# Patient Record
Sex: FEMALE | Race: BLACK OR AFRICAN AMERICAN | Employment: UNEMPLOYED | URBAN - METROPOLITAN AREA
[De-identification: names, ages, dates, MRNs, and addresses within clinical notes are randomized per-mention and may not be internally consistent; named-entity substitution may affect disease eponyms.]

---

## 2017-01-14 ENCOUNTER — HOSPITAL ENCOUNTER (OUTPATIENT)
Dept: GENERAL RADIOLOGY | Age: 57
Discharge: HOME | End: 2017-01-14
Payer: COMMERCIAL

## 2017-01-14 DIAGNOSIS — J45.41 MODERATE PERSISTENT ASTHMA WITH EXACERBATION: ICD-10-CM

## 2017-01-14 PROCEDURE — 71020 XR CHEST PA LAT: CPT

## 2017-01-25 ENCOUNTER — HOSPITAL ENCOUNTER (OUTPATIENT)
Dept: ULTRASOUND IMAGING | Age: 57
Discharge: HOME | End: 2017-01-25
Attending: FAMILY MEDICINE
Payer: COMMERCIAL

## 2017-01-25 ENCOUNTER — HOSPITAL ENCOUNTER (OUTPATIENT)
Dept: MAMMOGRAPHY | Age: 57
Discharge: HOME | End: 2017-01-25
Attending: FAMILY MEDICINE
Payer: COMMERCIAL

## 2017-01-25 DIAGNOSIS — N63.0 LUMP OR MASS IN BREAST: ICD-10-CM

## 2017-01-25 PROCEDURE — 77065 DX MAMMO INCL CAD UNI: CPT

## 2017-01-25 PROCEDURE — 76642 ULTRASOUND BREAST LIMITED: CPT

## 2017-02-06 ENCOUNTER — OFFICE VISIT (OUTPATIENT)
Dept: OBGYN CLINIC | Age: 57
End: 2017-02-06

## 2017-02-06 VITALS
WEIGHT: 222 LBS | DIASTOLIC BLOOD PRESSURE: 81 MMHG | HEIGHT: 66 IN | SYSTOLIC BLOOD PRESSURE: 129 MMHG | HEART RATE: 104 BPM | BODY MASS INDEX: 35.68 KG/M2

## 2017-02-06 DIAGNOSIS — N82.8 VAGINAL FISTULA: Primary | ICD-10-CM

## 2017-02-06 DIAGNOSIS — N89.8 VAGINAL LESION: ICD-10-CM

## 2017-02-07 DIAGNOSIS — N89.8 VAGINAL LESION: ICD-10-CM

## 2017-02-07 RX ORDER — VALACYCLOVIR HYDROCHLORIDE 1 G/1
1000 TABLET, FILM COATED ORAL DAILY
Qty: 5 TAB | Refills: 0 | Status: SHIPPED | OUTPATIENT
Start: 2017-02-07 | End: 2017-02-09 | Stop reason: SDUPTHER

## 2017-02-07 NOTE — PATIENT INSTRUCTIONS
Injury to the Rectum and Vagina: Care Instructions  Your Care Instructions  Injury to the rectum and vagina can cause many problems. These problems may include rectal or vaginal bleeding, infection, constipation, pain, or leaking of stool. The injury can be caused by an accident, childbirth, or physical or sexual abuse. Medicines can treat pain. They can also prevent infection. Surgery may be needed to treat severe injuries. Follow-up care is a key part of your treatment and safety. Be sure to make and go to all appointments, and call your doctor if you are having problems. It's also a good idea to know your test results and keep a list of the medicines you take. How can you care for yourself at home? · Be safe with medicines. Read and follow all instructions on the label. ¨ If the doctor gave you a prescription medicine for pain, take it as prescribed. ¨ If you are not taking a prescription pain medicine, ask your doctor if you can take an over-the-counter medicine. · If your doctor suggests, sit in a few inches of water (sitz bath) 3 times a day and after bowel movements. The warm water helps with pain and itching. · If you do not have a safe place to stay, tell your doctor. When should you call for help? Call 911 anytime you think you may need emergency care. For example, call if:  · You passed out (lost consciousness). · You feel that you are in danger. Call your doctor now or seek immediate medical care if:  · You have new or worse rectal or vaginal bleeding. · You are dizzy or lightheaded, or you feel like you may faint. · You have a fever. · You have sudden, severe pain in your belly or pelvis. Watch closely for changes in your health, and be sure to contact your doctor if:  · You need support for domestic violence or sexual abuse. · You do not get better as expected. Where can you learn more? Go to http://faye-jyada.info/.   Enter P687 in the search box to learn more about \"Injury to the Rectum and Vagina: Care Instructions. \"  Current as of: May 27, 2016  Content Version: 11.1  © 6257-5004 [employer], [employer]. Care instructions adapted under license by [employer] (which disclaims liability or warranty for this information). If you have questions about a medical condition or this instruction, always ask your healthcare professional. [address] any warranty or liability for your use of this information.

## 2017-02-07 NOTE — PROGRESS NOTES
SUBJ: Ms. Kadie Bush is a 49 y.o. y/o female, , who presents today for     Chief Complaint   Patient presents with    Vaginal Pain     vaginal cyst       Her LMP was No LMP recorded. Patient is not currently having periods (Reason: Menopause). Kelly Navarro is a new patient to our practice. She c/o a vaginal abscess that has caused her pain for approx 1 week. These abscesses reoccur approx every 3 years and then self-resolve. This particular lesion was located on her labia and was \"enlarged to the size of a golfball then started to drain pus on its own\". She was evaluated by her PCP for this concern on 2/3/17 and treated with a ABX course of ciprofloxacin. Today, she states that the lesion is almost gone and no longer causes her pain or is draining. 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 Per her report, she experienced a 3rd or 4th degree repair following the vaginal delivery of her first child and this eventually caused her fistula. She recalls a time where \"poop came from her vagina\" many years ago but denies this ever occurring again, she has seen 3 different GI specialists; however, none have addressed her concerns so she desires to follow up here. She denies pain, abnormal bleeding or other  symptoms today.      Past Medical History   Diagnosis Date    Asthma     Chest pain, unspecified      Possible Angina, GERD, chest wall pains, recurrent pains, possible atypical angina, happens at rest, abnormal nuc scan in past, discussed risk benefit options of cardiac cath/pci, she wants to think it over, add sl ntg    Coronary atherosclerosis of native coronary artery      Abnormal NUC scan, patient's symptoms are better, will start meds and monitor    Hypercholesterolemia     Obesity, unspecified      Discussed diet    Other and unspecified hyperlipidemia      F/u per pmd on zocor    Pain of right thumb     Trigger thumb of right hand       Past Surgical History   Procedure Laterality Date    Hx  section      Hx cholecystectomy       Family History   Problem Relation Age of Onset    Hypertension Mother     Diabetes Father     Colon Cancer Father     Hypertension Maternal Grandmother     Heart Disease Neg Hx      no family history of heart disease     Social History     Social History    Marital status: LEGALLY      Spouse name: N/A    Number of children: N/A    Years of education: N/A     Social History Main Topics    Smoking status: Former Smoker     Quit date: 2/25/1995    Smokeless tobacco: Never Used    Alcohol use 0.0 oz/week     0 Standard drinks or equivalent per week      Comment: socially    Drug use: No    Sexual activity: Yes     Partners: Male     Birth control/ protection: None     Other Topics Concern    None     Social History Narrative         PE:   Visit Vitals    /81 (BP 1 Location: Right arm, BP Patient Position: Sitting)    Pulse (!) 104    Ht 5' 6\" (1.676 m)    Wt 222 lb (100.7 kg)    BMI 35.83 kg/m2       Pt is a well developed female who is alert and oriented x 3. CVS exam: normal rate, regular rhythm, normal S1, S2, no murmurs, rubs, clicks or gallops. Lungs: clear bilaterally to auscultation, no wheezing, rhonci or rales  Pelvic exam: VULVA: 3 small vulvar lesion <1 cm each located on labia minora, vesicles, tender on palpation, VAGINA: normal appearing vagina with normal color and discharge, no lesions, CERVIX: normal appearing cervix without discharge or lesions, cervical motion tenderness absent, UTERUS: uterus is normal size, shape, consistency and nontender, ADNEXA: normal adnexa in size, nontender and no masses, RECTAL: normal rectal, no masses, normal sphincter tone. Assessment/Plan:       1. Vaginal lesion  Discussed findings on exam appear herpetic, consistent with pain and pustular appearance  Patient denies hx of HSV 2  Explained HSV transmission  - CULTURE, HSV W/ TYPING;  Future --> pending  - valACYclovir (VALTREX) 1 gram tablet; Take 1 Tab by mouth daily for 5 days. Dispense: 5 Tab; Refill: 0    2. Vaginal fistula  Will await US results to learn if fistula present  Discussed GI referral  - [address];  Future    Instructed to visit ED if bloody stools or pain  She verbalizes understanding  RTC in 2 wks to f/u and results    >50% of this visit spent in counseling

## 2017-02-09 DIAGNOSIS — B00.9 HSV-2 (HERPES SIMPLEX VIRUS 2) INFECTION: Primary | ICD-10-CM

## 2017-02-09 LAB — HSV SPEC CULT: ABNORMAL

## 2017-02-09 RX ORDER — VALACYCLOVIR HYDROCHLORIDE 1 G/1
1000 TABLET, FILM COATED ORAL DAILY
Qty: 5 TAB | Refills: 4 | Status: SHIPPED | OUTPATIENT
Start: 2017-02-09 | End: 2017-02-09 | Stop reason: CLARIF

## 2017-02-09 RX ORDER — VALACYCLOVIR HYDROCHLORIDE 1 G/1
1000 TABLET, FILM COATED ORAL DAILY
Qty: 5 TAB | Refills: 4 | Status: SHIPPED | OUTPATIENT
Start: 2017-02-09 | End: 2017-02-14

## 2017-02-09 NOTE — PROGRESS NOTES
Please notify patient of HSV results. She was treated prophylactically at last visit. Will send refills to pharmacy in case of recurrent outbreak.

## 2017-02-16 ENCOUNTER — HOSPITAL ENCOUNTER (OUTPATIENT)
Dept: ULTRASOUND IMAGING | Age: 57
Discharge: HOME | End: 2017-02-16
Attending: ADVANCED PRACTICE MIDWIFE
Payer: COMMERCIAL

## 2017-02-16 DIAGNOSIS — N82.8 VAGINAL FISTULA: ICD-10-CM

## 2017-02-16 PROCEDURE — 76830 TRANSVAGINAL US NON-OB: CPT

## 2017-02-21 ENCOUNTER — TELEPHONE (OUTPATIENT)
Dept: OBGYN CLINIC | Age: 57
End: 2017-02-21

## 2017-02-22 ENCOUNTER — TELEPHONE (OUTPATIENT)
Dept: OBGYN CLINIC | Age: 57
End: 2017-02-22

## 2017-02-27 ENCOUNTER — OFFICE VISIT (OUTPATIENT)
Dept: SURGERY | Age: 57
End: 2017-02-27

## 2017-02-27 VITALS
WEIGHT: 227 LBS | BODY MASS INDEX: 36.48 KG/M2 | SYSTOLIC BLOOD PRESSURE: 142 MMHG | DIASTOLIC BLOOD PRESSURE: 78 MMHG | OXYGEN SATURATION: 93 % | HEART RATE: 86 BPM | HEIGHT: 66 IN | RESPIRATION RATE: 17 BRPM | TEMPERATURE: 98.6 F

## 2017-02-27 DIAGNOSIS — N82.3 RECTOVAGINAL FISTULA: Primary | ICD-10-CM

## 2017-02-27 NOTE — MR AVS SNAPSHOT
Visit Information Date & Time Provider Department Dept. Phone Encounter #  
 2/27/2017  2:15 PM Lane Vázquez MD [address] [place], [place] [phone] [id number] Your Appointments 3/23/2017 11:00 AM  
ESTABLISHED PATIENT with Mike Hussein MD  
[facility]) Appt Note: 6 months; 6 months [address] [facility], [address] [place] [address] Afia Burleson, [address] [place] [address] Upcoming Health Maintenance Date Due Hepatitis C Screening 1960 Pneumococcal 19-64 Medium Risk (1 of 1 - PPSV23) 9/13/1981 DTaP/Tdap/Td series (1 - Tdap) 9/13/1983 PAP AKA CERVICAL CYTOLOGY 9/13/1983 FOBT Q 1 YEAR AGE 50-75 9/13/2012 INFLUENZA AGE 9 TO ADULT 8/1/2016 BREAST CANCER SCRN MAMMOGRAM 1/25/2019 Allergies as of 2/27/2017  Review Complete On: 2/27/2017 By: Lane Vázquez MD  
  
 Severity Noted Reaction Type Reactions Penicillins  02/20/2013    Not Reported This Time Current Immunizations  Never Reviewed No immunizations on file. Not reviewed this visit You Were Diagnosed With   
  
 Codes Comments Rectovaginal fistula    -  Primary ICD-10-CM: N82.3 ICD-9-CM: 619.1 Vitals BP  
  
  
  
  
  
 142/78 (BP 1 Location: Left arm, BP Patient Position: Sitting) Vitals History BMI and BSA Data Body Mass Index Body Surface Area  
 36.64 kg/m 2 2.19 m 2 Preferred Pharmacy Pharmacy Name Phone [id number] [address] [phone] Your Updated Medication List  
  
   
This list is accurate as of: 2/27/17  3:09 PM.  Always use your most recent med list.  
  
  
  
  
 * albuterol 90 mcg/actuation inhaler Commonly known as:  PROVENTIL HFA, VENTOLIN HFA, PROAIR HFA Take  by inhalation. * albuterol 2.5 mg /3 mL (0.083 %) nebulizer solution Commonly known as:  PROVENTIL VENTOLIN  
by Nebulization route once. aspirin 81 mg tablet Take 81 mg by mouth daily. atorvastatin 20 mg tablet Commonly known as:  LIPITOR Take 1 Tab by mouth daily. BIOTIN PO Take 1,000 mcg by mouth. cholecalciferol 1,000 unit Cap Commonly known as:  VITAMIN D3 Take  by mouth. HYDROcodone-acetaminophen 7.5-325 mg per tablet Commonly known as:  Alicia Parisian Take 1-2 Tabs by mouth nightly as needed for Pain. Max Daily Amount: 2 Tabs. metoprolol succinate 25 mg XL tablet Commonly known as:  TOPROL-XL Take 1 Tab by mouth daily. mometasone 220 mcg (60 doses) inhaler Commonly known as:  Anne Lofts Take  by inhalation. montelukast 10 mg tablet Commonly known as:  SINGULAIR Take 10 mg by mouth daily. MULTI VITAMIN PO Take  by mouth. nitroglycerin 0.4 mg SL tablet Commonly known as:  NITROSTAT  
1 Tab by SubLINGual route every five (5) minutes as needed for Chest Pain.  
  
 vitamin c-vitamin e Cap Take  by mouth. zinc 50 mg Tab tablet Take  by mouth daily. * Notice: This list has 2 medication(s) that are the same as other medications prescribed for you. Read the directions carefully, and ask your doctor or other care provider to review them with you. Introducing [facility]! Kyle Bull introduces [employer] patient portal. Now you can access parts of your medical record, email your doctor's office, and request medication refills online. 1. In your internet browser, go to [email or web address] 2. Click on the First Time User? Click Here link in the Sign In box. You will see the New Member Sign Up page. 3. Enter your [employer] Access Code exactly as it appears below. You will not need to use this code after youve completed the sign-up process. If you do not sign up before the expiration date, you must request a new code. · [employer] Access Code: [email or web address] Expires: 4/14/2017 11:55 AM 
 
4. Enter the last four digits of your Social Security Number (xxxx) and Date of Birth (mm/dd/yyyy) as indicated and click Submit. You will be taken to the next sign-up page. 5. Create a FriendsClear ID. This will be your [employer] login ID and cannot be changed, so think of one that is secure and easy to remember. 6. Create a [employer] password. You can change your password at any time. 7. Enter your Password Reset Question and Answer. This can be used at a later time if you forget your password. 8. Enter your e-mail address. You will receive e-mail notification when new information is available in [address] 9. Click Sign Up. You can now view and download portions of your medical record. 10. Click the Download Summary menu link to download a portable copy of your medical information. If you have questions, please visit the Frequently Asked Questions section of the [employer] website. Remember, [employer] is NOT to be used for urgent needs. For medical emergencies, dial 911. Now available from your iPhone and Android! Please provide this summary of care documentation to your next provider. Your primary care clinician is listed as Rhett Johns. If you have any questions after today's visit, please call [phone].

## 2017-02-27 NOTE — PROGRESS NOTES
HPI: Madelin Palomo is a 49 y.o. female presenting with chief complain of rectovaginal fistula. This occurred after the delivery of her first child. She had 3 repairs. It recurred a year later and have a second repair. Most recently she has developed abscesses which have been drained every 2-3 years. This sounds as if they have been drained transvaginally by a gynecologist.  She denies abdominal pain nausea or vomiting or recent weight loss. She has had 3 bowel movements per day denies constipation or diarrhea. Her last colonoscopy was in  and was negative. She denies rectal bleeding. She denies fecal incontinence. She denies feces per vagina. She is a  with 1 spontaneous delivery and 3 C-sections. She has occasionally urinary frequency.     Past Medical History:   Diagnosis Date    Asthma     Chest pain, unspecified     Possible Angina, GERD, chest wall pains, recurrent pains, possible atypical angina, happens at rest, abnormal nuc scan in past, discussed risk benefit options of cardiac cath/pci, she wants to think it over, add sl ntg    Coronary atherosclerosis of native coronary artery     Abnormal NUC scan, patient's symptoms are better, will start meds and monitor    Heart murmur     Hypercholesterolemia     Obesity, unspecified     Discussed diet    Other and unspecified hyperlipidemia     F/u per pmd on zocor    Pain of right thumb     Trigger thumb of right hand        Past Surgical History:   Procedure Laterality Date    HX  SECTION      HX CHOLECYSTECTOMY      HX COLONOSCOPY         Family History   Problem Relation Age of Onset    Hypertension Mother     Diabetes Father     Colon Cancer Father     Cancer Father      prosate    Hypertension Maternal Grandmother     Heart Disease Neg Hx      no family history of heart disease       Social History     Social History    Marital status: LEGALLY      Spouse name: N/A    Number of children: N/A    Years of education: N/A     Social History Main Topics    Smoking status: Former Smoker     Quit date: 2/25/1995    Smokeless tobacco: Never Used    Alcohol use 0.0 oz/week     0 Standard drinks or equivalent per week      Comment: socially    Drug use: No    Sexual activity: Yes     Partners: Male     Birth control/ protection: None     Other Topics Concern    None     Social History Narrative       Review of Systems - Review of Systems   Constitutional: Positive for chills, fever and malaise/fatigue. Negative for diaphoresis and weight loss. HENT: Negative for congestion, ear discharge, ear pain, hearing loss, nosebleeds, sore throat and tinnitus. Eyes: Positive for pain. Negative for blurred vision, double vision, photophobia, discharge and redness. Right eye pain with headache only   Respiratory: Positive for wheezing. Negative for cough, hemoptysis, sputum production, shortness of breath and stridor. Cardiovascular: Negative. Gastrointestinal: Negative. Genitourinary: Negative. Musculoskeletal: Negative. Skin: Positive for itching and rash. excema   Neurological: Positive for headaches. Negative for dizziness, tingling, tremors, sensory change, speech change, focal weakness, seizures, loss of consciousness and weakness. Endo/Heme/Allergies: Negative. Psychiatric/Behavioral: Negative. Outpatient Prescriptions Marked as Taking for the 2/27/17 encounter (Office Visit) with Axel Gill MD   Medication Sig Dispense Refill    MULTIVIT &MINERALS/FERROUS FUM (MULTI VITAMIN PO) Take  by mouth.  vitamin c-vitamin e cap Take  by mouth.  montelukast (SINGULAIR) 10 mg tablet Take 10 mg by mouth daily.  zinc 50 mg Tab Take  by mouth daily.          Allergies   Allergen Reactions    Penicillins Not Reported This Time       Vitals:    02/27/17 1423   BP: 142/78   Pulse: 86   Resp: 17   Temp: 98.6 °F (37 °C)   TempSrc: Oral   SpO2: 93%   Weight: 103 kg (227 lb)   Height: 5' 6\" (1.676 m)   PainSc:   0 - No pain       Physical Exam   Constitutional: She appears well-developed and well-nourished. HENT:   Head: Normocephalic and atraumatic. Eyes: Conjunctivae and EOM are normal.   Abdominal: Soft. She exhibits no distension. There is no tenderness. Musculoskeletal: Normal range of motion. Lymphadenopathy:     She has no cervical adenopathy. Right: No inguinal adenopathy present. Left: No inguinal adenopathy present. Neurological: She exhibits normal muscle tone. Skin: Skin is warm and dry. No rash noted. Psychiatric: She has a normal mood and affect. Her speech is normal.   Rectum: no external hemorrhoids, fissure, secondary fistulous opening  RALF good tone, no mass  Anoscopy: no visible internal opening, mild internal hemorrhoids L lateral. R posterior  Vaginal wall: no clear external opening    Assessment / Plan    Possible rectovaginal fistula with recurrent abscesses  OR for EUA, possible seton  Cardiac clearance given prior cardiac issues    The diagnoses and plan were discussed with the patient. All questions answered. Plan of care agreed to by all concerned.

## 2017-02-27 NOTE — LETTER
2/27/2017 3:11 PM 
 
Patient:  Portia Ralph YOB: 1960 Date of Visit: 2/27/2017 Wilfredo Ordoñez MD 
[address] [place] [address] [address] [address] VIA Facsimile: [phone] Alondra Slaughter, [address] [facility] [place] [address] VIA In Basket Dear Mary Grace Ann, I saw Julio Carroll in the office today for persistent abscesses in the rectovaginal area. She has a history of rectovaginal fistula status post multiple prior repairs, however these were several years ago. More currently she has had issues with recurrent abscesses requiring drainage. On exam she has no clear fistula but we will proceed to the operating room for exam under anesthesia to see if we can define it. Thank you very much for your referral of Ms. Roland Carrion. If you have questions, please do not hesitate to call me. I look forward to following Ms. Jaffe along with you and will keep you updated as to her progress. Sincerely, Ilene Romo MD

## 2017-03-23 ENCOUNTER — OFFICE VISIT (OUTPATIENT)
Dept: CARDIOLOGY CLINIC | Age: 57
End: 2017-03-23

## 2017-03-23 VITALS
SYSTOLIC BLOOD PRESSURE: 115 MMHG | HEIGHT: 66 IN | DIASTOLIC BLOOD PRESSURE: 84 MMHG | BODY MASS INDEX: 36.32 KG/M2 | WEIGHT: 226 LBS | HEART RATE: 92 BPM

## 2017-03-23 DIAGNOSIS — I49.3 PVC (PREMATURE VENTRICULAR CONTRACTION): Primary | ICD-10-CM

## 2017-03-23 DIAGNOSIS — Z01.810 PRE-OPERATIVE CARDIOVASCULAR EXAMINATION: ICD-10-CM

## 2017-03-23 DIAGNOSIS — Q21.12 PFO (PATENT FORAMEN OVALE): ICD-10-CM

## 2017-03-23 DIAGNOSIS — E78.00 HYPERCHOLESTEROLEMIA: ICD-10-CM

## 2017-03-23 NOTE — PROGRESS NOTES
1. Have you been to the ER, urgent care clinic since your last visit? Hospitalized since your last visit?     no    2. Have you seen or consulted any other health care providers outside of the [address] [place] [address] since your last visit? Include any pap smears or colon screening. Yes, pcp    3. Since your last visit, have you had any of the following symptoms? No cardiac symptoms    4. Have you had any blood work, X-rays or cardiac testing? Yes, [facility]iner    5. Where do you normally have your labs drawn?   Located within [facility]    6. Do you need any refills today?    no

## 2017-03-23 NOTE — PROGRESS NOTES
HISTORY OF PRESENT ILLNESS  Amrik Rojas is a 49 y.o. female. Cholesterol Problem   The history is provided by the patient. This is a chronic problem. The problem occurs constantly. Pertinent negatives include no chest pain, no abdominal pain, no headaches and no shortness of breath. Pre-op Exam   The history is provided by the patient. This is a new problem. Pertinent negatives include no chest pain, no abdominal pain, no headaches and no shortness of breath. Shortness of Breath   The history is provided by the patient. This is a recurrent problem. The problem occurs intermittently. The problem has not changed since onset. Associated symptoms include wheezing. Pertinent negatives include no fever, no headaches, no cough, no sputum production, no hemoptysis, no PND, no orthopnea, no chest pain, no vomiting, no abdominal pain, no rash, no leg swelling and no claudication. The problem's precipitants include exercise. Palpitations    The history is provided by the patient. This is a recurrent problem. The problem has not changed since onset. The problem occurs rarely. Pertinent negatives include no fever, no chest pain, no claudication, no orthopnea, no PND, no abdominal pain, no nausea, no vomiting, no headaches, no dizziness, no weakness, no cough, no hemoptysis, no shortness of breath and no sputum production. Review of Systems   Constitutional: Negative for chills and fever. HENT: Negative for nosebleeds. Eyes: Negative for blurred vision and double vision. Respiratory: Positive for wheezing. Negative for cough, hemoptysis, sputum production and shortness of breath. Cardiovascular: Negative for chest pain, palpitations, orthopnea, claudication, leg swelling and PND. Gastrointestinal: Negative for abdominal pain, heartburn, nausea and vomiting. Musculoskeletal: Negative for myalgias. Skin: Negative for rash. Neurological: Negative for dizziness, weakness and headaches. Endo/Heme/Allergies: Does not bruise/bleed easily. Family History   Problem Relation Age of Onset    Hypertension Mother     Diabetes Father     Colon Cancer Father     Cancer Father      prosate    Hypertension Maternal Grandmother     Heart Disease Neg Hx      no family history of heart disease       Past Medical History:   Diagnosis Date    Asthma     Chest pain, unspecified     Possible Angina, GERD, chest wall pains, recurrent pains, possible atypical angina, happens at rest, abnormal nuc scan in past, discussed risk benefit options of cardiac cath/pci, she wants to think it over, add sl ntg    Coronary atherosclerosis of native coronary artery     Abnormal NUC scan, patient's symptoms are better, will start meds and monitor    Heart murmur     Hypercholesterolemia     Obesity, unspecified     Discussed diet    Other and unspecified hyperlipidemia     F/u per pmd on zocor    Pain of right thumb     Trigger thumb of right hand        Past Surgical History:   Procedure Laterality Date    HX  SECTION      HX CHOLECYSTECTOMY      HX COLONOSCOPY         Social History   Substance Use Topics    Smoking status: Former Smoker     Quit date: 1995    Smokeless tobacco: Never Used    Alcohol use 0.0 oz/week     0 Standard drinks or equivalent per week      Comment: socially       Allergies   Allergen Reactions    Penicillins Not Reported This Time       Current Outpatient Prescriptions   Medication Sig    OMEGA-3 FATTY ACIDS/FISH OIL (FISH OIL EXTRA STRENGTH PO) Take  by mouth daily.  umeclidinium-vilanterol (ANORO ELLIPTA) 62.5-25 mcg/actuation inhaler Take 1 Puff by inhalation daily.  atorvastatin (LIPITOR) 20 mg tablet Take 1 Tab by mouth daily.  nitroglycerin (NITROSTAT) 0.4 mg SL tablet 1 Tab by SubLINGual route every five (5) minutes as needed for Chest Pain.  MULTIVIT &MINERALS/FERROUS FUM (MULTI VITAMIN PO) Take  by mouth.     Cholecalciferol, Vitamin D3, 1,000 unit cap Take  by mouth.  albuterol (PROVENTIL HFA, VENTOLIN HFA, PROAIR HFA) 90 mcg/actuation inhaler Take  by inhalation.  montelukast (SINGULAIR) 10 mg tablet Take 10 mg by mouth daily.  aspirin 81 mg tablet Take 81 mg by mouth daily.  zinc 50 mg Tab Take  by mouth daily.  BIOTIN PO Take 1,000 mcg by mouth. No current facility-administered medications for this visit. Visit Vitals    /84    Pulse 92    Ht 5' 6\" (1.676 m)    Wt 102.5 kg (226 lb)    BMI 36.48 kg/m2         Physical Exam   Constitutional: She is oriented to person, place, and time. She appears well-developed and well-nourished. HENT:   Head: Normocephalic and atraumatic. Eyes: Conjunctivae are normal.   Neck: Neck supple. No JVD present. No tracheal deviation present. No thyromegaly present. Cardiovascular: Normal rate. An irregular rhythm present. PMI is not displaced. Exam reveals no gallop, no S3 and no decreased pulses. No murmur heard. Pulmonary/Chest: No respiratory distress. She has no wheezes. She has no rales. She exhibits no tenderness. Abdominal: Soft. There is no tenderness. Musculoskeletal: She exhibits no edema. Neurological: She is alert and oriented to person, place, and time. Skin: Skin is warm. Psychiatric: She has a normal mood and affect. Ms. Kadie Bush has a reminder for a \"due or due soon\" health maintenance. I have asked that she contact her primary care provider for follow-up on this health maintenance. CARDIOLOGY STUDIES 3/1/2012   Myocardial Perfusion Scan Result Abnormal; ANT ischemia   Echocardiogram - Complete Result Normal EF     SUMMARY:1/2015:echo  Left ventricle: Systolic function was normal. Ejection fraction was  estimated in the range of 55 % to 60 %. There were no regional wall motion  abnormalities. Doppler parameters were consistent with abnormal left  ventricular relaxation (grade 1 diastolic dysfunction).     Atrial septum: Positive bubble study with a right to left shunt induced by  the Valsalva maneuver. Interatrial septum is aneurysmal with a jump  rope-like appearance. Mitral valve: There was mild regurgitation. Tricuspid valve: There was mild regurgitation. Pulmonary artery systolic  pressure: 30 mmHg. NUCLEAR IMAGIN2015         Findings:   1. Stress images reveal small reduced Myoview uptake in moderate sized area of anterior wall seen in short axis and vertical long axis views. 2. Resting images have no evidence of redistribution in the anterior wall. 3. Gated images reveal normal wall motion and ejection fraction is calculated a 64%. Conclusion:   1. Normal scan. 2. Evidence of a fixed anterior defect is most likely due to soft tissue attenuation. 3. Normal wall motion and ejection fraction. 4. Low risk scan. I have personally reviewed patients ekg done at other facility. Sr,pvc-2016  I Have personally reviewed recent relevant labs available and discussed with patient  2016  High ldl  Assessment       ICD-10-CM ICD-9-CM    1. PVC (premature ventricular contraction) I49.3 427.69 AMB POC EKG ROUTINE W/ 12 LEADS, INTER & REP    stable  OFF METOPROLOL     2. PFO (patent foramen ovale) Q21.1 745.5     old stable asymptomatic   3. Hypercholesterolemia E78.00 272.0     stable   4.  Pre-operative cardiovascular examination Z01.810 V72.81 AMB POC EKG ROUTINE W/ 12 LEADS, INTER & REP    STABLE  cardiac status  ok for fistula surgery       Medications Discontinued During This Encounter   Medication Reason    HYDROcodone-acetaminophen (NORCO) 7.5-325 mg per tablet Not A Current Medication    albuterol (PROVENTIL VENTOLIN) 2.5 mg /3 mL (0.083 %) nebulizer solution Not A Current Medication    mometasone (ASMANEX TWISTHALER) 220 mcg (60 doses) inhaler Not A Current Medication    vitamin c-vitamin e cap Not A Current Medication    metoprolol succinate (TOPROL-XL) 25 mg XL tablet Not A Current Medication       Orders Placed This Encounter    AMB POC EKG ROUTINE W/ 12 LEADS, INTER & REP     Order Specific Question:   Reason for Exam:     Answer:   surgical clearance       Follow-up Disposition:  Return in about 6 months (around 9/23/2017).

## 2017-03-23 NOTE — LETTER
Otto Karina 1960 
 
3/23/2017 Dear MD Rolando Mathew MD 
 
I had the pleasure of evaluating  Ms. Jaffe in office today. Below are the relevant portions of my assessment and plan of care. ICD-10-CM ICD-9-CM 1. PVC (premature ventricular contraction) I49.3 427.69 AMB POC EKG ROUTINE W/ 12 LEADS, INTER & REP  
 stable OFF METOPROLOL 2. PFO (patent foramen ovale) Q21.1 745.5   
 old stable asymptomatic 3. Hypercholesterolemia E78.00 272.0   
 stable 4. Pre-operative cardiovascular examination Z01.810 V72.81 AMB POC EKG ROUTINE W/ 12 LEADS, INTER & REP  
 STABLE 
cardiac status 
ok for fistula surgery Current Outpatient Prescriptions Medication Sig Dispense Refill  OMEGA-3 FATTY ACIDS/FISH OIL (FISH OIL EXTRA STRENGTH PO) Take  by mouth daily.  umeclidinium-vilanterol (ANORO ELLIPTA) 62.5-25 mcg/actuation inhaler Take 1 Puff by inhalation daily.  atorvastatin (LIPITOR) 20 mg tablet Take 1 Tab by mouth daily. 30 Tab 6  
 nitroglycerin (NITROSTAT) 0.4 mg SL tablet 1 Tab by SubLINGual route every five (5) minutes as needed for Chest Pain. 25 Tab 1  MULTIVIT &MINERALS/FERROUS FUM (MULTI VITAMIN PO) Take  by mouth.  Cholecalciferol, Vitamin D3, 1,000 unit cap Take  by mouth.  albuterol (PROVENTIL HFA, VENTOLIN HFA, PROAIR HFA) 90 mcg/actuation inhaler Take  by inhalation.  montelukast (SINGULAIR) 10 mg tablet Take 10 mg by mouth daily.  aspirin 81 mg tablet Take 81 mg by mouth daily.  zinc 50 mg Tab Take  by mouth daily.  BIOTIN PO Take 1,000 mcg by mouth. Orders Placed This Encounter  AMB POC EKG ROUTINE W/ 12 LEADS, INTER & REP Order Specific Question:   Reason for Exam: Answer:   surgical clearance  OMEGA-3 FATTY ACIDS/FISH OIL (FISH OIL EXTRA STRENGTH PO) Sig: Take  by mouth daily.  umeclidinium-vilanterol (ANORO ELLIPTA) 62.5-25 mcg/actuation inhaler Sig: Take 1 Puff by inhalation daily. If you have questions, please do not hesitate to call me. I look forward to following Ms. Jaffe along with you. Sincerely, Juanita Alvarado MD

## 2017-03-23 NOTE — MR AVS SNAPSHOT
Visit Information Date & Time Provider Department Dept. Phone Encounter #  
 3/23/2017 11:00 AM Rolando Dos Santos MD [facility] [address] [place] [address] [id number] Follow-up Instructions Return in about 6 months (around 9/23/2017). Your Appointments 9/6/2017  9:00 AM  
ESTABLISHED PATIENT with Rolando Dos Santos MD  
[facility]) Appt Note: 6 months [address] [facility], [address], [address] [place] [address] Upcoming Health Maintenance Date Due Hepatitis C Screening 1960 Pneumococcal 19-64 Medium Risk (1 of 1 - PPSV23) 9/13/1981 DTaP/Tdap/Td series (1 - Tdap) 9/13/1983 PAP AKA CERVICAL CYTOLOGY 9/13/1983 FOBT Q 1 YEAR AGE 50-75 9/13/2012 INFLUENZA AGE 9 TO ADULT 8/1/2016 BREAST CANCER SCRN MAMMOGRAM 1/25/2019 Allergies as of 3/23/2017  Review Complete On: 3/23/2017 By: Rolando Dos Santos MD  
  
 Severity Noted Reaction Type Reactions Penicillins  02/20/2013    Not Reported This Time Current Immunizations  Never Reviewed No immunizations on file. Not reviewed this visit You Were Diagnosed With   
  
 Codes Comments PVC (premature ventricular contraction)    -  Primary ICD-10-CM: I49.3 ICD-9-CM: 427.69 stable OFF METOPROLOL 
  
 PFO (patent foramen ovale)     ICD-10-CM: Q21.1 ICD-9-CM: 0.11 old stable asymptomatic Hypercholesterolemia     ICD-10-CM: E78.00 ICD-9-CM: 272.0 stable Pre-operative cardiovascular examination     ICD-10-CM: Z01.810 ICD-9-CM: V72.81 STABLE 
cardiac status 
ok for fistula surgery Vitals BP Pulse Height(growth percentile) Weight(growth percentile) BMI OB Status 115/84 92 5' 6\" (1.676 m) 226 lb (102.5 kg) 36.48 kg/m2 Menopause Smoking Status Former Smoker Vitals History BMI and BSA Data Body Mass Index Body Surface Area 36.48 kg/m 2 2.18 m 2 Preferred Pharmacy Pharmacy Name Phone [facility] [address] [phone] Your Updated Medication List  
  
   
This list is accurate as of: 3/23/17 12:18 PM.  Always use your most recent med list.  
  
  
  
  
 albuterol 90 mcg/actuation inhaler Commonly known as:  PROVENTIL HFA, VENTOLIN HFA, PROAIR HFA Take  by inhalation. ANORO ELLIPTA 62.5-25 mcg/actuation inhaler Generic drug:  umeclidinium-vilanterol Take 1 Puff by inhalation daily. aspirin 81 mg tablet Take 81 mg by mouth daily. atorvastatin 20 mg tablet Commonly known as:  LIPITOR Take 1 Tab by mouth daily. BIOTIN PO Take 1,000 mcg by mouth. cholecalciferol 1,000 unit Cap Commonly known as:  VITAMIN D3 Take  by mouth. FISH OIL EXTRA STRENGTH PO Take  by mouth daily. montelukast 10 mg tablet Commonly known as:  SINGULAIR Take 10 mg by mouth daily. MULTI VITAMIN PO Take  by mouth. nitroglycerin 0.4 mg SL tablet Commonly known as:  NITROSTAT  
1 Tab by SubLINGual route every five (5) minutes as needed for Chest Pain. zinc 50 mg Tab tablet Take  by mouth daily. We Performed the Following AMB POC EKG ROUTINE W/ 12 LEADS, INTER & REP [07199 CPT(R)] Follow-up Instructions Return in about 6 months (around 9/23/2017). Introducing [facility]! Rah Cage introduces [employer] patient portal. Now you can access parts of your medical record, email your doctor's office, and request medication refills online. 1. In your internet browser, go to [email or web address] 2. Click on the First Time User? Click Here link in the Sign In box. You will see the New Member Sign Up page. 3. Enter your [employer] Access Code exactly as it appears below. You will not need to use this code after youve completed the sign-up process.  If you do not sign up before the expiration date, you must request a new code. · [employer] Access Code: [email or web address] Expires: 4/14/2017 12:55 PM 
 
4. Enter the last four digits of your Social Security Number (xxxx) and Date of Birth (mm/dd/yyyy) as indicated and click Submit. You will be taken to the next sign-up page. 5. Create a [employer] ID. This will be your [employer] login ID and cannot be changed, so think of one that is secure and easy to remember. 6. Create a [employer] password. You can change your password at any time. 7. Enter your Password Reset Question and Answer. This can be used at a later time if you forget your password. 8. Enter your e-mail address. You will receive e-mail notification when new information is available in [address] 9. Click Sign Up. You can now view and download portions of your medical record. 10. Click the Download Summary menu link to download a portable copy of your medical information. If you have questions, please visit the Frequently Asked Questions section of the [employer] website. Remember, [employer] is NOT to be used for urgent needs. For medical emergencies, dial 911. Now available from your iPhone and Android! Please provide this summary of care documentation to your next provider. Your primary care clinician is listed as Santi Zheng. If you have any questions after today's visit, please call [phone].

## 2017-04-25 ENCOUNTER — HOSPITAL ENCOUNTER (OUTPATIENT)
Dept: LAB | Age: 57
Discharge: HOME | End: 2017-04-25
Payer: COMMERCIAL

## 2017-04-25 DIAGNOSIS — N82.3 RECTOVAGINAL FISTULA: ICD-10-CM

## 2017-04-25 LAB
ANION GAP BLD CALC-SCNC: 5 MMOL/L (ref 3–18)
BASOPHILS # BLD AUTO: 0 K/UL (ref 0–0.06)
BASOPHILS # BLD: 1 % (ref 0–2)
BUN SERPL-MCNC: 6 MG/DL (ref 7–18)
BUN/CREAT SERPL: 8 (ref 12–20)
CALCIUM SERPL-MCNC: 9.4 MG/DL (ref 8.5–10.1)
CHLORIDE SERPL-SCNC: 107 MMOL/L (ref 100–108)
CO2 SERPL-SCNC: 31 MMOL/L (ref 21–32)
CREAT SERPL-MCNC: 0.72 MG/DL (ref 0.6–1.3)
DIFFERENTIAL METHOD BLD: ABNORMAL
EOSINOPHIL # BLD: 0.3 K/UL (ref 0–0.4)
EOSINOPHIL NFR BLD: 7 % (ref 0–5)
ERYTHROCYTE [DISTWIDTH] IN BLOOD BY AUTOMATED COUNT: 12.7 % (ref 11.6–14.5)
GLUCOSE SERPL-MCNC: 95 MG/DL (ref 74–99)
HCT VFR BLD AUTO: 41.3 % (ref 35–45)
HGB BLD-MCNC: 13.2 G/DL (ref 12–16)
LYMPHOCYTES # BLD AUTO: 52 % (ref 21–52)
LYMPHOCYTES # BLD: 2.3 K/UL (ref 0.9–3.6)
MCH RBC QN AUTO: 29.1 PG (ref 24–34)
MCHC RBC AUTO-ENTMCNC: 32 G/DL (ref 31–37)
MCV RBC AUTO: 91.2 FL (ref 74–97)
MONOCYTES # BLD: 0.2 K/UL (ref 0.05–1.2)
MONOCYTES NFR BLD AUTO: 6 % (ref 3–10)
NEUTS SEG # BLD: 1.5 K/UL (ref 1.8–8)
NEUTS SEG NFR BLD AUTO: 34 % (ref 40–73)
PLATELET # BLD AUTO: 271 K/UL (ref 135–420)
PMV BLD AUTO: 10.5 FL (ref 9.2–11.8)
POTASSIUM SERPL-SCNC: 4 MMOL/L (ref 3.5–5.5)
RBC # BLD AUTO: 4.53 M/UL (ref 4.2–5.3)
SODIUM SERPL-SCNC: 143 MMOL/L (ref 136–145)
WBC # BLD AUTO: 4.4 K/UL (ref 4.6–13.2)

## 2017-04-25 PROCEDURE — 36415 COLL VENOUS BLD VENIPUNCTURE: CPT | Performed by: COLON & RECTAL SURGERY

## 2017-04-25 PROCEDURE — 80048 BASIC METABOLIC PNL TOTAL CA: CPT | Performed by: COLON & RECTAL SURGERY

## 2017-04-25 PROCEDURE — 85025 COMPLETE CBC W/AUTO DIFF WBC: CPT | Performed by: COLON & RECTAL SURGERY

## 2017-05-04 ENCOUNTER — ANESTHESIA EVENT (OUTPATIENT)
Dept: SURGERY | Age: 57
End: 2017-05-04
Payer: COMMERCIAL

## 2017-05-05 ENCOUNTER — HOSPITAL ENCOUNTER (OUTPATIENT)
Age: 57
Setting detail: OUTPATIENT SURGERY
Discharge: HOME | End: 2017-05-05
Attending: COLON & RECTAL SURGERY | Admitting: COLON & RECTAL SURGERY
Payer: COMMERCIAL

## 2017-05-05 ENCOUNTER — ANESTHESIA (OUTPATIENT)
Dept: SURGERY | Age: 57
End: 2017-05-05
Payer: COMMERCIAL

## 2017-05-05 VITALS
HEIGHT: 66 IN | BODY MASS INDEX: 36.24 KG/M2 | DIASTOLIC BLOOD PRESSURE: 73 MMHG | RESPIRATION RATE: 16 BRPM | OXYGEN SATURATION: 99 % | WEIGHT: 225.5 LBS | TEMPERATURE: 97.1 F | SYSTOLIC BLOOD PRESSURE: 104 MMHG | HEART RATE: 73 BPM

## 2017-05-05 LAB — HCG UR QL: NEGATIVE

## 2017-05-05 PROCEDURE — 76010000138 HC OR TIME 0.5 TO 1 HR: Performed by: COLON & RECTAL SURGERY

## 2017-05-05 PROCEDURE — 77030011640 HC PAD GRND REM COVD -A: Performed by: COLON & RECTAL SURGERY

## 2017-05-05 PROCEDURE — 88305 TISSUE EXAM BY PATHOLOGIST: CPT

## 2017-05-05 PROCEDURE — 76210000063 HC OR PH I REC FIRST 0.5 HR: Performed by: COLON & RECTAL SURGERY

## 2017-05-05 PROCEDURE — 74011250636 HC RX REV CODE- 250/636

## 2017-05-05 PROCEDURE — 74011250636 HC RX REV CODE- 250/636: Performed by: NURSE ANESTHETIST, CERTIFIED REGISTERED

## 2017-05-05 PROCEDURE — 81025 URINE PREGNANCY TEST: CPT

## 2017-05-05 PROCEDURE — 74011000250 HC RX REV CODE- 250

## 2017-05-05 PROCEDURE — 74011000250 HC RX REV CODE- 250: Performed by: COLON & RECTAL SURGERY

## 2017-05-05 PROCEDURE — 76210000020 HC REC RM PH II FIRST 0.5 HR: Performed by: COLON & RECTAL SURGERY

## 2017-05-05 PROCEDURE — 76060000032 HC ANESTHESIA 0.5 TO 1 HR: Performed by: COLON & RECTAL SURGERY

## 2017-05-05 PROCEDURE — 74011250637 HC RX REV CODE- 250/637: Performed by: NURSE ANESTHETIST, CERTIFIED REGISTERED

## 2017-05-05 PROCEDURE — 77030018836 HC SOL IRR NACL ICUM -A: Performed by: COLON & RECTAL SURGERY

## 2017-05-05 PROCEDURE — 77030031139 HC SUT VCRL2 J&J -A: Performed by: COLON & RECTAL SURGERY

## 2017-05-05 RX ORDER — BUPIVACAINE HYDROCHLORIDE 2.5 MG/ML
INJECTION, SOLUTION EPIDURAL; INFILTRATION; INTRACAUDAL AS NEEDED
Status: DISCONTINUED | OUTPATIENT
Start: 2017-05-05 | End: 2017-05-05 | Stop reason: HOSPADM

## 2017-05-05 RX ORDER — SODIUM CHLORIDE 0.9 % (FLUSH) 0.9 %
5-10 SYRINGE (ML) INJECTION AS NEEDED
Status: DISCONTINUED | OUTPATIENT
Start: 2017-05-05 | End: 2017-05-05 | Stop reason: HOSPADM

## 2017-05-05 RX ORDER — PROPOFOL 10 MG/ML
INJECTION, EMULSION INTRAVENOUS AS NEEDED
Status: DISCONTINUED | OUTPATIENT
Start: 2017-05-05 | End: 2017-05-05 | Stop reason: HOSPADM

## 2017-05-05 RX ORDER — INSULIN LISPRO 100 [IU]/ML
INJECTION, SOLUTION INTRAVENOUS; SUBCUTANEOUS ONCE
Status: DISCONTINUED | OUTPATIENT
Start: 2017-05-05 | End: 2017-05-05 | Stop reason: HOSPADM

## 2017-05-05 RX ORDER — ONDANSETRON 2 MG/ML
4 INJECTION INTRAMUSCULAR; INTRAVENOUS ONCE
Status: DISCONTINUED | OUTPATIENT
Start: 2017-05-05 | End: 2017-05-05 | Stop reason: HOSPADM

## 2017-05-05 RX ORDER — SODIUM CHLORIDE, SODIUM LACTATE, POTASSIUM CHLORIDE, CALCIUM CHLORIDE 600; 310; 30; 20 MG/100ML; MG/100ML; MG/100ML; MG/100ML
75 INJECTION, SOLUTION INTRAVENOUS CONTINUOUS
Status: DISCONTINUED | OUTPATIENT
Start: 2017-05-05 | End: 2017-05-05 | Stop reason: HOSPADM

## 2017-05-05 RX ORDER — FAMOTIDINE 20 MG/1
20 TABLET, FILM COATED ORAL ONCE
Status: COMPLETED | OUTPATIENT
Start: 2017-05-05 | End: 2017-05-05

## 2017-05-05 RX ORDER — MIDAZOLAM HYDROCHLORIDE 1 MG/ML
INJECTION, SOLUTION INTRAMUSCULAR; INTRAVENOUS AS NEEDED
Status: DISCONTINUED | OUTPATIENT
Start: 2017-05-05 | End: 2017-05-05 | Stop reason: HOSPADM

## 2017-05-05 RX ORDER — LIDOCAINE HYDROCHLORIDE 20 MG/ML
INJECTION, SOLUTION EPIDURAL; INFILTRATION; INTRACAUDAL; PERINEURAL AS NEEDED
Status: DISCONTINUED | OUTPATIENT
Start: 2017-05-05 | End: 2017-05-05 | Stop reason: HOSPADM

## 2017-05-05 RX ORDER — FENTANYL CITRATE 50 UG/ML
INJECTION, SOLUTION INTRAMUSCULAR; INTRAVENOUS AS NEEDED
Status: DISCONTINUED | OUTPATIENT
Start: 2017-05-05 | End: 2017-05-05 | Stop reason: HOSPADM

## 2017-05-05 RX ORDER — LIDOCAINE HYDROCHLORIDE 10 MG/ML
INJECTION, SOLUTION EPIDURAL; INFILTRATION; INTRACAUDAL; PERINEURAL AS NEEDED
Status: DISCONTINUED | OUTPATIENT
Start: 2017-05-05 | End: 2017-05-05 | Stop reason: HOSPADM

## 2017-05-05 RX ORDER — DEXTROSE 50 % IN WATER (D50W) INTRAVENOUS SYRINGE
25-50 AS NEEDED
Status: DISCONTINUED | OUTPATIENT
Start: 2017-05-05 | End: 2017-05-05 | Stop reason: HOSPADM

## 2017-05-05 RX ORDER — SODIUM CHLORIDE 0.9 % (FLUSH) 0.9 %
5-10 SYRINGE (ML) INJECTION EVERY 8 HOURS
Status: DISCONTINUED | OUTPATIENT
Start: 2017-05-05 | End: 2017-05-05 | Stop reason: HOSPADM

## 2017-05-05 RX ORDER — OXYCODONE AND ACETAMINOPHEN 5; 325 MG/1; MG/1
1 TABLET ORAL
Qty: 20 TAB | Refills: 0 | Status: SHIPPED | OUTPATIENT
Start: 2017-05-05 | End: 2017-05-22 | Stop reason: ALTCHOICE

## 2017-05-05 RX ORDER — MAGNESIUM SULFATE 100 %
4 CRYSTALS MISCELLANEOUS AS NEEDED
Status: DISCONTINUED | OUTPATIENT
Start: 2017-05-05 | End: 2017-05-05 | Stop reason: HOSPADM

## 2017-05-05 RX ORDER — FENTANYL CITRATE 50 UG/ML
50 INJECTION, SOLUTION INTRAMUSCULAR; INTRAVENOUS
Status: DISCONTINUED | OUTPATIENT
Start: 2017-05-05 | End: 2017-05-05 | Stop reason: HOSPADM

## 2017-05-05 RX ADMIN — FENTANYL CITRATE 50 MCG: 50 INJECTION, SOLUTION INTRAMUSCULAR; INTRAVENOUS at 07:35

## 2017-05-05 RX ADMIN — SODIUM CHLORIDE, SODIUM LACTATE, POTASSIUM CHLORIDE, AND CALCIUM CHLORIDE 75 ML/HR: 600; 310; 30; 20 INJECTION, SOLUTION INTRAVENOUS at 06:42

## 2017-05-05 RX ADMIN — FENTANYL CITRATE 50 MCG: 50 INJECTION, SOLUTION INTRAMUSCULAR; INTRAVENOUS at 07:49

## 2017-05-05 RX ADMIN — PROPOFOL 50 MG: 10 INJECTION, EMULSION INTRAVENOUS at 07:35

## 2017-05-05 RX ADMIN — LIDOCAINE HYDROCHLORIDE 40 MG: 20 INJECTION, SOLUTION EPIDURAL; INFILTRATION; INTRACAUDAL; PERINEURAL at 07:35

## 2017-05-05 RX ADMIN — PROPOFOL 50 MG: 10 INJECTION, EMULSION INTRAVENOUS at 07:57

## 2017-05-05 RX ADMIN — PROPOFOL 50 MG: 10 INJECTION, EMULSION INTRAVENOUS at 08:17

## 2017-05-05 RX ADMIN — FAMOTIDINE 20 MG: 20 TABLET ORAL at 06:42

## 2017-05-05 RX ADMIN — PROPOFOL 50 MG: 10 INJECTION, EMULSION INTRAVENOUS at 07:49

## 2017-05-05 RX ADMIN — MIDAZOLAM HYDROCHLORIDE 4 MG: 1 INJECTION, SOLUTION INTRAMUSCULAR; INTRAVENOUS at 07:31

## 2017-05-05 RX ADMIN — PROPOFOL 50 MG: 10 INJECTION, EMULSION INTRAVENOUS at 08:10

## 2017-05-05 NOTE — H&P
HPI: Fish Irwin is a 49 y.o. female presenting with chief complain of possible fistula.     Past Medical History:   Diagnosis Date    Asthma     Chest pain, unspecified     Possible Angina, GERD, chest wall pains, recurrent pains, possible atypical angina, happens at rest, abnormal nuc scan in past, discussed risk benefit options of cardiac cath/pci, she wants to think it over, add sl ntg    Coronary atherosclerosis of native coronary artery     Abnormal NUC scan, patient's symptoms are better, will start meds and monitor    Heart murmur     Hypercholesterolemia     Ill-defined condition     Patent Foramen Ovale- asymptomatic    Obesity, unspecified     Discussed diet    Other and unspecified hyperlipidemia     F/u per pmd on zocor    Pain of right thumb     Trigger thumb of right hand        Past Surgical History:   Procedure Laterality Date    HX  SECTION      HX CHOLECYSTECTOMY      HX COLONOSCOPY         Family History   Problem Relation Age of Onset    Hypertension Mother     Diabetes Father     Colon Cancer Father     Cancer Father      prosate    Hypertension Maternal Grandmother     Heart Disease Neg Hx      no family history of heart disease       Social History     Social History    Marital status: LEGALLY      Spouse name: N/A    Number of children: N/A    Years of education: N/A     Social History Main Topics    Smoking status: Former Smoker     Quit date: 1995    Smokeless tobacco: Never Used    Alcohol use 0.0 oz/week     0 Standard drinks or equivalent per week      Comment: socially    Drug use: No    Sexual activity: Yes     Partners: Male     Birth control/ protection: None     Other Topics Concern    None     Social History Narrative       Review of Systems - negative    Outpatient Prescriptions Marked as Taking for the 17 encounter [facility] Encounter)   Medication Sig Dispense Refill    OMEGA-3 FATTY ACIDS/FISH OIL (FISH OIL EXTRA STRENGTH PO) Take  by mouth daily.  umeclidinium-vilanterol (ANORO ELLIPTA) 62.5-25 mcg/actuation inhaler Take 1 Puff by inhalation daily.  MULTIVIT &MINERALS/FERROUS FUM (MULTI VITAMIN PO) Take  by mouth.  Cholecalciferol, Vitamin D3, 1,000 unit cap Take  by mouth.  albuterol (PROVENTIL HFA, VENTOLIN HFA, PROAIR HFA) 90 mcg/actuation inhaler Take  by inhalation.  montelukast (SINGULAIR) 10 mg tablet Take 10 mg by mouth daily.  aspirin 81 mg tablet Take 81 mg by mouth daily.  zinc 50 mg Tab Take  by mouth daily.  BIOTIN PO Take 1,000 mcg by mouth. Allergies   Allergen Reactions    Penicillins Other (comments)       Vitals:    04/24/17 1515 05/05/17 0632   BP:  122/80   Pulse:  93   Resp:  18   Temp:  98.6 °F (37 °C)   SpO2:  99%   Weight: 101.2 kg (223 lb) 102.3 kg (225 lb 8 oz)   Height: 5' 5.5\" (1.664 m) 5' 6\" (1.676 m)       Physical Exam   Constitutional: She appears well-developed and well-nourished. HENT:   Head: Normocephalic and atraumatic. Eyes: Conjunctivae and EOM are normal.   Abdominal: Soft. She exhibits no distension. There is no tenderness. Musculoskeletal: Normal range of motion. Lymphadenopathy:     She has no cervical adenopathy. Right: No inguinal adenopathy present. Left: No inguinal adenopathy present. Neurological: She exhibits normal muscle tone. Skin: Skin is warm and dry. No rash noted. Psychiatric: She has a normal mood and affect. Her speech is normal.       Assessment / Plan    EUA, possible seton    The diagnoses and plan were discussed with the patient. All questions answered. Plan of care agreed to by all concerned.

## 2017-05-05 NOTE — ANESTHESIA PREPROCEDURE EVALUATION
Anesthetic History   No history of anesthetic complications            Review of Systems / Medical History  Patient summary reviewed and pertinent labs reviewed    Pulmonary            Asthma        Neuro/Psych   Within defined limits           Cardiovascular      Valvular problems/murmurs: tricuspid insufficiency and mitral insufficiency      Dysrhythmias : PVC  Pacemaker, CAD and hyperlipidemia    Exercise tolerance: >4 METS  Comments: PFO   GI/Hepatic/Renal                Endo/Other        Obesity     Other Findings   Comments:   Risk Factors for Postoperative nausea/vomiting:       History of postoperative nausea/vomiting? NO       Female? YES       Motion sickness? NO       Intended opioid administration for postoperative analgesia? NO      Smoking Abstinence  Current Smoker? NO  Elective Surgery? YES  Seen preoperatively by anesthesiologist or proxy prior to day of surgery? YES  Pt abstained from smoking 24 hours prior to anesthesia?  YES           Physical Exam    Airway  Mallampati: I  TM Distance: > 6 cm  Neck ROM: normal range of motion   Mouth opening: Normal     Cardiovascular  Regular rate and rhythm,  S1 and S2 normal,  no murmur, click, rub, or gallop             Dental  No notable dental hx       Pulmonary  Breath sounds clear to auscultation               Abdominal  GI exam deferred       Other Findings            Anesthetic Plan    ASA: 3  Anesthesia type: MAC          Induction: Intravenous  Anesthetic plan and risks discussed with: Patient

## 2017-05-05 NOTE — DISCHARGE INSTRUCTIONS
Anal Fistulotomy: What to Expect at [facility] may be worried about having a bowel movement after your surgery. You will likely have some pain and bleeding with bowel movements for the first 1 to 2 weeks. You can make your bowel movements less painful by getting enough fiber and fluids. And you can use stool softeners or laxatives. Sitting in warm water (sitz bath) after bowel movements will also help. You may notice a small amount of pus or blood draining from the opening of your fistula. This is normal in the days after your surgery. You can put a gauze pad over the opening of the fistula to absorb the drainage, if needed. Most people can go back to work and their normal routine 1 to 2 weeks after surgery. It will probably take several weeks to several months for your fistula to completely heal. This depends on the size of your fistula and how much surgery you had. This care sheet gives you a general idea about how long it will take for you to recover. But each person recovers at a different pace. Follow the steps below to get better as quickly as possible. How can you care for yourself at home? Activity  · Rest when you feel tired. Getting enough sleep will help you recover. · Try to walk each day. Start by walking a little more than you did the day before. Bit by bit, increase the amount you walk. Walking boosts blood flow and helps prevent pneumonia and constipation. · You may drive when you are no longer taking pain medicine and can quickly move your foot from the gas pedal to the brake. You must also be able to sit comfortably for a long period of time, even if you do not plan to go far. You might get caught in traffic. · Most people are able to return to work within 1 to 2 weeks after surgery. · Shower or take baths as usual. Pat your anal area dry with a towel when you are done. Diet  · You can eat your normal diet.  If your stomach is upset, try bland, low-fat foods like plain rice, broiled chicken, toast, and yogurt. · Drink plenty of fluids (unless your doctor tells you not to). · Include high-fiber foods, such as fruits, vegetables, beans, and whole grains, in your diet each day. · You may notice that your bowel movements are not regular right after your surgery. This is common. Try to avoid constipation and straining with bowel movements. You may want to take a fiber supplement every day. If you have not had a bowel movement after a couple of days, ask your doctor about taking a mild laxative. Medicines  · Your doctor will tell you if and when you can restart your medicines. He or she will also give you instructions about taking any new medicines. · If you take blood thinners, such as warfarin (Coumadin), clopidogrel (Plavix), or aspirin, be sure to talk to your doctor. He or she will tell you if and when to start taking those medicines again. Make sure that you understand exactly what your doctor wants you to do. · Be safe with medicines. Take pain medicines exactly as directed. ¨ If the doctor gave you a prescription medicine for pain, take it as prescribed. ¨ If you are not taking a prescription pain medicine, take an over-the-counter medicine such as acetaminophen (Tylenol), ibuprofen (Advil, Motrin), or naproxen (Aleve). Read and follow all instructions on the label. ¨ Do not take two or more pain medicines at the same time unless the doctor told you to. Many pain medicines have acetaminophen, which is Tylenol. Too much acetaminophen (Tylenol) can be harmful. · If your doctor prescribed antibiotics, take them as directed. Do not stop taking them just because you feel better. You need to take the full course of antibiotics. · If you think your pain medicine is making you sick to your stomach:  ¨ Take your medicine after meals (unless your doctor has told you not to). ¨ Ask your doctor for a different pain medicine.   Incision care  · You may have gauze and bandages over the opening of your fistula, or you may have a string coming from the fistula. Your doctor will tell you how to take care of these. · After a bowel movement, use a baby wipe or take a shower or sitz bath to gently clean the anal area. Other instructions  · Place a maxi pad or gauze in your underwear to absorb drainage from your fistula while it heals. · Sit in a few inches of warm water (sitz bath) for 15 to 20 minutes. Then pat the area dry. Do this as long as you have pain in your anal area. · Apply ice several times a day for 10 to 20 minutes at a time. Put a thin cloth between your skin and the ice. · Support your feet with a small step stool when you sit on the toilet. This helps flex your hips and places your pelvis in a squatting position. This can make bowel movements easier after surgery. · Try lying on your stomach with a pillow under your hips to decrease swelling. Follow-up care is a key part of your treatment and safety. Be sure to make and go to all appointments, and call your doctor if you are having problems. It's also a good idea to know your test results and keep a list of the medicines you take. When should you call for help? Call 911 anytime you think you may need emergency care. For example, call if:  · You passed out (lost consciousness). · You have sudden chest pain and shortness of breath, or you cough up blood. · You have severe belly pain. Call your doctor now or seek immediate medical care if:  · You have bleeding from your anus that soaks 2 or more large gauze pads. · You have pain that does not get better after you take your pain medicine. · You have signs of infection, such as:  ¨ Increased pain, swelling, warmth, or redness. ¨ Red streaks leading from the wound. ¨ Swollen lymph nodes in your groin. ¨ A fever. · You have stool that leaks from your anus or you cannot control when you have a bowel movement.   Watch closely for any changes in your health, and be sure to contact your doctor if:  · You do not have a bowel movement after taking a laxative. Where can you learn more? Go to http://faye-jayda.info/. Enter Ramiro Koehler in the search box to learn more about \"Anal Fistulotomy: What to Expect at Home. \"  Current as of: August 9, 2016  Content Version: 11.2  © 0141-6170 [employer]. Care instructions adapted under license by [employer] (which disclaims liability or warranty for this information). If you have questions about a medical condition or this instruction, always ask your healthcare professional. [place] [address] any warranty or liability for your use of this information. Narcotic-Analgesic/Acetaminophen (Percocet, Norco, Lorcet HD, Lortab 10/325) - (By mouth)   Why this medicine is used:   Relieves pain. Contact a nurse or doctor right away if you have:  · Extreme weakness, shallow breathing, slow heartbeat  · Severe confusion, lightheadedness, dizziness, fainting  · Yellow skin or eyes, dark urine or pale stools  · Severe constipation, severe stomach pain, nausea, vomiting, loss of appetite  · Sweating or cold, clammy skin     Common side effects:  · Mild constipation, nausea, vomiting  · Sleepiness, tiredness  · Itching, rash  © 2017 [address] [employer] Information is for End User's use only and may not be sold, redistributed or otherwise used for commercial purposes.     DISCHARGE SUMMARY from Nurse    The following personal items are in your possession at time of discharge:    Dental Appliances: None  Visual Aid: Glasses, Contacts     Home Medications: None  Jewelry: None  Clothing: Undergarments, Pants, Shirt, Socks, Footwear  Other Valuables: None   PATIENT INSTRUCTIONS:    After general anesthesia or intravenous sedation, for 24 hours or while taking prescription Narcotics:  · Limit your activities  · Do not drive and operate hazardous machinery  · Do not make important personal or business decisions  · Do  not drink alcoholic beverages  · If you have not urinated within 8 hours after discharge, please contact your surgeon on call. Report the following to your surgeon:  · Excessive pain, swelling, redness or odor of or around the surgical area  · Temperature over 100.5  · Nausea and vomiting lasting longer than 4 hours or if unable to take medications  · Any signs of decreased circulation or nerve impairment to extremity: change in color, persistent  numbness, tingling, coldness or increase pain  · Any questions    *  Please give a list of your current medications to your Primary Care Provider. *  Please update this list whenever your medications are discontinued, doses are      changed, or new medications (including over-the-counter products) are added. *  Please carry medication information at all times in case of emergency situations. These are general instructions for a healthy lifestyle:    No smoking/ No tobacco products/ Avoid exposure to second hand smoke    Surgeon General's Warning:  Quitting smoking now greatly reduces serious risk to your health. Obesity, smoking, and sedentary lifestyle greatly increases your risk for illness    A healthy diet, regular physical exercise & weight monitoring are important for maintaining a healthy lifestyle    You may be retaining fluid if you have a history of heart failure or if you experience any of the following symptoms:  Weight gain of 3 pounds or more overnight or 5 pounds in a week, increased swelling in our hands or feet or shortness of breath while lying flat in bed. Please call your doctor as soon as you notice any of these symptoms; do not wait until your next office visit.     Recognize signs and symptoms of STROKE:    F-face looks uneven    A-arms unable to move or move unevenly    S-speech slurred or non-existent    T-time-call 911 as soon as signs and symptoms begin-DO NOT go       Back to bed or wait to see if you get better-TIME IS BRAIN. Warning Signs of HEART ATTACK     Call 911 if you have these symptoms:   Chest discomfort. Most heart attacks involve discomfort in the center of the chest that lasts more than a few minutes, or that goes away and comes back. It can feel like uncomfortable pressure, squeezing, fullness, or pain.  Discomfort in other areas of the upper body. Symptoms can include pain or discomfort in one or both arms, the back, neck, jaw, or stomach.  Shortness of breath with or without chest discomfort.  Other signs may include breaking out in a cold sweat, nausea, or lightheadedness. Don't wait more than five minutes to call 911 - MINUTES MATTER! Fast action can save your life. Calling 911 is almost always the fastest way to get lifesaving treatment. Emergency Medical Services staff can begin treatment when they arrive -- up to an hour sooner than if someone gets to the hospital by car. The discharge information has been reviewed with the patient and daughters. The patient and daughters verbalized understanding. Discharge medications reviewed with the patient and daughters and appropriate educational materials and side effects teaching were provided.

## 2017-05-05 NOTE — IP AVS SNAPSHOT
[address] Patient: Karyn Whitney MRN: [id number] LTB:[id number] You are allergic to the following Allergen Reactions Penicillins Other (comments) Recent Documentation Height Weight BMI OB Status Smoking Status 1.676 m 102.3 kg 36.4 kg/m2 Menopause Former Smoker Emergency Contacts Name Discharge Info Relation Home Work Mobile Spencer Jaffe DISCHARGE CAREGIVER [3] Spouse [3] [phone]    
 GARCIAMinorSpencer  Spouse [3] [phone] AlannahEmmie  Child [2] [phone] About your hospitalization You were admitted on: May 5, 2017 You last received care in the:  [facility] PHASE 2 RECOVERY You were discharged on: May 5, 2017 Unit phone number:  [phone] Why you were hospitalized Your primary diagnosis was:  Not on File Providers Seen During Your Hospitalizations Provider Role Specialty Primary office phone Li Weaver MD Attending Provider Colon and Rectal Surgery [phone] Your Primary Care Physician (PCP) Primary Care Physician Office Phone Office Fax [employer], [address] [place] [address] [phone] Follow-up Information Follow up With Details Comments Contact Info Abdoulaye Desai MD   [facility] 
[phone] Li Weaver MD Call today Arrange a 3 week follow up 8 [facility] [address] [place] [address] [facility] 
[phone] Current Discharge Medication List  
  
START taking these medications Dose & Instructions Dispensing Information Comments Morning Noon Evening Bedtime  
 oxyCODONE-acetaminophen 5-325 mg per tablet Commonly known as:  PERCOCET Your last dose was: Your next dose is:    
   
   
 Dose:  1 Tab Take 1 Tab by mouth every six (6) hours as needed for Pain. Max Daily Amount: 4 Tabs. Quantity:  20 Tab Refills:  0 CONTINUE these medications which have NOT CHANGED Dose & Instructions Dispensing Information Comments Morning Noon Evening Bedtime  
 albuterol 90 mcg/actuation inhaler Commonly known as:  PROVENTIL HFA, VENTOLIN HFA, PROAIR HFA Your last dose was: Your next dose is: Take  by inhalation. Refills:  0  
     
   
   
   
  
 ANORO ELLIPTA 62.5-25 mcg/actuation inhaler Generic drug:  umeclidinium-vilanterol Your last dose was: Your next dose is:    
   
   
 Dose:  1 Puff Take 1 Puff by inhalation daily. Refills:  0  
     
   
   
   
  
 BIOTIN PO Your last dose was: Your next dose is:    
   
   
 Dose:  1000 mcg Take 1,000 mcg by mouth. Refills:  0  
     
   
   
   
  
 cholecalciferol 1,000 unit Cap Commonly known as:  VITAMIN D3 Your last dose was: Your next dose is: Take  by mouth. Refills:  0  
     
   
   
   
  
 FISH OIL EXTRA STRENGTH PO Your last dose was: Your next dose is: Take  by mouth daily. Refills:  0  
     
   
   
   
  
 montelukast 10 mg tablet Commonly known as:  SINGULAIR Your last dose was: Your next dose is:    
   
   
 Dose:  10 mg Take 10 mg by mouth daily. Refills:  0 MULTI VITAMIN PO Your last dose was: Your next dose is: Take  by mouth. Refills:  0  
     
   
   
   
  
 nitroglycerin 0.4 mg SL tablet Commonly known as:  NITROSTAT Your last dose was: Your next dose is:    
   
   
 Dose:  0.4 mg  
1 Tab by SubLINGual route every five (5) minutes as needed for Chest Pain. Quantity:  25 Tab Refills:  1  
     
   
   
   
  
 zinc 50 mg Tab tablet Your last dose was: Your next dose is: Take  by mouth daily. Refills:  0 STOP taking these medications   
 aspirin 81 mg tablet Where to Get Your Medications Information on where to get these meds will be given to you by the nurse or doctor. ! Ask your nurse or doctor about these medications  
  oxyCODONE-acetaminophen 5-325 mg per tablet Discharge Instructions Anal Fistulotomy: What to Expect at [facility] Your Recovery You may be worried about having a bowel movement after your surgery. You will likely have some pain and bleeding with bowel movements for the first 1 to 2 weeks. You can make your bowel movements less painful by getting enough fiber and fluids. And you can use stool softeners or laxatives. Sitting in warm water (sitz bath) after bowel movements will also help. You may notice a small amount of pus or blood draining from the opening of your fistula. This is normal in the days after your surgery. You can put a gauze pad over the opening of the fistula to absorb the drainage, if needed. Most people can go back to work and their normal routine 1 to 2 weeks after surgery. It will probably take several weeks to several months for your fistula to completely heal. This depends on the size of your fistula and how much surgery you had. This care sheet gives you a general idea about how long it will take for you to recover. But each person recovers at a different pace. Follow the steps below to get better as quickly as possible. How can you care for yourself at home? Activity · Rest when you feel tired. Getting enough sleep will help you recover. · Try to walk each day. Start by walking a little more than you did the day before. Bit by bit, increase the amount you walk. Walking boosts blood flow and helps prevent pneumonia and constipation.  
· You may drive when you are no longer taking pain medicine and can quickly move your foot from the gas pedal to the brake. You must also be able to sit comfortably for a long period of time, even if you do not plan to go far. You might get caught in traffic. · Most people are able to return to work within 1 to 2 weeks after surgery. · Shower or take baths as usual. Pat your anal area dry with a towel when you are done. Diet · You can eat your normal diet. If your stomach is upset, try bland, low-fat foods like plain rice, broiled chicken, toast, and yogurt. · Drink plenty of fluids (unless your doctor tells you not to). · Include high-fiber foods, such as fruits, vegetables, beans, and whole grains, in your diet each day. · You may notice that your bowel movements are not regular right after your surgery. This is common. Try to avoid constipation and straining with bowel movements. You may want to take a fiber supplement every day. If you have not had a bowel movement after a couple of days, ask your doctor about taking a mild laxative. Medicines · Your doctor will tell you if and when you can restart your medicines. He or she will also give you instructions about taking any new medicines. · If you take blood thinners, such as warfarin (Coumadin), clopidogrel (Plavix), or aspirin, be sure to talk to your doctor. He or she will tell you if and when to start taking those medicines again. Make sure that you understand exactly what your doctor wants you to do. · Be safe with medicines. Take pain medicines exactly as directed. ¨ If the doctor gave you a prescription medicine for pain, take it as prescribed. ¨ If you are not taking a prescription pain medicine, take an over-the-counter medicine such as acetaminophen (Tylenol), ibuprofen (Advil, Motrin), or naproxen (Aleve). Read and follow all instructions on the label. ¨ Do not take two or more pain medicines at the same time unless the doctor told you to.  Many pain medicines have acetaminophen, which is Tylenol. Too much acetaminophen (Tylenol) can be harmful. · If your doctor prescribed antibiotics, take them as directed. Do not stop taking them just because you feel better. You need to take the full course of antibiotics. · If you think your pain medicine is making you sick to your stomach: 
¨ Take your medicine after meals (unless your doctor has told you not to). ¨ Ask your doctor for a different pain medicine. Incision care · You may have gauze and bandages over the opening of your fistula, or you may have a string coming from the fistula. Your doctor will tell you how to take care of these. · After a bowel movement, use a baby wipe or take a shower or sitz bath to gently clean the anal area. Other instructions · Place a maxi pad or gauze in your underwear to absorb drainage from your fistula while it heals. · Sit in a few inches of warm water (sitz bath) for 15 to 20 minutes. Then pat the area dry. Do this as long as you have pain in your anal area. · Apply ice several times a day for 10 to 20 minutes at a time. Put a thin cloth between your skin and the ice. · Support your feet with a small step stool when you sit on the toilet. This helps flex your hips and places your pelvis in a squatting position. This can make bowel movements easier after surgery. · Try lying on your stomach with a pillow under your hips to decrease swelling. Follow-up care is a key part of your treatment and safety. Be sure to make and go to all appointments, and call your doctor if you are having problems. It's also a good idea to know your test results and keep a list of the medicines you take. When should you call for help? Call 911 anytime you think you may need emergency care. For example, call if: 
· You passed out (lost consciousness). · You have sudden chest pain and shortness of breath, or you cough up blood. · You have severe belly pain. Call your doctor now or seek immediate medical care if: · You have bleeding from your anus that soaks 2 or more large gauze pads. · You have pain that does not get better after you take your pain medicine. · You have signs of infection, such as: 
¨ Increased pain, swelling, warmth, or redness. ¨ Red streaks leading from the wound. ¨ Swollen lymph nodes in your groin. ¨ A fever. · You have stool that leaks from your anus or you cannot control when you have a bowel movement. Watch closely for any changes in your health, and be sure to contact your doctor if: 
· You do not have a bowel movement after taking a laxative. Where can you learn more? Go to http://faye-jayda.info/. Enter Edwardkatarina Pete in the search box to learn more about \"Anal Fistulotomy: What to Expect at Home. \" Current as of: August 9, 2016 Content Version: 11.2 © 6729-7369 [employer]. Care instructions adapted under license by [employer] (which disclaims liability or warranty for this information). If you have questions about a medical condition or this instruction, always ask your healthcare professional. [place] [address] any warranty or liability for your use of this information. Narcotic-Analgesic/Acetaminophen (Percocet, Norco, Lorcet HD, Lortab 10/325) - (By mouth) Why this medicine is used:  
Relieves pain. Contact a nurse or doctor right away if you have: 
· Extreme weakness, shallow breathing, slow heartbeat · Severe confusion, lightheadedness, dizziness, fainting · Yellow skin or eyes, dark urine or pale stools · Severe constipation, severe stomach pain, nausea, vomiting, loss of appetite · Sweating or cold, clammy skin Common side effects: · Mild constipation, nausea, vomiting · Sleepiness, tiredness · Itching, rash © 2017 [facility] Information is for End User's use only and may not be sold, redistributed or otherwise used for commercial purposes. DISCHARGE SUMMARY from Nurse The following personal items are in your possession at time of discharge: 
 
Dental Appliances: None Visual Aid: Glasses, Contacts Home Medications: None Jewelry: None Clothing: Undergarments, Pants, Shirt, Socks, Footwear Other Valuables: None PATIENT INSTRUCTIONS: 
 
 
F-face looks uneven A-arms unable to move or move unevenly S-speech slurred or non-existent T-time-call 911 as soon as signs and symptoms begin-DO NOT go Back to bed or wait to see if you get better-TIME IS BRAIN. Warning Signs of HEART ATTACK Call 911 if you have these symptoms: 
? Chest discomfort. Most heart attacks involve discomfort in the center of the chest that lasts more than a few minutes, or that goes away and comes back. It can feel like uncomfortable pressure, squeezing, fullness, or pain. ? Discomfort in other areas of the upper body. Symptoms can include pain or discomfort in one or both arms, the back, neck, jaw, or stomach. ? Shortness of breath with or without chest discomfort. ? Other signs may include breaking out in a cold sweat, nausea, or lightheadedness. Don't wait more than five minutes to call [address]! Fast action can save your life. Calling 911 is almost always the fastest way to get lifesaving treatment. Emergency Medical Services staff can begin treatment when they arrive  up to an hour sooner than if someone gets to the hospital by car. The discharge information has been reviewed with the patient and daughters. The patient and daughters verbalized understanding. Discharge medications reviewed with the patient and daughters and appropriate educational materials and side effects teaching were provided. Discharge Orders None Introducing [facility]!    
 Radha Smith introduces [employer] ([employer]) patient portal. Now you can access parts of your medical record, email your doctor's office, and request medication refills online. 1. In your internet browser, go to [email or web address] 2. Click on the First Time User? Click Here link in the Sign In box. You will see the New Member Sign Up page. 3. Enter your [employer] Access Code exactly as it appears below. You will not need to use this code after youve completed the sign-up process. If you do not sign up before the expiration date, you must request a new code. · [employer] Access Code: [email or web address] Expires: 8/2/2017 11:50 AM 
 
4. Enter the last four digits of your Social Security Number (xxxx) and Date of Birth (mm/dd/yyyy) as indicated and click Submit. You will be taken to the next sign-up page. 5. Create a NovaSom ID. This will be your [employer]Som login ID and cannot be changed, so think of one that is secure and easy to remember. 6. Create a [employer] password. You can change your password at any time. 7. Enter your Password Reset Question and Answer. This can be used at a later time if you forget your password. 8. Enter your e-mail address. You will receive e-mail notification when new information is available in [address] 9. Click Sign Up. You can now view and download portions of your medical record. 10. Click the Download Summary menu link to download a portable copy of your medical information. If you have questions, please visit the Frequently Asked Questions section of the [employer] website. Remember, [employer] is NOT to be used for urgent needs. For medical emergencies, dial 911. Now available from your iPhone and Android! General Information Please provide this summary of care documentation to your next provider. Patient Signature:  ____________________________________________________________ Date:  ____________________________________________________________  
  
Elma Marck  Provider Signature: ____________________________________________________________ Date:  ____________________________________________________________

## 2017-05-05 NOTE — ANESTHESIA POSTPROCEDURE EVALUATION
Post-Anesthesia Evaluation and Assessment    Patient: Everardo Sanz MRN: [id number]  SSN: [id number]    YOB: 1960  Age: 49 y.o. Sex: female       Cardiovascular Function/Vital Signs  Visit Vitals    /70    Pulse 87    Temp 36.6 °C (97.9 °F)    Resp 20    Ht 5' 6\" (1.676 m)    Wt 102.3 kg (225 lb 8 oz)    SpO2 98%    BMI 36.4 kg/m2       Patient is status post MAC anesthesia for Procedure(s):  EXAM UNDER ANESTHESIA (EUA)/POSSIBLE SETON/PRONE. Nausea/Vomiting: None    Postoperative hydration reviewed and adequate. Pain:  Pain Scale 1: Numeric (0 - 10) (05/05/17 9020)  Pain Intensity 1: 0 (05/05/17 9494)   Managed    Neurological Status:   Neuro (WDL): Within Defined Limits (05/05/17 0630)   At baseline    Mental Status and Level of Consciousness: Arousable    Pulmonary Status:   O2 Device: Nasal cannula (05/05/17 2654)   Adequate oxygenation and airway patent    Complications related to anesthesia: None    Post-anesthesia assessment completed.  No concerns        Signed By: Ramonita Sparks MD     May 5, 2017

## 2017-05-05 NOTE — PROGRESS NOTES
conducted a pre-op visit with Gwen Whitney, who is a 49 y.o.,female. The  provided the following Interventions:  Initiated a relationship of care and support. Plan:  Chaplains will continue to follow and will provide pastoral care on an as needed/requested basis.  recommends bedside caregivers page  on duty if patient shows signs of acute spiritual or emotional distress.     [address] [facility] Certified [address] [facility]   [phone]

## 2017-05-05 NOTE — OP NOTES
[address]    Name:  Jonatan Barrios  MR#:  [id number]  :  1960  Account #:  [de-identified]  Date of Adm:  2017  Date of Surgery:  2017      PREOPERATIVE DIAGNOSIS: Rectovaginal fistula. POSTOPERATIVE DIAGNOSIS: Subcutaneous posterior vaginal wall  fistula. PROCEDURE: Subcutaneous fistulotomy posterior vaginal wall. SURGEON: Chau Sofia. Micheal Hubbard MD    ANESTHESIA: MAC.    ESTIMATED BLOOD LOSS: 10 mL. SPECIMENS REMOVED: Fistula tract to pathology. INDICATIONS: The patient is a 53-year-old woman who noted some  swelling in the perineal area. She has a history of rectovaginal fistula,  status post multiple repairs. She is brought to the operating room for  exam under anesthesia. I told her that if there was a fistula we would  place a seton. She understood the risks of the procedure including  bleeding, infection, recurrence, and incontinence, and wished to  proceed. DESCRIPTION OF PROCEDURE: The patient was properly identified  in the holding area, brought to the operating room and placed in the  prone jackknife position. Sedation was administered by Anesthesia. The vaginal and perianal areas were prepped and draped in the usual  sterile fashion. Digital rectal exam was performed and was normal.  Anoscopy was performed, there was no clearly visible internal opening. There were no visible external openings of the perianal area. The  vagina was evaluated as well. There was a very small pinhole size  opening in the distal vagina. This was probed and there was some  concern initially that there was a small fistula between this area and  the rectum. This could not be probed easily, it was injected with  peroxide and there was no peroxide formation in the rectum itself, that  tends to denote a fistulous tract. After further throbbing, it became  clear that the opening probed onto the other side of a large fold of  mucosa on the vaginal wall. We performed subcutaneous fistulotomy  here and removed a small portion of the fistula tract to pathology. After  excising the tissue and evaluating beneath this area, there was no  opening that could be probed. The rectum and vagina were  both irrigated. Dry sterile dressings were applied. The patient tolerated the procedure well. All instrument, sponge and  needle counts were correct at the end of the case x2. The patient  awoke from anesthesia, was transported to the PACU in stable  condition. MD YAS Washington / Mariposa Estrada  D:  05/05/2017   08:31  T:  05/05/2017   08:54  Job #:  [id number]

## 2017-05-05 NOTE — PERIOP NOTES
Patient armband removed and given to patient to take home. Patient was informed of the privacy risks if armband lost or stolen. Patient confirmed by two identifiers with discharge instructions prior too being provided to patient and daughters.

## 2017-05-22 ENCOUNTER — OFFICE VISIT (OUTPATIENT)
Dept: SURGERY | Age: 57
End: 2017-05-22

## 2017-05-22 VITALS
TEMPERATURE: 97.7 F | WEIGHT: 226 LBS | BODY MASS INDEX: 36.32 KG/M2 | SYSTOLIC BLOOD PRESSURE: 114 MMHG | HEIGHT: 66 IN | RESPIRATION RATE: 18 BRPM | HEART RATE: 84 BPM | DIASTOLIC BLOOD PRESSURE: 70 MMHG

## 2017-05-22 DIAGNOSIS — N82.8: Primary | ICD-10-CM

## 2017-05-22 RX ORDER — BISMUTH SUBSALICYLATE 262 MG
1 TABLET,CHEWABLE ORAL DAILY
COMMUNITY

## 2017-05-25 ENCOUNTER — DOCUMENTATION ONLY (OUTPATIENT)
Dept: SURGERY | Age: 57
End: 2017-05-25

## 2017-05-25 NOTE — PROGRESS NOTES
Received colonoscopy and pathology results from GI office/Dr. Tabares. Patient had colonoscopy in 2012, findings were a tubular adenoma and she was recalled for 10 years. LM for patient to return our phone call. Report scanned into media.

## 2017-10-26 ENCOUNTER — APPOINTMENT (OUTPATIENT)
Dept: GENERAL RADIOLOGY | Age: 57
End: 2017-10-26
Attending: EMERGENCY MEDICINE
Payer: COMMERCIAL

## 2017-10-26 ENCOUNTER — HOSPITAL ENCOUNTER (EMERGENCY)
Age: 57
Discharge: HOME | End: 2017-10-27
Attending: EMERGENCY MEDICINE
Payer: COMMERCIAL

## 2017-10-26 DIAGNOSIS — R07.9 CHEST PAIN, UNSPECIFIED TYPE: ICD-10-CM

## 2017-10-26 DIAGNOSIS — J45.21 INTERMITTENT ASTHMA WITH ACUTE EXACERBATION, UNSPECIFIED ASTHMA SEVERITY: Primary | ICD-10-CM

## 2017-10-26 LAB
ANION GAP SERPL CALC-SCNC: 4 MMOL/L (ref 3–18)
BASOPHILS # BLD: 0 K/UL (ref 0–0.1)
BASOPHILS NFR BLD: 0 % (ref 0–2)
BNP SERPL-MCNC: 33 PG/ML (ref 0–900)
BUN SERPL-MCNC: 7 MG/DL (ref 7–18)
BUN/CREAT SERPL: 7 (ref 12–20)
CALCIUM SERPL-MCNC: 8.8 MG/DL (ref 8.5–10.1)
CHLORIDE SERPL-SCNC: 109 MMOL/L (ref 100–108)
CK MB CFR SERPL CALC: NORMAL % (ref 0–4)
CK MB SERPL-MCNC: <1 NG/ML (ref 5–25)
CK SERPL-CCNC: 71 U/L (ref 26–192)
CO2 SERPL-SCNC: 29 MMOL/L (ref 21–32)
CREAT SERPL-MCNC: 0.96 MG/DL (ref 0.6–1.3)
DIFFERENTIAL METHOD BLD: ABNORMAL
EOSINOPHIL # BLD: 0.3 K/UL (ref 0–0.4)
EOSINOPHIL NFR BLD: 6 % (ref 0–5)
ERYTHROCYTE [DISTWIDTH] IN BLOOD BY AUTOMATED COUNT: 12.4 % (ref 11.6–14.5)
GLUCOSE SERPL-MCNC: 160 MG/DL (ref 74–99)
HCT VFR BLD AUTO: 40.4 % (ref 35–45)
HGB BLD-MCNC: 13.1 G/DL (ref 12–16)
LYMPHOCYTES # BLD: 2.6 K/UL (ref 0.9–3.6)
LYMPHOCYTES NFR BLD: 50 % (ref 21–52)
MCH RBC QN AUTO: 29.8 PG (ref 24–34)
MCHC RBC AUTO-ENTMCNC: 32.4 G/DL (ref 31–37)
MCV RBC AUTO: 92 FL (ref 74–97)
MONOCYTES # BLD: 0.4 K/UL (ref 0.05–1.2)
MONOCYTES NFR BLD: 8 % (ref 3–10)
NEUTS SEG # BLD: 1.8 K/UL (ref 1.8–8)
NEUTS SEG NFR BLD: 36 % (ref 40–73)
PLATELET # BLD AUTO: 277 K/UL (ref 135–420)
PMV BLD AUTO: 10.4 FL (ref 9.2–11.8)
POTASSIUM SERPL-SCNC: 3.4 MMOL/L (ref 3.5–5.5)
RBC # BLD AUTO: 4.39 M/UL (ref 4.2–5.3)
SODIUM SERPL-SCNC: 142 MMOL/L (ref 136–145)
TROPONIN I SERPL-MCNC: <0.02 NG/ML (ref 0–0.04)
WBC # BLD AUTO: 5.1 K/UL (ref 4.6–13.2)

## 2017-10-26 PROCEDURE — 71010 XR CHEST PORT: CPT

## 2017-10-26 PROCEDURE — 85025 COMPLETE CBC W/AUTO DIFF WBC: CPT | Performed by: EMERGENCY MEDICINE

## 2017-10-26 PROCEDURE — 96374 THER/PROPH/DIAG INJ IV PUSH: CPT

## 2017-10-26 PROCEDURE — 83880 ASSAY OF NATRIURETIC PEPTIDE: CPT | Performed by: EMERGENCY MEDICINE

## 2017-10-26 PROCEDURE — 77030029684 HC NEB SM VOL KT MONA -A

## 2017-10-26 PROCEDURE — 74011250636 HC RX REV CODE- 250/636: Performed by: EMERGENCY MEDICINE

## 2017-10-26 PROCEDURE — 80048 BASIC METABOLIC PNL TOTAL CA: CPT | Performed by: EMERGENCY MEDICINE

## 2017-10-26 PROCEDURE — 94640 AIRWAY INHALATION TREATMENT: CPT

## 2017-10-26 PROCEDURE — 82550 ASSAY OF CK (CPK): CPT | Performed by: EMERGENCY MEDICINE

## 2017-10-26 PROCEDURE — 93005 ELECTROCARDIOGRAM TRACING: CPT

## 2017-10-26 PROCEDURE — 99285 EMERGENCY DEPT VISIT HI MDM: CPT

## 2017-10-26 PROCEDURE — 74011000250 HC RX REV CODE- 250: Performed by: EMERGENCY MEDICINE

## 2017-10-26 RX ORDER — IPRATROPIUM BROMIDE AND ALBUTEROL SULFATE 2.5; .5 MG/3ML; MG/3ML
3 SOLUTION RESPIRATORY (INHALATION) ONCE
Status: COMPLETED | OUTPATIENT
Start: 2017-10-26 | End: 2017-10-26

## 2017-10-26 RX ADMIN — METHYLPREDNISOLONE SODIUM SUCCINATE 125 MG: 125 INJECTION, POWDER, FOR SOLUTION INTRAMUSCULAR; INTRAVENOUS at 22:19

## 2017-10-26 RX ADMIN — IPRATROPIUM BROMIDE AND ALBUTEROL SULFATE 3 ML: .5; 3 SOLUTION RESPIRATORY (INHALATION) at 22:19

## 2017-10-27 VITALS
DIASTOLIC BLOOD PRESSURE: 74 MMHG | WEIGHT: 228 LBS | TEMPERATURE: 97.5 F | OXYGEN SATURATION: 94 % | HEIGHT: 66 IN | BODY MASS INDEX: 36.64 KG/M2 | RESPIRATION RATE: 19 BRPM | HEART RATE: 84 BPM | SYSTOLIC BLOOD PRESSURE: 104 MMHG

## 2017-10-27 LAB
ATRIAL RATE: 74 BPM
ATRIAL RATE: 90 BPM
CALCULATED P AXIS, ECG09: 19 DEGREES
CALCULATED P AXIS, ECG09: 22 DEGREES
CALCULATED R AXIS, ECG10: 18 DEGREES
CALCULATED R AXIS, ECG10: 19 DEGREES
CALCULATED T AXIS, ECG11: 13 DEGREES
CALCULATED T AXIS, ECG11: 9 DEGREES
CK MB CFR SERPL CALC: NORMAL % (ref 0–4)
CK MB SERPL-MCNC: <1 NG/ML (ref 5–25)
CK SERPL-CCNC: 61 U/L (ref 26–192)
DIAGNOSIS, 93000: NORMAL
DIAGNOSIS, 93000: NORMAL
P-R INTERVAL, ECG05: 130 MS
P-R INTERVAL, ECG05: 156 MS
Q-T INTERVAL, ECG07: 382 MS
Q-T INTERVAL, ECG07: 408 MS
QRS DURATION, ECG06: 88 MS
QRS DURATION, ECG06: 88 MS
QTC CALCULATION (BEZET), ECG08: 452 MS
QTC CALCULATION (BEZET), ECG08: 467 MS
TROPONIN I SERPL-MCNC: <0.02 NG/ML (ref 0–0.04)
VENTRICULAR RATE, ECG03: 74 BPM
VENTRICULAR RATE, ECG03: 90 BPM

## 2017-10-27 PROCEDURE — 93005 ELECTROCARDIOGRAM TRACING: CPT

## 2017-10-27 PROCEDURE — 82550 ASSAY OF CK (CPK): CPT | Performed by: EMERGENCY MEDICINE

## 2017-10-27 RX ORDER — PREDNISONE 50 MG/1
50 TABLET ORAL DAILY
Qty: 4 TAB | Refills: 0 | Status: SHIPPED | OUTPATIENT
Start: 2017-10-27 | End: 2017-10-31

## 2017-10-27 NOTE — ED PROVIDER NOTES
HPI Comments: 9:41 PM Lachelle Cortes is a 56 y.o. female with h/o asthma, heart murmur, and obesity who presents to ED brought in by EMS complaining of SOB onset 2 hours ago. The patient states that she was laying down and went to stand up to used the restroom and began expiericing SOB. She states that she taken Albuterol 1 hour before her symptoms occurred that did not help in alleviating her symptoms. The patient states she has not taken any other medications. EMS states they did not give the patient any medication. PCP: Chago Us MD      The history is provided by the patient. Past Medical History:   Diagnosis Date    Asthma     Chest pain, unspecified     Possible Angina, GERD, chest wall pains, recurrent pains, possible atypical angina, happens at rest, abnormal nuc scan in past, discussed risk benefit options of cardiac cath/pci, she wants to think it over, add sl ntg    Coronary atherosclerosis of native coronary artery     Abnormal NUC scan, patient's symptoms are better, will start meds and monitor    Heart murmur     Hypercholesterolemia     Ill-defined condition     Patent Foramen Ovale- asymptomatic    Obesity, unspecified     Discussed diet    Other and unspecified hyperlipidemia     no longer on meds    Pain of right thumb     Trigger thumb of right hand        Past Surgical History:   Procedure Laterality Date    BIOPSY OF VAGINA,SIMPLE N/A 05/05/2017    Dr. Emerson Smith DIAGNOSTIC ANOSCOPY N/A 05/05/2017    Dr. Kaley Camara HX CHOLECYSTECTOMY      HX COLONOSCOPY  2012    HX OTHER SURGICAL      Subcutaneous fistulotomy posterior vaginal wall.          Family History:   Problem Relation Age of Onset    Hypertension Mother     Diabetes Father     Colon Cancer Father     Cancer Father      prosate    Hypertension Maternal Grandmother     Heart Disease Neg Hx      no family history of heart disease       Social History     Social History    Marital status: LEGALLY      Spouse name: N/A    Number of children: N/A    Years of education: N/A     Occupational History    Not on file. Social History Main Topics    Smoking status: Former Smoker     Quit date: 2/25/1995    Smokeless tobacco: Never Used    Alcohol use 0.0 oz/week     0 Standard drinks or equivalent per week      Comment: socially    Drug use: No    Sexual activity: Yes     Partners: Male     Birth control/ protection: None     Other Topics Concern    Not on file     Social History Narrative         ALLERGIES: Penicillins    Review of Systems   Constitutional: Negative. Negative for activity change and appetite change. HENT: Negative for congestion, ear discharge, ear pain, facial swelling, nosebleeds, postnasal drip, sinus pressure, sneezing and tinnitus. Eyes: Negative for pain, discharge, redness and visual disturbance. Respiratory: Positive for shortness of breath. Negative for apnea, cough, choking, chest tightness, wheezing and stridor. Cardiovascular: Negative for chest pain and leg swelling. Gastrointestinal: Negative for abdominal distention, abdominal pain, anal bleeding, blood in stool, constipation, diarrhea, nausea and vomiting. Genitourinary: Negative for decreased urine volume, difficulty urinating, dyspareunia, dysuria, flank pain, frequency, hematuria, pelvic pain, urgency, vaginal bleeding and vaginal discharge. Musculoskeletal: Negative for arthralgias, back pain, gait problem, joint swelling, myalgias, neck pain and neck stiffness. Skin: Negative for color change. Neurological: Negative for dizziness, tremors, seizures, speech difficulty, weakness, numbness and headaches. Hematological: Negative for adenopathy. Does not bruise/bleed easily. Psychiatric/Behavioral: Negative for agitation, dysphoric mood and self-injury. The patient is not nervous/anxious.         Vitals:    10/27/17 0215 10/27/17 0230 10/27/17 0245 10/27/17 0430 BP: 103/72 101/66 109/71 104/74   Pulse: 89 91 87 84   Resp: 22 21 21 19   Temp:       SpO2: 92% 92% 92% 94%   Weight:       Height:                Physical Exam   Constitutional: She is oriented to person, place, and time. She appears well-developed and well-nourished. HENT:   Head: Normocephalic and atraumatic. Right Ear: External ear normal.   Left Ear: External ear normal.   Nose: Nose normal.   Mouth/Throat: Oropharynx is clear and moist.   Eyes: Conjunctivae and EOM are normal. Pupils are equal, round, and reactive to light. Neck: Normal range of motion. Neck supple. Cardiovascular: Regular rhythm and intact distal pulses. Heart sounds okay. Pulmonary/Chest: Effort normal. No respiratory distress. She has wheezes. She has no rales. She exhibits no tenderness. 1+ wheezes   Abdominal: Soft. Bowel sounds are normal. She exhibits no distension and no mass. There is no tenderness. There is no rebound and no guarding. Musculoskeletal: Normal range of motion. She exhibits no edema. Neurological: She is alert and oriented to person, place, and time. Skin: Skin is warm and dry. No rash noted. No erythema. Psychiatric: She has a normal mood and affect. Her behavior is normal. Judgment normal.   Nursing note and vitals reviewed. MDM  Number of Diagnoses or Management Options  Diagnosis management comments: Shortness of breath noted. Known asthmatic DDx CHF, COPD, clot, infection, hemorrhage, other etiologies. Known asthmatic. Will trend labs, treat and follow.  Prior cardiac workup reviewed       Amount and/or Complexity of Data Reviewed  Clinical lab tests: ordered and reviewed  Tests in the radiology section of CPT®: ordered and reviewed    Risk of Complications, Morbidity, and/or Mortality  Presenting problems: moderate      ED Course       Procedures        Vitals:  Patient Vitals for the past 12 hrs:   Temp Pulse Resp BP SpO2   10/27/17 0430 - 84 19 104/74 94 %   10/27/17 0245 - 87 21 109/71 92 %   10/27/17 0230 - 91 21 101/66 92 %   10/27/17 0215 - 89 22 103/72 92 %   10/27/17 0200 - 90 20 111/79 93 %   10/27/17 0130 - 93 18 104/63 94 %   10/27/17 0115 - 96 23 104/77 93 %   10/27/17 0100 - 82 22 104/68 92 %   10/27/17 0045 - 93 14 104/68 95 %   10/27/17 0030 - 93 16 112/78 94 %   10/27/17 0015 - 92 24 112/69 94 %   10/27/17 0000 - 95 16 109/63 95 %   10/26/17 2345 - (!) 109 19 126/74 94 %   10/26/17 2330 - 96 15 118/76 96 %   10/26/17 2315 - (!) 103 18 130/72 96 %   10/26/17 2300 - 98 18 123/72 94 %   10/26/17 2245 - (!) 108 21 131/81 97 %   10/26/17 2230 - 85 25 118/71 96 %   10/26/17 2215 - 78 18 121/80 99 %   10/26/17 2211 97.5 °F (36.4 °C) 85 13 111/70 97 %   10/26/17 2200 - 77 17 111/70 97 %       Medications ordered:   Medications   albuterol-ipratropium (DUO-NEB) 2.5 MG-0.5 MG/3 ML (3 mL Nebulization Given 10/26/17 2219)   methylPREDNISolone (PF) (SOLU-MEDROL) injection 125 mg (125 mg IntraVENous Given 10/26/17 2219)         Lab findings:  Recent Results (from the past 12 hour(s))   NT-PRO BNP    Collection Time: 10/26/17 10:00 PM   Result Value Ref Range    NT pro-BNP 33 0 - 900 PG/ML   CBC WITH AUTOMATED DIFF    Collection Time: 10/26/17 10:00 PM   Result Value Ref Range    WBC 5.1 4.6 - 13.2 K/uL    RBC 4.39 4.20 - 5.30 M/uL    HGB 13.1 12.0 - 16.0 g/dL    HCT 40.4 35.0 - 45.0 %    MCV 92.0 74.0 - 97.0 FL    MCH 29.8 24.0 - 34.0 PG    MCHC 32.4 31.0 - 37.0 g/dL    RDW 12.4 11.6 - 14.5 %    PLATELET 084 583 - 178 K/uL    MPV 10.4 9.2 - 11.8 FL    NEUTROPHILS 36 (L) 40 - 73 %    LYMPHOCYTES 50 21 - 52 %    MONOCYTES 8 3 - 10 %    EOSINOPHILS 6 (H) 0 - 5 %    BASOPHILS 0 0 - 2 %    ABS. NEUTROPHILS 1.8 1.8 - 8.0 K/UL    ABS. LYMPHOCYTES 2.6 0.9 - 3.6 K/UL    ABS. MONOCYTES 0.4 0.05 - 1.2 K/UL    ABS. EOSINOPHILS 0.3 0.0 - 0.4 K/UL    ABS.  BASOPHILS 0.0 0.0 - 0.1 K/UL    DF AUTOMATED     METABOLIC PANEL, BASIC    Collection Time: 10/26/17 10:00 PM   Result Value Ref Range    Sodium 142 136 - 145 mmol/L    Potassium 3.4 (L) 3.5 - 5.5 mmol/L    Chloride 109 (H) 100 - 108 mmol/L    CO2 29 21 - 32 mmol/L    Anion gap 4 3.0 - 18 mmol/L    Glucose 160 (H) 74 - 99 mg/dL    BUN 7 7.0 - 18 MG/DL    Creatinine 0.96 0.6 - 1.3 MG/DL    BUN/Creatinine ratio 7 (L) 12 - 20      GFR est AA >60 >60 ml/min/1.73m2    GFR est non-AA >60 >60 ml/min/1.73m2    Calcium 8.8 8.5 - 10.1 MG/DL   CARDIAC PANEL,(CK, CKMB & TROPONIN)    Collection Time: 10/26/17 10:00 PM   Result Value Ref Range    CK 71 26 - 192 U/L    CK - MB <1.0 <3.6 ng/ml    CK-MB Index  0.0 - 4.0 %     CALCULATION NOT PERFORMED WHEN RESULT IS BELOW LINEAR LIMIT    Troponin-I, Qt. <0.02 0.0 - 0.045 NG/ML   EKG, 12 LEAD, SUBSEQUENT    Collection Time: 10/26/17 10:33 PM   Result Value Ref Range    Ventricular Rate 74 BPM    Atrial Rate 74 BPM    P-R Interval 130 ms    QRS Duration 88 ms    Q-T Interval 408 ms    QTC Calculation (Bezet) 452 ms    Calculated P Axis 22 degrees    Calculated R Axis 19 degrees    Calculated T Axis 13 degrees    Diagnosis       Normal sinus rhythm with sinus arrhythmia  Normal ECG  No previous ECGs available     EKG, 12 LEAD, INITIAL    Collection Time: 10/27/17  1:23 AM   Result Value Ref Range    Ventricular Rate 90 BPM    Atrial Rate 90 BPM    P-R Interval 156 ms    QRS Duration 88 ms    Q-T Interval 382 ms    QTC Calculation (Bezet) 467 ms    Calculated P Axis 19 degrees    Calculated R Axis 18 degrees    Calculated T Axis 9 degrees    Diagnosis       Normal sinus rhythm  Nonspecific T wave abnormality  Prolonged QT  Abnormal ECG  When compared with ECG of 26-OCT-2017 22:33,  No significant change was found     CARDIAC PANEL,(CK, CKMB & TROPONIN)    Collection Time: 10/27/17  1:48 AM   Result Value Ref Range    CK 61 26 - 192 U/L    CK - MB <1.0 <3.6 ng/ml    CK-MB Index  0.0 - 4.0 %     CALCULATION NOT PERFORMED WHEN RESULT IS BELOW LINEAR LIMIT    Troponin-I, Qt. <0.02 0.0 - 0.045 NG/ML       EKG interpretation by ED Physician:  6838 NSR with sinus arrhythmia at a rate if 74bpm. No STEMI.  0123 NSR at a rate of 90bpm. Nonspecific T wave abnormality. Prolonged QT. X-Ray, CT or other radiology findings or impressions:  XR CHEST PORT      Interpretation by Dr. Gabriel Calderón, ED provider:  No changes from previous. Progress notes, Consult notes or additional Procedure notes:   4:36 AM Patient will be discharged with an Aspirin daily with no strenuous activity. Call PCP today for a follow up and possible outpatient stress test.     Reevaluation of patient:   I have reassessed the patient. Patient is feeling improved,no chest pain or pressure, no nausea,diaphoresis or palpitations,no wheezing . Patient was discharged in good condition, for outpatient further workup While it is impossible to completely exclude the possibility of underlying serious disease or worsening of condition, I feel the relative likelihood is extremely low. I discussed this uncertainty with the patient, who understood ED evaluation and treatment and felt comfortable with the outpatient treatment plan. All questions regarding care, test results, and follow up were answered. The patient is stable and appropriate to discharge. They understand that they should return to the emergency department for any new or worsening symptoms. I stressed the importance of follow up for repeat assessment and possibly further evaluation/treatment. Disposition:  Diagnosis:   1. Intermittent asthma with acute exacerbation, unspecified asthma severity    2. Chest pain, unspecified type        Disposition: Discharge.     Follow-up Information     Follow up With Details Comments Louise Bro MD Call today  [phone] [facility]  [address]      [facility] EM[facility]GENCY DEPT  As needed, If symptoms worsen [address], [place] [address]  [phone]           Discharge Medication List as of 10/27/2017  4:36 AM      CONTINUE these medications which have NOT CHANGED    Details   multivitamin (ONE A DAY) tablet Take 1 Tab by mouth daily. , Historical Med      OMEGA-3 FATTY ACIDS/FISH OIL (FISH OIL EXTRA STRENGTH PO) Take  by mouth daily. , Historical Med      umeclidinium-vilanterol (ANORO ELLIPTA) 62.5-25 mcg/actuation inhaler Take 1 Puff by inhalation daily. , Historical Med      nitroglycerin (NITROSTAT) 0.4 mg SL tablet 1 Tab by SubLINGual route every five (5) minutes as needed for Chest Pain., Normal, Disp-25 Tab, R-1      MULTIVIT &MINERALS/FERROUS FUM (MULTI VITAMIN PO) Take  by mouth., Historical Med      Cholecalciferol, Vitamin D3, 1,000 unit cap Take  by mouth., Historical Med      albuterol (PROVENTIL HFA, VENTOLIN HFA, PROAIR HFA) 90 mcg/actuation inhaler Take  by inhalation. , Historical Med      montelukast (SINGULAIR) 10 mg tablet Take 10 mg by mouth daily. , Historical Med      zinc 50 mg Tab Take  by mouth daily. , Historical Med      BIOTIN PO Take 1,000 mcg by mouth., Historical Med               Scribe Attestation      [employer] acting as a scribe for and in the presence of Yoanna Galo MD      October 26, 2017 at 10:05 PM       Provider Attestation:      I personally performed the services described in the documentation, reviewed the documentation, as recorded by the scribe in my presence, and it accurately and completely records my words and actions.  October 26, 2017 at 10:05 PM - Yoanna Galo MD

## 2017-10-27 NOTE — ED TRIAGE NOTES
Per EMS-Pt. C/o dizziness with position change which started at noon today; pt. Also c/o SOB used rescue inhaler once with no relief. Pt. Able to speak in complete sentences w/o difficulty.

## 2017-10-27 NOTE — DISCHARGE INSTRUCTIONS
Asthma Attack: Care Instructions  Your Care Instructions    During an asthma attack, the airways swell and narrow. This makes it hard to breathe. Severe asthma attacks can be life-threatening, but you can help prevent them by keeping your asthma under control and treating symptoms before they get bad. Symptoms include being short of breath, having chest tightness, coughing, and wheezing. Noting and treating these symptoms can also help you avoid future trips to the emergency room. The doctor has checked you carefully, but problems can develop later. If you notice any problems or new symptoms, get medical treatment right away. Follow-up care is a key part of your treatment and safety. Be sure to make and go to all appointments, and call your doctor if you are having problems. It's also a good idea to know your test results and keep a list of the medicines you take. How can you care for yourself at home? · Follow your asthma action plan to prevent and treat attacks. If you don't have an asthma action plan, work with your doctor to create one. · Take your asthma medicines exactly as prescribed. Talk to your doctor right away if you have any questions about how to take them. ¨ Use your quick-relief medicine when you have symptoms of an attack. Quick-relief medicine is usually an albuterol inhaler. Some people need to use quick-relief medicine before they exercise. ¨ Take your controller medicine every day, not just when you have symptoms. Controller medicine is usually an inhaled corticosteroid. The goal is to prevent problems before they occur. Don't use your controller medicine to treat an attack that has already started. It doesn't work fast enough to help. ¨ If your doctor prescribed corticosteroid pills to use during an attack, take them exactly as prescribed. It may take hours for the pills to work, but they may make the episode shorter and help you breathe better.   ¨ Keep your quick-relief medicine with you at all times. · Talk to your doctor before using other medicines. Some medicines, such as aspirin, can cause asthma attacks in some people. · If you have a peak flow meter, use it to check how well you are breathing. This can help you predict when an asthma attack is going to occur. Then you can take medicine to prevent the asthma attack or make it less severe. · Do not smoke or allow others to smoke around you. Avoid smoky places. Smoking makes asthma worse. If you need help quitting, talk to your doctor about stop-smoking programs and medicines. These can increase your chances of quitting for good. · Learn what triggers an asthma attack for you, and avoid the triggers when you can. Common triggers include colds, smoke, air pollution, dust, pollen, mold, pets, cockroaches, stress, and cold air. · Avoid colds and the flu. Get a pneumococcal vaccine shot. If you have had one before, ask your doctor if you need a second dose. Get a flu vaccine every fall. If you must be around people with colds or the flu, wash your hands often. When should you call for help? Call 911 anytime you think you may need emergency care. For example, call if:  ? · You have severe trouble breathing. ?Call your doctor now or seek immediate medical care if:  ? · Your symptoms do not get better after you have followed your asthma action plan. ? · You have new or worse trouble breathing. ? · Your coughing and wheezing get worse. ? · You cough up dark brown or bloody mucus (sputum). ? · You have a new or higher fever. ? Watch closely for changes in your health, and be sure to contact your doctor if:  ? · You need to use quick-relief medicine on more than 2 days a week (unless it is just for exercise). ? · You cough more deeply or more often, especially if you notice more mucus or a change in the color of your mucus. ? · You are not getting better as expected. Where can you learn more?   Go to http://faye-jayda.info/. Enter L920 in the search box to learn more about \"Asthma Attack: Care Instructions. \"  Current as of: May 12, 2017  Content Version: 11.4  © 9718-0351 [employer]. Care instructions adapted under license by [employer] (which disclaims liability or warranty for this information). If you have questions about a medical condition or this instruction, always ask your healthcare professional. [address] any warranty or liability for your use of this information. Chest Pain: Care Instructions  Your Care Instructions    There are many things that can cause chest pain. Some are not serious and will get better on their own in a few days. But some kinds of chest pain need more testing and treatment. Your doctor may have recommended a follow-up visit in the next 8 to 12 hours. If you are not getting better, you may need more tests or treatment. Even though your doctor has released you, you still need to watch for any problems. The doctor carefully checked you, but sometimes problems can develop later. If you have new symptoms or if your symptoms do not get better, get medical care right away. If you have worse or different chest pain or pressure that lasts more than 5 minutes or you passed out (lost consciousness), call 911 or seek other emergency help right away. A medical visit is only one step in your treatment. Even if you feel better, you still need to do what your doctor recommends, such as going to all suggested follow-up appointments and taking medicines exactly as directed. This will help you recover and help prevent future problems. How can you care for yourself at home? · Rest until you feel better. · Take your medicine exactly as prescribed. Call your doctor if you think you are having a problem with your medicine. · Do not drive after taking a prescription pain medicine.   When should you call for help?  Call 911 if:  ? · You passed out (lost consciousness). ? · You have severe difficulty breathing. ? · You have symptoms of a heart attack. These may include:  ¨ Chest pain or pressure, or a strange feeling in your chest.  ¨ Sweating. ¨ Shortness of breath. ¨ Nausea or vomiting. ¨ Pain, pressure, or a strange feeling in your back, neck, jaw, or upper belly or in one or both shoulders or arms. ¨ Lightheadedness or sudden weakness. ¨ A fast or irregular heartbeat. After you call 911, the  may tell you to chew 1 adult-strength or 2 to 4 low-dose aspirin. Wait for an ambulance. Do not try to drive yourself. ?Call your doctor today if:  ? · You have any trouble breathing. ? · Your chest pain gets worse. ? · You are dizzy or lightheaded, or you feel like you may faint. ? · You are not getting better as expected. ? · You are having new or different chest pain. Where can you learn more? Go to http://faye-jayda.info/. Enter A120 in the search box to learn more about \"Chest Pain: Care Instructions. \"  Current as of: March 20, 2017  Content Version: 11.4  © 2036-6951 [employer]. Care instructions adapted under license by [employer] (which disclaims liability or warranty for this information). If you have questions about a medical condition or this instruction, always ask your healthcare professional. [place] [address] any warranty or liability for your use of this information.

## 2017-11-02 ENCOUNTER — HOSPITAL ENCOUNTER (OUTPATIENT)
Dept: ULTRASOUND IMAGING | Age: 57
Discharge: HOME | End: 2017-11-02
Attending: FAMILY MEDICINE
Payer: COMMERCIAL

## 2017-11-02 ENCOUNTER — HOSPITAL ENCOUNTER (OUTPATIENT)
Dept: MAMMOGRAPHY | Age: 57
Discharge: HOME | End: 2017-11-02
Attending: FAMILY MEDICINE
Payer: COMMERCIAL

## 2017-11-02 DIAGNOSIS — N63.20 BREAST MASS, LEFT: ICD-10-CM

## 2017-11-02 PROCEDURE — 76642 ULTRASOUND BREAST LIMITED: CPT

## 2017-11-02 PROCEDURE — 77066 DX MAMMO INCL CAD BI: CPT

## 2017-11-06 ENCOUNTER — APPOINTMENT (OUTPATIENT)
Dept: GENERAL RADIOLOGY | Age: 57
End: 2017-11-06
Attending: EMERGENCY MEDICINE
Payer: COMMERCIAL

## 2017-11-06 ENCOUNTER — HOSPITAL ENCOUNTER (EMERGENCY)
Age: 57
Discharge: HOME | End: 2017-11-06
Attending: EMERGENCY MEDICINE | Admitting: EMERGENCY MEDICINE
Payer: COMMERCIAL

## 2017-11-06 VITALS
WEIGHT: 225 LBS | SYSTOLIC BLOOD PRESSURE: 98 MMHG | OXYGEN SATURATION: 95 % | HEART RATE: 97 BPM | DIASTOLIC BLOOD PRESSURE: 68 MMHG | BODY MASS INDEX: 36.16 KG/M2 | HEIGHT: 66 IN | RESPIRATION RATE: 16 BRPM | TEMPERATURE: 97.8 F

## 2017-11-06 DIAGNOSIS — R07.9 ACUTE CHEST PAIN: Primary | ICD-10-CM

## 2017-11-06 DIAGNOSIS — J98.11 ATELECTASIS: ICD-10-CM

## 2017-11-06 LAB
ALBUMIN SERPL-MCNC: 4 G/DL (ref 3.4–5)
ALBUMIN/GLOB SERPL: 1 {RATIO} (ref 0.8–1.7)
ALP SERPL-CCNC: 65 U/L (ref 45–117)
ALT SERPL-CCNC: 24 U/L (ref 13–56)
ANION GAP SERPL CALC-SCNC: 6 MMOL/L (ref 3–18)
AST SERPL-CCNC: 13 U/L (ref 15–37)
BASOPHILS # BLD: 0 K/UL (ref 0–0.06)
BASOPHILS NFR BLD: 0 % (ref 0–2)
BILIRUB DIRECT SERPL-MCNC: 0.2 MG/DL (ref 0–0.2)
BILIRUB SERPL-MCNC: 1.1 MG/DL (ref 0.2–1)
BUN SERPL-MCNC: 9 MG/DL (ref 7–18)
BUN/CREAT SERPL: 9 (ref 12–20)
CALCIUM SERPL-MCNC: 9.6 MG/DL (ref 8.5–10.1)
CHLORIDE SERPL-SCNC: 106 MMOL/L (ref 100–108)
CK MB CFR SERPL CALC: NORMAL % (ref 0–4)
CK MB CFR SERPL CALC: NORMAL % (ref 0–4)
CK MB SERPL-MCNC: <1 NG/ML (ref 5–25)
CK MB SERPL-MCNC: <1 NG/ML (ref 5–25)
CK SERPL-CCNC: 74 U/L (ref 26–192)
CK SERPL-CCNC: 80 U/L (ref 26–192)
CO2 SERPL-SCNC: 28 MMOL/L (ref 21–32)
CREAT SERPL-MCNC: 0.97 MG/DL (ref 0.6–1.3)
D DIMER PPP FEU-MCNC: 0.3 UG/ML(FEU)
DIFFERENTIAL METHOD BLD: NORMAL
EOSINOPHIL # BLD: 0.1 K/UL (ref 0–0.4)
EOSINOPHIL NFR BLD: 2 % (ref 0–5)
ERYTHROCYTE [DISTWIDTH] IN BLOOD BY AUTOMATED COUNT: 12.4 % (ref 11.6–14.5)
GLOBULIN SER CALC-MCNC: 4.1 G/DL (ref 2–4)
GLUCOSE SERPL-MCNC: 117 MG/DL (ref 74–99)
HCT VFR BLD AUTO: 42.4 % (ref 35–45)
HGB BLD-MCNC: 14.1 G/DL (ref 12–16)
LIPASE SERPL-CCNC: 188 U/L (ref 73–393)
LYMPHOCYTES # BLD: 1.7 K/UL (ref 0.9–3.6)
LYMPHOCYTES NFR BLD: 24 % (ref 21–52)
MAGNESIUM SERPL-MCNC: 2 MG/DL (ref 1.6–2.6)
MCH RBC QN AUTO: 30.6 PG (ref 24–34)
MCHC RBC AUTO-ENTMCNC: 33.3 G/DL (ref 31–37)
MCV RBC AUTO: 92 FL (ref 74–97)
MONOCYTES # BLD: 0.5 K/UL (ref 0.05–1.2)
MONOCYTES NFR BLD: 7 % (ref 3–10)
NEUTS SEG # BLD: 4.8 K/UL (ref 1.8–8)
NEUTS SEG NFR BLD: 67 % (ref 40–73)
PLATELET # BLD AUTO: 266 K/UL (ref 135–420)
PMV BLD AUTO: 10.3 FL (ref 9.2–11.8)
POTASSIUM SERPL-SCNC: 3.6 MMOL/L (ref 3.5–5.5)
PROT SERPL-MCNC: 8.1 G/DL (ref 6.4–8.2)
RBC # BLD AUTO: 4.61 M/UL (ref 4.2–5.3)
SODIUM SERPL-SCNC: 140 MMOL/L (ref 136–145)
TROPONIN I SERPL-MCNC: <0.02 NG/ML (ref 0–0.04)
TROPONIN I SERPL-MCNC: <0.02 NG/ML (ref 0–0.04)
WBC # BLD AUTO: 7.1 K/UL (ref 4.6–13.2)

## 2017-11-06 PROCEDURE — 83690 ASSAY OF LIPASE: CPT | Performed by: EMERGENCY MEDICINE

## 2017-11-06 PROCEDURE — 74011000250 HC RX REV CODE- 250: Performed by: EMERGENCY MEDICINE

## 2017-11-06 PROCEDURE — 83735 ASSAY OF MAGNESIUM: CPT | Performed by: EMERGENCY MEDICINE

## 2017-11-06 PROCEDURE — 80048 BASIC METABOLIC PNL TOTAL CA: CPT | Performed by: EMERGENCY MEDICINE

## 2017-11-06 PROCEDURE — 85379 FIBRIN DEGRADATION QUANT: CPT | Performed by: EMERGENCY MEDICINE

## 2017-11-06 PROCEDURE — 99284 EMERGENCY DEPT VISIT MOD MDM: CPT

## 2017-11-06 PROCEDURE — 80076 HEPATIC FUNCTION PANEL: CPT | Performed by: EMERGENCY MEDICINE

## 2017-11-06 PROCEDURE — 71020 XR CHEST PA LAT: CPT

## 2017-11-06 PROCEDURE — 93005 ELECTROCARDIOGRAM TRACING: CPT

## 2017-11-06 PROCEDURE — 85025 COMPLETE CBC W/AUTO DIFF WBC: CPT | Performed by: EMERGENCY MEDICINE

## 2017-11-06 PROCEDURE — 94640 AIRWAY INHALATION TREATMENT: CPT

## 2017-11-06 PROCEDURE — 82550 ASSAY OF CK (CPK): CPT | Performed by: EMERGENCY MEDICINE

## 2017-11-06 PROCEDURE — 77030029684 HC NEB SM VOL KT MONA -A

## 2017-11-06 RX ORDER — IPRATROPIUM BROMIDE AND ALBUTEROL SULFATE 2.5; .5 MG/3ML; MG/3ML
3 SOLUTION RESPIRATORY (INHALATION)
Status: COMPLETED | OUTPATIENT
Start: 2017-11-06 | End: 2017-11-06

## 2017-11-06 RX ORDER — PREDNISONE 10 MG/1
TABLET ORAL
Qty: 1 PACKAGE | Refills: 0 | Status: SHIPPED | OUTPATIENT
Start: 2017-11-06 | End: 2017-11-13

## 2017-11-06 RX ADMIN — IPRATROPIUM BROMIDE AND ALBUTEROL SULFATE 3 ML: .5; 3 SOLUTION RESPIRATORY (INHALATION) at 18:47

## 2017-11-06 NOTE — ED PROVIDER NOTES
HPI Comments: 5:01 PM        Rodri Tim is a 56 y.o. Female with PMHx of asthma, heart murmur and hypercholesterolemia and past surgical history of caesarian and cholecystectomy and no family history of heart disease presents to the ED with a chief complaint of chest tightness for the past 3 hours. Pt states she felt chest tightness and heaviness while shopping at [facility] today. Pt also states she sat in a chair and then in the car, but it did not alleviate her tightness. Pt reports that she had lunch around 12:30-1 PM today and had chest tightness after that and it lasted for about 2 hours before easing off on itself. Pt's daughter present bedside states that the pt had difficulty breathing along with her chest tightness. Pt reports that the onset of her chest tightness is random and has no correlation with moving around or performing any strenuous activity. Pt states she had these episodes of chest tightness for the past 1 month but they have been more frequent to an extent of 3 episodes in the last 1 week. Patient denies any fever, chills, cough, abdominal pain, N/V/D, headache, dysuria, diaphoresis, syncope or any other symptoms or complaints. The history is provided by the patient. No  was used.         Past Medical History:   Diagnosis Date    Asthma     Chest pain, unspecified     Possible Angina, GERD, chest wall pains, recurrent pains, possible atypical angina, happens at rest, abnormal nuc scan in past, discussed risk benefit options of cardiac cath/pci, she wants to think it over, add sl ntg    Coronary atherosclerosis of native coronary artery     Abnormal NUC scan, patient's symptoms are better, will start meds and monitor    Heart murmur     Hypercholesterolemia     Ill-defined condition     Patent Foramen Ovale- asymptomatic    Obesity, unspecified     Discussed diet    Other and unspecified hyperlipidemia     no longer on meds    Pain of right thumb     Trigger thumb of right hand        Past Surgical History:   Procedure Laterality Date    BIOPSY OF VAGINA,SIMPLE N/A 05/05/2017    Dr. Mike Fairchild N/A 05/05/2017    Dr. Chase Mast HX COLONOSCOPY  2012    HX OTHER SURGICAL      Subcutaneous fistulotomy posterior vaginal wall. Family History:   Problem Relation Age of Onset    Hypertension Mother     Diabetes Father     Colon Cancer Father     Cancer Father      prosate    Hypertension Maternal Grandmother     Heart Disease Neg Hx      no family history of heart disease       Social History     Social History    Marital status: LEGALLY      Spouse name: N/A    Number of children: N/A    Years of education: N/A     Occupational History    Not on file. Social History Main Topics    Smoking status: Former Smoker     Quit date: 2/25/1995    Smokeless tobacco: Never Used    Alcohol use 0.0 oz/week     0 Standard drinks or equivalent per week      Comment: socially    Drug use: No    Sexual activity: Yes     Partners: Male     Birth control/ protection: None     Other Topics Concern    Not on file     Social History Narrative         ALLERGIES: Penicillins    Review of Systems   Constitutional: Negative for activity change, fatigue and fever. HENT: Negative for congestion and rhinorrhea. Eyes: Negative for visual disturbance. Respiratory: Positive for chest tightness and shortness of breath. Cardiovascular: Negative for chest pain and palpitations. Gastrointestinal: Negative for abdominal pain, diarrhea, nausea and vomiting. Genitourinary: Negative for dysuria and hematuria. Musculoskeletal: Negative for back pain. Skin: Negative for rash. Neurological: Negative for dizziness, weakness and light-headedness.        Vitals:    11/06/17 1605 11/06/17 1739   BP: (!) 130/92 115/75   Pulse:  97   Resp: (!) 80 16   Temp: 97.8 °F (36.6 °C)    SpO2: 97% 97% Weight: 102.1 kg (225 lb)    Height: 5' 6\" (1.676 m)             Physical Exam   Constitutional: She is oriented to person, place, and time. She appears well-developed and well-nourished. No distress. HENT:   Head: Normocephalic and atraumatic. Right Ear: External ear normal.   Left Ear: External ear normal.   Nose: Nose normal.   Mouth/Throat: Oropharynx is clear and moist.   Eyes: Conjunctivae and EOM are normal. Pupils are equal, round, and reactive to light. No scleral icterus. Neck: Normal range of motion. Neck supple. No JVD present. No tracheal deviation present. No thyromegaly present. Cardiovascular: Normal rate, regular rhythm, normal heart sounds and intact distal pulses. Exam reveals no gallop and no friction rub. No murmur heard. Pulmonary/Chest: Effort normal and breath sounds normal. She exhibits no tenderness. Abdominal: Soft. Bowel sounds are normal. She exhibits no distension. There is no tenderness. There is no rebound and no guarding. Musculoskeletal: Normal range of motion. She exhibits no edema or tenderness. Lymphadenopathy:     She has no cervical adenopathy. Neurological: She is alert and oriented to person, place, and time. No cranial nerve deficit. Coordination normal.   No sensory loss, Gait normal, Motor 5/5   Skin: Skin is warm and dry. Psychiatric: She has a normal mood and affect. Her behavior is normal. Judgment and thought content normal.   Nursing note and vitals reviewed. MDM  Number of Diagnoses or Management Options  Diagnosis management comments: Pt is a 51yo female with a hx of asthma, PFO, recurrent chest pressure, hypercholesterolemia presents to the ED after having chest pressure that lasted 2 hours. The pain subsided on its own. Pt did use her inhaler without relief. Pt denies nausea, vomiting, or diaphoresis. Pt per CC has hx of abnormal stress. Will follow cardiac labs, d-dimer as patient recently travelled, CXR then reevaluate.  Alvaro Richmond DO Fran 5:25 PM      ED Course       Procedures           Vitals:  Patient Vitals for the past 12 hrs:   Temp Pulse Resp BP SpO2   11/06/17 1739 - 97 16 115/75 97 %   11/06/17 1605 97.8 °F (36.6 °C) - (!) 80 (!) 130/92 97 %       Medications Ordered:  Medications   albuterol-ipratropium (DUO-NEB) 2.5 MG-0.5 MG/3 ML (3 mL Nebulization Given 11/6/17 1654)       Lab Findings:  Recent Results (from the past 12 hour(s))   EKG, 12 LEAD, INITIAL    Collection Time: 11/06/17  3:50 PM   Result Value Ref Range    Ventricular Rate 91 BPM    Atrial Rate 91 BPM    P-R Interval 136 ms    QRS Duration 82 ms    Q-T Interval 356 ms    QTC Calculation (Bezet) 437 ms    Calculated P Axis 30 degrees    Calculated R Axis 19 degrees    Calculated T Axis -4 degrees    Diagnosis       Normal sinus rhythm  Nonspecific T wave abnormality  Abnormal ECG  When compared with ECG of 27-OCT-2017 01:23,  No significant change was found     CBC WITH AUTOMATED DIFF    Collection Time: 11/06/17  5:19 PM   Result Value Ref Range    WBC 7.1 4.6 - 13.2 K/uL    RBC 4.61 4.20 - 5.30 M/uL    HGB 14.1 12.0 - 16.0 g/dL    HCT 42.4 35.0 - 45.0 %    MCV 92.0 74.0 - 97.0 FL    MCH 30.6 24.0 - 34.0 PG    MCHC 33.3 31.0 - 37.0 g/dL    RDW 12.4 11.6 - 14.5 %    PLATELET 785 636 - 903 K/uL    MPV 10.3 9.2 - 11.8 FL    NEUTROPHILS 67 40 - 73 %    LYMPHOCYTES 24 21 - 52 %    MONOCYTES 7 3 - 10 %    EOSINOPHILS 2 0 - 5 %    BASOPHILS 0 0 - 2 %    ABS. NEUTROPHILS 4.8 1.8 - 8.0 K/UL    ABS. LYMPHOCYTES 1.7 0.9 - 3.6 K/UL    ABS. MONOCYTES 0.5 0.05 - 1.2 K/UL    ABS. EOSINOPHILS 0.1 0.0 - 0.4 K/UL    ABS.  BASOPHILS 0.0 0.0 - 0.06 K/UL    DF AUTOMATED     METABOLIC PANEL, BASIC    Collection Time: 11/06/17  5:19 PM   Result Value Ref Range    Sodium 140 136 - 145 mmol/L    Potassium 3.6 3.5 - 5.5 mmol/L    Chloride 106 100 - 108 mmol/L    CO2 28 21 - 32 mmol/L    Anion gap 6 3.0 - 18 mmol/L    Glucose 117 (H) 74 - 99 mg/dL    BUN 9 7.0 - 18 MG/DL    Creatinine 0.97 0.6 - 1.3 MG/DL    BUN/Creatinine ratio 9 (L) 12 - 20      GFR est AA >60 >60 ml/min/1.73m2    GFR est non-AA 60 (L) >60 ml/min/1.73m2    Calcium 9.6 8.5 - 10.1 MG/DL   CARDIAC PANEL,(CK, CKMB & TROPONIN)    Collection Time: 17  5:19 PM   Result Value Ref Range    CK 80 26 - 192 U/L    CK - MB <1.0 <3.6 ng/ml    CK-MB Index  0.0 - 4.0 %     CALCULATION NOT PERFORMED WHEN RESULT IS BELOW LINEAR LIMIT    Troponin-I, Qt. <0.02 0.0 - 0.045 NG/ML   LIPASE    Collection Time: 17  5:19 PM   Result Value Ref Range    Lipase 188 73 - 393 U/L   HEPATIC FUNCTION PANEL    Collection Time: 17  5:19 PM   Result Value Ref Range    Protein, total 8.1 6.4 - 8.2 g/dL    Albumin 4.0 3.4 - 5.0 g/dL    Globulin 4.1 (H) 2.0 - 4.0 g/dL    A-G Ratio 1.0 0.8 - 1.7      Bilirubin, total 1.1 (H) 0.2 - 1.0 MG/DL    Bilirubin, direct 0.2 0.0 - 0.2 MG/DL    Alk. phosphatase 65 45 - 117 U/L    AST (SGOT) 13 (L) 15 - 37 U/L    ALT (SGPT) 24 13 - 56 U/L   MAGNESIUM    Collection Time: 17  5:19 PM   Result Value Ref Range    Magnesium 2.0 1.6 - 2.6 mg/dL   D DIMER    Collection Time: 17  5:19 PM   Result Value Ref Range    D DIMER 0.30 <0.46 ug/ml(FEU)   CARDIAC PANEL,(CK, CKMB & TROPONIN)    Collection Time: 17  7:21 PM   Result Value Ref Range    CK 74 26 - 192 U/L    CK - MB <1.0 <3.6 ng/ml    CK-MB Index  0.0 - 4.0 %     CALCULATION NOT PERFORMED WHEN RESULT IS BELOW LINEAR LIMIT    Troponin-I, Qt. <0.02 0.0 - 0.045 NG/ML       EKG Interpretation by ED physician:  Rhythm: NSR  Rate: 91 bpm  Interpretation: LVH, no STEMI   EKG interpret by Luis Martinez MD 3:50 PM          X-ray, CT or radiology findings or impressions:  XR CHEST PA LAT   Impression:  1. Streaky densities in the left lung base are potentially atelectasis. Progress notes, consult notes, or additional procedure notes:  Pt had stress 2015:  NUCLEAR IMAGIN2015         Findings:   1.  Stress images reveal small reduced Myoview uptake in moderate sized area of anterior wall seen in short axis and vertical long axis views. 2. Resting images have no evidence of redistribution in the anterior wall. 3. Gated images reveal normal wall motion and ejection fraction is calculated a 64%. 6:06 PM Consult: I discussed care with Dr. Tyler Membreno (Cardiology). It was a standard discussion including patient history, chief complaint, available diagnostic results, and predicted treatment course. Dr. Snehal Fairbanks states that if the 2 troponins are negative, pt can see Dr. Tyler Membreno in her office to do a stress test.     Reevaluation of the patient:   7:48 PM  Pt's repeat trop in negative. I discussed the case with her cardiology associates and they will see her in office to repeat stress test in 24-48 hours. Pt understands the plan and will return if symptoms worsen. Diagnosis:   1. Acute chest pain        Disposition: Discharge     Follow-up Information     None           Patient's Medications   Start Taking    No medications on file   Continue Taking    ALBUTEROL (PROVENTIL HFA, VENTOLIN HFA, PROAIR HFA) 90 MCG/ACTUATION INHALER    Take  by inhalation. BIOTIN PO    Take 1,000 mcg by mouth. CHOLECALCIFEROL, VITAMIN D3, 1,000 UNIT CAP    Take  by mouth. MONTELUKAST (SINGULAIR) 10 MG TABLET    Take 10 mg by mouth daily. MULTIVIT &MINERALS/FERROUS FUM (MULTI VITAMIN PO)    Take  by mouth. MULTIVITAMIN (ONE A DAY) TABLET    Take 1 Tab by mouth daily. NITROGLYCERIN (NITROSTAT) 0.4 MG SL TABLET    1 Tab by SubLINGual route every five (5) minutes as needed for Chest Pain. OMEGA-3 FATTY ACIDS/FISH OIL (FISH OIL EXTRA STRENGTH PO)    Take  by mouth daily. UMECLIDINIUM-VILANTEROL (ANORO ELLIPTA) 62.5-25 MCG/ACTUATION INHALER    Take 1 Puff by inhalation daily. ZINC 50 MG TAB    Take  by mouth daily.    These Medications have changed    No medications on file   Stop Taking    No medications on file       Scribe Attestation     Tony Maurer acting as a scribe for and in the presence of Dolores Baldwin MD      November 06, 2017 at 4:42 PM       Provider Attestation:      I personally performed the services described in the documentation, reviewed the documentation, as recorded by the scribe in my presence, and it accurately and completely records my words and actions.  November 06, 2017 at 4:42 PM - Dolores Baldwin MD

## 2017-11-06 NOTE — ED NOTES
I performed a brief evaluation, including history and physical, of the patient here in triage and I have determined that pt will need further treatment and evaluation from the main side ER physician. I have placed initial orders to help in expediting patients care. November 06, 2017 at 4:02 PM - Juancarlos Santiago DO        There were no vitals taken for this visit.

## 2017-11-07 NOTE — DISCHARGE INSTRUCTIONS
Chest Pain: Care Instructions  Your Care Instructions    There are many things that can cause chest pain. Some are not serious and will get better on their own in a few days. But some kinds of chest pain need more testing and treatment. Your doctor may have recommended a follow-up visit in the next 8 to 12 hours. If you are not getting better, you may need more tests or treatment. Even though your doctor has released you, you still need to watch for any problems. The doctor carefully checked you, but sometimes problems can develop later. If you have new symptoms or if your symptoms do not get better, get medical care right away. If you have worse or different chest pain or pressure that lasts more than 5 minutes or you passed out (lost consciousness), call 911 or seek other emergency help right away. A medical visit is only one step in your treatment. Even if you feel better, you still need to do what your doctor recommends, such as going to all suggested follow-up appointments and taking medicines exactly as directed. This will help you recover and help prevent future problems. How can you care for yourself at home? · Rest until you feel better. · Take your medicine exactly as prescribed. Call your doctor if you think you are having a problem with your medicine. · Do not drive after taking a prescription pain medicine. When should you call for help? Call 911 if:  ? · You passed out (lost consciousness). ? · You have severe difficulty breathing. ? · You have symptoms of a heart attack. These may include:  ¨ Chest pain or pressure, or a strange feeling in your chest.  ¨ Sweating. ¨ Shortness of breath. ¨ Nausea or vomiting. ¨ Pain, pressure, or a strange feeling in your back, neck, jaw, or upper belly or in one or both shoulders or arms. ¨ Lightheadedness or sudden weakness. ¨ A fast or irregular heartbeat.   After you call 911, the  may tell you to chew 1 adult-strength or 2 to 4 low-dose aspirin. Wait for an ambulance. Do not try to drive yourself. ?Call your doctor today if:  ? · You have any trouble breathing. ? · Your chest pain gets worse. ? · You are dizzy or lightheaded, or you feel like you may faint. ? · You are not getting better as expected. ? · You are having new or different chest pain. Where can you learn more? Go to http://faye-jayda.info/. Enter A120 in the search box to learn more about \"Chest Pain: Care Instructions. \"  Current as of: March 20, 2017  Content Version: 11.4  © 3761-0386 [employer]. Care instructions adapted under license by [employer] (which disclaims liability or warranty for this information). If you have questions about a medical condition or this instruction, always ask your healthcare professional. [place] [address] any warranty or liability for your use of this information. Musculoskeletal Chest Pain: Care Instructions  Your Care Instructions    Chest pain is not always a sign that something is wrong with your heart or that you have another serious problem. The doctor thinks your chest pain is caused by strained muscles or ligaments, inflamed chest cartilage, or another problem in your chest, rather than by your heart. You may need more tests to find the cause of your chest pain. Follow-up care is a key part of your treatment and safety. Be sure to make and go to all appointments, and call your doctor if you are having problems. It's also a good idea to know your test results and keep a list of the medicines you take. How can you care for yourself at home? · Take pain medicines exactly as directed. ¨ If the doctor gave you a prescription medicine for pain, take it as prescribed. ¨ If you are not taking a prescription pain medicine, ask your doctor if you can take an over-the-counter medicine. · Rest and protect the sore area.   · Stop, change, or take a break from any activity that may be causing your pain or soreness. · Put ice or a cold pack on the sore area for 10 to 20 minutes at a time. Try to do this every 1 to 2 hours for the next 3 days (when you are awake) or until the swelling goes down. Put a thin cloth between the ice and your skin. · After 2 or 3 days, apply a heating pad set on low or a warm cloth to the area that hurts. Some doctors suggest that you go back and forth between hot and cold. · Do not wrap or tape your ribs for support. This may cause you to take smaller breaths, which could increase your risk of lung problems. · Mentholated creams such as Bengay or Icy Hot may soothe sore muscles. Follow the instructions on the package. · Follow your doctor's instructions for exercising. · Gentle stretching and massage may help you get better faster. Stretch slowly to the point just before pain begins, and hold the stretch for at least 15 to 30 seconds. Do this 3 or 4 times a day. Stretch just after you have applied heat. · As your pain gets better, slowly return to your normal activities. Any increased pain may be a sign that you need to rest a while longer. When should you call for help? Call 911 anytime you think you may need emergency care. For example, call if:  ? · You have chest pain or pressure. This may occur with:  ¨ Sweating. ¨ Shortness of breath. ¨ Nausea or vomiting. ¨ Pain that spreads from the chest to the neck, jaw, or one or both shoulders or arms. ¨ Dizziness or lightheadedness. ¨ A fast or uneven pulse. After calling 911, chew 1 adult-strength aspirin. Wait for an ambulance. Do not try to drive yourself. ? · You have sudden chest pain and shortness of breath, or you cough up blood. ?Call your doctor now or seek immediate medical care if:  ? · You have any trouble breathing. ? · Your chest pain gets worse. ? · Your chest pain occurs consistently with exercise and is relieved by rest.   ? Watch closely for changes in your health, and be sure to contact your doctor if:  ? · Your chest pain does not get better after 1 week. Where can you learn more? Go to http://faye-jayda.info/. Enter V293 in the search box to learn more about \"Musculoskeletal Chest Pain: Care Instructions. \"  Current as of: March 20, 2017  Content Version: 11.4  © 5453-0033 [employer]. Care instructions adapted under license by [employer] (which disclaims liability or warranty for this information). If you have questions about a medical condition or this instruction, always ask your healthcare professional. [place] [address] any warranty or liability for your use of this information.

## 2017-11-08 LAB
ATRIAL RATE: 91 BPM
CALCULATED P AXIS, ECG09: 30 DEGREES
CALCULATED R AXIS, ECG10: 19 DEGREES
CALCULATED T AXIS, ECG11: -4 DEGREES
DIAGNOSIS, 93000: NORMAL
P-R INTERVAL, ECG05: 136 MS
Q-T INTERVAL, ECG07: 356 MS
QRS DURATION, ECG06: 82 MS
QTC CALCULATION (BEZET), ECG08: 437 MS
VENTRICULAR RATE, ECG03: 91 BPM

## 2017-11-09 DIAGNOSIS — J45.909 UNCOMPLICATED ASTHMA, UNSPECIFIED ASTHMA SEVERITY, UNSPECIFIED WHETHER PERSISTENT: Primary | ICD-10-CM

## 2017-11-09 NOTE — PROGRESS NOTES
Verbal Order with read back per Deidre Ramos MD  For PFT smart panel. AMB POC PFT complete w/ bronchodilator  AMB POC PFT complete w/o bronchodilator    Dr. Raf Medrano MD will co-sign the orders.

## 2017-11-13 ENCOUNTER — OFFICE VISIT (OUTPATIENT)
Dept: CARDIOLOGY CLINIC | Age: 57
End: 2017-11-13

## 2017-11-13 VITALS
HEIGHT: 66 IN | HEART RATE: 98 BPM | DIASTOLIC BLOOD PRESSURE: 75 MMHG | WEIGHT: 223 LBS | SYSTOLIC BLOOD PRESSURE: 105 MMHG | BODY MASS INDEX: 35.84 KG/M2

## 2017-11-13 DIAGNOSIS — I49.3 PVC (PREMATURE VENTRICULAR CONTRACTION): ICD-10-CM

## 2017-11-13 DIAGNOSIS — E78.5 HYPERLIPIDEMIA, UNSPECIFIED HYPERLIPIDEMIA TYPE: ICD-10-CM

## 2017-11-13 DIAGNOSIS — R07.9 CHEST PAIN, UNSPECIFIED TYPE: Primary | ICD-10-CM

## 2017-11-13 DIAGNOSIS — R55 NEAR SYNCOPE: ICD-10-CM

## 2017-11-13 DIAGNOSIS — Q21.12 PFO (PATENT FORAMEN OVALE): ICD-10-CM

## 2017-11-13 RX ORDER — LORATADINE 10 MG/1
10 TABLET ORAL
COMMUNITY

## 2017-11-13 RX ORDER — ZINC GLUCONATE 10 MG
250 LOZENGE ORAL
COMMUNITY
End: 2018-11-02

## 2017-11-13 RX ORDER — ASPIRIN 81 MG/1
81 TABLET ORAL DAILY
COMMUNITY
End: 2018-06-30

## 2017-11-13 RX ORDER — LANOLIN ALCOHOL/MO/W.PET/CERES
1000 CREAM (GRAM) TOPICAL DAILY
COMMUNITY

## 2017-11-13 NOTE — PROGRESS NOTES
1. Have you been to the ER, urgent care clinic since your last visit? Hospitalized since your last visit? [facility] November 2017 CP     2. Have you seen or consulted any other health care providers outside of the [address] [place] [address] since your last visit? Include any pap smears or colon screening.  Yes

## 2017-11-13 NOTE — PROGRESS NOTES
HISTORY OF PRESENT ILLNESS  Anne Santiago is a 56 y.o. female. HPI Comments: Recent er visit with cp-chart reviewed    Hospital Follow Up   The history is provided by the patient. Associated symptoms include chest pain. Pertinent negatives include no abdominal pain, no headaches and no shortness of breath. Cholesterol Problem   The history is provided by the patient. This is a chronic problem. The problem occurs constantly. Associated symptoms include chest pain. Pertinent negatives include no abdominal pain, no headaches and no shortness of breath. Chest Pain (Angina)    The history is provided by the patient. This is a new problem. The problem has been resolved. The problem occurs every several days. The pain is associated with exertion and rest. The pain is present in the substernal region. The pain is moderate. The quality of the pain is described as pressure-like. The pain does not radiate. Associated symptoms include dizziness. Pertinent negatives include no abdominal pain, no claudication, no cough, no fever, no headaches, no hemoptysis, no nausea, no orthopnea, no palpitations, no PND, no shortness of breath, no sputum production, no vomiting and no weakness. Pre-op Exam   The history is provided by the patient. This is a new problem. Associated symptoms include chest pain. Pertinent negatives include no abdominal pain, no headaches and no shortness of breath. Shortness of Breath   The history is provided by the patient. This is a recurrent problem. The problem occurs intermittently. The problem has not changed since onset. Associated symptoms include wheezing and chest pain. Pertinent negatives include no fever, no headaches, no cough, no sputum production, no hemoptysis, no PND, no orthopnea, no vomiting, no abdominal pain, no rash, no leg swelling and no claudication. The problem's precipitants include exercise. Palpitations    The history is provided by the patient.  This is a recurrent problem. The problem has not changed since onset. The problem occurs rarely. Associated symptoms include chest pain and dizziness. Pertinent negatives include no fever, no claudication, no orthopnea, no PND, no abdominal pain, no nausea, no vomiting, no headaches, no weakness, no cough, no hemoptysis, no shortness of breath and no sputum production. Review of Systems   Constitutional: Negative for chills and fever. HENT: Negative for nosebleeds. Eyes: Negative for blurred vision and double vision. Respiratory: Positive for wheezing. Negative for cough, hemoptysis, sputum production and shortness of breath. Cardiovascular: Positive for chest pain. Negative for palpitations, orthopnea, claudication, leg swelling and PND. Gastrointestinal: Negative for abdominal pain, heartburn, nausea and vomiting. Musculoskeletal: Negative for myalgias. Skin: Negative for rash. Neurological: Positive for dizziness. Negative for weakness and headaches. Endo/Heme/Allergies: Does not bruise/bleed easily.      Family History   Problem Relation Age of Onset    Hypertension Mother     Diabetes Father     Colon Cancer Father     Cancer Father      prosate    Hypertension Maternal Grandmother     Heart Disease Neg Hx      no family history of heart disease       Past Medical History:   Diagnosis Date    Asthma     Chest pain, unspecified     Possible Angina, GERD, chest wall pains, recurrent pains, possible atypical angina, happens at rest, abnormal nuc scan in past, discussed risk benefit options of cardiac cath/pci, she wants to think it over, add sl ntg    Coronary atherosclerosis of native coronary artery     Abnormal NUC scan, patient's symptoms are better, will start meds and monitor    Heart murmur     Hypercholesterolemia     Ill-defined condition     Patent Foramen Ovale- asymptomatic    Obesity, unspecified     Discussed diet    Other and unspecified hyperlipidemia     no longer on meds    Pain of right thumb     Trigger thumb of right hand        Past Surgical History:   Procedure Laterality Date    BIOPSY OF VAGINA,SIMPLE N/A 05/05/2017    Dr. Calin Sloan N/A 05/05/2017    Dr. Chloe Murcia HX CHOLECYSTECTOMY      HX COLONOSCOPY  2012    HX OTHER SURGICAL      Subcutaneous fistulotomy posterior vaginal wall. Social History   Substance Use Topics    Smoking status: Former Smoker     Types: Cigarettes     Quit date: 2/25/1995    Smokeless tobacco: Never Used    Alcohol use 0.0 oz/week     0 Standard drinks or equivalent per week      Comment: socially       Allergies   Allergen Reactions    Penicillins Other (comments)     Takes skin of body        Current Outpatient Prescriptions   Medication Sig    mometasone (ASMANEX TWISTHALER) 220 mcg (120 doses) aepb inhaler Take 1 Puff by inhalation daily.  loratadine (CLARITIN) 10 mg tablet Take 10 mg by mouth.  magnesium 250 mg tab Take 250 mg by mouth.  aspirin delayed-release 81 mg tablet Take 81 mg by mouth daily.  cyanocobalamin (VITAMIN B-12) 1,000 mcg tablet Take 1,000 mcg by mouth daily.  OTHER chromax plus take one cap every day    multivitamin (ONE A DAY) tablet Take 1 Tab by mouth daily.  OMEGA-3 FATTY ACIDS/FISH OIL (FISH OIL EXTRA STRENGTH PO) Take  by mouth daily.  nitroglycerin (NITROSTAT) 0.4 mg SL tablet 1 Tab by SubLINGual route every five (5) minutes as needed for Chest Pain.  Cholecalciferol, Vitamin D3, 1,000 unit cap Take  by mouth.  albuterol (PROVENTIL HFA, VENTOLIN HFA, PROAIR HFA) 90 mcg/actuation inhaler Take  by inhalation.  zinc 50 mg Tab Take  by mouth daily. No current facility-administered medications for this visit. Visit Vitals    /75    Pulse 98    Ht 5' 6\" (1.676 m)    Wt 101.2 kg (223 lb)    BMI 35.99 kg/m2         Physical Exam   Constitutional: She is oriented to person, place, and time.  She appears well-developed and well-nourished. HENT:   Head: Normocephalic and atraumatic. Eyes: Conjunctivae are normal.   Neck: Neck supple. No JVD present. No tracheal deviation present. No thyromegaly present. Cardiovascular: Normal rate. An irregular rhythm present. PMI is not displaced. Exam reveals no gallop, no S3 and no decreased pulses. No murmur heard. Pulmonary/Chest: No respiratory distress. She has no wheezes. She has no rales. She exhibits no tenderness. Abdominal: Soft. There is no tenderness. Musculoskeletal: She exhibits no edema. Neurological: She is alert and oriented to person, place, and time. Skin: Skin is warm. Psychiatric: She has a normal mood and affect. Ms. Demond Rodriguez has a reminder for a \"due or due soon\" health maintenance. I have asked that she contact her primary care provider for follow-up on this health maintenance. CARDIOLOGY STUDIES 3/1/2012   Myocardial Perfusion Scan Result Abnormal; ANT ischemia   Echocardiogram - Complete Result Normal EF   Some recent data might be hidden     SUMMARY:2015:echo  Left ventricle: Systolic function was normal. Ejection fraction was  estimated in the range of 55 % to 60 %. There were no regional wall motion  abnormalities. Doppler parameters were consistent with abnormal left  ventricular relaxation (grade 1 diastolic dysfunction). Atrial septum: Positive bubble study with a right to left shunt induced by  the Valsalva maneuver. Interatrial septum is aneurysmal with a jump  rope-like appearance. Mitral valve: There was mild regurgitation. Tricuspid valve: There was mild regurgitation. Pulmonary artery systolic  pressure: 30 mmHg. NUCLEAR IMAGIN2015         Findings:   1. Stress images reveal small reduced Myoview uptake in moderate sized area of anterior wall seen in short axis and vertical long axis views. 2. Resting images have no evidence of redistribution in the anterior wall.    3. Gated images reveal normal wall motion and ejection fraction is calculated a 64%. Conclusion:   1. Normal scan. 2. Evidence of a fixed anterior defect is most likely due to soft tissue attenuation. 3. Normal wall motion and ejection fraction. 4. Low risk scan. I have personally reviewed patients ekg done at other facility. Sr,pvc-1/2016  I Have personally reviewed recent relevant labs available and discussed with patient  8/2016  High ldl  I have personally reviewed patient's records available from hospital and other providers and incorporated findings in patient care. 11/2017  Er  I have personally reviewed patients ekg done at other facility. Assessment       ICD-10-CM ICD-9-CM    1. Chest pain, unspecified type R07.9 786.50 SCHEDULE NUCLEAR STUDY    ? angina  abn nuc once recent er visit   2. Hyperlipidemia, unspecified hyperlipidemia type E78.5 272.4     stable   3. PVC (premature ventricular contraction) I49.3 427.69 SCHEDULE NUCLEAR STUDY      2D ECHO COMPLETE ADULT (TTE)      ECG,PT DEMAND EVENT,PRESYMPT MEMORY LOOP    stable   4. PFO (patent foramen ovale) Q21.1 745. 5 2D ECHO COMPLETE ADULT (TTE)    old   5.  Near syncope R55 780.2 ECG,PT DEMAND EVENT,PRESYMPT MEMORY LOOP    2 episode on exertion and at rest       Medications Discontinued During This Encounter   Medication Reason    BIOTIN PO Not A Current Medication    montelukast (SINGULAIR) 10 mg tablet Not A Current Medication    MULTIVIT &MINERALS/FERROUS FUM (MULTI VITAMIN PO) Not A Current Medication    umeclidinium-vilanterol (ANORO ELLIPTA) 62.5-25 mcg/actuation inhaler Not A Current Medication    predniSONE (STERAPRED DS) 10 mg dose pack Not A Current Medication       Orders Placed This Encounter    ECG,PT DEMAND EVENT,PRESYMPT MEMORY LOOP    SCHEDULE NUCLEAR STUDY     Exercise stress test     Standing Status:   Future     Standing Expiration Date:   11/13/2018    2D ECHO COMPLETE ADULT (TTE)     Standing Status:   Future     Standing Expiration Date: 5/12/2018     Order Specific Question:   Reason for Exam:     Answer:   see diagnosis       Follow-up Disposition:  Return for F/u after tests.

## 2017-11-16 ENCOUNTER — HOSPITAL ENCOUNTER (OUTPATIENT)
Dept: VASCULAR SURGERY | Age: 57
Discharge: HOME | End: 2017-11-16
Attending: PSYCHIATRY & NEUROLOGY
Payer: COMMERCIAL

## 2017-11-16 ENCOUNTER — APPOINTMENT (OUTPATIENT)
Age: 57
End: 2017-11-16
Attending: PSYCHIATRY & NEUROLOGY
Payer: COMMERCIAL

## 2017-11-16 DIAGNOSIS — R55 SYNCOPE AND COLLAPSE: ICD-10-CM

## 2017-11-16 PROCEDURE — 93880 EXTRACRANIAL BILAT STUDY: CPT

## 2017-11-16 NOTE — PROCEDURES
[facility]  *** FINAL REPORT ***    Name: Dior Randle  MRN: [id number]    Outpatient  : 13 Sep 1960  HIS Order #: [id number]  [address] [facility] Visit #: [id number]  Date: 2017    TYPE OF TEST: Cerebrovascular Duplex    REASON FOR TEST    Right Carotid:-             Proximal               Mid                 Distal  cm/s  Systolic  Diastolic  Systolic  Diastolic  Systolic  Diastolic  CCA:     96.5      25.4       90.5      25.4       91.9      29.6  Bulb:  ECA:     96.4      23.5  ICA:     70.9      24.1       57.0      30.4       57.0      27.1  ICA/CCA:  0.8       0.8    ICA Stenosis: Normal    Right Vertebral:-  Finding: Antegrade  Sys:       53.4  Hina:       23.7    Right Subclavian: Normal    Left Carotid:-            Proximal                Mid                 Distal  cm/s  Systolic  Diastolic  Systolic  Diastolic  Systolic  Diastolic  CCA:    090.7      46.7       95.1      37.0       93.5      28.9  Bulb:  ECA:     67.7      28.9  ICA:     69.9      21.5       57.8      33.6       59.4      26.3  ICA/CCA:  0.7       0.5    ICA Stenosis: Normal    Left Vertebral:-  Finding: Antegrade  Sys:       57.7  Hina:       29.2    Left Subclavian: Normal    INTERPRETATION/FINDINGS  Duplex images were obtained using 2-D gray scale, color flow, and  spectral Doppler analysis. 1. No evidence of significant arterial occlusive disease in the  internal carotid arteries. 2. No significant stenosis in the external carotid arteries  bilaterally. 3. Antegrade flow in both vertebral arteries. 4. Normal flow in both subclavian arteries. ADDITIONAL COMMENTS    I have personally reviewed the data relevant to the interpretation of  this  study.     TECHNOLOGIST: Loren Gallardo RVT  Signed: 2017 03:46 PM    PHYSICIAN: Esperanza Ramos MD  Signed: 2017 04:23 PM

## 2017-11-20 ENCOUNTER — CLINICAL SUPPORT (OUTPATIENT)
Dept: PULMONOLOGY | Age: 57
End: 2017-11-20

## 2017-11-20 VITALS — HEIGHT: 66 IN | WEIGHT: 225 LBS | BODY MASS INDEX: 36.16 KG/M2

## 2017-11-20 DIAGNOSIS — J45.909 UNCOMPLICATED ASTHMA, UNSPECIFIED ASTHMA SEVERITY, UNSPECIFIED WHETHER PERSISTENT: ICD-10-CM

## 2017-11-22 ENCOUNTER — CLINICAL SUPPORT (OUTPATIENT)
Dept: CARDIOLOGY CLINIC | Age: 57
End: 2017-11-22

## 2017-11-22 DIAGNOSIS — R07.9 CHEST PAIN, UNSPECIFIED TYPE: Primary | ICD-10-CM

## 2017-11-22 DIAGNOSIS — E78.5 HYPERLIPIDEMIA, UNSPECIFIED HYPERLIPIDEMIA TYPE: ICD-10-CM

## 2017-11-22 DIAGNOSIS — Q21.12 PFO (PATENT FORAMEN OVALE): ICD-10-CM

## 2017-11-22 DIAGNOSIS — I49.3 PVC (PREMATURE VENTRICULAR CONTRACTION): ICD-10-CM

## 2017-11-22 NOTE — PROGRESS NOTES
[facility]  [address], [address] [place] [address], [place], [address] [place] [address]  [phone] [place]  [phone] [place]       Name: Davy Christiansen         MRN#: [id number]        YOB: 1960   Gender: female Ht:5'6\" Wt:225 lbs       . Date of Rest/Stress Images: 11/22/2017   Referring Physician: Konrad Sicard, MD  Ordering Physician: Tawanda Jesus. Yo Wilson MD, [facility]  Technologist: Hammad Dean. YOMAIRA Bernard., C.N.M.T  Diagnosis:No diagnosis found. Rest/Stress Myoview SPECT Myocardial Perfusion Imaging with  Lexiscan Stress and gated SPECT Imaging      PROCEDURE:      Myocardial perfusion imaging was performed at rest approximately 30 mins following the intravenous injection,(Right hand ) of 12.2 mCi of Tc99m Myoview for evaluation of myocardial function and perfusion at rest.    Baseline Data:    Baseline EKG reveals sinus rhythm, within normal limits. Baseline heart rate is 56. Baseline blood pressure is 109/72. Procedure: The patient was injected with 0.4 mg IV Lexiscan. The patient had no significant symptoms. Heart rate increased from baseline to a heart rate of 130. Blood pressure increased to 123/84. Electrocardiogram showed no significant ST depression. The patient had transient T-wave inversion in V3-V6 in the inferior lead during the procedure. Diagnosis:   1. Nondiagnostic EKG portion of Lexiscan stress test.    2. Nuclear imaging report to follow. Pharmacological:  Patient was injected with . 4 mg/mL with Lexiscan intravenously over a period 10 to 20 sec. After pharmacologic stress, the patient was injected intravenously with 37.5 mCi of Tc99m Myoview. Gating post stress tomographic imaging performed approximately 45 minutes post tracer injection. The data was reconstructed in the short, horizontal long and vertical long axis views and tomographic slices were generated.      NUCLEAR IMAGING:    Findings:   1. Stress images reveal normal Myoview distrubution in all the LV segments in short axis, vertical and horizontal long axis views. 2. Resting images have a normal uptake. 3. Gated images reveal normal wall motion and the ejection fraction is calculated to be 54%. Conclusion:   1. Normal perfusion scan. 2. Normal wall motion and ejection fraction. 3. No evidence of significant fixed or reversible defect suggesting ischemia or myocardial infarction noted from this nuclear study. 4. Low risk scan. Thank you for the referral.    E-signed and Interpreting Physician:    Cisco Valverde.  Eva Miranda MD, [facility] [place]     Date of interpretation: 11/22/2017  Date of final report: 11/22/2017

## 2017-11-27 ENCOUNTER — OFFICE VISIT (OUTPATIENT)
Dept: CARDIOLOGY CLINIC | Age: 57
End: 2017-11-27

## 2017-11-27 VITALS
HEART RATE: 93 BPM | DIASTOLIC BLOOD PRESSURE: 77 MMHG | BODY MASS INDEX: 36 KG/M2 | WEIGHT: 224 LBS | SYSTOLIC BLOOD PRESSURE: 128 MMHG | HEIGHT: 66 IN

## 2017-11-27 DIAGNOSIS — I49.3 PVC (PREMATURE VENTRICULAR CONTRACTION): ICD-10-CM

## 2017-11-27 DIAGNOSIS — I34.0 NON-RHEUMATIC MITRAL REGURGITATION: ICD-10-CM

## 2017-11-27 DIAGNOSIS — R55 SYNCOPE, UNSPECIFIED SYNCOPE TYPE: ICD-10-CM

## 2017-11-27 DIAGNOSIS — R07.9 CHEST PAIN, UNSPECIFIED TYPE: Primary | ICD-10-CM

## 2017-11-27 RX ORDER — NITROGLYCERIN 0.4 MG/1
0.4 TABLET SUBLINGUAL
Qty: 25 TAB | Refills: 1 | Status: SHIPPED | OUTPATIENT
Start: 2017-11-27 | End: 2017-11-27 | Stop reason: SDUPTHER

## 2017-11-27 RX ORDER — NITROGLYCERIN 0.4 MG/1
0.4 TABLET SUBLINGUAL
Qty: 25 TAB | Refills: 1 | Status: SHIPPED | OUTPATIENT
Start: 2017-11-27 | End: 2018-11-02 | Stop reason: SDUPTHER

## 2017-11-27 NOTE — PROGRESS NOTES
1. Have you been to the ER, urgent care clinic since your last visit? Hospitalized since your last visit? No    2. Have you seen or consulted any other health care providers outside of the [address] [place] [address] since your last visit? Include any pap smears or colon screening. No       ECHO SUMMARY    SUMMARY:  Left ventricle: Systolic function was normal. Ejection fraction was  estimated to be 55 %. There were no regional wall motion abnormalities. Wall thickness was mildly to moderately increased. Doppler parameters were  consistent with abnormal left ventricular relaxation (grade 1 diastolic  dysfunction). Atrial septum: Interatrial septum is aneurysmal with a jump rope-like  appearance. There was a positive bubble study on previous echo indicating  a PFO. Mitral valve: There was mild regurgitation. Tricuspid valve: There was mild regurgitation. COMPARISONS:  There has been no significant change.  Comparison was made with the

## 2017-11-27 NOTE — PROGRESS NOTES
HISTORY OF PRESENT ILLNESS  Jacinto Stephens is a 56 y.o. female. HPI Comments: Recent er visit with cp-chart reviewed  Patient is here for follow up of diagnostic tests. Results will be discussed. Cholesterol Problem   The history is provided by the patient. This is a chronic problem. The problem occurs constantly. Associated symptoms include chest pain. Pertinent negatives include no abdominal pain, no headaches and no shortness of breath. Chest Pain (Angina)    The history is provided by the patient. This is a new problem. The problem has been resolved. The problem occurs every several days. The pain is associated with exertion and rest. The pain is present in the substernal region. The pain is moderate. The quality of the pain is described as pressure-like. The pain does not radiate. Associated symptoms include dizziness. Pertinent negatives include no abdominal pain, no claudication, no cough, no fever, no headaches, no hemoptysis, no nausea, no orthopnea, no palpitations, no PND, no shortness of breath, no sputum production, no vomiting and no weakness. Shortness of Breath   The history is provided by the patient. This is a recurrent problem. The problem occurs intermittently. The problem has not changed since onset. Associated symptoms include wheezing and chest pain. Pertinent negatives include no fever, no headaches, no cough, no sputum production, no hemoptysis, no PND, no orthopnea, no vomiting, no abdominal pain, no rash, no leg swelling and no claudication. The problem's precipitants include exercise. Palpitations    The history is provided by the patient. This is a recurrent problem. The problem has not changed since onset. The problem occurs rarely. Associated symptoms include chest pain and dizziness.  Pertinent negatives include no fever, no claudication, no orthopnea, no PND, no abdominal pain, no nausea, no vomiting, no headaches, no weakness, no cough, no hemoptysis, no shortness of breath and no sputum production. Review of Systems   Constitutional: Negative for chills and fever. HENT: Negative for nosebleeds. Eyes: Negative for blurred vision and double vision. Respiratory: Positive for wheezing. Negative for cough, hemoptysis, sputum production and shortness of breath. Cardiovascular: Positive for chest pain. Negative for palpitations, orthopnea, claudication, leg swelling and PND. Gastrointestinal: Negative for abdominal pain, heartburn, nausea and vomiting. Musculoskeletal: Negative for myalgias. Skin: Negative for rash. Neurological: Positive for dizziness. Negative for weakness and headaches. Endo/Heme/Allergies: Does not bruise/bleed easily.      Family History   Problem Relation Age of Onset    Hypertension Mother     Diabetes Father     Colon Cancer Father     Cancer Father      prosate    Hypertension Maternal Grandmother     Heart Disease Neg Hx      no family history of heart disease       Past Medical History:   Diagnosis Date    Asthma     Chest pain, unspecified     Possible Angina, GERD, chest wall pains, recurrent pains, possible atypical angina, happens at rest, abnormal nuc scan in past, discussed risk benefit options of cardiac cath/pci, she wants to think it over, add sl ntg    Coronary atherosclerosis of native coronary artery     Abnormal NUC scan, patient's symptoms are better, will start meds and monitor    Heart murmur     Hypercholesterolemia     Ill-defined condition     Patent Foramen Ovale- asymptomatic    Obesity, unspecified     Discussed diet    Other and unspecified hyperlipidemia     no longer on meds    Pain of right thumb     Trigger thumb of right hand        Past Surgical History:   Procedure Laterality Date    BIOPSY OF VAGINA,SIMPLE N/A 05/05/2017    Dr. Spring Rangel DIAGNOSTIC ANOSCOPY N/A 05/05/2017    Dr. Alcon Ernandez HX CHOLECYSTECTOMY      HX COLONOSCOPY  2012    HX OTHER SURGICAL Subcutaneous fistulotomy posterior vaginal wall. Social History   Substance Use Topics    Smoking status: Former Smoker     Types: Cigarettes     Quit date: 2/25/1995    Smokeless tobacco: Never Used    Alcohol use 0.0 oz/week     0 Standard drinks or equivalent per week      Comment: socially       Allergies   Allergen Reactions    Penicillins Other (comments)     Takes skin of body        Current Outpatient Prescriptions   Medication Sig    nitroglycerin (NITROSTAT) 0.4 mg SL tablet 1 Tab by SubLINGual route every five (5) minutes as needed for Chest Pain.  mometasone (ASMANEX TWISTHALER) 220 mcg (120 doses) aepb inhaler Take 1 Puff by inhalation daily.  loratadine (CLARITIN) 10 mg tablet Take 10 mg by mouth.  magnesium 250 mg tab Take 250 mg by mouth.  aspirin delayed-release 81 mg tablet Take 81 mg by mouth daily.  cyanocobalamin (VITAMIN B-12) 1,000 mcg tablet Take 1,000 mcg by mouth daily.  OTHER chromax plus take one cap every day    multivitamin (ONE A DAY) tablet Take 1 Tab by mouth daily.  OMEGA-3 FATTY ACIDS/FISH OIL (FISH OIL EXTRA STRENGTH PO) Take  by mouth daily.  Cholecalciferol, Vitamin D3, 1,000 unit cap Take  by mouth.  albuterol (PROVENTIL HFA, VENTOLIN HFA, PROAIR HFA) 90 mcg/actuation inhaler Take  by inhalation.  zinc 50 mg Tab Take  by mouth daily. No current facility-administered medications for this visit. Visit Vitals    /77    Pulse 93    Ht 5' 6\" (1.676 m)    Wt 101.6 kg (224 lb)    BMI 36.15 kg/m2         Physical Exam   Constitutional: She is oriented to person, place, and time. She appears well-developed and well-nourished. HENT:   Head: Normocephalic and atraumatic. Eyes: Conjunctivae are normal.   Neck: Neck supple. No JVD present. No tracheal deviation present. No thyromegaly present. Cardiovascular: Normal rate. An irregular rhythm present. PMI is not displaced.   Exam reveals no gallop, no S3 and no decreased pulses. No murmur heard. Pulmonary/Chest: No respiratory distress. She has no wheezes. She has no rales. She exhibits no tenderness. Abdominal: Soft. There is no tenderness. Musculoskeletal: She exhibits no edema. Neurological: She is alert and oriented to person, place, and time. Skin: Skin is warm. Psychiatric: She has a normal mood and affect. Ms. Alejandra Gomes has a reminder for a \"due or due soon\" health maintenance. I have asked that she contact her primary care provider for follow-up on this health maintenance. CARDIOLOGY STUDIES 3/1/2012   Myocardial Perfusion Scan Result Abnormal; ANT ischemia   Echocardiogram - Complete Result Normal EF   Some recent data might be hidden     SUMMARY:2015:echo  Left ventricle: Systolic function was normal. Ejection fraction was  estimated in the range of 55 % to 60 %. There were no regional wall motion  abnormalities. Doppler parameters were consistent with abnormal left  ventricular relaxation (grade 1 diastolic dysfunction). Atrial septum: Positive bubble study with a right to left shunt induced by  the Valsalva maneuver. Interatrial septum is aneurysmal with a jump  rope-like appearance. Mitral valve: There was mild regurgitation. Tricuspid valve: There was mild regurgitation. Pulmonary artery systolic  pressure: 30 mmHg. NUCLEAR IMAGIN2015         Findings:   1. Stress images reveal small reduced Myoview uptake in moderate sized area of anterior wall seen in short axis and vertical long axis views. 2. Resting images have no evidence of redistribution in the anterior wall. 3. Gated images reveal normal wall motion and ejection fraction is calculated a 64%. Conclusion:   1. Normal scan. 2. Evidence of a fixed anterior defect is most likely due to soft tissue attenuation. 3. Normal wall motion and ejection fraction. 4. Low risk scan. I have personally reviewed patients ekg done at other facility.   gracia Bradley-2016  I Have personally reviewed recent relevant labs available and discussed with patient  2016  High ldl  I have personally reviewed patient's records available from hospital and other providers and incorporated findings in patient care. 2017  Er  I have personally reviewed patients ekg done at other facility. SUMMARY:echo-2017  Left ventricle: Systolic function was normal. Ejection fraction was  estimated to be 55 %. There were no regional wall motion abnormalities. Wall thickness was mildly to moderately increased. Doppler parameters were  consistent with abnormal left ventricular relaxation (grade 1 diastolic  dysfunction). Atrial septum: Interatrial septum is aneurysmal with a jump rope-like  appearance. There was a positive bubble study on previous echo indicating  a PFO. Mitral valve: There was mild regurgitation. Tricuspid valve: There was mild regurgitation. NUCLEAR IMAGIN2017     Findings:   1. Stress images reveal normal Myoview distrubution in all the LV segments in short axis, vertical and horizontal long axis views. 2. Resting images have a normal uptake. 3. Gated images reveal normal wall motion and the ejection fraction is calculated to be 54%. Conclusion:   1. Normal perfusion scan. 2. Normal wall motion and ejection fraction. 3. No evidence of significant fixed or reversible defect suggesting ischemia or myocardial infarction noted from this nuclear study. 4. Low risk scan. 2017-event monitor pending  Assessment       ICD-10-CM ICD-9-CM    1. Chest pain, unspecified type R07.9 786.50 nitroglycerin (NITROSTAT) 0.4 mg SL tablet    better  negative stres stest  medical managment   2. PVC (premature ventricular contraction) I49.3 427.69    3. Non-rheumatic mitral regurgitation I34.0 424.0     mild  monitor   4.  Syncope, unspecified syncope type R55 780.2     no recurrence  event monitor pending         Medications Discontinued During This Encounter   Medication Reason    nitroglycerin (NITROSTAT) 0.4 mg SL tablet Reorder       Orders Placed This Encounter    nitroglycerin (NITROSTAT) 0.4 mg SL tablet     Si Tab by SubLINGual route every five (5) minutes as needed for Chest Pain. Dispense:  25 Tab     Refill:  1       Follow-up Disposition:  Return in about 3 months (around 2018).

## 2017-11-27 NOTE — MR AVS SNAPSHOT
Visit Information Date & Time Provider Department Dept. Phone Encounter #  
 11/27/2017  8:45 AM Berta Dumont MD [facility] [address] [place] [address] [id number] Follow-up Instructions Return in about 3 months (around 2/27/2018). Your Appointments 11/29/2017  9:30 AM  
New Patient with Kye Dick MD  
[address] ([facility]) Appt Note: Dr. Swetha Kidd for asthma. [facility] 11/20/17 @ 10:00; .  
 [address] [place] [address], [address] Tamie [place]on [address]  
[phone]  
  
   
 [address] [place] [address], [address] [facility],[address] [place] [address] Upcoming Health Maintenance Date Due Hepatitis C Screening 1960 Pneumococcal 19-64 Medium Risk (1 of 1 - PPSV23) 9/13/1981 DTaP/Tdap/Td series (1 - Tdap) 9/13/1983 PAP AKA CERVICAL CYTOLOGY 9/13/1983 FOBT Q 1 YEAR AGE 50-75 9/13/2012 Influenza Age 5 to Adult 8/1/2017 BREAST CANCER SCRN MAMMOGRAM 11/2/2019 Allergies as of 11/27/2017  Review Complete On: 11/27/2017 By: Berta Dumont MD  
  
 Severity Noted Reaction Type Reactions Penicillins  02/20/2013    Other (comments) Takes skin of body Current Immunizations  Never Reviewed No immunizations on file. Not reviewed this visit You Were Diagnosed With   
  
 Codes Comments Chest pain, unspecified type    -  Primary ICD-10-CM: R07.9 ICD-9-CM: 786.50 better 
negative stres stest 
medical managment PVC (premature ventricular contraction)     ICD-10-CM: I49.3 ICD-9-CM: 427.69 Non-rheumatic mitral regurgitation     ICD-10-CM: I34.0 ICD-9-CM: 424.0 mild 
monitor Syncope, unspecified syncope type     ICD-10-CM: R55 
ICD-9-CM: 780.2 no recurrence 
event monitor pending Vitals BP Pulse Height(growth percentile) Weight(growth percentile) BMI OB Status 128/77 93 5' 6\" (1.676 m) 224 lb (101.6 kg) 36.15 kg/m2 Menopause Smoking Status Former Smoker Vitals History BMI and BSA Data Body Mass Index Body Surface Area  
 36.15 kg/m 2 2.18 m 2 Preferred Pharmacy Pharmacy Name Phone [facility] [address] [phone] Your Updated Medication List  
  
   
This list is accurate as of: 17  9:15 AM.  Always use your most recent med list.  
  
  
  
  
 albuterol 90 mcg/actuation inhaler Commonly known as:  PROVENTIL HFA, VENTOLIN HFA, PROAIR HFA Take  by inhalation. ASMANEX TWISTHALER 220 mcg (120 doses) Aepb inhaler Generic drug:  mometasone Take 1 Puff by inhalation daily. aspirin delayed-release 81 mg tablet Take 81 mg by mouth daily. cholecalciferol 1,000 unit Cap Commonly known as:  VITAMIN D3 Take  by mouth. FISH OIL EXTRA STRENGTH PO Take  by mouth daily. loratadine 10 mg tablet Commonly known as:  Giuliano Shirley Take 10 mg by mouth.  
  
 magnesium 250 mg Tab Take 250 mg by mouth.  
  
 multivitamin tablet Commonly known as:  ONE A DAY Take 1 Tab by mouth daily. nitroglycerin 0.4 mg SL tablet Commonly known as:  NITROSTAT  
1 Tab by SubLINGual route every five (5) minutes as needed for Chest Pain. OTHER  
chromax plus take one cap every day VITAMIN B-12 1,000 mcg tablet Generic drug:  cyanocobalamin Take 1,000 mcg by mouth daily. zinc 50 mg Tab tablet Take  by mouth daily. Prescriptions Sent to Pharmacy Refills  
 nitroglycerin (NITROSTAT) 0.4 mg SL tablet 1 Si Tab by SubLINGual route every five (5) minutes as needed for Chest Pain. Class: Normal  
 Pharmacy: [facility] [address]  #: [phone] Route: SubLINGual  
  
Follow-up Instructions Return in about 3 months (around 2018). Introducing [facility]!    
 [employer] introduces [employer] patient portal. Now you can access parts of your medical record, email your doctor's office, and request medication refills online. 1. In your internet browser, go to [email or web address]. [email or web address] 2. Click on the First Time User? Click Here link in the Sign In box. You will see the New Member Sign Up page. 3. Enter your [employer] Access Code exactly as it appears below. You will not need to use this code after youve completed the sign-up process. If you do not sign up before the expiration date, you must request a new code. · [employer] Access Code: [email or web address] Expires: 1/17/2018  2:27 PM 
 
4. Enter the last four digits of your Social Security Number (xxxx) and Date of Birth (mm/dd/yyyy) as indicated and click Submit. You will be taken to the next sign-up page. 5. Create a [employer] ID. This will be your [employer] login ID and cannot be changed, so think of one that is secure and easy to remember. 6. Create a [employer] password. You can change your password at any time. 7. Enter your Password Reset Question and Answer. This can be used at a later time if you forget your password. 8. Enter your e-mail address. You will receive e-mail notification when new information is available in [address] 9. Click Sign Up. You can now view and download portions of your medical record. 10. Click the Download Summary menu link to download a portable copy of your medical information. If you have questions, please visit the Frequently Asked Questions section of the [employer] website. Remember, [employer] is NOT to be used for urgent needs. For medical emergencies, dial 911. Now available from your iPhone and Android! Please provide this summary of care documentation to your next provider. Your primary care clinician is listed as Tyra Parrish. If you have any questions after today's visit, please call [phone].

## 2017-11-29 ENCOUNTER — OFFICE VISIT (OUTPATIENT)
Dept: PULMONOLOGY | Age: 57
End: 2017-11-29

## 2017-11-29 VITALS
HEART RATE: 92 BPM | SYSTOLIC BLOOD PRESSURE: 120 MMHG | WEIGHT: 224 LBS | HEIGHT: 66 IN | BODY MASS INDEX: 36 KG/M2 | OXYGEN SATURATION: 96 % | RESPIRATION RATE: 14 BRPM | TEMPERATURE: 98 F | DIASTOLIC BLOOD PRESSURE: 84 MMHG

## 2017-11-29 DIAGNOSIS — E66.9 CLASS 2 OBESITY WITHOUT SERIOUS COMORBIDITY WITH BODY MASS INDEX (BMI) OF 36.0 TO 36.9 IN ADULT, UNSPECIFIED OBESITY TYPE: ICD-10-CM

## 2017-11-29 DIAGNOSIS — J45.40 MODERATE PERSISTENT ASTHMA WITHOUT COMPLICATION: Primary | ICD-10-CM

## 2017-11-29 DIAGNOSIS — J30.1 CHRONIC SEASONAL ALLERGIC RHINITIS DUE TO POLLEN: ICD-10-CM

## 2017-11-29 RX ORDER — ALBUTEROL SULFATE 90 UG/1
2 AEROSOL, METERED RESPIRATORY (INHALATION)
Qty: 1 INHALER | Refills: 6 | Status: SHIPPED | OUTPATIENT
Start: 2017-11-29 | End: 2019-03-26 | Stop reason: SDUPTHER

## 2017-11-29 RX ORDER — ALBUTEROL SULFATE 0.83 MG/ML
2.5 SOLUTION RESPIRATORY (INHALATION) ONCE
Qty: 24 EACH | Refills: 3 | Status: SHIPPED | OUTPATIENT
Start: 2017-11-29 | End: 2017-11-29

## 2017-11-29 RX ORDER — FLUTICASONE PROPIONATE 50 MCG
SPRAY, SUSPENSION (ML) NASAL
Qty: 1 BOTTLE | Refills: 6 | Status: SHIPPED | OUTPATIENT
Start: 2017-11-29 | End: 2018-01-30 | Stop reason: SDUPTHER

## 2017-11-29 NOTE — COMMUNICATION BODY
[facility]  Pulmonary, Critical Care, and Sleep Medicine      Pulmonary Office Initial Consultation    Name: Milad Madera     : 1960     Date: 2017        Subjective:   Patient has been referred for evaluation of: Asthma    Patient is a 56 y.o. female who hs been diagnosed with asthma and seasonal allergies for several years and treated with different medications but still has breakthrough episodes of exacerbation needing ER visits and interventions. She has been on Singulair, Asmanex, ventolin- MDI and also has a nebulizer. She needs a new machine and supplies. Usually has symptoms of wheezing, cough, increased SOB. Denies orthopnea, PND. Has been noted by her children to be a snorer. She has seasonal increase in nasal stuffiness, postnasal drainage and sneezing- Fall and winter. She is a non smoker. 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 No exposure to industrial dust, inhalational agents. Has recent episodes x2 of syncope- seen in Er and following with Cardiology and neurology. .    C/o intermittent chest pain,   No fever, chills, night sweats       Past Medical History:   Diagnosis Date    Asthma     Chest pain, unspecified     Possible Angina, GERD, chest wall pains, recurrent pains, possible atypical angina, happens at rest, abnormal nuc scan in past, discussed risk benefit options of cardiac cath/pci, she wants to think it over, add sl ntg    Coronary atherosclerosis of native coronary artery     Abnormal NUC scan, patient's symptoms are better, will start meds and monitor    Heart murmur     Hypercholesterolemia     Ill-defined condition     Patent Foramen Ovale- asymptomatic    Obesity, unspecified     Discussed diet    Other and unspecified hyperlipidemia     no longer on meds    Pain of right thumb     Trigger thumb of right hand        Past Surgical History: Procedure Laterality Date    BIOPSY OF VAGINA,SIMPLE N/A 05/05/2017    Dr. Fady Maldonado N/A 05/05/2017    Dr. Brown Canal HX CHOLECYSTECTOMY      HX COLONOSCOPY  2012    HX OTHER SURGICAL      Subcutaneous fistulotomy posterior vaginal wall. Social History     Social History    Marital status: LEGALLY      Spouse name: N/A    Number of children: N/A    Years of education: N/A     Social History Main Topics    Smoking status: Former Smoker     Packs/day: 0.20     Years: 5.00     Types: Cigarettes     Quit date: 2/25/1995    Smokeless tobacco: Never Used    Alcohol use 0.0 oz/week     0 Standard drinks or equivalent per week      Comment: socially    Drug use: No    Sexual activity: Yes     Partners: Male     Birth control/ protection: None     Other Topics Concern    None     Social History Narrative       Family History   Problem Relation Age of Onset    Hypertension Mother     Diabetes Father     Colon Cancer Father     Cancer Father      prosate    Hypertension Maternal Grandmother     Heart Disease Neg Hx      no family history of heart disease       Allergies   Allergen Reactions    Penicillins Other (comments)     Takes skin of body        . Current Outpatient Prescriptions   Medication Sig Dispense Refill    nitroglycerin (NITROSTAT) 0.4 mg SL tablet 1 Tab by SubLINGual route every five (5) minutes as needed for Chest Pain. 25 Tab 1    mometasone (ASMANEX TWISTHALER) 220 mcg (120 doses) aepb inhaler Take 1 Puff by inhalation daily.  loratadine (CLARITIN) 10 mg tablet Take 10 mg by mouth.  magnesium 250 mg tab Take 250 mg by mouth.  aspirin delayed-release 81 mg tablet Take 81 mg by mouth daily.  cyanocobalamin (VITAMIN B-12) 1,000 mcg tablet Take 1,000 mcg by mouth daily.  OTHER chromax plus take one cap every day      multivitamin (ONE A DAY) tablet Take 1 Tab by mouth daily.       OMEGA-3 FATTY ACIDS/FISH OIL (FISH OIL EXTRA STRENGTH PO) Take  by mouth daily.  Cholecalciferol, Vitamin D3, 1,000 unit cap Take  by mouth.  albuterol (PROVENTIL HFA, VENTOLIN HFA, PROAIR HFA) 90 mcg/actuation inhaler Take  by inhalation.  zinc 50 mg Tab Take  by mouth daily.            Review of Systems:  HEENT: No epistaxis, no nasal drainage, no difficulty in swallowing, no redness in eyes  Respiratory: as above  Cardiovascular: no chest pain, no palpitations, no chronic leg edema, no syncope  Gastrointestinal: no abd pain, no vomiting, no diarrhea, no bleeding symptoms  Genitourinary: No urinary symptoms or hematuria  Integument/breast: No ulcers or rashes  Musculoskeletal:Neg  Neurological: No focal weakness, no seizures, no headaches  Behvioral/Psych: No anxiety, no depression  Constitutional: No fever, no chills, no weight loss, no night sweats     Objective:     Visit Vitals    /84 (BP 1 Location: Left arm, BP Patient Position: Sitting)    Pulse 92    Temp 98 °F (36.7 °C) (Oral)    Resp 14    Ht 5' 6\" (1.676 m)    Wt 101.6 kg (224 lb)    SpO2 96%    BMI 36.15 kg/m2        Physical Exam:   General: comfortable, no acute distress  HEENT: pupils reactive, sclera anicteric, EOM intact  Neck: No adenopathy or thyroid swelling, no lymphadenopathy or JVD, supple  CVS: S1S2 no murmurs  RS: Mod AE bilaterally, no tactile fremitus or egophony, no accessory muscle use  Abd: soft, non tender, no hepatosplenomegaly  Neuro: non focal, awake, alert  Extrm: no leg edema, clubbing or cyanosis  Skin: no rash    Data review:   Pertinent labs: CBC, BMP, LFT's      PFT:  Pulmonary Function Test    11/21/17  Patient effort:   Good  Meets ATS criteria for interpretation  Flows:  Maximal Mid Expiratory Flow rate is reduced to 58 % predicted  Forced Expiratory Volume in one second is normal  FEV 1% is reduced  FEF50/FIF50 is reduced  Flow Volume Loop:  Reduced Terminal Flows in the Flow Volume Loop  Bronchodilator:  Significant improvement with bronchodilator in FEF 50/FIF50  Impression:  Mild obstructive defect, Predominately small airways    Date FVC FEV1  FEV1/FVC [id number] TLC RV RV/TLC VC DLCO   11/20/2017    58%                                              Echo:  11/22/2017    Left ventricle: Systolic function was normal. Ejection fraction was  estimated to be 55 %. There were no regional wall motion abnormalities. Wall thickness was mildly to moderately increased. Doppler parameters were  consistent with abnormal left ventricular relaxation (grade 1 diastolic  dysfunction). Atrial septum: Interatrial septum is aneurysmal with a jump rope-like  appearance. There was a positive bubble study on previous echo indicating  a PFO. Mitral valve: There was mild regurgitation. Tricuspid valve: There was mild regurgitation. COMPARISONS:  There has been no significant change. Comparison was made with the  previous study of 14-Jan-2015. Imaging:  I have personally reviewed the patients radiographs and have reviewed the reports:    CXR Results     11/16/2017    Streaky densities in the left lung base are likely atelectasis. No  definite focal consolidation, pleural effusion, pulmonary edema, or  pneumothorax. Heart is top normal in size. Thoracic aorta is mildly tortuous. There are no acute osseous abnormalities. Surgical clips in the right upper  quadrant suggest prior cholecystectomy. Impression:  1. Streaky densities in the left lung base are potentially atelectasis. 1/14/2017    There is no pneumothorax, pneumonia or pleural effusions. Heart and  mediastinal structures are unremarkable. Visualized bony thorax and soft tissues  are within normal limits.      IMPRESSION:  1. No acute cardiopulmonary process. CT Results  (Last 48 hours)    None        .      Patient Active Problem List   Diagnosis Code    Coronary atherosclerosis of native coronary artery I25.10    Chest pain R07.9    Hyperlipidemia E78.5    Obesity E66.9    Asthma J45.909    Hypercholesterolemia E78.00    PVC (premature ventricular contraction) I49.3    PFO (patent foramen ovale) Q21.1    Non-rheumatic mitral regurgitation I34.0    Syncope R55     IMPRESSION:   · Asthma- moderate intermittent. Sub optimal control related to triggers as well as some compliance issues ( not using controller as ordered). Suspect postnasal drip- seasonal allergies and GERD as triggers. Spirometry with reduced MMEFR and FEF50/FIF50 with bronchodilator response. Flow volume loop suggests upper airway redundacy  · PFO with non rheumatic MR  · Obesity- BMI 36.15      RECOMMENDATIONS:   · Optimize treatment for asthma- discussed need for trigger control, maintenance therapy and rescue treatment with peak flows monitoring and action plan  · Discussed green, yellow, red zone concept- provided peak flow meter with written action plan instructions  · Discussed spirometry results and rationale for treatment  · Will check IgE ( eosinophilia noted on CBC)   · Add Flonase to Singulair for residual Postnasal drip control  · GERD control- written instructions provided. Can consider trial with short term PPI  · Healthy weight and activity - exercise discussed. · Needs influenza vaccinations and Pneumovax- wishes to come back at later date  · Will consider sleep evalaution  · Continue follow up with Cardiology for PFO  · Will follow for asthma management     Health maintenance screens deferred to Primary care provider.      Raj Blandon MD

## 2017-11-29 NOTE — PATIENT INSTRUCTIONS

## 2017-11-29 NOTE — MR AVS SNAPSHOT
Visit Information Date & Time Provider Department Dept. Phone Encounter #  
 11/29/2017  9:30 AM Gwen Carlos MD [facility] [id number] Follow-up Instructions Return in about 2 months (around 1/29/2018). Your Appointments 2/19/2018  8:30 AM  
ESTABLISHED PATIENT with Demetrius Casiano MD  
[facility]) Appt Note: 3 months [address] [facility], [address] [place] [address]  
[facility], [address] [place] [address] Upcoming Health Maintenance Date Due Hepatitis C Screening 1960 Pneumococcal 19-64 Medium Risk (1 of 1 - PPSV23) 9/13/1981 DTaP/Tdap/Td series (1 - Tdap) 9/13/1983 PAP AKA CERVICAL CYTOLOGY 9/13/1983 FOBT Q 1 YEAR AGE 50-75 9/13/2012 Influenza Age 5 to Adult 8/1/2017 BREAST CANCER SCRN MAMMOGRAM 11/2/2019 Allergies as of 11/29/2017  Review Complete On: 11/29/2017 By: Gwen Carlos MD  
  
 Severity Noted Reaction Type Reactions Penicillins  02/20/2013    Other (comments) Takes skin of body Current Immunizations  Reviewed on 11/29/2017 No immunizations on file. Reviewed by Gwen Carlos MD on 11/29/2017 at  9:35 AM  
You Were Diagnosed With   
  
 Codes Comments Asthma due to environmental allergies    -  Primary ICD-10-CM: J45.909 ICD-9-CM: 493.90 Vitals BP Pulse Temp Resp Height(growth percentile) Weight(growth percentile) 120/84 (BP 1 Location: Left arm, BP Patient Position: Sitting) 92 98 °F (36.7 °C) (Oral) 14 5' 6\" (1.676 m) 224 lb (101.6 kg) SpO2 BMI OB Status Smoking Status 96% 36.15 kg/m2 Menopause Former Smoker Vitals History BMI and BSA Data Body Mass Index Body Surface Area  
 36.15 kg/m 2 2.18 m 2 Preferred Pharmacy Pharmacy Name Phone [facility], [address] [place] [address] [phone] Your Updated Medication List  
  
   
This list is accurate as of: 11/29/17 10:17 AM.  Always use your most recent med list.  
  
  
  
  
 * albuterol 90 mcg/actuation inhaler Commonly known as:  PROVENTIL HFA, VENTOLIN HFA, PROAIR HFA Take 2 Puffs by inhalation every six (6) hours as needed for Wheezing. * albuterol 2.5 mg /3 mL (0.083 %) nebulizer solution Commonly known as:  PROVENTIL VENTOLIN  
3 mL by Nebulization route once for 1 dose. ASMANEX TWISTHALER 220 mcg (120 doses) Aepb inhaler Generic drug:  mometasone Take 1 Puff by inhalation daily. aspirin delayed-release 81 mg tablet Take 81 mg by mouth daily. cholecalciferol 1,000 unit Cap Commonly known as:  VITAMIN D3 Take  by mouth. FISH OIL EXTRA STRENGTH PO Take  by mouth daily. fluticasone 50 mcg/actuation nasal spray Commonly known as:  FLONASE  
2 squirts each nostril at bedtime  
  
 loratadine 10 mg tablet Commonly known as:  Michaell Organ Take 10 mg by mouth.  
  
 magnesium 250 mg Tab Take 250 mg by mouth.  
  
 multivitamin tablet Commonly known as:  ONE A DAY Take 1 Tab by mouth daily. nitroglycerin 0.4 mg SL tablet Commonly known as:  NITROSTAT  
1 Tab by SubLINGual route every five (5) minutes as needed for Chest Pain. OTHER  
chromax plus take one cap every day VITAMIN B-12 1,000 mcg tablet Generic drug:  cyanocobalamin Take 1,000 mcg by mouth daily. zinc 50 mg Tab tablet Take  by mouth daily. * Notice: This list has 2 medication(s) that are the same as other medications prescribed for you. Read the directions carefully, and ask your doctor or other care provider to review them with you. Prescriptions Sent to Pharmacy Refills  
 albuterol (PROVENTIL HFA, VENTOLIN HFA, PROAIR HFA) 90 mcg/actuation inhaler 6 Sig: Take 2 Puffs by inhalation every six (6) hours as needed for Wheezing.   
 Class: Normal  
 Pharmacy: [address] Ph #: [phone] Route: Inhalation  
 fluticasone (FLONASE) 50 mcg/actuation nasal spray 6 Si squirts each nostril at bedtime Class: Normal  
 Pharmacy: [facility] [id number] [place] [address] Ph #: [phone]  
 albuterol (PROVENTIL VENTOLIN) 2.5 mg /3 mL (0.083 %) nebulizer solution 3 Sig: 3 mL by Nebulization route once for 1 dose. Class: Normal  
 Pharmacy: [address] Ph #: [phone] Route: Nebulization We Performed the Following AMB SUPPLY ORDER [[id number] Custom] Comments:  
 Nebulizer with supplies Follow-up Instructions Return in about 2 months (around 2018). To-Do List   
 2017 Lab:  IMMUNOGLOBULIN E, QT Patient Instructions Gastroesophageal Reflux Disease (GERD): Care Instructions Your Care Instructions Gastroesophageal reflux disease (GERD) is the backward flow of stomach acid into the esophagus. The esophagus is the tube that leads from your throat to your stomach. A one-way valve prevents the stomach acid from moving up into this tube. When you have GERD, this valve does not close tightly enough. If you have mild GERD symptoms including heartburn, you may be able to control the problem with antacids or over-the-counter medicine. Changing your diet, losing weight, and making other lifestyle changes can also help reduce symptoms. Follow-up care is a key part of your treatment and safety. Be sure to make and go to all appointments, and call your doctor if you are having problems. It's also a good idea to know your test results and keep a list of the medicines you take. How can you care for yourself at home? · Take your medicines exactly as prescribed. Call your doctor if you think you are having a problem with your medicine. · Your doctor may recommend over-the-counter medicine. For mild or occasional indigestion, antacids, such as Tums, Gaviscon, Mylanta, or Maalox, may help. Your doctor also may recommend over-the-counter acid reducers, such as Pepcid AC, Tagamet HB, Zantac 75, or Prilosec. Read and follow all instructions on the label. If you use these medicines often, talk with your doctor. · Change your eating habits. ¨ It's best to eat several small meals instead of two or three large meals. ¨ After you eat, wait 2 to 3 hours before you lie down. ¨ Chocolate, mint, and alcohol can make GERD worse. ¨ Spicy foods, foods that have a lot of acid (like tomatoes and oranges), and coffee can make GERD symptoms worse in some people. If your symptoms are worse after you eat a certain food, you may want to stop eating that food to see if your symptoms get better. · Do not smoke or chew tobacco. Smoking can make GERD worse. If you need help quitting, talk to your doctor about stop-smoking programs and medicines. These can increase your chances of quitting for good. · If you have GERD symptoms at night, raise the head of your bed 6 to 8 inches by putting the frame on blocks or placing a foam wedge under the head of your mattress. (Adding extra pillows does not work.) · Do not wear tight clothing around your middle. · Lose weight if you need to. Losing just 5 to 10 pounds can help. When should you call for help? Call your doctor now or seek immediate medical care if: 
? · You have new or different belly pain. ? · Your stools are black and tarlike or have streaks of blood. ? Watch closely for changes in your health, and be sure to contact your doctor if: 
? · Your symptoms have not improved after 2 days. ? · Food seems to catch in your throat or chest.  
Where can you learn more? Go to http://faye-jayda.info/.  
Enter H196 in the search box to learn more about \"Gastroesophageal Reflux Disease (GERD): Care Instructions. \" Current as of: May 12, 2017 Content Version: 11.4 © 4721-8595 [employer]. Care instructions adapted under license by [employer] (which disclaims liability or warranty for this information). If you have questions about a medical condition or this instruction, always ask your healthcare professional. [address] any warranty or liability for your use of this information. Introducing [facility]! [place] introduces [employer] patient portal. Now you can access parts of your medical record, email your doctor's office, and request medication refills online. 1. In your internet browser, go to [email or web address] 2. Click on the First Time User? Click Here link in the Sign In box. You will see the New Member Sign Up page. 3. Enter your [employer] Access Code exactly as it appears below. You will not need to use this code after youve completed the sign-up process. If you do not sign up before the expiration date, you must request a new code. · [employer] Access Code: [email or web address] Expires: 1/17/2018  2:27 PM 
 
4. Enter the last four digits of your Social Security Number (xxxx) and Date of Birth (mm/dd/yyyy) as indicated and click Submit. You will be taken to the next sign-up page. 5. Create a [employer] ID. This will be your [employer] login ID and cannot be changed, so think of one that is secure and easy to remember. 6. Create a [employer] password. You can change your password at any time. 7. Enter your Password Reset Question and Answer. This can be used at a later time if you forget your password. 8. Enter your e-mail address. You will receive e-mail notification when new information is available in [address] 9. Click Sign Up. You can now view and download portions of your medical record. 10. Click the Download Summary menu link to download a portable copy of your medical information. If you have questions, please visit the Frequently Asked Questions section of the [employer] website. Remember, [employer] is NOT to be used for urgent needs. For medical emergencies, dial 911. Now available from your iPhone and Android! Please provide this summary of care documentation to your next provider. Your primary care clinician is listed as Luis Laws. If you have any questions after today's visit, please call [phone].

## 2017-11-29 NOTE — PROGRESS NOTES
[facility]  Pulmonary, Critical Care, and Sleep Medicine      Pulmonary Office Initial Consultation    Name: Jacinto Stephens     : 1960     Date: 2017        Subjective:   Patient has been referred for evaluation of: Asthma    Patient is a 56 y.o. female who hs been diagnosed with asthma and seasonal allergies for several years and treated with different medications but still has breakthrough episodes of exacerbation needing ER visits and interventions. She has been on Singulair, Asmanex, ventolin- MDI and also has a nebulizer. She needs a new machine and supplies. Usually has symptoms of wheezing, cough, increased SOB. Denies orthopnea, PND. Has been noted by her children to be a snorer. She has seasonal increase in nasal stuffiness, postnasal drainage and sneezing- Fall and winter. She is a non smoker. 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 No exposure to industrial dust, inhalational agents. Has recent episodes x2 of syncope- seen in Er and following with Cardiology and neurology. .    C/o intermittent chest pain,   No fever, chills, night sweats       Past Medical History:   Diagnosis Date    Asthma     Chest pain, unspecified     Possible Angina, GERD, chest wall pains, recurrent pains, possible atypical angina, happens at rest, abnormal nuc scan in past, discussed risk benefit options of cardiac cath/pci, she wants to think it over, add sl ntg    Coronary atherosclerosis of native coronary artery     Abnormal NUC scan, patient's symptoms are better, will start meds and monitor    Heart murmur     Hypercholesterolemia     Ill-defined condition     Patent Foramen Ovale- asymptomatic    Obesity, unspecified     Discussed diet    Other and unspecified hyperlipidemia     no longer on meds    Pain of right thumb     Trigger thumb of right hand        Past Surgical History: Procedure Laterality Date    BIOPSY OF VAGINA,SIMPLE N/A 05/05/2017    Dr. Kevin Tate N/A 05/05/2017    Dr. Fayrene Dancer HX CHOLECYSTECTOMY      HX COLONOSCOPY  2012    HX OTHER SURGICAL      Subcutaneous fistulotomy posterior vaginal wall. Social History     Social History    Marital status: LEGALLY      Spouse name: N/A    Number of children: N/A    Years of education: N/A     Social History Main Topics    Smoking status: Former Smoker     Packs/day: 0.20     Years: 5.00     Types: Cigarettes     Quit date: 2/25/1995    Smokeless tobacco: Never Used    Alcohol use 0.0 oz/week     0 Standard drinks or equivalent per week      Comment: socially    Drug use: No    Sexual activity: Yes     Partners: Male     Birth control/ protection: None     Other Topics Concern    None     Social History Narrative       Family History   Problem Relation Age of Onset    Hypertension Mother     Diabetes Father     Colon Cancer Father     Cancer Father      prosate    Hypertension Maternal Grandmother     Heart Disease Neg Hx      no family history of heart disease       Allergies   Allergen Reactions    Penicillins Other (comments)     Takes skin of body        . Current Outpatient Prescriptions   Medication Sig Dispense Refill    nitroglycerin (NITROSTAT) 0.4 mg SL tablet 1 Tab by SubLINGual route every five (5) minutes as needed for Chest Pain. 25 Tab 1    mometasone (ASMANEX TWISTHALER) 220 mcg (120 doses) aepb inhaler Take 1 Puff by inhalation daily.  loratadine (CLARITIN) 10 mg tablet Take 10 mg by mouth.  magnesium 250 mg tab Take 250 mg by mouth.  aspirin delayed-release 81 mg tablet Take 81 mg by mouth daily.  cyanocobalamin (VITAMIN B-12) 1,000 mcg tablet Take 1,000 mcg by mouth daily.  OTHER chromax plus take one cap every day      multivitamin (ONE A DAY) tablet Take 1 Tab by mouth daily.       OMEGA-3 FATTY ACIDS/FISH OIL (FISH OIL EXTRA STRENGTH PO) Take  by mouth daily.  Cholecalciferol, Vitamin D3, 1,000 unit cap Take  by mouth.  albuterol (PROVENTIL HFA, VENTOLIN HFA, PROAIR HFA) 90 mcg/actuation inhaler Take  by inhalation.  zinc 50 mg Tab Take  by mouth daily.            Review of Systems:  HEENT: No epistaxis, no nasal drainage, no difficulty in swallowing, no redness in eyes  Respiratory: as above  Cardiovascular: no chest pain, no palpitations, no chronic leg edema, no syncope  Gastrointestinal: no abd pain, no vomiting, no diarrhea, no bleeding symptoms  Genitourinary: No urinary symptoms or hematuria  Integument/breast: No ulcers or rashes  Musculoskeletal:Neg  Neurological: No focal weakness, no seizures, no headaches  Behvioral/Psych: No anxiety, no depression  Constitutional: No fever, no chills, no weight loss, no night sweats     Objective:     Visit Vitals    /84 (BP 1 Location: Left arm, BP Patient Position: Sitting)    Pulse 92    Temp 98 °F (36.7 °C) (Oral)    Resp 14    Ht 5' 6\" (1.676 m)    Wt 101.6 kg (224 lb)    SpO2 96%    BMI 36.15 kg/m2        Physical Exam:   General: comfortable, no acute distress  HEENT: pupils reactive, sclera anicteric, EOM intact  Neck: No adenopathy or thyroid swelling, no lymphadenopathy or JVD, supple  CVS: S1S2 no murmurs  RS: Mod AE bilaterally, no tactile fremitus or egophony, no accessory muscle use  Abd: soft, non tender, no hepatosplenomegaly  Neuro: non focal, awake, alert  Extrm: no leg edema, clubbing or cyanosis  Skin: no rash    Data review:   Pertinent labs: CBC, BMP, LFT's      PFT:  Pulmonary Function Test    11/21/17  Patient effort:   Good  Meets ATS criteria for interpretation  Flows:  Maximal Mid Expiratory Flow rate is reduced to 58 % predicted  Forced Expiratory Volume in one second is normal  FEV 1% is reduced  FEF50/FIF50 is reduced  Flow Volume Loop:  Reduced Terminal Flows in the Flow Volume Loop  Bronchodilator:  Significant improvement with bronchodilator in FEF 50/FIF50  Impression:  Mild obstructive defect, Predominately small airways    Date FVC FEV1  FEV1/FVC DBD14-22 TLC RV RV/TLC VC DLCO   11/20/2017    58%                                              Echo:  11/22/2017    Left ventricle: Systolic function was normal. Ejection fraction was  estimated to be 55 %. There were no regional wall motion abnormalities. Wall thickness was mildly to moderately increased. Doppler parameters were  consistent with abnormal left ventricular relaxation (grade 1 diastolic  dysfunction). Atrial septum: Interatrial septum is aneurysmal with a jump rope-like  appearance. There was a positive bubble study on previous echo indicating  a PFO. Mitral valve: There was mild regurgitation. Tricuspid valve: There was mild regurgitation. COMPARISONS:  There has been no significant change. Comparison was made with the  previous study of 14-Jan-2015. Imaging:  I have personally reviewed the patients radiographs and have reviewed the reports:    CXR Results     11/16/2017    Streaky densities in the left lung base are likely atelectasis. No  definite focal consolidation, pleural effusion, pulmonary edema, or  pneumothorax. Heart is top normal in size. Thoracic aorta is mildly tortuous. There are no acute osseous abnormalities. Surgical clips in the right upper  quadrant suggest prior cholecystectomy. Impression:  1. Streaky densities in the left lung base are potentially atelectasis. 1/14/2017    There is no pneumothorax, pneumonia or pleural effusions. Heart and  mediastinal structures are unremarkable. Visualized bony thorax and soft tissues  are within normal limits.      IMPRESSION:  1. No acute cardiopulmonary process. CT Results  (Last 48 hours)    None        .      Patient Active Problem List   Diagnosis Code    Coronary atherosclerosis of native coronary artery I25.10    Chest pain R07.9    Hyperlipidemia E78.5    Obesity E66.9    Asthma J45.909    Hypercholesterolemia E78.00    PVC (premature ventricular contraction) I49.3    PFO (patent foramen ovale) Q21.1    Non-rheumatic mitral regurgitation I34.0    Syncope R55     IMPRESSION:   · Asthma- moderate intermittent. Sub optimal control related to triggers as well as some compliance issues ( not using controller as ordered). Suspect postnasal drip- seasonal allergies and GERD as triggers. Spirometry with reduced MMEFR and FEF50/FIF50 with bronchodilator response. Flow volume loop suggests upper airway redundacy  · PFO with non rheumatic MR  · Obesity- BMI 36.15      RECOMMENDATIONS:   · Optimize treatment for asthma- discussed need for trigger control, maintenance therapy and rescue treatment with peak flows monitoring and action plan  · Discussed green, yellow, red zone concept- provided peak flow meter with written action plan instructions  · Discussed spirometry results and rationale for treatment  · Will check IgE ( eosinophilia noted on CBC)   · Add Flonase to Singulair for residual Postnasal drip control  · GERD control- written instructions provided. Can consider trial with short term PPI  · Healthy weight and activity - exercise discussed. · Needs influenza vaccinations and Pneumovax- wishes to come back at later date  · Will consider sleep evalaution  · Continue follow up with Cardiology for PFO  · Will follow for asthma management     Health maintenance screens deferred to Primary care provider.      Anitra Santiago MD

## 2017-11-29 NOTE — LETTER
2017 12:29 PM 
 
Patient:  Dotty Jeff YOB: 1960 Date of Visit: 2017 Dear Mary Moreno MD 
[place] [address] [address] [place] [address] VIA Facsimile: [phone] 
 : Thank you for referring Ms. Afua Lemus to me for evaluation/treatment. Below are the relevant portions of my assessment and plan of care. [facility] Pulmonary, Critical Care, and Sleep Medicine Pulmonary Office Initial Consultation Name: Dotty Jeff : 1960 Date: 2017 Subjective:  
Patient has been referred for evaluation of: Asthma Patient is a 56 y.o. female who hs been diagnosed with asthma and seasonal allergies for several years and treated with different medications but still has breakthrough episodes of exacerbation needing ER visits and interventions. She has been on Singulair, Asmanex, ventolin- MDI and also has a nebulizer. She needs a new machine and supplies. Usually has symptoms of wheezing, cough, increased SOB. Denies orthopnea, PND. Has been noted by her children to be a snorer. She has seasonal increase in nasal stuffiness, postnasal drainage and sneezing- Fall and winter. She is a non smoker. 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 Has recent episodes x2 of syncope- seen in Er and following with Cardiology and neurology. Belem Castro C/o intermittent chest pain, No fever, chills, night sweats Past Medical History:  
Diagnosis Date  Asthma  Chest pain, unspecified Possible Angina, GERD, chest wall pains, recurrent pains, possible atypical angina, happens at rest, abnormal nuc scan in past, discussed risk benefit options of cardiac cath/pci, she wants to think it over, add sl ntg  Coronary atherosclerosis of native coronary artery Abnormal NUC scan, patient's symptoms are better, will start meds and monitor  Heart murmur  Hypercholesterolemia  Ill-defined condition Patent Foramen Ovale- asymptomatic  Obesity, unspecified Discussed diet  Other and unspecified hyperlipidemia   
 no longer on meds  Pain of right thumb  Trigger thumb of right hand Past Surgical History:  
Procedure Laterality Date  BIOPSY OF VAGINA,SIMPLE N/A 2017 Dr. Franco Leone  DIAGNOSTIC ANOSCOPY N/A 2017 Dr. Franco Leone  HX  SECTION    
 HX CHOLECYSTECTOMY  HX COLONOSCOPY    HX OTHER SURGICAL Subcutaneous fistulotomy posterior vaginal wall. Social History Social History  Marital status: LEGALLY  Spouse name: N/A  
 Number of children: N/A  
 Years of education: N/A Social History Main Topics  Smoking status: Former Smoker Packs/day: 0.20 Years: 5.00 Types: Cigarettes Quit date: 1995  Smokeless tobacco: Never Used  Alcohol use 0.0 oz/week  
  0 Standard drinks or equivalent per week Comment: socially  Drug use: No  
 Sexual activity: Yes  
  Partners: Male Birth control/ protection: None Other Topics Concern  None Social History Narrative Family History Problem Relation Age of Onset  Hypertension Mother  Diabetes Father  Colon Cancer Father  Cancer Father   
  prosate  Hypertension Maternal Grandmother  Heart Disease Neg Hx   
  no family history of heart disease Allergies Allergen Reactions  Penicillins Other (comments) Takes skin of body Carmen Kelley Current Outpatient Prescriptions Medication Sig Dispense Refill  nitroglycerin (NITROSTAT) 0.4 mg SL tablet 1 Tab by SubLINGual route every five (5) minutes as needed for Chest Pain. 25 Tab 1  
 mometasone (ASMANEX TWISTHALER) 220 mcg (120 doses) aepb inhaler Take 1 Puff by inhalation daily.  loratadine (CLARITIN) 10 mg tablet Take 10 mg by mouth.  magnesium 250 mg tab Take 250 mg by mouth.  aspirin delayed-release 81 mg tablet Take 81 mg by mouth daily.  cyanocobalamin (VITAMIN B-12) 1,000 mcg tablet Take 1,000 mcg by mouth daily.  OTHER chromax plus take one cap every day  multivitamin (ONE A DAY) tablet Take 1 Tab by mouth daily.  OMEGA-3 FATTY ACIDS/FISH OIL (FISH OIL EXTRA STRENGTH PO) Take  by mouth daily.  Cholecalciferol, Vitamin D3, 1,000 unit cap Take  by mouth.  albuterol (PROVENTIL HFA, VENTOLIN HFA, PROAIR HFA) 90 mcg/actuation inhaler Take  by inhalation.  zinc 50 mg Tab Take  by mouth daily. Review of Systems: 
HEENT: No epistaxis, no nasal drainage, no difficulty in swallowing, no redness in eyes Respiratory: as above Cardiovascular: no chest pain, no palpitations, no chronic leg edema, no syncope Gastrointestinal: no abd pain, no vomiting, no diarrhea, no bleeding symptoms Genitourinary: No urinary symptoms or hematuria Integument/breast: No ulcers or rashes Musculoskeletal:Neg 
Neurological: No focal weakness, no seizures, no headaches Behvioral/Psych: No anxiety, no depression Constitutional: No fever, no chills, no weight loss, no night sweats Objective:  
 
Visit Vitals  /84 (BP 1 Location: Left arm, BP Patient Position: Sitting)  Pulse 92  Temp 98 °F (36.7 °C) (Oral)  Resp 14  
 Ht 5' 6\" (1.676 m)  Wt 101.6 kg (224 lb)  SpO2 96%  BMI 36.15 kg/m2 Physical Exam:  
General: comfortable, no acute distress HEENT: pupils reactive, sclera anicteric, EOM intact Neck: No adenopathy or thyroid swelling, no lymphadenopathy or JVD, supple CVS: S1S2 no murmurs RS: Mod AE bilaterally, no tactile fremitus or egophony, no accessory muscle use Abd: soft, non tender, no hepatosplenomegaly Neuro: non focal, awake, alert Extrm: no leg edema, clubbing or cyanosis Skin: no rash Data review:  
Pertinent labs: CBC, BMP, LFT's PFT: 
Pulmonary Function Test 
 
11/21/17 Patient effort:  
Good Meets ATS criteria for interpretation Flows: 
Maximal Mid Expiratory Flow rate is reduced to 58 % predicted Forced Expiratory Volume in one second is normal 
FEV 1% is reduced FEF50/FIF50 is reduced Flow Volume Loop: 
Reduced Terminal Flows in the Flow Volume Loop Bronchodilator: 
Significant improvement with bronchodilator in FEF 50/FIF50 Impression: 
Mild obstructive defect, Predominately small airways Date FVC FEV1  FEV1/FVC XEM04-25 TLC RV RV/TLC VC DLCO  
11/20/2017    58% Echo: 
11/22/2017 Left ventricle: Systolic function was normal. Ejection fraction was 
estimated to be 55 %. There were no regional wall motion abnormalities. Wall thickness was mildly to moderately increased. Doppler parameters were 
consistent with abnormal left ventricular relaxation (grade 1 diastolic 
dysfunction). Atrial septum: Interatrial septum is aneurysmal with a jump rope-like 
appearance. There was a positive bubble study on previous echo indicating 
a PFO. Mitral valve: There was mild regurgitation. Tricuspid valve: There was mild regurgitation. COMPARISONS: 
There has been no significant change. Comparison was made with the 
previous study of 14-Jan-2015. Imaging: 
I have personally reviewed the patients radiographs and have reviewed the reports: CXR Results 11/16/2017 Streaky densities in the left lung base are likely atelectasis. No 
definite focal consolidation, pleural effusion, pulmonary edema, or 
pneumothorax. Heart is top normal in size. Thoracic aorta is mildly tortuous. There are no acute osseous abnormalities. Surgical clips in the right upper 
quadrant suggest prior cholecystectomy. Impression: 1. Streaky densities in the left lung base are potentially atelectasis. 1/14/2017 There is no pneumothorax, pneumonia or pleural effusions. Heart and 
mediastinal structures are unremarkable. Visualized bony thorax and soft tissues 
are within normal limits. 
  
 IMPRESSION: 
1. No acute cardiopulmonary process. CT Results  (Last 48 hours) None [facility] Patient Active Problem List  
Diagnosis Code  Coronary atherosclerosis of native coronary artery I25.10  Chest pain R07.9  Hyperlipidemia E78.5  Obesity E66.9  Asthma J45.909  Hypercholesterolemia E78.00  
 PVC (premature ventricular contraction) I49.3  PFO (patent foramen ovale) Q21.1  Non-rheumatic mitral regurgitation I34.0  Syncope R55 IMPRESSION:  
· Asthma- moderate intermittent. Sub optimal control related to triggers as well as some compliance issues ( not using controller as ordered). Suspect postnasal drip- seasonal allergies and GERD as triggers. Spirometry with reduced MMEFR and FEF50/FIF50 with bronchodilator response. Flow volume loop suggests upper airway redundacy · PFO with non rheumatic MR 
· Obesity- BMI 36.15  
  
RECOMMENDATIONS:  
· Optimize treatment for asthma- discussed need for trigger control, maintenance therapy and rescue treatment with peak flows monitoring and action plan · Discussed green, yellow, red zone concept- provided peak flow meter with written action plan instructions · Discussed spirometry results and rationale for treatment · Will check IgE ( eosinophilia noted on CBC) · Add Flonase to Singulair for residual Postnasal drip control · GERD control- written instructions provided. Can consider trial with short term PPI · Healthy weight and activity - exercise discussed. · Needs influenza vaccinations and Pneumovax- wishes to come back at later date · Will consider sleep evalaution · Continue follow up with Cardiology for PFO · Will follow for asthma management Health maintenance screens deferred to Primary care provider.  
  
Claudy Perez MD 
 
 If you have questions, please do not hesitate to call me. I look forward to following Ms. Jaffe along with you.  
 
 
 
Sincerely, 
 
 
Chi Guerrero MD

## 2017-12-01 ENCOUNTER — TELEPHONE (OUTPATIENT)
Dept: PULMONOLOGY | Age: 57
End: 2017-12-01

## 2017-12-01 RX ORDER — ALBUTEROL SULFATE 0.83 MG/ML
2.5 SOLUTION RESPIRATORY (INHALATION)
Qty: 24 EACH | Refills: 3 | Status: SHIPPED | OUTPATIENT
Start: 2017-12-01 | End: 2019-03-16 | Stop reason: SDUPTHER

## 2018-01-19 ENCOUNTER — HOSPITAL ENCOUNTER (OUTPATIENT)
Dept: LAB | Age: 58
Discharge: HOME | End: 2018-01-19

## 2018-01-19 PROCEDURE — 99001 SPECIMEN HANDLING PT-LAB: CPT | Performed by: INTERNAL MEDICINE

## 2018-01-25 LAB — IGE SERPL-ACNC: 119 IU/ML (ref 0–100)

## 2018-01-30 ENCOUNTER — OFFICE VISIT (OUTPATIENT)
Dept: PULMONOLOGY | Age: 58
End: 2018-01-30

## 2018-01-30 VITALS
HEIGHT: 66 IN | TEMPERATURE: 98.2 F | HEART RATE: 75 BPM | SYSTOLIC BLOOD PRESSURE: 100 MMHG | BODY MASS INDEX: 35.52 KG/M2 | OXYGEN SATURATION: 98 % | WEIGHT: 221 LBS | RESPIRATION RATE: 16 BRPM | DIASTOLIC BLOOD PRESSURE: 64 MMHG

## 2018-01-30 DIAGNOSIS — J45.40 MODERATE PERSISTENT ASTHMA WITHOUT COMPLICATION: Primary | ICD-10-CM

## 2018-01-30 RX ORDER — FLUTICASONE PROPIONATE 50 MCG
SPRAY, SUSPENSION (ML) NASAL
Qty: 1 BOTTLE | Refills: 6 | Status: SHIPPED | OUTPATIENT
Start: 2018-01-30 | End: 2018-11-02

## 2018-01-30 NOTE — MR AVS SNAPSHOT
[address] [facility], [address] [address] 
[phone] Patient: Xenia Rosales MRN: [id number] BMX:[id number] Visit Information Date & Time Provider Department Dept. Phone Encounter #  
 1/30/2018  9:15 AM MD Abena Pinto [facility] [id number] Follow-up Instructions Return in about 4 months (around 5/30/2018). Your Appointments 2/19/2018  8:30 AM  
ESTABLISHED PATIENT with Anali Steward MD  
[facility]) Appt Note: 3 months [address] [facility], [address] [facility] [address] 0679 Afia Burleson, [address] [place] [address] Upcoming Health Maintenance Date Due Hepatitis C Screening 1960 Pneumococcal 19-64 Medium Risk (1 of 1 - PPSV23) 9/13/1981 DTaP/Tdap/Td series (1 - Tdap) 9/13/1983 PAP AKA CERVICAL CYTOLOGY 9/13/1983 FOBT Q 1 YEAR AGE 50-75 9/13/2012 Influenza Age 5 to Adult 8/1/2017 BREAST CANCER SCRN MAMMOGRAM 11/2/2019 Allergies as of 1/30/2018  Review Complete On: 1/30/2018 By: Chantelle Ernandez MD  
  
 Severity Noted Reaction Type Reactions Penicillins  02/20/2013    Other (comments) Takes skin of body Current Immunizations  Reviewed on 11/29/2017 No immunizations on file. Not reviewed this visit You Were Diagnosed With   
  
 Codes Comments Moderate persistent asthma without complication    -  Primary ICD-10-CM: J45.40 ICD-9-CM: 493.90 Vitals BP Pulse Temp Resp Height(growth percentile) Weight(growth percentile) 100/64 (BP 1 Location: Left arm, BP Patient Position: Sitting) 75 98.2 °F (36.8 °C) (Oral) 16 5' 6\" (1.676 m) 221 lb (100.2 kg) SpO2 BMI OB Status Smoking Status 98% 35.67 kg/m2 Menopause Former Smoker BMI and BSA Data  Body Mass Index Body Surface Area  
 35.67 kg/m 2 2.16 m 2  
  
  
 Preferred Pharmacy Pharmacy Name Phone [facility] [place], [place]  [phone] Your Updated Medication List  
  
   
This list is accurate as of: 1/30/18 10:23 AM.  Always use your most recent med list.  
  
  
  
  
 * albuterol 90 mcg/actuation inhaler Commonly known as:  PROVENTIL HFA, VENTOLIN HFA, PROAIR HFA Take 2 Puffs by inhalation every six (6) hours as needed for Wheezing. * albuterol 2.5 mg /3 mL (0.083 %) nebulizer solution Commonly known as:  PROVENTIL VENTOLIN  
3 mL by Nebulization route every six (6) hours as needed for Wheezing. ASMANEX TWISTHALER 220 mcg (120 doses) Aepb inhaler Generic drug:  mometasone Take 1 Puff by inhalation daily. aspirin delayed-release 81 mg tablet Take 81 mg by mouth daily. cholecalciferol 1,000 unit Cap Commonly known as:  VITAMIN D3 Take  by mouth. FISH OIL EXTRA STRENGTH PO Take  by mouth daily. fluticasone 50 mcg/actuation nasal spray Commonly known as:  FLONASE  
2 squirts each nostril at bedtime  
  
 loratadine 10 mg tablet Commonly known as:  Thermon Marker Take 10 mg by mouth.  
  
 magnesium 250 mg Tab Take 250 mg by mouth.  
  
 multivitamin tablet Commonly known as:  ONE A DAY Take 1 Tab by mouth daily. nitroglycerin 0.4 mg SL tablet Commonly known as:  NITROSTAT  
1 Tab by SubLINGual route every five (5) minutes as needed for Chest Pain. OTHER  
chromax plus take one cap every day VITAMIN B-12 1,000 mcg tablet Generic drug:  cyanocobalamin Take 1,000 mcg by mouth daily. zinc 50 mg Tab tablet Take  by mouth daily. * Notice: This list has 2 medication(s) that are the same as other medications prescribed for you. Read the directions carefully, and ask your doctor or other care provider to review them with you. Prescriptions Sent to Pharmacy Refills fluticasone (FLONASE) 50 mcg/actuation nasal spray 6 Si squirts each nostril at bedtime Class: Normal  
 Pharmacy: [facility], [address] [facility] #: [phone] We Performed the Following AMB SUPPLY ORDER [[id number] Custom] Comments:  
 Nebulizer for home use with needed supplies. Follow-up Instructions Return in about 4 months (around 2018). Introducing [facility]! Margoth Stephenson introduces [employer] patient portal. Now you can access parts of your medical record, email your doctor's office, and request medication refills online. 1. In your internet browser, go to [email or web address]. [email or web address] 2. Click on the First Time User? Click Here link in the Sign In box. You will see the New Member Sign Up page. 3. Enter your [employer] Access Code exactly as it appears below. You will not need to use this code after youve completed the sign-up process. If you do not sign up before the expiration date, you must request a new code. · [employer] Access Code: [email or web address] Expires: 2018 11:00 PM 
 
4. Enter the last four digits of your Social Security Number (xxxx) and Date of Birth (mm/dd/yyyy) as indicated and click Submit. You will be taken to the next sign-up page. 5. Create a [employer] ID. This will be your [employer] login ID and cannot be changed, so think of one that is secure and easy to remember. 6. Create a [employer] password. You can change your password at any time. 7. Enter your Password Reset Question and Answer. This can be used at a later time if you forget your password. 8. Enter your e-mail address. You will receive e-mail notification when new information is available in [address] 9. Click Sign Up. You can now view and download portions of your medical record. 10. Click the Download Summary menu link to download a portable copy of your medical information. If you have questions, please visit the Frequently Asked Questions section of the [employer] website. Remember, [employer] is NOT to be used for urgent needs. For medical emergencies, dial 911. Now available from your iPhone and Android! Please provide this summary of care documentation to your next provider. Your primary care clinician is listed as Shanae Kennedy. If you have any questions after today's visit, please call [phone].

## 2018-01-30 NOTE — PROGRESS NOTES
[facility]  Pulmonary, Critical Care, and Sleep Medicine      Pulmonary Office visit    Name: Jackie Dorsey     : 1960     Date: 2018        Subjective:   Patient has been referred for evaluation of: Asthma    18   No new complaints. Using prescribed treatment and completed blood work. Denies any increase in cough, wheezing, SOB  Denies any ER/acute visits  Some nasal stuffiness and postnasal drip    HPI:  Patient is a 56 y.o. female who hs been diagnosed with asthma and seasonal allergies for several years and treated with different medications but still has breakthrough episodes of exacerbation needing ER visits and interventions. She has been on Singulair, Asmanex, ventolin- MDI and also has a nebulizer. She needs a new machine and supplies. Usually has symptoms of wheezing, cough, increased SOB. Denies orthopnea, PND. Has been noted by her children to be a snorer. She has seasonal increase in nasal stuffiness, postnasal drainage and sneezing- Fall and winter. She is a non smoker. 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 No exposure to industrial dust, inhalational agents. Has recent episodes x2 of syncope- seen in Er and following with Cardiology and neurology. .    C/o intermittent chest pain,   No fever, chills, night sweats       Past Medical History:   Diagnosis Date    Asthma     Chest pain, unspecified     Possible Angina, GERD, chest wall pains, recurrent pains, possible atypical angina, happens at rest, abnormal nuc scan in past, discussed risk benefit options of cardiac cath/pci, she wants to think it over, add sl ntg    Coronary atherosclerosis of native coronary artery     Abnormal NUC scan, patient's symptoms are better, will start meds and monitor    Heart murmur     Hypercholesterolemia     Ill-defined condition     Patent Foramen Ovale- asymptomatic    Obesity, unspecified     Discussed diet    Other and unspecified hyperlipidemia     no longer on meds    Pain of right thumb     Trigger thumb of right hand        Past Surgical History:   Procedure Laterality Date    HX  SECTION      HX CHOLECYSTECTOMY      HX COLONOSCOPY      HX OTHER SURGICAL      Subcutaneous fistulotomy posterior vaginal wall.  [place] ANOSCOPY DX W/COLLJ SPEC BR/WA SPX WHEN PRFRMD N/A 2017    Dr. Rebecca Ferrara N/A 2017    Dr. Paco Chandler     Allergies   Allergen Reactions    Penicillins Other (comments)     Takes skin of body      Current Outpatient Prescriptions   Medication Sig Dispense Refill    albuterol (PROVENTIL HFA, VENTOLIN HFA, PROAIR HFA) 90 mcg/actuation inhaler Take 2 Puffs by inhalation every six (6) hours as needed for Wheezing. 1 Inhaler 6    nitroglycerin (NITROSTAT) 0.4 mg SL tablet 1 Tab by SubLINGual route every five (5) minutes as needed for Chest Pain. 25 Tab 1    mometasone (ASMANEX TWISTHALER) 220 mcg (120 doses) aepb inhaler Take 1 Puff by inhalation daily.  loratadine (CLARITIN) 10 mg tablet Take 10 mg by mouth.  magnesium 250 mg tab Take 250 mg by mouth.  aspirin delayed-release 81 mg tablet Take 81 mg by mouth daily.  cyanocobalamin (VITAMIN B-12) 1,000 mcg tablet Take 1,000 mcg by mouth daily.  OTHER chromax plus take one cap every day      multivitamin (ONE A DAY) tablet Take 1 Tab by mouth daily.  Cholecalciferol, Vitamin D3, 1,000 unit cap Take  by mouth.  zinc 50 mg Tab Take  by mouth daily.  albuterol (PROVENTIL VENTOLIN) 2.5 mg /3 mL (0.083 %) nebulizer solution 3 mL by Nebulization route every six (6) hours as needed for Wheezing. 24 Each 3    fluticasone (FLONASE) 50 mcg/actuation nasal spray 2 squirts each nostril at bedtime 1 Bottle 6    OMEGA-3 FATTY ACIDS/FISH OIL (FISH OIL EXTRA STRENGTH PO) Take  by mouth daily.            Review of Systems:  HEENT: No epistaxis, no nasal drainage, no difficulty in swallowing, no redness in eyes  Respiratory: as above  Cardiovascular: no chest pain, no palpitations, no chronic leg edema, no syncope  Gastrointestinal: no abd pain, no vomiting, no diarrhea, no bleeding symptoms  Genitourinary: No urinary symptoms or hematuria  Integument/breast: No ulcers or rashes  Musculoskeletal:Neg  Neurological: No focal weakness, no seizures, no headaches  Behvioral/Psych: No anxiety, no depression  Constitutional: No fever, no chills, no weight loss, no night sweats     Objective:     Visit Vitals    /64 (BP 1 Location: Left arm, BP Patient Position: Sitting)    Pulse 75    Temp 98.2 °F (36.8 °C) (Oral)    Resp 16    Ht 5' 6\" (1.676 m)    Wt 100.2 kg (221 lb)    SpO2 98%    BMI 35.67 kg/m2        Physical Exam:   General: comfortable, no acute distress  HEENT: pupils reactive, sclera anicteric, EOM intact  Neck: No adenopathy or thyroid swelling, no lymphadenopathy or JVD, supple  CVS: S1S2 no murmurs  RS: Mod AE bilaterally, no tactile fremitus or egophony, no accessory muscle use  Abd: soft, non tender, no hepatosplenomegaly  Neuro: non focal, awake, alert  Extrm: no leg edema, clubbing or cyanosis  Skin: no rash    Data review:   Pertinent labs: CBC, BMP, LFT's      PFT:  Pulmonary Function Test    11/21/17  Patient effort:   Good  Meets ATS criteria for interpretation  Flows:  Maximal Mid Expiratory Flow rate is reduced to 58 % predicted  Forced Expiratory Volume in one second is normal  FEV 1% is reduced  FEF50/FIF50 is reduced  Flow Volume Loop:  Reduced Terminal Flows in the Flow Volume Loop  Bronchodilator:  Significant improvement with bronchodilator in FEF 50/FIF50  Impression:  Mild obstructive defect, Predominately small airways    Date FVC FEV1  FEV1/FVC AID82-46 TLC RV RV/TLC VC DLCO   11/20/2017    58%                                              Echo:  11/22/2017    Left ventricle: Systolic function was normal. Ejection fraction was  estimated to be 55 %. There were no regional wall motion abnormalities. Wall thickness was mildly to moderately increased. Doppler parameters were  consistent with abnormal left ventricular relaxation (grade 1 diastolic  dysfunction). Atrial septum: Interatrial septum is aneurysmal with a jump rope-like  appearance. There was a positive bubble study on previous echo indicating  a PFO. Mitral valve: There was mild regurgitation. Tricuspid valve: There was mild regurgitation. COMPARISONS:  There has been no significant change. Comparison was made with the  previous study of 14-Jan-2015. Imaging:  I have personally reviewed the patients radiographs and have reviewed the reports:    CXR Results     11/16/2017    Streaky densities in the left lung base are likely atelectasis. No  definite focal consolidation, pleural effusion, pulmonary edema, or  pneumothorax. Heart is top normal in size. Thoracic aorta is mildly tortuous. There are no acute osseous abnormalities. Surgical clips in the right upper  quadrant suggest prior cholecystectomy. Impression:  1. Streaky densities in the left lung base are potentially atelectasis. 1/14/2017    There is no pneumothorax, pneumonia or pleural effusions. Heart and  mediastinal structures are unremarkable. Visualized bony thorax and soft tissues  are within normal limits.      IMPRESSION:  1. No acute cardiopulmonary process. CT Results  (Last 48 hours)    None        . Patient Active Problem List   Diagnosis Code    Coronary atherosclerosis of native coronary artery I25.10    Chest pain R07.9    Hyperlipidemia E78.5    Obesity E66.9    Asthma J45.909    Hypercholesterolemia E78.00    PVC (premature ventricular contraction) I49.3    PFO (patent foramen ovale) Q21.1    Non-rheumatic mitral regurgitation I34.0    Syncope R55     IMPRESSION:   · Asthma- moderate intermittent.  Sub optimal control related to triggers as well as some compliance issues ( not using controller as ordered). Suspect postnasal drip- seasonal allergies and GERD as triggers. Spirometry with reduced MMEFR and FEF50/FIF50 with bronchodilator response. Flow volume loop suggests upper airway redundacy  · PFO with non rheumatic MR  · Obesity- BMI 36.15      RECOMMENDATIONS:   · Optimize treatment for asthma- discussed need for trigger control, maintenance therapy and rescue treatment with peak flows monitoring and action plan. · Discussed green, yellow, red zone concept- provided peak flow meter with written action plan instructions  · Discussed spirometry results and rationale for treatment  · Discussed IGE results (119)  · Continue  Flonase , Asmanex,Singulair   · GERD control- written instructions provided. Can consider trial with short term PPI  · Healthy weight and activity - exercise discussed. · Needs influenza vaccinations and Pneumovax- wishes to come back at later date  · Will consider sleep evalaution  · Continue follow up with Cardiology for PFO  · Will follow for asthma management     Health maintenance screens deferred to Primary care provider.      Narcisa Woods MD

## 2018-01-30 NOTE — PROGRESS NOTES
1. Have you been to the ER, urgent care clinic since your last visit? Hospitalized since your last visit? No    2. Have you seen or consulted any other health care providers outside of the [address] [place] [address] since your last visit? Include any pap smears or colon screening.  No

## 2018-05-15 ENCOUNTER — HOSPITAL ENCOUNTER (OUTPATIENT)
Dept: ULTRASOUND IMAGING | Age: 58
Discharge: HOME | End: 2018-05-15
Attending: FAMILY MEDICINE
Payer: COMMERCIAL

## 2018-05-15 DIAGNOSIS — R22.32 MASS OF ARM, LEFT: ICD-10-CM

## 2018-05-15 DIAGNOSIS — R74.8 ELEVATED LIVER ENZYMES: ICD-10-CM

## 2018-05-15 PROCEDURE — 76705 ECHO EXAM OF ABDOMEN: CPT

## 2018-05-15 PROCEDURE — 76882 US LMTD JT/FCL EVL NVASC XTR: CPT

## 2018-06-12 ENCOUNTER — HOSPITAL ENCOUNTER (OUTPATIENT)
Dept: GENERAL RADIOLOGY | Age: 58
Discharge: HOME | End: 2018-06-12
Payer: COMMERCIAL

## 2018-06-12 DIAGNOSIS — M54.2 NECK PAIN: ICD-10-CM

## 2018-06-12 DIAGNOSIS — M54.9 BACK PAIN: ICD-10-CM

## 2018-06-12 PROCEDURE — 72114 X-RAY EXAM L-S SPINE BENDING: CPT

## 2018-06-12 PROCEDURE — 72050 X-RAY EXAM NECK SPINE 4/5VWS: CPT

## 2018-06-25 ENCOUNTER — HOSPITAL ENCOUNTER (OUTPATIENT)
Dept: PHYSICAL THERAPY | Age: 58
Discharge: HOME | End: 2018-06-25
Payer: COMMERCIAL

## 2018-06-25 PROCEDURE — 97110 THERAPEUTIC EXERCISES: CPT

## 2018-06-25 PROCEDURE — 97161 PT EVAL LOW COMPLEX 20 MIN: CPT

## 2018-06-25 PROCEDURE — 97140 MANUAL THERAPY 1/> REGIONS: CPT

## 2018-06-25 NOTE — PROGRESS NOTES
PT DAILY TREATMENT NOTE - [facility]     Patient Name: Daniel King  Date:2018  : 1960  [x]  Patient  Verified  Payor: Amarjit Hernadez / Plan: [place] [employer] PPO / Product Type: PPO /    In time:1015  Out time:1103  Total Treatment Time (min): 48    Visit #: 1 of 10-12    Treatment Area: Low back pain [M54.5]    SUBJECTIVE  Pain Level (0-10 scale): 8/10  Any medication changes, allergies to medications, adverse drug reactions, diagnosis change, or new procedure performed?: [x] No    [] Yes (see summary sheet for update)  Subjective functional status/changes:   [] No changes reported  See eval    OBJECTIVE    Modality rationale: decrease pain to improve the patients ability to ease with ADL's   Min Type Additional Details    [] Estim:  []Unatt       []IFC  []Premod                        []Other:  []w/ice   []w/heat  Position:  Location:    [] Estim: []Att    []TENS instruct  []NMES                    []Other:  []w/US   []w/ice   []w/heat  Position:  Location:    []  Traction: [] Cervical       []Lumbar                       [] Prone          []Supine                       []Intermittent   []Continuous Lbs:  [] before manual  [] after manual    []  Ultrasound: []Continuous   [] Pulsed                           []1MHz   []3MHz W/cm2:  Location:    []  Iontophoresis with dexamethasone         Location: [] Take home patch   [] In clinic   10 []  Ice     [x]  heat  []  Ice massage  []  Laser   []  Anodyne Position: seated  Location:LS and CS    []  Laser with stim  []  Other:  Position:  Location:    []  Vasopneumatic Device Pressure:       [] lo [] med [] hi   Temperature: [] lo [] med [] hi   [] Skin assessment post-treatment:  []intact []redness- no adverse reaction    []redness - adverse reaction:     20 min []Eval                  []Re-Eval       10 min Therapeutic Exercise:  [] See flow sheet :   Rationale: increase ROM and increase strength to improve the patients ability to ease with ADL's    8 min Manual Therapy:  Leg pull, lumbar traction with belt trial.    Rationale: decrease pain, increase ROM, increase tissue extensibility and decrease trigger points to ease with ADL's        With   [] TE   [] TA   [] neuro   [] other: Patient Education: [x] Review HEP    [] Progressed/Changed HEP based on:   [] positioning   [] body mechanics   [] transfers   [] heat/ice application    [] other:      Other Objective/Functional Measures: see eval     Pain Level (0-10 scale) post treatment: 6/10    ASSESSMENT/Changes in Function: see eval    Patient will continue to benefit from skilled PT services to modify and progress therapeutic interventions, address functional mobility deficits, address ROM deficits, address strength deficits, analyze and address soft tissue restrictions, analyze and cue movement patterns, analyze and modify body mechanics/ergonomics and assess and modify postural abnormalities to attain remaining goals. [x]  See Plan of Care  []  See progress note/recertification  []  See Discharge Summary         Progress towards goals / Updated goals:  See poc    PLAN  [x]  Upgrade activities as tolerated     [x]  Continue plan of care  []  Update interventions per flow sheet       []  Discharge due to:_  []  Other:_      Frederic Rollins, PT 6/25/2018  10:16 AM    No future appointments.
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
## 2018-06-27 ENCOUNTER — HOSPITAL ENCOUNTER (INPATIENT)
Age: 58
LOS: 3 days | Discharge: HOME | DRG: 378 | End: 2018-06-30
Attending: EMERGENCY MEDICINE | Admitting: INTERNAL MEDICINE
Payer: COMMERCIAL

## 2018-06-27 DIAGNOSIS — R51.9 HEADACHE DISORDER: Primary | ICD-10-CM

## 2018-06-27 PROBLEM — K92.2 LOWER GI BLEED: Status: ACTIVE | Noted: 2018-06-27

## 2018-06-27 PROBLEM — N82.3 RECTOVAGINAL FISTULA: Chronic | Status: ACTIVE | Noted: 2018-06-27

## 2018-06-27 PROBLEM — D64.9 SYMPTOMATIC ANEMIA: Status: ACTIVE | Noted: 2018-06-27

## 2018-06-27 LAB
ABO + RH BLD: NORMAL
ALBUMIN SERPL-MCNC: 3.5 G/DL (ref 3.4–5)
ALBUMIN/GLOB SERPL: 0.9 {RATIO} (ref 0.8–1.7)
ALP SERPL-CCNC: 62 U/L (ref 45–117)
ALT SERPL-CCNC: 21 U/L (ref 13–56)
ANION GAP SERPL CALC-SCNC: 5 MMOL/L (ref 3–18)
APTT PPP: 27.1 SEC (ref 23–36.4)
AST SERPL-CCNC: 13 U/L (ref 15–37)
BASOPHILS # BLD: 0 K/UL (ref 0–0.1)
BASOPHILS NFR BLD: 0 % (ref 0–2)
BILIRUB SERPL-MCNC: 0.7 MG/DL (ref 0.2–1)
BLOOD GROUP ANTIBODIES SERPL: NORMAL
BUN SERPL-MCNC: 12 MG/DL (ref 7–18)
BUN/CREAT SERPL: 13 (ref 12–20)
CALCIUM SERPL-MCNC: 8.8 MG/DL (ref 8.5–10.1)
CHLORIDE SERPL-SCNC: 109 MMOL/L (ref 100–108)
CO2 SERPL-SCNC: 29 MMOL/L (ref 21–32)
CREAT SERPL-MCNC: 0.92 MG/DL (ref 0.6–1.3)
DIFFERENTIAL METHOD BLD: NORMAL
EOSINOPHIL # BLD: 0.2 K/UL (ref 0–0.4)
EOSINOPHIL NFR BLD: 3 % (ref 0–5)
ERYTHROCYTE [DISTWIDTH] IN BLOOD BY AUTOMATED COUNT: 12.7 % (ref 11.6–14.5)
GLOBULIN SER CALC-MCNC: 3.8 G/DL (ref 2–4)
GLUCOSE SERPL-MCNC: 106 MG/DL (ref 74–99)
HCT VFR BLD AUTO: 39.7 % (ref 35–45)
HGB BLD-MCNC: 13.1 G/DL (ref 12–16)
INR PPP: 1 (ref 0.8–1.2)
LYMPHOCYTES # BLD: 2.8 K/UL (ref 0.9–3.6)
LYMPHOCYTES NFR BLD: 48 % (ref 21–52)
MCH RBC QN AUTO: 29.4 PG (ref 24–34)
MCHC RBC AUTO-ENTMCNC: 33 G/DL (ref 31–37)
MCV RBC AUTO: 89.2 FL (ref 74–97)
MONOCYTES # BLD: 0.5 K/UL (ref 0.05–1.2)
MONOCYTES NFR BLD: 8 % (ref 3–10)
NEUTS SEG # BLD: 2.4 K/UL (ref 1.8–8)
NEUTS SEG NFR BLD: 41 % (ref 40–73)
PLATELET # BLD AUTO: 281 K/UL (ref 135–420)
PMV BLD AUTO: 10 FL (ref 9.2–11.8)
POTASSIUM SERPL-SCNC: 3.8 MMOL/L (ref 3.5–5.5)
PROT SERPL-MCNC: 7.3 G/DL (ref 6.4–8.2)
PROTHROMBIN TIME: 12.7 SEC (ref 11.5–15.2)
RBC # BLD AUTO: 4.45 M/UL (ref 4.2–5.3)
SODIUM SERPL-SCNC: 143 MMOL/L (ref 136–145)
SPECIMEN EXP DATE BLD: NORMAL
WBC # BLD AUTO: 5.9 K/UL (ref 4.6–13.2)

## 2018-06-27 PROCEDURE — 80053 COMPREHEN METABOLIC PANEL: CPT | Performed by: PHYSICIAN ASSISTANT

## 2018-06-27 PROCEDURE — 96375 TX/PRO/DX INJ NEW DRUG ADDON: CPT

## 2018-06-27 PROCEDURE — 86900 BLOOD TYPING SEROLOGIC ABO: CPT | Performed by: PHYSICIAN ASSISTANT

## 2018-06-27 PROCEDURE — 85025 COMPLETE CBC W/AUTO DIFF WBC: CPT | Performed by: PHYSICIAN ASSISTANT

## 2018-06-27 PROCEDURE — 96361 HYDRATE IV INFUSION ADD-ON: CPT

## 2018-06-27 PROCEDURE — C9113 INJ PANTOPRAZOLE SODIUM, VIA: HCPCS | Performed by: PHYSICIAN ASSISTANT

## 2018-06-27 PROCEDURE — 99284 EMERGENCY DEPT VISIT MOD MDM: CPT

## 2018-06-27 PROCEDURE — 74011250636 HC RX REV CODE- 250/636: Performed by: PHYSICIAN ASSISTANT

## 2018-06-27 PROCEDURE — C9113 INJ PANTOPRAZOLE SODIUM, VIA: HCPCS | Performed by: INTERNAL MEDICINE

## 2018-06-27 PROCEDURE — 96374 THER/PROPH/DIAG INJ IV PUSH: CPT

## 2018-06-27 PROCEDURE — 93005 ELECTROCARDIOGRAM TRACING: CPT

## 2018-06-27 PROCEDURE — 65270000029 HC RM PRIVATE

## 2018-06-27 PROCEDURE — 85730 THROMBOPLASTIN TIME PARTIAL: CPT | Performed by: PHYSICIAN ASSISTANT

## 2018-06-27 PROCEDURE — 94761 N-INVAS EAR/PLS OXIMETRY MLT: CPT

## 2018-06-27 PROCEDURE — 85610 PROTHROMBIN TIME: CPT | Performed by: PHYSICIAN ASSISTANT

## 2018-06-27 PROCEDURE — 65660000000 HC RM CCU STEPDOWN

## 2018-06-27 PROCEDURE — 74011250636 HC RX REV CODE- 250/636: Performed by: INTERNAL MEDICINE

## 2018-06-27 RX ORDER — PANTOPRAZOLE SODIUM 40 MG/10ML
40 INJECTION, POWDER, LYOPHILIZED, FOR SOLUTION INTRAVENOUS DAILY
Status: DISCONTINUED | OUTPATIENT
Start: 2018-06-28 | End: 2018-06-27

## 2018-06-27 RX ORDER — PANTOPRAZOLE SODIUM 40 MG/10ML
40 INJECTION, POWDER, LYOPHILIZED, FOR SOLUTION INTRAVENOUS
Status: COMPLETED | OUTPATIENT
Start: 2018-06-27 | End: 2018-06-27

## 2018-06-27 RX ORDER — NALOXONE HYDROCHLORIDE 0.4 MG/ML
0.4 INJECTION, SOLUTION INTRAMUSCULAR; INTRAVENOUS; SUBCUTANEOUS AS NEEDED
Status: DISCONTINUED | OUTPATIENT
Start: 2018-06-27 | End: 2018-06-30 | Stop reason: HOSPADM

## 2018-06-27 RX ORDER — PANTOPRAZOLE SODIUM 40 MG/10ML
80 INJECTION, POWDER, LYOPHILIZED, FOR SOLUTION INTRAVENOUS DAILY
Status: DISCONTINUED | OUTPATIENT
Start: 2018-06-28 | End: 2018-06-27

## 2018-06-27 RX ORDER — OXYCODONE AND ACETAMINOPHEN 5; 325 MG/1; MG/1
1 TABLET ORAL
Status: DISCONTINUED | OUTPATIENT
Start: 2018-06-27 | End: 2018-06-30 | Stop reason: HOSPADM

## 2018-06-27 RX ORDER — PANTOPRAZOLE SODIUM 40 MG/10ML
40 INJECTION, POWDER, LYOPHILIZED, FOR SOLUTION INTRAVENOUS EVERY 12 HOURS
Status: DISCONTINUED | OUTPATIENT
Start: 2018-06-27 | End: 2018-06-29

## 2018-06-27 RX ORDER — ONDANSETRON 2 MG/ML
4 INJECTION INTRAMUSCULAR; INTRAVENOUS
Status: DISCONTINUED | OUTPATIENT
Start: 2018-06-27 | End: 2018-06-30 | Stop reason: HOSPADM

## 2018-06-27 RX ORDER — SODIUM CHLORIDE 9 MG/ML
100 INJECTION, SOLUTION INTRAVENOUS CONTINUOUS
Status: DISCONTINUED | OUTPATIENT
Start: 2018-06-27 | End: 2018-06-30 | Stop reason: HOSPADM

## 2018-06-27 RX ORDER — ONDANSETRON 2 MG/ML
4 INJECTION INTRAMUSCULAR; INTRAVENOUS
Status: COMPLETED | OUTPATIENT
Start: 2018-06-27 | End: 2018-06-27

## 2018-06-27 RX ORDER — ALBUTEROL SULFATE 0.83 MG/ML
2.5 SOLUTION RESPIRATORY (INHALATION)
Status: DISCONTINUED | OUTPATIENT
Start: 2018-06-27 | End: 2018-06-30 | Stop reason: HOSPADM

## 2018-06-27 RX ORDER — ACETAMINOPHEN 325 MG/1
650 TABLET ORAL
Status: DISCONTINUED | OUTPATIENT
Start: 2018-06-27 | End: 2018-06-30 | Stop reason: HOSPADM

## 2018-06-27 RX ORDER — DOCUSATE SODIUM 100 MG/1
100 CAPSULE, LIQUID FILLED ORAL
Status: DISCONTINUED | OUTPATIENT
Start: 2018-06-27 | End: 2018-06-30 | Stop reason: HOSPADM

## 2018-06-27 RX ADMIN — ONDANSETRON 4 MG: 2 INJECTION, SOLUTION INTRAMUSCULAR; INTRAVENOUS at 20:14

## 2018-06-27 RX ADMIN — SODIUM CHLORIDE 100 ML/HR: 900 INJECTION, SOLUTION INTRAVENOUS at 23:06

## 2018-06-27 RX ADMIN — PANTOPRAZOLE SODIUM 40 MG: 40 INJECTION, POWDER, FOR SOLUTION INTRAVENOUS at 23:06

## 2018-06-27 RX ADMIN — PANTOPRAZOLE SODIUM 40 MG: 40 INJECTION, POWDER, FOR SOLUTION INTRAVENOUS at 20:14

## 2018-06-27 RX ADMIN — SODIUM CHLORIDE 1000 ML: 900 INJECTION, SOLUTION INTRAVENOUS at 18:58

## 2018-06-27 NOTE — ED PROVIDER NOTES
EMERGENCY DEPARTMENT HISTORY AND PHYSICAL EXAM    7:56 PM      Date: 6/27/2018  Patient Name: Jaren Mo    History of Presenting Illness     Chief Complaint   Patient presents with    Rectal Bleeding         History Provided By: Patient    Additional History (Context): Jaren Mo is a 56 y.o. female with hyperlipidemia and asthma who presents with 1 hour of acute onset rectal bleeding with mild aching pain with bowel movements. Pt reports rectal bleeding that started about an hour ago and has had 5 episodes while in the ED with mild pain with the bowel movements but denies abdominal pain. Pt takes a daily Asprin. Pt had a scope about 7 years ago. No other concerns or symptoms at this time. PCP: Kayla Raymundo MD    Chief Complaint: rectal bleeding  Duration: 1 Hours  Timing:  Acute  Location: rectal  Quality: Aching  Severity: Mild  Modifying Factors: none  Associated Symptoms: pain with bowel movements      Current Facility-Administered Medications   Medication Dose Route Frequency Provider Last Rate Last Dose    sodium chloride 0.9 % bolus infusion 1,000 mL  1,000 mL IntraVENous ONCE Perry, PA 1,000 mL/hr at 06/27/18 1858 1,000 mL at 06/27/18 1858    pantoprazole (PROTONIX) injection 40 mg  40 mg IntraVENous NOW Perry, PA        ondansetron (ZOFRAN) injection 4 mg  4 mg IntraVENous NOW Perry, PA         Current Outpatient Prescriptions   Medication Sig Dispense Refill    fluticasone (FLONASE) 50 mcg/actuation nasal spray 2 squirts each nostril at bedtime 1 Bottle 6    albuterol (PROVENTIL VENTOLIN) 2.5 mg /3 mL (0.083 %) nebulizer solution 3 mL by Nebulization route every six (6) hours as needed for Wheezing. 24 Each 3    albuterol (PROVENTIL HFA, VENTOLIN HFA, PROAIR HFA) 90 mcg/actuation inhaler Take 2 Puffs by inhalation every six (6) hours as needed for Wheezing.  1 Inhaler 6    nitroglycerin (NITROSTAT) 0.4 mg SL tablet 1 Tab by SubLINGual route every five (5) minutes as needed for Chest Pain. 25 Tab 1    mometasone (ASMANEX TWISTHALER) 220 mcg (120 doses) aepb inhaler Take 1 Puff by inhalation daily.  loratadine (CLARITIN) 10 mg tablet Take 10 mg by mouth.  magnesium 250 mg tab Take 250 mg by mouth.  aspirin delayed-release 81 mg tablet Take 81 mg by mouth daily.  cyanocobalamin (VITAMIN B-12) 1,000 mcg tablet Take 1,000 mcg by mouth daily.  OTHER chromax plus take one cap every day      multivitamin (ONE A DAY) tablet Take 1 Tab by mouth daily.  OMEGA-3 FATTY ACIDS/FISH OIL (FISH OIL EXTRA STRENGTH PO) Take  by mouth daily.  Cholecalciferol, Vitamin D3, 1,000 unit cap Take  by mouth.  zinc 50 mg Tab Take  by mouth daily. Past History     Past Medical History:  Past Medical History:   Diagnosis Date    Asthma     Chest pain, unspecified     Possible Angina, GERD, chest wall pains, recurrent pains, possible atypical angina, happens at rest, abnormal nuc scan in past, discussed risk benefit options of cardiac cath/pci, she wants to think it over, add sl ntg    Coronary atherosclerosis of native coronary artery     Abnormal NUC scan, patient's symptoms are better, will start meds and monitor    Heart murmur     Hypercholesterolemia     Ill-defined condition     Patent Foramen Ovale- asymptomatic    Obesity, unspecified     Discussed diet    Other and unspecified hyperlipidemia     no longer on meds    Pain of right thumb     Trigger thumb of right hand        Past Surgical History:  Past Surgical History:   Procedure Laterality Date    HX  SECTION      HX CHOLECYSTECTOMY      HX COLONOSCOPY      HX OTHER SURGICAL      Subcutaneous fistulotomy posterior vaginal wall.     [place] ANOSCOPY DX W/COLLJ SPEC BR/WA SPX WHEN PRFRMD N/A 2017    Dr. Nigel Fleming N/A 2017    Dr. Vanna Napier       Family History:  Family History   Problem Relation Age of Onset    Hypertension Mother     Diabetes Father     Colon Cancer Father     Cancer Father      prosate    Hypertension Maternal Grandmother     Heart Disease Neg Hx      no family history of heart disease       Social History:  Social History   Substance Use Topics    Smoking status: Former Smoker     Packs/day: 0.20     Years: 5.00     Types: Cigarettes     Quit date: 2/25/1995    Smokeless tobacco: Never Used    Alcohol use 0.0 oz/week     0 Standard drinks or equivalent per week      Comment: socially       Allergies: Allergies   Allergen Reactions    Penicillins Other (comments)     Takes skin of body          Review of Systems     Review of Systems   Cardiovascular: Negative for chest pain. Gastrointestinal: Positive for anal bleeding and blood in stool. Negative for abdominal pain. All other systems reviewed and are negative. Physical Exam     Visit Vitals    /84 (BP 1 Location: Left arm, BP Patient Position: Sitting)    Pulse 88    Temp 97.1 °F (36.2 °C)    Resp 16    Ht 5' 5\" (1.651 m)    Wt 91.2 kg (201 lb)    SpO2 95%    BMI 33.45 kg/m2       Physical Exam   Constitutional: She is oriented to person, place, and time. She appears well-developed. HENT:   Head: Normocephalic and atraumatic. Eyes: EOM are normal. Pupils are equal, round, and reactive to light. Neck: Normal range of motion. Neck supple. Cardiovascular: Normal rate, regular rhythm and normal heart sounds. Exam reveals no friction rub. No murmur heard. Pulmonary/Chest: Effort normal and breath sounds normal. No respiratory distress. She has no wheezes. Abdominal: Soft. She exhibits no distension. There is no tenderness. There is no rebound and no guarding. Musculoskeletal: Normal range of motion. Neurological: She is alert and oriented to person, place, and time. Skin: Skin is warm and dry. Psychiatric: She has a normal mood and affect.  Her behavior is normal. Thought content normal. Diagnostic Study Results     Blood stool x 5;   Blood work [employer]. Will admit obs. Medical Decision Making     1. LGIB: painless no imaging. ; recent steriod use. [facility]/ dr Joceline Garcia; will admit. [facility]/ Dr Heather Perez will consult     Diagnosis     No diagnosis found. _______________________________    Attestations:  Scribe [address] [place] [address] acting as a scribe for and in the presence of Ana Wong MD      June 27, 2018 at 7:56 PM       Provider Attestation:      I personally performed the services described in the documentation, reviewed the documentation, as recorded by the scribe in my presence, and it accurately and completely records my words and actions.  June 27, 2018 at 7:56 PM - Ana Wong MD    _______________________________

## 2018-06-27 NOTE — IP AVS SNAPSHOT
Natalie Calderon 
 
 
 [address] [facility] [address] [place] [address] Patient: Monica Whitney MRN: [id number] RMR:[id number] About your hospitalization You were admitted on:  June 27, 2018 You last received care in the:  [facility] [address] [facility] [place] You were discharged on:  June 30, 2018 Why you were hospitalized Your primary diagnosis was:  Symptomatic Anemia Your diagnoses also included:  Lower Gi Bleed, Rectovaginal Fistula Follow-up Information Follow up With Details Comments Contact Info Debra Hassan MD On 7/3/2018 @8:45AM [place] [address] [address] [place] [address] 
[phone] Karen Rinaldi MD In 2 weeks  [address] [place] [id number] Your Scheduled Appointments Monday July 02, 2018 10:30 AM EDT  
PT GENERAL F/U with Kirby Dutton, PTA  
[facility] PT [address] ) [place] [facility] [address]  
[phone] Thursday July 05, 2018 12:00 PM EDT  
PT GENERAL F/U with Eloisa Dixon, PT  
[facility] PT [address] ) [place] [facility] [address]  
[phone] Monday July 09, 2018 10:30 AM EDT  
PT GENERAL F/U with Kirby Dutton, PTA  
[facility] PT [address] [place] [facility] [address]  
[phone] Wednesday July 11, 2018 10:00 AM EDT  
PT GENERAL F/U with Kirby Dutton, PTA  
[facility] PT [address] [place] [facility] [address]  
[phone] Monday July 16, 2018  9:30 AM EDT  
PT GENERAL F/U with Kirby Dutton, PTA  
[facility] [address] ) [address] [place] [address]  
[phone]  Wednesday July 18, 2018  9:30 AM EDT  
PT GENERAL F/U with Eloisa Dixon, PT  
 [facility] PT [address] ) [place] [address]  
[phone] Monday July 23, 2018 10:00 AM EDT  
PT GENERAL F/U with Clari Lessen, PTA  
[facility] PT [address] ) [place] [address]  
[phone] Wednesday July 25, 2018 10:00 AM EDT  
PT GENERAL F/U with Clari Lessen, PTA  
[facility] PT [address] ) [place] [address]  
[phone] Monday July 30, 2018 10:30 AM EDT  
PT GENERAL F/U with Clari Lessen, PTA  
[facility] PT [address] ) [place] [facility] [address]  
[phone] Wednesday August 01, 2018 10:30 AM EDT  
PT GENERAL F/U with Clari Lessen, PTA  
[facility] PT [address] ) [place] [facility] [address]  
[phone] Discharge Orders None A check xi indicates which time of day the medication should be taken. My Medications START taking these medications Instructions Each Dose to Equal  
 Morning Noon Evening Bedtime  
 oxyCODONE-acetaminophen 5-325 mg per tablet Commonly known as:  PERCOCET Your last dose was: Your next dose is: Take 1 Tab by mouth every six (6) hours as needed. Max Daily Amount: 4 Tabs. 1 Tab  
    
   
   
   
  
 pantoprazole 40 mg tablet Commonly known as:  PROTONIX Your last dose was: Your next dose is: Take 1 Tab by mouth Daily (before breakfast). 40 mg  
    
   
   
   
  
 psyllium husk-aspartame 3.4 gram Pwpk packet Commonly known as:  METAMUCIL FIBER Your last dose was: Your next dose is: Take 1 Packet by mouth daily (with dinner). 1 Packet CONTINUE taking these medications Instructions Each Dose to Equal  
 Morning Noon Evening Bedtime * albuterol 90 mcg/actuation inhaler Commonly known as:  PROVENTIL HFA, VENTOLIN HFA, PROAIR HFA Your last dose was: Your next dose is: Take 2 Puffs by inhalation every six (6) hours as needed for Wheezing. 2 Puff * albuterol 2.5 mg /3 mL (0.083 %) nebulizer solution Commonly known as:  PROVENTIL VENTOLIN Your last dose was: Your next dose is:    
   
   
 3 mL by Nebulization route every six (6) hours as needed for Wheezing. 2.5 mg  
    
   
   
   
  
 ASMANEX TWISTHALER 220 mcg (120 doses) Aepb inhaler Generic drug:  mometasone Your last dose was: Your next dose is: Take 1 Puff by inhalation daily. 1 Puff  
    
   
   
   
  
 cholecalciferol 1,000 unit Cap Commonly known as:  VITAMIN D3 Your last dose was: Your next dose is: Take  by mouth. FISH OIL EXTRA STRENGTH PO Your last dose was: Your next dose is: Take  by mouth daily. fluticasone 50 mcg/actuation nasal spray Commonly known as:  Caffie Efrainler Your last dose was: Your next dose is:    
   
   
 2 squirts each nostril at bedtime  
     
   
   
   
  
 loratadine 10 mg tablet Commonly known as:  Genaro Guaman Your last dose was: Your next dose is: Take 10 mg by mouth. 10 mg  
    
   
   
   
  
 magnesium 250 mg Tab Your last dose was: Your next dose is: Take 250 mg by mouth. 250 mg  
    
   
   
   
  
 multivitamin tablet Commonly known as:  ONE A DAY Your last dose was: Your next dose is: Take 1 Tab by mouth daily. 1 Tab  
    
   
   
   
  
 nitroglycerin 0.4 mg SL tablet Commonly known as:  NITROSTAT Your last dose was: Your next dose is: 1 Tab by SubLINGual route every five (5) minutes as needed for Chest Pain. 0.4 mg  
    
   
   
   
  
 OTHER Your last dose was: Your next dose is:    
   
   
 chromax plus take one cap every day VITAMIN B-12 1,000 mcg tablet Generic drug:  cyanocobalamin Your last dose was: Your next dose is: Take 1,000 mcg by mouth daily. 1000 mcg  
    
   
   
   
  
 zinc 50 mg Tab tablet Your last dose was: Your next dose is: Take  by mouth daily. * Notice: This list has 2 medication(s) that are the same as other medications prescribed for you. Read the directions carefully, and ask your doctor or other care provider to review them with you. STOP taking these medications   
 aspirin delayed-release 81 mg tablet Where to Get Your Medications Information on where to get these meds will be given to you by the nurse or doctor. ! Ask your nurse or doctor about these medications  
  oxyCODONE-acetaminophen 5-325 mg per tablet  
 pantoprazole 40 mg tablet  
 psyllium husk-aspartame 3.4 gram Pwpk packet Opioid Education Prescription Opioids: What You Need to Know: 
 
Prescription opioids can be used to help relieve moderate-to-severe pain and are often prescribed following a surgery or injury, or for certain health conditions. These medications can be an important part of treatment but also come with serious risks. Opioids are strong pain medicines. Examples include hydrocodone, oxycodone, fentanyl, and morphine. Heroin is an example of an illegal opioid. It is important to work with your health care provider to make sure you are getting the safest, most effective care. WHAT ARE THE RISKS AND SIDE EFFECTS OF OPIOID USE?  
Prescription opioids carry serious risks of addiction and overdose, especially with prolonged use. An opioid overdose, often marked by slow breathing, can cause sudden death. The use of prescription opioids can have a number of side effects as well, even when taken as directed. · Tolerance-meaning you might need to take more of a medication for the same pain relief · Physical dependence-meaning you have symptoms of withdrawal when the medication is stopped. Withdrawal symptoms can include nausea, sweating, chills, diarrhea, stomach cramps, and muscle aches. Withdrawal can last up to several weeks, depending on which drug you took and how long you took it. · Increased sensitivity to pain · Constipation · Nausea, vomiting, and dry mouth · Sleepiness and dizziness · Confusion · Depression · Low levels of testosterone that can result in lower sex drive, energy, and strength · Itching and sweating RISKS ARE GREATER WITH:      
· History of drug misuse, substance use disorder, or overdose · Mental health conditions (such as depression or anxiety) · Sleep apnea · Older age (72 years or older) · Pregnancy Avoid alcohol while taking prescription opioids. Also, unless specifically advised by your health care provider, medications to avoid include: · Benzodiazepines (such as Xanax or Valium) · Muscle relaxants (such as Soma or Flexeril) · Hypnotics (such as Ambien or Lunesta) · Other prescription opioids KNOW YOUR OPTIONS Talk to your health care provider about ways to manage your pain that don't involve prescription opioids. Some of these options may actually work better and have fewer risks and side effects. Options may include: 
· Pain relievers such as acetaminophen, ibuprofen, and naproxen · Some medications that are also used for depression or seizures · Physical therapy and exercise · Counseling to help patients learn how to cope better with triggers of pain and stress. · Application of heat or cold compress · Massage therapy · Relaxation techniques Be Informed Make sure you know the name of your medication, how much and how often to take it, and its potential risks & side effects. IF YOU ARE PRESCRIBED OPIOIDS FOR PAIN: 
· Never take opioids in greater amounts or more often than prescribed. Remember the goal is not to be pain-free but to manage your pain at a tolerable level. · Follow up with your primary care provider to: · Work together to create a plan on how to manage your pain. · Talk about ways to help manage your pain that don't involve prescription opioids. · Talk about any and all concerns and side effects. · Help prevent misuse and abuse. · Never sell or share prescription opioids · Help prevent misuse and abuse. · Store prescription opioids in a secure place and out of reach of others (this may include visitors, children, friends, and family). · Safely dispose of unused/unwanted prescription opioids: Find your community drug take-back program or your pharmacy mail-back program, or flush them down the toilet, following guidance from the Food and Drug Administration (www.fda.gov/Drugs/ResourcesForYou). · Visit www.cdc.gov/drugoverdose to learn about the risks of opioid abuse and overdose. · If you believe you may be struggling with addiction, tell your health care provider and ask for guidance or call [address] [place], [place] [address]Instant Information at 1-308-043-XLZC. Discharge Instructions Upper GI Endoscopy: What to Expect at [facility] Your Recovery After you have an endoscopy, you will stay at the hospital or clinic for 1 to 2 hours. This will allow the medicine to wear off. You will be able to go home after your doctor or nurse checks to make sure you are not having any problems.  
You may have to stay overnight if you had treatment during the test. You may have a sore throat for a day or two after the test. 
 This care sheet gives you a general idea about what to expect after the test. 
How can you care for yourself at home? Activity · Rest as much as you need to after you go home. · You should be able to go back to your usual activities the day after the test. 
Diet · Follow your doctor's directions for eating after the test. 
· Drink plenty of fluids (unless your doctor has told you not to). Medications · If you have a sore throat the day after the test, use an over-the-counter spray to numb your throat. Follow-up care is a key part of your treatment and safety. Be sure to make and go to all appointments, and call your doctor if you are having problems. It's also a good idea to know your test results and keep a list of the medicines you take. When should you call for help? Call 911 anytime you think you may need emergency care. For example, call if: 
? · You passed out (loses consciousness). ? · You have trouble breathing. ? · You pass maroon or bloody stools. ?Call your doctor now or seek immediate medical care if: 
? · You have pain that does not get better after your take pain medicine. ? · You have new or worse belly pain. ? · You have blood in your stools. ? · You are sick to your stomach and cannot keep fluids down. ? · You have a fever. ? · You cannot pass stools or gas. ? Watch closely for changes in your health, and be sure to contact your doctor if: 
? · Your throat still hurts after a day or two. ? · You do not get better as expected. Where can you learn more? Go to http://faye-jayda.info/. Enter [phone] in the search box to learn more about \"Upper GI Endoscopy: What to Expect at Home. \" Current as of: May 12, 2017 Content Version: 11.4 © 0776-3752 Healthwise, testbirds. Care instructions adapted under license by [employer] (which disclaims liability or warranty for this information).  If you have questions about a medical condition or this instruction, always ask your healthcare professional. [place] [address] any warranty or liability for your use of this information. Xamarin Activation Thank you for requesting access to Xamarin. Please follow the instructions below to securely access and download your online medical record. Xamarin allows you to send messages to your doctor, view your test results, renew your prescriptions, schedule appointments, and more. How Do I Sign Up? 1. In your internet browser, go to [email or web address] 
2. Click on the First Time User? Click Here link in the Sign In box. You will be redirect to the New Member Sign Up page. 3. Enter your Xamarin Access Code exactly as it appears below. You will not need to use this code after youve completed the sign-up process. If you do not sign up before the expiration date, you must request a new code. Xamarin Access Code: [email or web address] Expires: 2018  2:22 PM (This is the date your Xamarin access code will ) 4. Enter the last four digits of your Social Security Number (xxxx) and Date of Birth (mm/dd/yyyy) as indicated and click Submit. You will be taken to the next sign-up page. 5. Create a Xamarin ID. This will be your Xamarin login ID and cannot be changed, so think of one that is secure and easy to remember. 6. Create a Xamarin password. You can change your password at any time. 7. Enter your Password Reset Question and Answer. This can be used at a later time if you forget your password. 8. Enter your e-mail address. You will receive e-mail notification when new information is available in [address] 9. Click Sign Up. You can now view and download portions of your medical record. 10. Click the Download Summary menu link to download a portable copy of your medical information. Additional Information If you have questions, please visit the Frequently Asked Questions section of the SMS Assist website at [email or web address] Remember, MyChart is NOT to be used for urgent needs. For medical emergencies, dial 911. Patient armband removed and shredded DISCHARGE SUMMARY from Nurse PATIENT INSTRUCTIONS: 
 
 
F-face looks uneven A-arms unable to move or move unevenly S-speech slurred or non-existent T-time-call 911 as soon as signs and symptoms begin-DO NOT go Back to bed or wait to see if you get better-TIME IS BRAIN. Warning Signs of HEART ATTACK Call 911 if you have these symptoms: 
? Chest discomfort. Most heart attacks involve discomfort in the center of the chest that lasts more than a few minutes, or that goes away and comes back. It can feel like uncomfortable pressure, squeezing, fullness, or pain. ? Discomfort in other areas of the upper body. Symptoms can include pain or discomfort in one or both arms, the back, neck, jaw, or stomach. ? Shortness of breath with or without chest discomfort. ? Other signs may include breaking out in a cold sweat, nausea, or lightheadedness. Don't wait more than five minutes to call [address]! Fast action can save your life. Calling 911 is almost always the fastest way to get lifesaving treatment. Emergency Medical Services staff can begin treatment when they arrive  up to an hour sooner than if someone gets to the hospital by car. The discharge information has been reviewed with the patient. The patient verbalized understanding. Discharge medications reviewed with the patient and appropriate educational materials and side effects teaching were provided.  
___________________________________________________________________________ ________________________________________________________ Colonoscopy: What to Expect at [facility] Your Recovery After you have a colonoscopy, you will stay at the clinic for 1 to 2 hours until the medicines wear off. Then you can go home. But you will need to arrange for a ride. Your doctor will tell you when you can eat and do your other usual activities. Your doctor will talk to you about when you will need your next colonoscopy. Your doctor can help you decide how often you need to be checked. This will depend on the results of your test and your risk for colorectal cancer. After the test, you may be bloated or have gas pains. You may need to pass gas. If a biopsy was done or a polyp was removed, you may have streaks of blood in your stool (feces) for a few days. This care sheet gives you a general idea about how long it will take for you to recover. But each person recovers at a different pace. Follow the steps below to get better as quickly as possible. How can you care for yourself at home? Activity ? · Rest when you feel tired. ? · You can do your normal activities when it feels okay to do so. Diet ? · Follow your doctor's directions for eating. ? · Unless your doctor has told you not to, drink plenty of fluids. This helps to replace the fluids that were lost during the colon prep. ? · Do not drink alcohol. Medicines ? · Your doctor will tell you if and when you can restart your medicines. He or she will also give you instructions about taking any new medicines. ? · If you take blood thinners, such as warfarin (Coumadin), clopidogrel (Plavix), or aspirin, be sure to talk to your doctor. He or she will tell you if and when to start taking those medicines again. Make sure that you understand exactly what your doctor wants you to do.   
? · If polyps were removed or a biopsy was done during the test, your doctor may tell you not to take aspirin or other anti-inflammatory medicines for a few days. These include ibuprofen (Advil, Motrin) and naproxen (Aleve). Other instructions ? · For your safety, do not drive or operate machinery until the medicine wears off and you can think clearly. Your doctor may tell you not to drive or operate machinery until the day after your test.  
? · Do not sign legal documents or make major decisions until the medicine wears off and you can think clearly. The anesthesia can make it hard for you to fully understand what you are agreeing to. Follow-up care is a key part of your treatment and safety. Be sure to make and go to all appointments, and call your doctor if you are having problems. It's also a good idea to know your test results and keep a list of the medicines you take. When should you call for help? Call 911 anytime you think you may need emergency care. For example, call if: 
? · You passed out (lost consciousness). ? · You pass maroon or bloody stools. ? · You have trouble breathing. ?Call your doctor now or seek immediate medical care if: 
? · You have pain that does not get better after you take pain medicine. ? · You are sick to your stomach or cannot drink fluids. ? · You have new or worse belly pain. ? · You have blood in your stools. ? · You have a fever. ? · You cannot pass stools or gas. ? Watch closely for changes in your health, and be sure to contact your doctor if you have any problems. Where can you learn more? Go to http://faye-jayda.info/. Enter E264 in the search box to learn more about \"Colonoscopy: What to Expect at Home. \" Current as of: May 12, 2017 Content Version: 11.4 © 0474-7343 [employer]. Care instructions adapted under license by [employer] (which disclaims liability or warranty for this information).  If you have questions about a medical condition or this instruction, always ask your healthcare professional. Gerardo Rios, [facility] disclaims any warranty or liability for your use of this information. Gastrointestinal Bleeding: Care Instructions Your Care Instructions The digestive or gastrointestinal tract goes from the mouth to the anus. It is often called the GI tract. Bleeding can happen anywhere in the GI tract. It may be caused by an ulcer, an infection, or cancer. It may also be caused by medicines such as aspirin or ibuprofen. Light bleeding may not cause any symptoms at first. But if you continue to bleed for a while, you may feel very weak or tired. Sudden, heavy bleeding means you need to see a doctor right away. This kind of bleeding can be very dangerous. But it can usually be cured or controlled. The doctor may do some tests to find the cause of your bleeding. Follow-up care is a key part of your treatment and safety. Be sure to make and go to all appointments, and call your doctor if you are having problems. It's also a good idea to know your test results and keep a list of the medicines you take. How can you care for yourself at home? · Be safe with medicines. Take your medicines exactly as prescribed. Call your doctor if you think you are having a problem with your medicine. You will get more details on the specific medicines your doctor prescribes. · Do not take aspirin or other anti-inflammatory medicines, such as naproxen (Aleve) or ibuprofen (Advil, Motrin), without talking to your doctor first. Ask your doctor if it is okay to use acetaminophen (Tylenol). · Do not drink alcohol. · The bleeding may make you lose iron. So it's important to eat foods that have a lot of iron. These include red meat, shellfish, poultry, and eggs. They also include beans, raisins, whole-grain breads, and leafy green vegetables. If you want help planning meals, you can make an appointment with a dietitian. When should you call for help? Call 911 anytime you think you may need emergency care.  For example, call if: 
? · You have sudden, severe belly pain. ? · You vomit blood or what looks like coffee grounds. ? · You passed out (lost consciousness). ? · Your stools are maroon or very bloody. ?Call your doctor now or seek immediate medical care if: 
? · You are dizzy or lightheaded, or you feel like you may faint. ? · Your stools are black and look like tar, or they have streaks of blood. ? · You have belly pain. ? · You vomit or have nausea. ? · You have trouble swallowing, or it hurts when you swallow. ? Watch closely for changes in your health, and be sure to contact your doctor if: 
? · You do not get better as expected. Where can you learn more? Go to http://faye-jayda.info/. Enter L177 in the search box to learn more about \"Gastrointestinal Bleeding: Care Instructions. \" Current as of: March 20, 2017 Content Version: 11.4 © 6729-0690 [employer]. Care instructions adapted under license by [employer] (which disclaims liability or warranty for this information). If you have questions about a medical condition or this instruction, always ask your healthcare professional. [place] [address] any warranty or liability for your use of this information. Follow up with you PCP, and GI specialist. Follow up with your Cardiologist to determine need to restart Aspirin. Anemia: Care Instructions Your Care Instructions Anemia is a low level of red blood cells, which carry oxygen throughout your body. Many things can cause anemia. Lack of iron is one of the most common causes. Your body needs iron to make hemoglobin, a substance in red blood cells that carries oxygen from the lungs to your body's cells. Without enough iron, the body produces fewer and smaller red blood cells. As a result, your body's cells do not get enough oxygen, and you feel tired and weak. And you may have trouble concentrating. Bleeding is the most common cause of a lack of iron. You may have heavy menstrual bleeding or bleeding caused by conditions such as ulcers, hemorrhoids, or cancer. Regular use of aspirin or other anti-inflammatory medicines (such as ibuprofen) also can cause bleeding in some people. A lack of iron in your diet also can cause anemia, especially at times when the body needs more iron, such as during pregnancy, infancy, and the teen years. Your doctor may have prescribed iron pills. It may take several months of treatment for your iron levels to return to normal. Your doctor also may suggest that you eat foods that are rich in iron, such as meat and beans. There are many other causes of anemia. It is not always due to a lack of iron. Finding the specific cause of your anemia will help your doctor find the right treatment for you. Follow-up care is a key part of your treatment and safety. Be sure to make and go to all appointments, and call your doctor if you are having problems. It's also a good idea to know your test results and keep a list of the medicines you take. How can you care for yourself at home? · Take your medicines exactly as prescribed. Call your doctor if you think you are having a problem with your medicine. · If your doctor recommends iron pills, take them as directed: ¨ Try to take the pills on an empty stomach about 1 hour before or 2 hours after meals. But you may need to take iron with food to avoid an upset stomach. ¨ Do not take antacids or drink milk or caffeine drinks (such as coffee, tea, or cola) at the same time or within 2 hours of the time that you take your iron. They can make it hard for your body to absorb the iron. ¨ Vitamin C (from food or supplements) helps your body absorb iron. Try taking iron pills with a glass of orange juice or some other food that is high in vitamin C, such as citrus fruits.  
¨ Iron pills may cause stomach problems, such as heartburn, nausea, diarrhea, constipation, and cramps. Be sure to drink plenty of fluids, and include fruits, vegetables, and fiber in your diet each day. Iron pills often make your bowel movements dark or green. ¨ If you forget to take an iron pill, do not take a double dose of iron the next time you take a pill. ¨ Keep iron pills out of the reach of small children. An overdose of iron can be very dangerous. · Follow your doctor's advice about eating iron-rich foods. These include red meat, shellfish, poultry, eggs, beans, raisins, whole-grain bread, and leafy green vegetables. · Steam vegetables to help them keep their iron content. When should you call for help? Call 911 anytime you think you may need emergency care. For example, call if: 
? · You have symptoms of a heart attack. These may include: ¨ Chest pain or pressure, or a strange feeling in the chest. 
¨ Sweating. ¨ Shortness of breath. ¨ Nausea or vomiting. ¨ Pain, pressure, or a strange feeling in the back, neck, jaw, or upper belly or in one or both shoulders or arms. ¨ Lightheadedness or sudden weakness. ¨ A fast or irregular heartbeat. After you call 911, the  may tell you to chew 1 adult-strength or 2 to 4 low-dose aspirin. Wait for an ambulance. Do not try to drive yourself. ? · You passed out (lost consciousness). ?Call your doctor now or seek immediate medical care if: 
? · You have new or increased shortness of breath. ? · You are dizzy or lightheaded, or you feel like you may faint. ? · Your fatigue and weakness continue or get worse. ? · You have any abnormal bleeding, such as: 
¨ Nosebleeds. ¨ Vaginal bleeding that is different (heavier, more frequent, at a different time of the month) than what you are used to. ¨ Bloody or black stools, or rectal bleeding. ¨ Bloody or pink urine. ? Watch closely for changes in your health, and be sure to contact your doctor if: 
? · You do not get better as expected. Where can you learn more? Go to http://faye-jayda.info/. Enter R301 in the search box to learn more about \"Anemia: Care Instructions. \" Current as of: October 13, 2016 Content Version: 11.4 © 1117-9610 Catapult. Care instructions adapted under license by [employer] (which disclaims liability or warranty for this information). If you have questions about a medical condition or this instruction, always ask your healthcare professional. [address] any warranty or liability for your use of this information. [employer] Announcement We are excited to announce that we are making your provider's discharge notes available to you in [employer]. You will see these notes when they are completed and signed by the physician that discharged you from your recent hospital stay. If you have any questions or concerns about any information you see in [employer], please call the Health Information Department where you were seen or reach out to your Primary Care Provider for more information about your plan of care. Introducing [facility]! [employer] introduces [employer] patient portal. Now you can access parts of your medical record, email your doctor's office, and request medication refills online. 1. In your internet browser, go to [email or web address] 2. Click on the First Time User? Click Here link in the Sign In box. You will see the New Member Sign Up page. 3. Enter your [employer] Access Code exactly as it appears below. You will not need to use this code after youve completed the sign-up process. If you do not sign up before the expiration date, you must request a new code. · [employer] Access Code: [email or web address] Expires: 7/25/2018  2:22 PM 
 
4. Enter the last four digits of your Social Security Number (xxxx) and Date of Birth (mm/dd/yyyy) as indicated and click Submit.  You will be taken to the next sign-up page. 5. Create a WeVideot ID. This will be your HotGrinds login ID and cannot be changed, so think of one that is secure and easy to remember. 6. Create a WeVideot password. You can change your password at any time. 7. Enter your Password Reset Question and Answer. This can be used at a later time if you forget your password. 8. Enter your e-mail address. You will receive e-mail notification when new information is available in [address] 9. Click Sign Up. You can now view and download portions of your medical record. 10. Click the Download Summary menu link to download a portable copy of your medical information. If you have questions, please visit the Frequently Asked Questions section of the HotGrinds website. Remember, HotGrinds is NOT to be used for urgent needs. For medical emergencies, dial 911. Now available from your iPhone and Android! Introducing Ld Hdz As a [facility] patient, I wanted to make you aware of our electronic visit tool called Ld Hdz. [facility] 24/7 allows you to connect within minutes with a medical provider 24 hours a day, seven days a week via a mobile device or tablet or logging into a secure website from your computer. You can access Ld Hdz from anywhere in the [place]. A virtual visit might be right for you when you have a simple condition and feel like you just dont want to get out of bed, or cant get away from work for an appointment, when your regular [facility] provider is not available (evenings, weekends or holidays), or when youre out of town and need minor care. Electronic visits cost only $49 and if the [facility] 24/7 provider determines a prescription is needed to treat your condition, one can be electronically transmitted to a nearby pharmacy*. Please take a moment to enroll today if you have not already done so.   The enrollment process is free and takes just a few minutes. To enroll, please download the Urgent.ly 24/7 beni to your tablet or phone, or visit [email or web address] to enroll on your computer. And, as an [address] [place] [address] patient with a [employer] account, the results of your visits will be scanned into your electronic medical record and your primary care provider will be able to view the scanned results. We urge you to continue to see your regular Urgent.ly provider for your ongoing medical care. And while your primary care provider may not be the one available when you seek a [facility] virtual visit, the peace of mind you get from getting a real diagnosis real time can be priceless. For more information on [facility], view our Frequently Asked Questions (FAQs) at [email or web address]. Sincerely, 
 
Valentino Milks, MD 
Chief Medical Officer Samia Joanna Frantz *:  certain medications cannot be prescribed via [facility] Unresulted Labs-Please follow up with your PCP about these lab tests Order Current Status US TRANSVAGINAL In process Providers Seen During Your Hospitalization Provider Specialty Primary office phone Joselyn Webber MD Emergency Medicine [phone] Jessica Edmond MD Emergency Medicine [phone] Chilo Torres MD Hospitalist [phone] Iglesia Fuentes MD Internal Medicine [phone] Your Primary Care Physician (PCP) Primary Care Physician Office Phone Office Fax Conrad Avendano [phone] You are allergic to the following Allergen Reactions Penicillins Other (comments) Takes skin of body Recent Documentation Height Weight Breastfeeding? BMI OB Status Smoking Status 1.651 m 91.2 kg No 33.45 kg/m2 Menopause Former Smoker Emergency Contacts Name Discharge Info Relation Home Work Mobile Spencer Jaffe DISCHARGE CAREGIVER [3] Spouse [3] [phone] Emmie Jaffe DISCHARGE CAREGIVER [3] Child [2] [phone] Patient Belongings The following personal items are in your possession at time of discharge: 
  Dental Appliances: None  Visual Aid: Contacts      Home Medications: None   Jewelry: Earrings, With patient  Clothing: At bedside, With patient    Other Valuables: Cell Phone, Contact Lenses, Purse, Wallet, With patient  Personal Items Sent to Safe: none Discharge Instructions Attachments/References MEFS - PANTOPRAZOLE (PROTONIX) - (BY MOUTH) (ENGLISH) MEFS - OXYCODONE/ACETAMINOPHEN (PERCOCET, ROXICET) - (BY MOUTH) (ENGLISH) MEFS - LAXATIVE, BULK-FORMING (FIBER, BENEFIBER, CITRUCEL, EQUALACTIN) - (BY MOUTH) (ENGLISH) DIVERTICULOSIS (ENGLISH) ESOPHAGITIS (ENGLISH) Patient Handouts Pantoprazole (Protonix) - (By mouth) Why this medicine is used:  
Treats gastroesophageal reflux disease (GERD), a damaged esophagus, and high levels of stomach acid. Contact a nurse or doctor right away if you have: · Blistering, peeling, red skin rash · Swelling, muscle pain, stiffness, cramps, or twitching · Joint pain, rash on your cheeks or arms that gets worse in the sun · Dark-colored urine, change in how much or how often you urinate · Seizures, dizziness, uneven heartbeat · Severe diarrhea, stomach cramps, fever, weight gain Common side effects: · Mild diarrhea, stomach pain · Headache, tiredness © 2017 [address] [facility] Information is for End User's use only and may not be sold, redistributed or otherwise used for commercial purposes. Oxycodone/Acetaminophen (Percocet, Roxicet) - (By mouth) Why this medicine is used:  
Treats pain. This medicine contains a narcotic pain reliever. Contact a nurse or doctor right away if you have: 
· Extreme weakness, shallow breathing, slow heartbeat · Sweating or cold, clammy skin · Skin blisters, rash, or peeling Common side effects: 
· Constipation · Nausea, vomiting · Tiredness © 2017 [address] [employer]  Information is for End User's use only and may not be sold, redistributed or otherwise used for commercial purposes. Laxative, Bulk-forming (Fiber, Benefiber, Citrucel, Equalactin) - (By mouth) Why this medicine is used:  
Treats constipation by helping you have a bowel movement. Contact a nurse or doctor right away if you have: · Trouble breathing or swallowing · Constipation or abdominal pain · Skin rash, itching Common side effects: 
· Loose stools © 2017 [address] [employer]  Information is for End User's use only and may not be sold, redistributed or otherwise used for commercial purposes. Diverticulosis: Care Instructions Your Care Instructions In diverticulosis, pouches called diverticula form in the wall of the large intestine (colon). The pouches do not cause any pain or other symptoms. Most people who have diverticulosis do not know they have it. But the pouches sometimes bleed, and if they become infected, they can cause pain and other symptoms. When this happens, it is called diverticulitis. Diverticula form when pressure pushes the wall of the colon outward at certain weak points. A diet that is too low in fiber can cause diverticula. Follow-up care is a key part of your treatment and safety. Be sure to make and go to all appointments, and call your doctor if you are having problems. It's also a good idea to know your test results and keep a list of the medicines you take. How can you care for yourself at home? · Include fruits, leafy green vegetables, beans, and whole grains in your diet each day. These foods are high in fiber. · Take a fiber supplement, such as Citrucel or Metamucil, every day if needed. Read and follow all instructions on the label. · Drink plenty of fluids, enough so that your urine is light yellow or clear like water. If you have kidney, heart, or liver disease and have to limit fluids, talk with your doctor before you increase the amount of fluids you drink. · Get at least 30 minutes of exercise on most days of the week. Walking is a good choice. You also may want to do other activities, such as running, swimming, cycling, or playing tennis or team sports. · Cut out foods that cause gas, pain, or other symptoms. When should you call for help? Call your doctor now or seek immediate medical care if: 
? · You have belly pain. ? · You pass maroon or very bloody stools. ? · You have a fever. ? · You have nausea and vomiting. ? · You have unusual changes in your bowel movements or abdominal swelling. ? · You have burning pain when you urinate. ? · You have abnormal vaginal discharge. ? · You have shoulder pain. ? · You have cramping pain that does not get better when you have a bowel movement or pass gas. ? · You pass gas or stool from your urethra while urinating. ? Watch closely for changes in your health, and be sure to contact your doctor if you have any problems. Where can you learn more? Go to http://faye-jayda.info/. Enter L227 in the search box to learn more about \"Diverticulosis: Care Instructions. \" Current as of: May 12, 2017 Content Version: 11.4 © 0163-8779 Promuc. Care instructions adapted under license by [employer] (which disclaims liability or warranty for this information). If you have questions about a medical condition or this instruction, always ask your healthcare professional. [place] [address] any warranty or liability for your use of this information. Esophagitis: Care Instructions Your Care Instructions Esophagitis (say \"ih-sof-uh-JY-tus\") is irritation of the esophagus, the tube that carries food from your throat to your stomach. Acid reflux is the most common cause of this condition. When you have reflux, stomach acid and juices flow upward. This can cause pain or a burning feeling in your chest. You may have a sore throat. It may be hard to swallow. Other causes of this condition include some medicines and supplements. Allergies or an infection can also cause it. Your doctor will ask about your symptoms and past health. He or she might do tests to find the cause of your symptoms. Treatment depends on what is causing the problem. Treatment might include changing your diet or taking medicine to relieve your symptoms. It might also include changing a medicine that is causing your symptoms. If you have reflux, medicine that reduces the stomach acid helps your body heal. It might take 1 to 3 weeks to heal. 
Follow-up care is a key part of your treatment and safety. Be sure to make and go to all appointments, and call your doctor if you are having problems. It's also a good idea to know your test results and keep a list of the medicines you take. How can you care for yourself at home? · If you have acid reflux, your doctor may recommend that you: 
¨ Eat several small meals instead of two or three large meals. After you eat, wait 2 to 3 hours before you lie down. ¨ Avoid chocolate, mint, alcohol, and spicy foods. ¨ Don't smoke or use smokeless tobacco. Smoking can make this condition worse. If you need help quitting, talk to your doctor about stop-smoking programs and medicines. These can increase your chances of quitting for good. ¨ Raise the head of your bed 6 to 8 inches if you have symptoms at night. ¨ Lose weight if you are overweight. ¨ Take an over-the-counter antacid, such as Maalox, Mylanta, or Tums. Be careful when you take over-the-counter antacid medicines. Many of these medicines have aspirin in them.  Read the label to make sure that you are not taking more than the recommended dose. Too much aspirin can be harmful. ¨ Take stronger acid reducers. Examples are famotidine (such as Pepcid), omeprazole (such as Prilosec), and ranitidine (such as Zantac). · If your condition is caused by infection, allergy, or other problems, use the medicine or treatments that your doctor recommends. · Be safe with medicines. Take your medicines exactly as prescribed. Call your doctor if you think you are having a problem with your medicine. When should you call for help? Call your doctor now or seek immediate medical care if: 
? · You have new or worse belly pain. ? · You are vomiting. ? Watch closely for changes in your health, and be sure to contact your doctor if: 
? · You have new or worse symptoms of reflux. ? · You have trouble or pain swallowing. ? · You are losing weight. ? · You do not get better as expected. Where can you learn more? Go to http://faye-jayda.info/. Enter H095 in the search box to learn more about \"Esophagitis: Care Instructions. \" Current as of: May 12, 2017 Content Version: 11.4 © 0339-2276 Asetek. Care instructions adapted under license by [employer] (which disclaims liability or warranty for this information). If you have questions about a medical condition or this instruction, always ask your healthcare professional. [address] any warranty or liability for your use of this information. Please provide this summary of care documentation to your next provider. Signatures-by signing, you are acknowledging that this After Visit Summary has been reviewed with you and you have received a copy. Patient Signature:  ____________________________________________________________ Date:  ____________________________________________________________  
  
[facility]  Provider Signature: ____________________________________________________________ Date:  ____________________________________________________________

## 2018-06-27 NOTE — ED TRIAGE NOTES
PtSid Elliott Hang in by medics, per medic pt. C/o sudden onset of abdominal pain followed by dizziness with ambulating to BR. Pt. Stated she felt better then had a second episode of rectal bleeding with dark blood and clots. On arrival, pt. A/o x 4 In NAD; denies any abdominal pain at this time. Assisted on/off bedpan; straw colored urine observed. No blood or clots present.

## 2018-06-27 NOTE — ED NOTES
Pt. Had 2 large bloody stool; c/o dizziness. CCL-Rosalba made aware. Pt. Transferred to bed 14 on main ED side. Transferred to stretcher w/o difficulty. Report given to Jonathan Medrano RN.

## 2018-06-27 NOTE — IP AVS SNAPSHOT
[address] Patient: Meg Whitney MRN: [id number]:[id number] A check xi indicates which time of day the medication should be taken. My Medications START taking these medications Instructions Each Dose to Equal  
 Morning Noon Evening Bedtime  
 oxyCODONE-acetaminophen 5-325 mg per tablet Commonly known as:  PERCOCET Your last dose was: Your next dose is: Take 1 Tab by mouth every six (6) hours as needed. Max Daily Amount: 4 Tabs. 1 Tab  
    
   
   
   
  
 pantoprazole 40 mg tablet Commonly known as:  PROTONIX Your last dose was: Your next dose is: Take 1 Tab by mouth Daily (before breakfast). 40 mg  
    
   
   
   
  
 psyllium husk-aspartame 3.4 gram Pwpk packet Commonly known as:  METAMUCIL FIBER Your last dose was: Your next dose is: Take 1 Packet by mouth daily (with dinner). 1 Packet CONTINUE taking these medications Instructions Each Dose to Equal  
 Morning Noon Evening Bedtime * albuterol 90 mcg/actuation inhaler Commonly known as:  PROVENTIL HFA, VENTOLIN HFA, PROAIR HFA Your last dose was: Your next dose is: Take 2 Puffs by inhalation every six (6) hours as needed for Wheezing. 2 Puff * albuterol 2.5 mg /3 mL (0.083 %) nebulizer solution Commonly known as:  PROVENTIL VENTOLIN Your last dose was: Your next dose is:    
   
   
 3 mL by Nebulization route every six (6) hours as needed for Wheezing. 2.5 mg  
    
   
   
   
  
 ASMANEX TWISTHALER 220 mcg (120 doses) Aepb inhaler Generic drug:  mometasone Your last dose was: Your next dose is: Take 1 Puff by inhalation daily. 1 Puff  
    
   
   
   
  
 cholecalciferol 1,000 unit Cap Commonly known as:  VITAMIN D3 Your last dose was: Your next dose is: Take  by mouth. FISH OIL EXTRA STRENGTH PO Your last dose was: Your next dose is: Take  by mouth daily. fluticasone 50 mcg/actuation nasal spray Commonly known as:  Collins Ch Your last dose was: Your next dose is:    
   
   
 2 squirts each nostril at bedtime  
     
   
   
   
  
 loratadine 10 mg tablet Commonly known as:  Kaylah Meyers Your last dose was: Your next dose is: Take 10 mg by mouth. 10 mg  
    
   
   
   
  
 magnesium 250 mg Tab Your last dose was: Your next dose is: Take 250 mg by mouth. 250 mg  
    
   
   
   
  
 multivitamin tablet Commonly known as:  ONE A DAY Your last dose was: Your next dose is: Take 1 Tab by mouth daily. 1 Tab  
    
   
   
   
  
 nitroglycerin 0.4 mg SL tablet Commonly known as:  NITROSTAT Your last dose was: Your next dose is:    
   
   
 1 Tab by SubLINGual route every five (5) minutes as needed for Chest Pain. 0.4 mg  
    
   
   
   
  
 OTHER Your last dose was: Your next dose is:    
   
   
 chromax plus take one cap every day VITAMIN B-12 1,000 mcg tablet Generic drug:  cyanocobalamin Your last dose was: Your next dose is: Take 1,000 mcg by mouth daily. 1000 mcg  
    
   
   
   
  
 zinc 50 mg Tab tablet Your last dose was: Your next dose is: Take  by mouth daily. * Notice: This list has 2 medication(s) that are the same as other medications prescribed for you. Read the directions carefully, and ask your doctor or other care provider to review them with you. STOP taking these medications   
 aspirin delayed-release 81 mg tablet Where to Get Your Medications Information on where to get these meds will be given to you by the nurse or doctor. ! Ask your nurse or doctor about these medications  
  oxyCODONE-acetaminophen 5-325 mg per tablet  
 pantoprazole 40 mg tablet  
 psyllium husk-aspartame 3.4 gram Pwpk packet

## 2018-06-28 ENCOUNTER — APPOINTMENT (OUTPATIENT)
Dept: ULTRASOUND IMAGING | Age: 58
DRG: 378 | End: 2018-06-28
Attending: INTERNAL MEDICINE
Payer: COMMERCIAL

## 2018-06-28 LAB
ANION GAP SERPL CALC-SCNC: 5 MMOL/L (ref 3–18)
ATRIAL RATE: 93 BPM
BASOPHILS # BLD: 0 K/UL (ref 0–0.06)
BASOPHILS NFR BLD: 0 % (ref 0–2)
BUN SERPL-MCNC: 10 MG/DL (ref 7–18)
BUN/CREAT SERPL: 14 (ref 12–20)
CALCIUM SERPL-MCNC: 8 MG/DL (ref 8.5–10.1)
CALCULATED P AXIS, ECG09: 27 DEGREES
CALCULATED R AXIS, ECG10: 33 DEGREES
CALCULATED T AXIS, ECG11: 32 DEGREES
CHLORIDE SERPL-SCNC: 114 MMOL/L (ref 100–108)
CO2 SERPL-SCNC: 28 MMOL/L (ref 21–32)
CREAT SERPL-MCNC: 0.73 MG/DL (ref 0.6–1.3)
DIAGNOSIS, 93000: NORMAL
DIFFERENTIAL METHOD BLD: ABNORMAL
EOSINOPHIL # BLD: 0.1 K/UL (ref 0–0.4)
EOSINOPHIL NFR BLD: 1 % (ref 0–5)
ERYTHROCYTE [DISTWIDTH] IN BLOOD BY AUTOMATED COUNT: 12.8 % (ref 11.6–14.5)
GLUCOSE SERPL-MCNC: 106 MG/DL (ref 74–99)
HCT VFR BLD AUTO: 26.4 % (ref 35–45)
HCT VFR BLD AUTO: 34.3 % (ref 35–45)
HCT VFR BLD AUTO: 34.8 % (ref 35–45)
HCT VFR BLD AUTO: 36.6 % (ref 35–45)
HGB BLD-MCNC: 11 G/DL (ref 12–16)
HGB BLD-MCNC: 11.4 G/DL (ref 12–16)
HGB BLD-MCNC: 11.6 G/DL (ref 12–16)
HGB BLD-MCNC: 8.5 G/DL (ref 12–16)
LYMPHOCYTES # BLD: 2.6 K/UL (ref 0.9–3.6)
LYMPHOCYTES NFR BLD: 44 % (ref 21–52)
MAGNESIUM SERPL-MCNC: 2 MG/DL (ref 1.6–2.6)
MCH RBC QN AUTO: 29.4 PG (ref 24–34)
MCHC RBC AUTO-ENTMCNC: 32.1 G/DL (ref 31–37)
MCV RBC AUTO: 91.7 FL (ref 74–97)
MONOCYTES # BLD: 0.4 K/UL (ref 0.05–1.2)
MONOCYTES NFR BLD: 8 % (ref 3–10)
NEUTS SEG # BLD: 2.8 K/UL (ref 1.8–8)
NEUTS SEG NFR BLD: 47 % (ref 40–73)
P-R INTERVAL, ECG05: 140 MS
PHOSPHATE SERPL-MCNC: 3.7 MG/DL (ref 2.5–4.9)
PLATELET # BLD AUTO: 248 K/UL (ref 135–420)
PMV BLD AUTO: 9.8 FL (ref 9.2–11.8)
POTASSIUM SERPL-SCNC: 4.3 MMOL/L (ref 3.5–5.5)
Q-T INTERVAL, ECG07: 374 MS
QRS DURATION, ECG06: 84 MS
QTC CALCULATION (BEZET), ECG08: 465 MS
RBC # BLD AUTO: 3.74 M/UL (ref 4.2–5.3)
SODIUM SERPL-SCNC: 147 MMOL/L (ref 136–145)
VENTRICULAR RATE, ECG03: 93 BPM
WBC # BLD AUTO: 5.9 K/UL (ref 4.6–13.2)

## 2018-06-28 PROCEDURE — 74011250636 HC RX REV CODE- 250/636: Performed by: INTERNAL MEDICINE

## 2018-06-28 PROCEDURE — 85018 HEMOGLOBIN: CPT | Performed by: INTERNAL MEDICINE

## 2018-06-28 PROCEDURE — 85025 COMPLETE CBC W/AUTO DIFF WBC: CPT | Performed by: INTERNAL MEDICINE

## 2018-06-28 PROCEDURE — 74011250637 HC RX REV CODE- 250/637: Performed by: INTERNAL MEDICINE

## 2018-06-28 PROCEDURE — 83735 ASSAY OF MAGNESIUM: CPT | Performed by: INTERNAL MEDICINE

## 2018-06-28 PROCEDURE — 74011000250 HC RX REV CODE- 250: Performed by: NURSE PRACTITIONER

## 2018-06-28 PROCEDURE — 65270000029 HC RM PRIVATE

## 2018-06-28 PROCEDURE — 36415 COLL VENOUS BLD VENIPUNCTURE: CPT | Performed by: INTERNAL MEDICINE

## 2018-06-28 PROCEDURE — 76830 TRANSVAGINAL US NON-OB: CPT

## 2018-06-28 PROCEDURE — 74011250637 HC RX REV CODE- 250/637: Performed by: NURSE PRACTITIONER

## 2018-06-28 PROCEDURE — 84100 ASSAY OF PHOSPHORUS: CPT | Performed by: INTERNAL MEDICINE

## 2018-06-28 PROCEDURE — C9113 INJ PANTOPRAZOLE SODIUM, VIA: HCPCS | Performed by: INTERNAL MEDICINE

## 2018-06-28 PROCEDURE — 80048 BASIC METABOLIC PNL TOTAL CA: CPT | Performed by: INTERNAL MEDICINE

## 2018-06-28 RX ORDER — BISACODYL 5 MG
10 TABLET, DELAYED RELEASE (ENTERIC COATED) ORAL ONCE
Status: COMPLETED | OUTPATIENT
Start: 2018-06-28 | End: 2018-06-28

## 2018-06-28 RX ORDER — ONDANSETRON 4 MG/1
4 TABLET, FILM COATED ORAL ONCE
Status: COMPLETED | OUTPATIENT
Start: 2018-06-28 | End: 2018-06-28

## 2018-06-28 RX ADMIN — ONDANSETRON HYDROCHLORIDE 4 MG: 4 TABLET, FILM COATED ORAL at 19:30

## 2018-06-28 RX ADMIN — SODIUM CHLORIDE 100 ML/HR: 900 INJECTION, SOLUTION INTRAVENOUS at 20:16

## 2018-06-28 RX ADMIN — PANTOPRAZOLE SODIUM 40 MG: 40 INJECTION, POWDER, FOR SOLUTION INTRAVENOUS at 09:20

## 2018-06-28 RX ADMIN — ACETAMINOPHEN 650 MG: 325 TABLET ORAL at 15:29

## 2018-06-28 RX ADMIN — BISACODYL 10 MG: 5 TABLET, DELAYED RELEASE ORAL at 19:30

## 2018-06-28 RX ADMIN — PANTOPRAZOLE SODIUM 40 MG: 40 INJECTION, POWDER, FOR SOLUTION INTRAVENOUS at 20:16

## 2018-06-28 RX ADMIN — POLYETHYLENE GLYCOL 3350, SODIUM SULFATE ANHYDROUS, SODIUM BICARBONATE, SODIUM CHLORIDE, POTASSIUM CHLORIDE 2000 ML: 236; 22.74; 6.74; 5.86; 2.97 POWDER, FOR SOLUTION ORAL at 19:31

## 2018-06-28 NOTE — PROGRESS NOTES
I have assumed care of MsSid Alannah She was assessed after bedside report. She is currently sleeping.
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
## 2018-06-28 NOTE — ED NOTES
TRANSFER - OUT REPORT:    Verbal report given to [facility], RN (name) on Barbra Villagran  being transferred to [facility] (unit) for routine progression of care       Report consisted of patients Situation, Background, Assessment and   Recommendations(SBAR). Information from the following report(s) SBAR, Kardex, ED Summary, Intake/Output, MAR and Recent Results was reviewed with the receiving nurse. Lines:   Peripheral IV 06/27/18 Left Wrist (Active)   Site Assessment Clean, dry, & intact 6/27/2018  8:48 PM   Phlebitis Assessment 0 6/27/2018  8:48 PM   Infiltration Assessment 0 6/27/2018  8:48 PM   Dressing Status Clean, dry, & intact 6/27/2018  8:48 PM   Hub Color/Line Status Pink 6/27/2018  8:48 PM        Opportunity for questions and clarification was provided.       Patient transported with:   Monitor  Registered Nurse

## 2018-06-28 NOTE — PROGRESS NOTES
[facility]  Progress Note    Patient: Jacinto Lipoma Age: 56 y.o. : 1960 MR#: [id number] SSN: [id number]  Date: 2018     Subjective:     Pt feels better today, no BRBPR with 2 loose stools today. Denies any chest pain, SOB, N/V, or abd pain. Passing flatus. Assessment/Plan:   1. Lower GI bleed with + guaiac test: GI consulted, plan for EGD and Colonoscopy tomorrow. NPO per GI. Monitor H/H. Continue IV PPI. 2. Symptomatic anemia 2ry to Acute GI Bleed: monitor H/H. Transfuse if Hgb <7   3. H/o Rectovaginal fistula s/p Subcutaneous fistulotomy posterior vaginal wall on 17. Follow results TV US. asymptomatic per pt. 4. Asthma- moderate intermittent: no exacerbation, cont BD prn. Goals of care: Full code  Disposition:    [x] Case management referral; follow procedures tomorrow. Possible discharge afterwards.      Case discussed with:  [x]Patient  [x]Family  []Nursing  []Case Management  DVT Prophylaxis:  []Lovenox  []Hep SQ  [x]SCDs  []Coumadin   []On Heparin gtt    Objective:   VS:   Visit Vitals    /74 (BP 1 Location: Left arm, BP Patient Position: At rest)    Pulse 66    Temp 97.1 °F (36.2 °C)    Resp 18    Ht 5' 5\" (1.651 m)    Wt 91.2 kg (201 lb)    SpO2 99%    Breastfeeding No    BMI 33.45 kg/m2      Tmax/24hrs: Temp (24hrs), Av.3 °F (36.3 °C), Min:97 °F (36.1 °C), Max:98 °F (36.7 °C)  No intake or output data in the 24 hours ending 18 1149    General:  Awake, alert, NAD  Cardiovascular:  RRR  Pulmonary:  CTA  GI:  NT, normal BS  Extremities:  No edema or cyanosis  Additional:      Labs:    Recent Results (from the past 24 hour(s))   CBC WITH AUTOMATED DIFF    Collection Time: 18  5:50 PM   Result Value Ref Range    WBC 5.9 4.6 - 13.2 K/uL    RBC 4.45 4.20 - 5.30 M/uL    HGB 13.1 12.0 - 16.0 g/dL    HCT 39.7 35.0 - 45.0 %    MCV 89.2 74.0 - 97.0 FL    MCH 29.4 24.0 - 34.0 PG    MCHC 33.0 31.0 - 37.0 g/dL    RDW 12.7 11.6 - 14.5 %    PLATELET 159 841 - 045 K/uL    MPV 10.0 9.2 - 11.8 FL    NEUTROPHILS 41 40 - 73 %    LYMPHOCYTES 48 21 - 52 %    MONOCYTES 8 3 - 10 %    EOSINOPHILS 3 0 - 5 %    BASOPHILS 0 0 - 2 %    ABS. NEUTROPHILS 2.4 1.8 - 8.0 K/UL    ABS. LYMPHOCYTES 2.8 0.9 - 3.6 K/UL    ABS. MONOCYTES 0.5 0.05 - 1.2 K/UL    ABS. EOSINOPHILS 0.2 0.0 - 0.4 K/UL    ABS. BASOPHILS 0.0 0.0 - 0.1 K/UL    DF AUTOMATED     METABOLIC PANEL, COMPREHENSIVE    Collection Time: 06/27/18  5:50 PM   Result Value Ref Range    Sodium 143 136 - 145 mmol/L    Potassium 3.8 3.5 - 5.5 mmol/L    Chloride 109 (H) 100 - 108 mmol/L    CO2 29 21 - 32 mmol/L    Anion gap 5 3.0 - 18 mmol/L    Glucose 106 (H) 74 - 99 mg/dL    BUN 12 7.0 - 18 MG/DL    Creatinine 0.92 0.6 - 1.3 MG/DL    BUN/Creatinine ratio 13 12 - 20      GFR est AA >60 >60 ml/min/1.73m2    GFR est non-AA >60 >60 ml/min/1.73m2    Calcium 8.8 8.5 - 10.1 MG/DL    Bilirubin, total 0.7 0.2 - 1.0 MG/DL    ALT (SGPT) 21 13 - 56 U/L    AST (SGOT) 13 (L) 15 - 37 U/L    Alk.  phosphatase 62 45 - 117 U/L    Protein, total 7.3 6.4 - 8.2 g/dL    Albumin 3.5 3.4 - 5.0 g/dL    Globulin 3.8 2.0 - 4.0 g/dL    A-G Ratio 0.9 0.8 - 1.7     PROTHROMBIN TIME + INR    Collection Time: 06/27/18  5:50 PM   Result Value Ref Range    Prothrombin time 12.7 11.5 - 15.2 sec    INR 1.0 0.8 - 1.2     PTT    Collection Time: 06/27/18  5:50 PM   Result Value Ref Range    aPTT 27.1 23.0 - 36.4 SEC   TYPE & SCREEN    Collection Time: 06/27/18  6:00 PM   Result Value Ref Range    Crossmatch Expiration 06/30/2018     ABO/Rh(D) O POSITIVE     Antibody screen NEG    EKG, 12 LEAD, INITIAL    Collection Time: 06/27/18  7:11 PM   Result Value Ref Range    Ventricular Rate 93 BPM    Atrial Rate 93 BPM    P-R Interval 140 ms    QRS Duration 84 ms    Q-T Interval 374 ms    QTC Calculation (Bezet) 465 ms    Calculated P Axis 27 degrees    Calculated R Axis 33 degrees    Calculated T Axis 32 degrees    Diagnosis       Normal sinus rhythm  Normal ECG  When compared with ECG of 06-NOV-2017 15:50,  No significant change was found  Confirmed by Bryant Foot ([id number]) on 6/28/2018 5:62:12 AM     METABOLIC PANEL, BASIC    Collection Time: 06/28/18  3:25 AM   Result Value Ref Range    Sodium 147 (H) 136 - 145 mmol/L    Potassium 4.3 3.5 - 5.5 mmol/L    Chloride 114 (H) 100 - 108 mmol/L    CO2 28 21 - 32 mmol/L    Anion gap 5 3.0 - 18 mmol/L    Glucose 106 (H) 74 - 99 mg/dL    BUN 10 7.0 - 18 MG/DL    Creatinine 0.73 0.6 - 1.3 MG/DL    BUN/Creatinine ratio 14 12 - 20      GFR est AA >60 >60 ml/min/1.73m2    GFR est non-AA >60 >60 ml/min/1.73m2    Calcium 8.0 (L) 8.5 - 10.1 MG/DL   MAGNESIUM    Collection Time: 06/28/18  3:25 AM   Result Value Ref Range    Magnesium 2.0 1.6 - 2.6 mg/dL   PHOSPHORUS    Collection Time: 06/28/18  3:25 AM   Result Value Ref Range    Phosphorus 3.7 2.5 - 4.9 MG/DL   CBC WITH AUTOMATED DIFF    Collection Time: 06/28/18  3:25 AM   Result Value Ref Range    WBC 5.9 4.6 - 13.2 K/uL    RBC 3.74 (L) 4.20 - 5.30 M/uL    HGB 11.0 (L) 12.0 - 16.0 g/dL    HCT 34.3 (L) 35.0 - 45.0 %    MCV 91.7 74.0 - 97.0 FL    MCH 29.4 24.0 - 34.0 PG    MCHC 32.1 31.0 - 37.0 g/dL    RDW 12.8 11.6 - 14.5 %    PLATELET 901 929 - 194 K/uL    MPV 9.8 9.2 - 11.8 FL    NEUTROPHILS 47 40 - 73 %    LYMPHOCYTES 44 21 - 52 %    MONOCYTES 8 3 - 10 %    EOSINOPHILS 1 0 - 5 %    BASOPHILS 0 0 - 2 %    ABS. NEUTROPHILS 2.8 1.8 - 8.0 K/UL    ABS. LYMPHOCYTES 2.6 0.9 - 3.6 K/UL    ABS. MONOCYTES 0.4 0.05 - 1.2 K/UL    ABS. EOSINOPHILS 0.1 0.0 - 0.4 K/UL    ABS.  BASOPHILS 0.0 0.0 - 0.06 K/UL    DF AUTOMATED     HGB & HCT    Collection Time: 06/28/18 10:42 AM   Result Value Ref Range    HGB 11.6 (L) 12.0 - 16.0 g/dL    HCT 36.6 35.0 - 45.0 %       Signed By: Paul Baeza PA-C     June 28, 2018 11:49 AM

## 2018-06-28 NOTE — CDMP QUERY
H&P: Symptomatic Anemia 2ry to GI Bleed  Please clarify:    =>Symptomatic Anemia 2ry to Acute GI Bleed  =>Symptomatic Anemia 2ry to Chronic GI Bleed  =>Other Explanation of clinical findings  =>Unable to Determine (no explanation of clinical findings)      If you DECLINE this query or would like to communicate with [employer], please utilize the \"[employer] message box\" at the TOP of the Progress Note on the right.       Thank you,  Shane Park RN/CCDS  [phone]
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
## 2018-06-28 NOTE — NURSE NAVIGATOR
Reason for Admission:   Rectal bleed                   RRAT Score:          2           Plan for utilizing home health:      Unlikely however freedom of choice given in early preparation for discharge                    Likelihood of Readmission:  low                         Transition of Care Plan:        Home    Demographics reviewed and correct on file, PCP is Dr. Mitali Patricio as charted.

## 2018-06-28 NOTE — PROGRESS NOTES
Ms. Alejandra Gomes and her daughter were in good spirits when I stopped by, and I offered them support. She told me about her upcoming procedure and her strong kemi in God. I prayed for them and invited them to call me back at any time.      [address], M.Div, [facility] Resident   Pager: [phone]  Phone: [phone]

## 2018-06-28 NOTE — PROGRESS NOTES
Bedside verbal report given to Peter Oh RN (ongoing nurse) by Rosey Christianson RN (offgoing nurse). Reposrt included SBAR, Kardex, MAR, I/O, and Lab results. Patient resting in bed with visitor at bedside. Patient has no apparent distress.

## 2018-06-28 NOTE — ED NOTES
PT had medium amount of blood in bedpan with several clots. PT remains alert and oriented with family members at bedside.

## 2018-06-28 NOTE — CDMP QUERY
PMH: Asthma  Home meds: Albuterol nebulizer/inhaler; Mometasone    Please clarify re: Asthma    Acuity ( Acute, Exacerbated, Status Asthmaticus, Chronic)    Severity (mild, moderate , severe)    Intermittent or Persistent  =>Other Explanation of clinical findings  =>Unable to Determine (no explanation of clinical findings)    If you DECLINE this query or would like to communicate with [employer], please utilize the \"[employer] message box\" at the TOP of the Progress Note on the right.       Thank you,  Esteban Reynolds RN/CCDS  [phone]

## 2018-06-29 ENCOUNTER — ANESTHESIA EVENT (OUTPATIENT)
Dept: ENDOSCOPY | Age: 58
DRG: 378 | End: 2018-06-29
Payer: COMMERCIAL

## 2018-06-29 ENCOUNTER — ANESTHESIA (OUTPATIENT)
Dept: ENDOSCOPY | Age: 58
DRG: 378 | End: 2018-06-29
Payer: COMMERCIAL

## 2018-06-29 LAB
ANION GAP SERPL CALC-SCNC: 5 MMOL/L (ref 3–18)
BASOPHILS # BLD: 0 K/UL (ref 0–0.1)
BASOPHILS NFR BLD: 0 % (ref 0–2)
BUN SERPL-MCNC: 8 MG/DL (ref 7–18)
BUN/CREAT SERPL: 11 (ref 12–20)
CALCIUM SERPL-MCNC: 8.3 MG/DL (ref 8.5–10.1)
CHLORIDE SERPL-SCNC: 114 MMOL/L (ref 100–108)
CO2 SERPL-SCNC: 27 MMOL/L (ref 21–32)
CREAT SERPL-MCNC: 0.72 MG/DL (ref 0.6–1.3)
DIFFERENTIAL METHOD BLD: ABNORMAL
EOSINOPHIL # BLD: 0.2 K/UL (ref 0–0.4)
EOSINOPHIL NFR BLD: 3 % (ref 0–5)
ERYTHROCYTE [DISTWIDTH] IN BLOOD BY AUTOMATED COUNT: 12.7 % (ref 11.6–14.5)
GLUCOSE SERPL-MCNC: 96 MG/DL (ref 74–99)
HCT VFR BLD AUTO: 32.7 % (ref 35–45)
HCT VFR BLD AUTO: 33 % (ref 35–45)
HGB BLD-MCNC: 10.5 G/DL (ref 12–16)
HGB BLD-MCNC: 10.6 G/DL (ref 12–16)
LYMPHOCYTES # BLD: 2.8 K/UL (ref 0.9–3.6)
LYMPHOCYTES NFR BLD: 45 % (ref 21–52)
MCH RBC QN AUTO: 29 PG (ref 24–34)
MCHC RBC AUTO-ENTMCNC: 32.4 G/DL (ref 31–37)
MCV RBC AUTO: 89.6 FL (ref 74–97)
MONOCYTES # BLD: 0.4 K/UL (ref 0.05–1.2)
MONOCYTES NFR BLD: 7 % (ref 3–10)
NEUTS SEG # BLD: 2.8 K/UL (ref 1.8–8)
NEUTS SEG NFR BLD: 45 % (ref 40–73)
PLATELET # BLD AUTO: 250 K/UL (ref 135–420)
PMV BLD AUTO: 9.9 FL (ref 9.2–11.8)
POTASSIUM SERPL-SCNC: 3.7 MMOL/L (ref 3.5–5.5)
RBC # BLD AUTO: 3.65 M/UL (ref 4.2–5.3)
SODIUM SERPL-SCNC: 146 MMOL/L (ref 136–145)
WBC # BLD AUTO: 6.1 K/UL (ref 4.6–13.2)

## 2018-06-29 PROCEDURE — 85025 COMPLETE CBC W/AUTO DIFF WBC: CPT | Performed by: INTERNAL MEDICINE

## 2018-06-29 PROCEDURE — 74011000250 HC RX REV CODE- 250: Performed by: ANESTHESIOLOGY

## 2018-06-29 PROCEDURE — 76040000019: Performed by: INTERNAL MEDICINE

## 2018-06-29 PROCEDURE — 36415 COLL VENOUS BLD VENIPUNCTURE: CPT | Performed by: INTERNAL MEDICINE

## 2018-06-29 PROCEDURE — 77030009426 HC FCPS BIOP ENDOSC BSC -B: Performed by: INTERNAL MEDICINE

## 2018-06-29 PROCEDURE — 77030010936 HC CLP LIG BSC -C: Performed by: INTERNAL MEDICINE

## 2018-06-29 PROCEDURE — 74011250637 HC RX REV CODE- 250/637: Performed by: INTERNAL MEDICINE

## 2018-06-29 PROCEDURE — 74011000250 HC RX REV CODE- 250

## 2018-06-29 PROCEDURE — 77030004927 HC CATH ELECHEMSTAS BSC -C: Performed by: INTERNAL MEDICINE

## 2018-06-29 PROCEDURE — 76060000031 HC ANESTHESIA FIRST 0.5 HR: Performed by: INTERNAL MEDICINE

## 2018-06-29 PROCEDURE — 74011250636 HC RX REV CODE- 250/636

## 2018-06-29 PROCEDURE — 80048 BASIC METABOLIC PNL TOTAL CA: CPT | Performed by: INTERNAL MEDICINE

## 2018-06-29 PROCEDURE — 88305 TISSUE EXAM BY PATHOLOGIST: CPT | Performed by: INTERNAL MEDICINE

## 2018-06-29 PROCEDURE — 0W3P8ZZ CONTROL BLEEDING IN GASTROINTESTINAL TRACT, VIA NATURAL OR ARTIFICIAL OPENING ENDOSCOPIC: ICD-10-PCS | Performed by: INTERNAL MEDICINE

## 2018-06-29 PROCEDURE — 74011250636 HC RX REV CODE- 250/636: Performed by: INTERNAL MEDICINE

## 2018-06-29 PROCEDURE — 0DB38ZX EXCISION OF LOWER ESOPHAGUS, VIA NATURAL OR ARTIFICIAL OPENING ENDOSCOPIC, DIAGNOSTIC: ICD-10-PCS | Performed by: INTERNAL MEDICINE

## 2018-06-29 PROCEDURE — 77030008565 HC TBNG SUC IRR ERBE -B: Performed by: INTERNAL MEDICINE

## 2018-06-29 PROCEDURE — 74011250636 HC RX REV CODE- 250/636: Performed by: ANESTHESIOLOGY

## 2018-06-29 PROCEDURE — 85018 HEMOGLOBIN: CPT | Performed by: INTERNAL MEDICINE

## 2018-06-29 PROCEDURE — 77030018846 HC SOL IRR STRL H20 ICUM -A: Performed by: INTERNAL MEDICINE

## 2018-06-29 PROCEDURE — C9113 INJ PANTOPRAZOLE SODIUM, VIA: HCPCS | Performed by: INTERNAL MEDICINE

## 2018-06-29 PROCEDURE — 65270000029 HC RM PRIVATE

## 2018-06-29 PROCEDURE — 74011000250 HC RX REV CODE- 250: Performed by: NURSE PRACTITIONER

## 2018-06-29 RX ORDER — PROPOFOL 10 MG/ML
INJECTION, EMULSION INTRAVENOUS AS NEEDED
Status: DISCONTINUED | OUTPATIENT
Start: 2018-06-29 | End: 2018-06-29 | Stop reason: HOSPADM

## 2018-06-29 RX ORDER — PANTOPRAZOLE SODIUM 40 MG/1
40 TABLET, DELAYED RELEASE ORAL
Status: DISCONTINUED | OUTPATIENT
Start: 2018-06-30 | End: 2018-06-30 | Stop reason: HOSPADM

## 2018-06-29 RX ORDER — PROPOFOL 10 MG/ML
INJECTION, EMULSION INTRAVENOUS
Status: DISCONTINUED | OUTPATIENT
Start: 2018-06-29 | End: 2018-06-29 | Stop reason: HOSPADM

## 2018-06-29 RX ORDER — LIDOCAINE HYDROCHLORIDE 20 MG/ML
INJECTION, SOLUTION EPIDURAL; INFILTRATION; INTRACAUDAL; PERINEURAL AS NEEDED
Status: DISCONTINUED | OUTPATIENT
Start: 2018-06-29 | End: 2018-06-29 | Stop reason: HOSPADM

## 2018-06-29 RX ORDER — SODIUM CHLORIDE 0.9 % (FLUSH) 0.9 %
5-10 SYRINGE (ML) INJECTION AS NEEDED
Status: DISCONTINUED | OUTPATIENT
Start: 2018-06-29 | End: 2018-06-29 | Stop reason: HOSPADM

## 2018-06-29 RX ORDER — DEXTROSE 50 % IN WATER (D50W) INTRAVENOUS SYRINGE
25-50 AS NEEDED
Status: CANCELLED | OUTPATIENT
Start: 2018-06-29

## 2018-06-29 RX ORDER — MAGNESIUM SULFATE 100 %
4 CRYSTALS MISCELLANEOUS AS NEEDED
Status: CANCELLED | OUTPATIENT
Start: 2018-06-29

## 2018-06-29 RX ORDER — SODIUM CHLORIDE 0.9 % (FLUSH) 0.9 %
5-10 SYRINGE (ML) INJECTION EVERY 8 HOURS
Status: DISCONTINUED | OUTPATIENT
Start: 2018-06-29 | End: 2018-06-29 | Stop reason: HOSPADM

## 2018-06-29 RX ORDER — ONDANSETRON 2 MG/ML
4 INJECTION INTRAMUSCULAR; INTRAVENOUS ONCE
Status: CANCELLED | OUTPATIENT
Start: 2018-06-29 | End: 2018-06-29

## 2018-06-29 RX ORDER — INSULIN LISPRO 100 [IU]/ML
INJECTION, SOLUTION INTRAVENOUS; SUBCUTANEOUS ONCE
Status: DISCONTINUED | OUTPATIENT
Start: 2018-06-29 | End: 2018-06-29 | Stop reason: HOSPADM

## 2018-06-29 RX ORDER — SODIUM CHLORIDE, SODIUM LACTATE, POTASSIUM CHLORIDE, CALCIUM CHLORIDE 600; 310; 30; 20 MG/100ML; MG/100ML; MG/100ML; MG/100ML
75 INJECTION, SOLUTION INTRAVENOUS CONTINUOUS
Status: DISCONTINUED | OUTPATIENT
Start: 2018-06-29 | End: 2018-06-29 | Stop reason: HOSPADM

## 2018-06-29 RX ORDER — SODIUM CHLORIDE 0.9 % (FLUSH) 0.9 %
5-10 SYRINGE (ML) INJECTION AS NEEDED
Status: CANCELLED | OUTPATIENT
Start: 2018-06-29

## 2018-06-29 RX ORDER — INSULIN LISPRO 100 [IU]/ML
INJECTION, SOLUTION INTRAVENOUS; SUBCUTANEOUS ONCE
Status: CANCELLED | OUTPATIENT
Start: 2018-06-29 | End: 2018-06-30

## 2018-06-29 RX ADMIN — PROPOFOL 150 MG: 10 INJECTION, EMULSION INTRAVENOUS at 13:47

## 2018-06-29 RX ADMIN — FAMOTIDINE 20 MG: 10 INJECTION, SOLUTION INTRAVENOUS at 13:38

## 2018-06-29 RX ADMIN — OXYCODONE HYDROCHLORIDE AND ACETAMINOPHEN 1 TABLET: 5; 325 TABLET ORAL at 17:43

## 2018-06-29 RX ADMIN — POLYETHYLENE GLYCOL 3350, SODIUM SULFATE ANHYDROUS, SODIUM BICARBONATE, SODIUM CHLORIDE, POTASSIUM CHLORIDE 2000 ML: 236; 22.74; 6.74; 5.86; 2.97 POWDER, FOR SOLUTION ORAL at 08:51

## 2018-06-29 RX ADMIN — PANTOPRAZOLE SODIUM 40 MG: 40 INJECTION, POWDER, FOR SOLUTION INTRAVENOUS at 08:51

## 2018-06-29 RX ADMIN — PROPOFOL 120 MCG/KG/MIN: 10 INJECTION, EMULSION INTRAVENOUS at 13:47

## 2018-06-29 RX ADMIN — LIDOCAINE HYDROCHLORIDE 100 MG: 20 INJECTION, SOLUTION EPIDURAL; INFILTRATION; INTRACAUDAL; PERINEURAL at 13:47

## 2018-06-29 RX ADMIN — PSYLLIUM HUSK 1 PACKET: 3.4 POWDER ORAL at 17:37

## 2018-06-29 RX ADMIN — SODIUM CHLORIDE, SODIUM LACTATE, POTASSIUM CHLORIDE, AND CALCIUM CHLORIDE: 600; 310; 30; 20 INJECTION, SOLUTION INTRAVENOUS at 13:43

## 2018-06-29 NOTE — PROCEDURES
[place]  [facility], [address]    Endoscopic Gastroduodenoscopy and Colonoscopy Procedure Note    Impression:    EGD: -Grade B esophagitis ? [place]   Colonoscopy: extensive left sided diverticulosis one with clot and visable vessel that was cauterized and clipped x 3     Recommendations:  EGD: -PPI daily for 8 weeks - elevate the head of the bed 3 inches - check bxs   Colonoscopy: -metamucil 1 tablespoon daily -ok to advance diet -dc tomorrow is stable    Call us for questions or concerns available as needed , repeat colo in 5 years due to mild prep limits    Indications:   EGD: gi bleeding  Colonoscopy: gi bleeding    Anesthesia/Sedation: mac     Pre-Procedure Physical:    Current Facility-Administered Medications   Medication Dose Route Frequency    lactated Ringers infusion  75 mL/hr IntraVENous CONTINUOUS    sodium chloride (NS) flush 5-10 mL  5-10 mL IntraVENous Q8H    sodium chloride (NS) flush 5-10 mL  5-10 mL IntraVENous PRN    insulin lispro (HUMALOG) injection   SubCUTAneous ONCE    albuterol (PROVENTIL VENTOLIN) nebulizer solution 2.5 mg  2.5 mg Nebulization Q6H PRN    acetaminophen (TYLENOL) tablet 650 mg  650 mg Oral Q6H PRN    oxyCODONE-acetaminophen (PERCOCET) 5-325 mg per tablet 1 Tab  1 Tab Oral Q6H PRN    naloxone (NARCAN) injection 0.4 mg  0.4 mg IntraVENous PRN    ondansetron (ZOFRAN) injection 4 mg  4 mg IntraVENous Q6H PRN    docusate sodium (COLACE) capsule 100 mg  100 mg Oral BID PRN    0.9% sodium chloride infusion  100 mL/hr IntraVENous CONTINUOUS    pantoprazole (PROTONIX) injection 40 mg  40 mg IntraVENous Q12H     Facility-Administered Medications Ordered in Other Encounters   Medication Dose Route Frequency    lidocaine (PF) (XYLOCAINE) 20 mg/mL (2 %) injection   IntraVENous PRN    propofol (DIPRIVAN) 10 mg/mL injection   IntraVENous PRN    propofol (DIPRAVAN) INFUSION   IntraVENous CONTINUOUS Penicillins    Patient Vitals for the past 8 hrs:   BP Temp Pulse Resp SpO2   06/29/18 1340 116/73 - 84 11 100 %   06/29/18 1142 113/77 97.3 °F (36.3 °C) 63 18 99 %   06/29/18 0833 101/69 97.5 °F (36.4 °C) 76 20 98 %       Exam:    Airway: clear   Heart: normal S1and S2    Lungs: clear bilateral  Abdomen: soft, nontender, bowel sounds present and normal in all quads   Mental Status: awake, alert and oriented to person, place and time          Procedure Details    Informed consent was obtained for the procedure, including conscious sedation. Risks of pancreatitis, infection, perforation, hemorrhage, adverse drug reaction and aspiration were discussed. The patient was placed in the left lateral decubitus position. Based on the pre-procedure assessment, including review of the patient's medical history, medications, allergies, and review of systems, She had been deemed to be an appropriate candidate for conscious sedation; She was therefore sedated with the medications listed below. She was monitored continuously with ECG tracing, pulse oximetry, blood pressure monitoring, and direct observation. The gastroscope was inserted into the mouth and advanced under direct vision to the third portion of the duodenum. A careful inspection was made as the gastroscope was withdrawn, including a retroflexed view of the proximal stomach; findings and interventions are described below. Appropriate photodocumentation was obtained. The patient was placed in the left lateral decubitus position. A rectal examination was performed. The colonoscope was inserted into the rectum and advanced under direct vision to the the cecum. The quality of the colonic preparation was fair. A careful inspection was made as the colonoscope was withdrawn, including a retroflexed view of the rectum; findings and interventions are described below. Appropriate photodocumentation was obtained.     Findings:   EGD: Grade b esophagiits of very distal esophagus wih a ? [place]s all bxed   Colonoscopy: Extensive left colon diverticula [facility] had a clot and a visable vessel that was cuaterized wiht bicap and clipped x 3 no active bleeding     Therapies:    EGD: none  Colonoscopy: bicap cuatery with 7fr probe and Resolution clip x 3     Specimens: distal esophagus   Estimated blood loss: none            Complications:   None; patient tolerated the procedure well. Attending Attestation:  I performed the procedure.      Gianni Echevarria MD

## 2018-06-29 NOTE — PROGRESS NOTES
[facility] Hospitalist Group  Progress Note    Patient: Julienne Crocker Age: 56 y.o. : 1960 MR#: [id number] SSN: [id number]  Date: 2018     Subjective:     Pt reports BRBPR today at 4am with GI prep. States the stool was watery and looked liked dry blood. Denies any chest pain, SOB, N/V, or abd pain. Assessment/Plan:   1. Lower GI bleed with + guaiac test: GI consulted, s/p EGD and Colonoscopy today. Advance diet per GI. Monitor H/H. Continue IV PPI. 2. Symptomatic anemia 2ry to Acute GI Bleed: monitor H/H. Transfuse if Hgb <7   3. H/o Rectovaginal fistula s/p Subcutaneous fistulotomy posterior vaginal wall on 17. Follow results TV US. asymptomatic per pt. 4. Asthma- moderate intermittent: no exacerbation, cont BD prn.   5. Grade B esophagitis: -PPI daily for 8 weeks per GI. 6. Extensive left sided diverticulosis one with clot and visable vessel: s/p cauterization and clipx 3.: monitor today per GI. Goals of care: Full code  Disposition:    [x] Case management referral; Discharge tomorrow if stable per GI.      Case discussed with:  [x]Patient  [x]Family  []Nursing  []Case Management  DVT Prophylaxis:  []Lovenox  []Hep SQ  [x]SCDs  []Coumadin   []On Heparin gtt    Objective:   VS:   Visit Vitals    /77 (BP 1 Location: Left arm, BP Patient Position: At rest)    Pulse 63    Temp 97.3 °F (36.3 °C)    Resp 18    Ht 5' 5\" (1.651 m)    Wt 91.2 kg (201 lb)    SpO2 99%    Breastfeeding No    BMI 33.45 kg/m2      Tmax/24hrs: Temp (24hrs), Av.2 °F (36.2 °C), Min:96.7 °F (35.9 °C), Max:97.5 °F (36.4 °C)  No intake or output data in the 24 hours ending 18 1321    General:  Awake, alert, NAD  Cardiovascular:  RRR  Pulmonary:  CTA  GI:  NT, normal BS  Extremities:  No edema or cyanosis      Labs:    Recent Results (from the past 24 hour(s))   HGB & HCT    Collection Time: 18  3:40 PM   Result Value Ref Range    HGB 8.5 (L) 12.0 - 16.0 g/dL HCT 26.4 (L) 35.0 - 45.0 %   HGB & HCT    Collection Time: 06/28/18  9:50 PM   Result Value Ref Range    HGB 11.4 (L) 12.0 - 16.0 g/dL    HCT 34.8 (L) 35.0 - 34.6 %   METABOLIC PANEL, BASIC    Collection Time: 06/29/18  3:00 AM   Result Value Ref Range    Sodium 146 (H) 136 - 145 mmol/L    Potassium 3.7 3.5 - 5.5 mmol/L    Chloride 114 (H) 100 - 108 mmol/L    CO2 27 21 - 32 mmol/L    Anion gap 5 3.0 - 18 mmol/L    Glucose 96 74 - 99 mg/dL    BUN 8 7.0 - 18 MG/DL    Creatinine 0.72 0.6 - 1.3 MG/DL    BUN/Creatinine ratio 11 (L) 12 - 20      GFR est AA >60 >60 ml/min/1.73m2    GFR est non-AA >60 >60 ml/min/1.73m2    Calcium 8.3 (L) 8.5 - 10.1 MG/DL   CBC WITH AUTOMATED DIFF    Collection Time: 06/29/18  3:00 AM   Result Value Ref Range    WBC 6.1 4.6 - 13.2 K/uL    RBC 3.65 (L) 4.20 - 5.30 M/uL    HGB 10.6 (L) 12.0 - 16.0 g/dL    HCT 32.7 (L) 35.0 - 45.0 %    MCV 89.6 74.0 - 97.0 FL    MCH 29.0 24.0 - 34.0 PG    MCHC 32.4 31.0 - 37.0 g/dL    RDW 12.7 11.6 - 14.5 %    PLATELET 233 260 - 367 K/uL    MPV 9.9 9.2 - 11.8 FL    NEUTROPHILS 45 40 - 73 %    LYMPHOCYTES 45 21 - 52 %    MONOCYTES 7 3 - 10 %    EOSINOPHILS 3 0 - 5 %    BASOPHILS 0 0 - 2 %    ABS. NEUTROPHILS 2.8 1.8 - 8.0 K/UL    ABS. LYMPHOCYTES 2.8 0.9 - 3.6 K/UL    ABS. MONOCYTES 0.4 0.05 - 1.2 K/UL    ABS. EOSINOPHILS 0.2 0.0 - 0.4 K/UL    ABS.  BASOPHILS 0.0 0.0 - 0.1 K/UL    DF AUTOMATED         Signed By: Manny Jackson PA-C     June 29, 2018 1:21 PM

## 2018-06-29 NOTE — ANESTHESIA PREPROCEDURE EVALUATION
Anesthetic History   No history of anesthetic complications            Review of Systems / Medical History  Patient summary reviewed and pertinent labs reviewed    Pulmonary  Within defined limits          Asthma        Neuro/Psych   Within defined limits           Cardiovascular            Dysrhythmias   CAD         GI/Hepatic/Renal  Within defined limits              Endo/Other  Within defined limits      Obesity     Other Findings   Comments: Documentation of current medication  Current medications obtained, documented and obtained? YES      Risk Factors for Postoperative nausea/vomiting:       History of postoperative nausea/vomiting? NO       Female? YES       Motion sickness? NO       Intended opioid administration for postoperative analgesia? YES      Smoking Abstinence:  Current Smoker? NO  Elective Surgery? YES  Seen preoperatively by anesthesiologist or proxy prior to day of surgery? YES  Pt abstained from smoking 24 hours prior to anesthesia?  YES    Preventive care/screening for High Blood Pressure:  Aged 18 years and older: YES  Screened for high blood pressure: YES  Patients with high blood pressure referred to primary care provider   for BP management: YES                     Physical Exam    Airway  Mallampati: II  TM Distance: 4 - 6 cm  Neck ROM: normal range of motion   Mouth opening: Normal     Cardiovascular    Rhythm: regular  Rate: normal         Dental  No notable dental hx       Pulmonary  Breath sounds clear to auscultation               Abdominal  GI exam deferred       Other Findings            Anesthetic Plan    ASA: 3  Anesthesia type: MAC          Induction: Intravenous  Anesthetic plan and risks discussed with: Patient

## 2018-06-29 NOTE — PROGRESS NOTES
Bedside and Verbal shift change report given to Yonis Chanel RN (oncoming nurse) by Say Jamil RN (offgoing nurse). Report included the following information SBAR, Procedure Summary, MAR, Recent Results and Cardiac Rhythm NSR.

## 2018-06-29 NOTE — PERIOP NOTES
TRANSFER - OUT REPORT:    Verbal report given to Yuly RN(name) on Lucas Whitney  being transferred to [address] [place] [address](unit) for routine post - op       Report consisted of patients Situation, Background, Assessment and   Recommendations(SBAR). Information from the following report(s) SBAR, Procedure Summary, Intake/Output and MAR was reviewed with the receiving nurse. Lines:   Peripheral IV 06/27/18 Left Wrist (Active)   Site Assessment Clean, dry, & intact 6/29/2018 10:30 AM   Phlebitis Assessment 0 6/29/2018 10:30 AM   Infiltration Assessment 0 6/29/2018 10:30 AM   Dressing Status Clean, dry, & intact 6/29/2018 10:30 AM   Dressing Type Transparent 6/29/2018 10:30 AM   Hub Color/Line Status Pink 6/29/2018 10:30 AM   Action Taken Open ports on tubing capped 6/29/2018 10:30 AM   Alcohol Cap Used Yes 6/29/2018 10:30 AM        Opportunity for questions and clarification was provided.       Patient transported with:   [employer]

## 2018-06-29 NOTE — PROGRESS NOTES
Pt c/o feeling lightheaded. This RN checked /79 HR 80 bpm. Pt has not had food since 06/27 d/t admitting dx. Did offer offer pt apple juice per clear liquid diet; pt is already receiving continuous IVF.

## 2018-06-29 NOTE — PROGRESS NOTES
Problem: Falls - Risk of  Goal: *Absence of Falls  Document Denis Fall Risk and appropriate interventions in the flowsheet.    Outcome: Progressing Towards Goal  Fall Risk Interventions:  Mobility Interventions: Assess mobility with egress test         Medication Interventions: Patient to call before getting OOB

## 2018-06-30 VITALS
HEART RATE: 87 BPM | OXYGEN SATURATION: 100 % | HEIGHT: 65 IN | DIASTOLIC BLOOD PRESSURE: 74 MMHG | BODY MASS INDEX: 33.49 KG/M2 | WEIGHT: 201 LBS | SYSTOLIC BLOOD PRESSURE: 123 MMHG | RESPIRATION RATE: 18 BRPM | TEMPERATURE: 97.4 F

## 2018-06-30 LAB
BASOPHILS # BLD: 0 K/UL (ref 0–0.1)
BASOPHILS NFR BLD: 0 % (ref 0–2)
DIFFERENTIAL METHOD BLD: ABNORMAL
EOSINOPHIL # BLD: 0.2 K/UL (ref 0–0.4)
EOSINOPHIL NFR BLD: 3 % (ref 0–5)
ERYTHROCYTE [DISTWIDTH] IN BLOOD BY AUTOMATED COUNT: 12.6 % (ref 11.6–14.5)
HCT VFR BLD AUTO: 30.3 % (ref 35–45)
HGB BLD-MCNC: 9.9 G/DL (ref 12–16)
LYMPHOCYTES # BLD: 2.4 K/UL (ref 0.9–3.6)
LYMPHOCYTES NFR BLD: 43 % (ref 21–52)
MCH RBC QN AUTO: 28.9 PG (ref 24–34)
MCHC RBC AUTO-ENTMCNC: 32.7 G/DL (ref 31–37)
MCV RBC AUTO: 88.6 FL (ref 74–97)
MONOCYTES # BLD: 0.5 K/UL (ref 0.05–1.2)
MONOCYTES NFR BLD: 8 % (ref 3–10)
NEUTS SEG # BLD: 2.6 K/UL (ref 1.8–8)
NEUTS SEG NFR BLD: 46 % (ref 40–73)
PLATELET # BLD AUTO: 243 K/UL (ref 135–420)
PMV BLD AUTO: 9.7 FL (ref 9.2–11.8)
RBC # BLD AUTO: 3.42 M/UL (ref 4.2–5.3)
WBC # BLD AUTO: 5.6 K/UL (ref 4.6–13.2)

## 2018-06-30 PROCEDURE — 85025 COMPLETE CBC W/AUTO DIFF WBC: CPT | Performed by: PHYSICIAN ASSISTANT

## 2018-06-30 PROCEDURE — 36415 COLL VENOUS BLD VENIPUNCTURE: CPT | Performed by: PHYSICIAN ASSISTANT

## 2018-06-30 PROCEDURE — 74011250636 HC RX REV CODE- 250/636: Performed by: INTERNAL MEDICINE

## 2018-06-30 PROCEDURE — 74011250637 HC RX REV CODE- 250/637: Performed by: PHYSICIAN ASSISTANT

## 2018-06-30 RX ORDER — PANTOPRAZOLE SODIUM 40 MG/1
40 TABLET, DELAYED RELEASE ORAL
Qty: 30 TAB | Refills: 0 | Status: SHIPPED | OUTPATIENT
Start: 2018-06-30 | End: 2018-11-02

## 2018-06-30 RX ORDER — OXYCODONE AND ACETAMINOPHEN 5; 325 MG/1; MG/1
1 TABLET ORAL
Qty: 12 TAB | Refills: 0 | Status: SHIPPED | OUTPATIENT
Start: 2018-06-30 | End: 2022-03-08 | Stop reason: ALTCHOICE

## 2018-06-30 RX ADMIN — PANTOPRAZOLE SODIUM 40 MG: 40 TABLET, DELAYED RELEASE ORAL at 10:01

## 2018-06-30 RX ADMIN — SODIUM CHLORIDE 100 ML/HR: 900 INJECTION, SOLUTION INTRAVENOUS at 03:37

## 2018-06-30 NOTE — ROUTINE PROCESS
Bedside and Verbal shift change report given to Mohit Viramontes RN (oncoming nurse) by Sonya Torres RN (offgoing nurse). Report included the following information SBAR, Kardex, MAR and Recent Results. SITUATION:  Code Status: Full Code  Reason for Admission: Lower GI bleed  DX  Hospital day: 3  Problem List:       Hospital Problems  Date Reviewed: 6/29/2018          Codes Class Noted POA    Lower GI bleed ICD-10-CM: K92.2  ICD-9-CM: 578.9  6/27/2018 Unknown        Rectovaginal fistula (Chronic) ICD-10-CM: N82.3  ICD-9-CM: 619.1  6/27/2018 No        * (Principal)Symptomatic anemia ICD-10-CM: D64.9  ICD-9-CM: 285.9  6/27/2018 Unknown              BACKGROUND:   Past Medical History:   Past Medical History:   Diagnosis Date    Asthma     Chest pain, unspecified     Possible Angina, GERD, chest wall pains, recurrent pains, possible atypical angina, happens at rest, abnormal nuc scan in past, discussed risk benefit options of cardiac cath/pci, she wants to think it over, add sl ntg    Coronary atherosclerosis of native coronary artery     Abnormal NUC scan, patient's symptoms are better, will start meds and monitor    Heart murmur     Hypercholesterolemia     Ill-defined condition     Patent Foramen Ovale- asymptomatic    Obesity, unspecified     Discussed diet    Other and unspecified hyperlipidemia     no longer on meds    Pain of right thumb     Trigger thumb of right hand       Patient taking anticoagulants no    Patient has a defibrillator: no    History of shots NO for example, flu, pneumonia, tetanus   Isolation History NO for example, MRSA, CDiff    ASSESSMENT:  Changes in Assessment Throughout Shift: None  Significant Changes in 24 hours (for example, RR/code, fall)  Patient has Central Line: no Reasons if yes:   Patient has Johnston Cath: no Reasons if yes:     Mobility Issues  PT  IV Patency  OR Checklist  Pending Tests    Last Vitals:  Vitals w/ MEWS Score (last day)     Date/Time MEWS Score Pulse Resp Temp BP Level of Consciousness SpO2    06/30/18 0341 1 79 17 97 °F (36.1 °C) 119/83 Alert 96 %    06/29/18 2356 1 87 16 97.9 °F (36.6 °C) 108/71 Alert 97 %    06/29/18 2023 2 84 16 97.8 °F (36.6 °C) 100/69 Alert 98 %    06/29/18 1551 1 72 15 98.3 °F (36.8 °C) 110/74 Alert 97 %    06/29/18 1434 -- 84 14 -- 109/62 -- 99 %    06/29/18 1424 -- 83 15 -- 102/59 -- 99 %    06/29/18 1417 -- 86 16 97.7 °F (36.5 °C) 94/55 Alert 99 %    06/29/18 1340 -- 84 11 -- 116/73 Alert 100 %    06/29/18 1142 1 63 18 97.3 °F (36.3 °C) 113/77 Alert 99 %    06/29/18 0833 1 76 20 97.5 °F (36.4 °C) 101/69 Alert 98 %    06/29/18 0344 1 72 20 97.3 °F (36.3 °C) 111/78 Alert 96 %    06/29/18 0000 1 73 18 97.2 °F (36.2 °C) 110/71 Alert 99 %            PAIN    Pain Assessment    Pain Intensity 1: 2 (06/30/18 0400)              Patient Stated Pain Goal: 0  Intervention effective: yes  Time of last intervention: No stated pain Reassessment Completed: yes   Other actions taken for pain:     Last 3 Weights:  Last 3 Recorded Weights in this Encounter    06/27/18 1811   Weight: 91.2 kg (201 lb)   Weight change:     INTAKE/OUPUT    Current Shift:      Last three shifts: 06/28 1901 - 06/30 0700  In: 800 [I.V.:800]  Out: -     RECOMMENDATIONS AND DISCHARGE PLANNING  Patient needs and requests: None    Pending tests/procedures: Labs     Discharge plan for patient: TBD    Discharge planning Needs or Barriers: None    Estimated Discharge Date: 6/30/18 Posted on Whiteboard in Patients Room: yes       \"HEALS\" SAFETY CHECK  A safety check occurred in the patient's room between off going nurse and oncoming nurse listed above. The safety check included the below items:    H  High Alert Medications Verify all high alert medication drips (heparin, PCA, etc.)  E  Equipment Suction is set up for ALL patients (with angela)  Red plugs utilized for all equipment (IV pumps, etc.)  WOWs wiped down at end of shift.   Room stocked with oxygen, suction, and other unit-specific supplies  A  Alarms Bed alarm is set for fall risk patients  Ensure chair alarm is in place and activated if patient is up in a chair  L  Lines Check IV for any infiltration  Johnston bag is empty if patient has a Johnston   Tubing and IV bags are labeled  S  Safety  Room is clean, patient is clean, and equipment is clean. Hallways are clear from equipment besides carts. Fall bracelet on for fall risk patients  Ensure room is clear and free of clutter  Suction is set up for ALL patients (with angela)  Hallways are clear from equipment besides carts.    Isolation precautions followed, supplies available outside room, sign posted    Arthur Gagnon RN

## 2018-06-30 NOTE — DISCHARGE INSTRUCTIONS
Upper GI Endoscopy: What to Expect at [address] [place] [address]  After you have an endoscopy, you will stay at the hospital or clinic for 1 to 2 hours. This will allow the medicine to wear off. You will be able to go home after your doctor or nurse checks to make sure you are not having any problems. You may have to stay overnight if you had treatment during the test. You may have a sore throat for a day or two after the test.  This care sheet gives you a general idea about what to expect after the test.  How can you care for yourself at home? Activity  · Rest as much as you need to after you go home. · You should be able to go back to your usual activities the day after the test.  Diet  · Follow your doctor's directions for eating after the test.  · Drink plenty of fluids (unless your doctor has told you not to). Medications  · If you have a sore throat the day after the test, use an over-the-counter spray to numb your throat. Follow-up care is a key part of your treatment and safety. Be sure to make and go to all appointments, and call your doctor if you are having problems. It's also a good idea to know your test results and keep a list of the medicines you take. When should you call for help? Call 911 anytime you think you may need emergency care. For example, call if:  ? · You passed out (loses consciousness). ? · You have trouble breathing. ? · You pass maroon or bloody stools. ?Call your doctor now or seek immediate medical care if:  ? · You have pain that does not get better after your take pain medicine. ? · You have new or worse belly pain. ? · You have blood in your stools. ? · You are sick to your stomach and cannot keep fluids down. ? · You have a fever. ? · You cannot pass stools or gas. ? Watch closely for changes in your health, and be sure to contact your doctor if:  ? · Your throat still hurts after a day or two. ? · You do not get better as expected.    Where can you learn more? Go to http://faye-jayda.info/. Enter [phone] in the search box to learn more about \"Upper GI Endoscopy: What to Expect at Home. \"  Current as of: May 12, 2017  Content Version: 11.4  © 0748-1777 [employer]. Care instructions adapted under license by [employer] (which disclaims liability or warranty for this information). If you have questions about a medical condition or this instruction, always ask your healthcare professional. [address] any warranty or liability for your use of this information. [employer] Activation    Thank you for requesting access to [employer]. Please follow the instructions below to securely access and download your online medical record. [employer] allows you to send messages to your doctor, view your test results, renew your prescriptions, schedule appointments, and more. How Do I Sign Up? 1. In your internet browser, go to [email or web address]  2. Click on the First Time User? Click Here link in the Sign In box. You will be redirect to the New Member Sign Up page. 3. Enter your [employer] Access Code exactly as it appears below. You will not need to use this code after youve completed the sign-up process. If you do not sign up before the expiration date, you must request a new code. V[employer]imm Access Code: [email or web address]  Expires: 2018  2:22 PM (This is the date your [employer] access code will )    4. Enter the last four digits of your Social Security Number (xxxx) and Date of Birth (mm/dd/yyyy) as indicated and click Submit. You will be taken to the next sign-up page. 5. Create a Vaximm ID. This will be your V[employer]imm login ID and cannot be changed, so think of one that is secure and easy to remember. 6. Create a V[employer]imm password. You can change your password at any time. 7. Enter your Password Reset Question and Answer. This can be used at a later time if you forget your password.    8. Enter your e-mail address. You will receive e-mail notification when new information is available in [address]. 9. Click Sign Up. You can now view and download portions of your medical record. 10. Click the Download Summary menu link to download a portable copy of your medical information. Additional Information    If you have questions, please visit the Frequently Asked Questions section of the [employer] website at [email or web address] Remember, [employer] is NOT to be used for urgent needs. For medical emergencies, dial 911. Patient armband removed and shredded  DISCHARGE SUMMARY from Nurse    PATIENT INSTRUCTIONS:    After general anesthesia or intravenous sedation, for 24 hours or while taking prescription Narcotics:  · Limit your activities  · Do not drive and operate hazardous machinery  · Do not make important personal or business decisions  · Do  not drink alcoholic beverages  · If you have not urinated within 8 hours after discharge, please contact your surgeon on call. Report the following to your surgeon:  · Excessive pain, swelling, redness or odor of or around the surgical area  · Temperature over 100.5  · Nausea and vomiting lasting longer than 4 hours or if unable to take medications  · Any signs of decreased circulation or nerve impairment to extremity: change in color, persistent  numbness, tingling, coldness or increase pain  · Any questions    What to do at Home:  Recommended activity: Activity as tolerated. If you experience any of the following symptoms rectal bleeding, dizziness. Shortness of breath, please follow up with primary care doctor. *  Please give a list of your current medications to your Primary Care Provider. *  Please update this list whenever your medications are discontinued, doses are      changed, or new medications (including over-the-counter products) are added.     *  Please carry medication information at all times in case of emergency situations. These are general instructions for a healthy lifestyle:    No smoking/ No tobacco products/ Avoid exposure to second hand smoke  Surgeon General's Warning:  Quitting smoking now greatly reduces serious risk to your health. Obesity, smoking, and sedentary lifestyle greatly increases your risk for illness    A healthy diet, regular physical exercise & weight monitoring are important for maintaining a healthy lifestyle    You may be retaining fluid if you have a history of heart failure or if you experience any of the following symptoms:  Weight gain of 3 pounds or more overnight or 5 pounds in a week, increased swelling in our hands or feet or shortness of breath while lying flat in bed. Please call your doctor as soon as you notice any of these symptoms; do not wait until your next office visit. Recognize signs and symptoms of STROKE:    F-face looks uneven    A-arms unable to move or move unevenly    S-speech slurred or non-existent    T-time-call 911 as soon as signs and symptoms begin-DO NOT go       Back to bed or wait to see if you get better-TIME IS BRAIN. Warning Signs of HEART ATTACK     Call 911 if you have these symptoms:   Chest discomfort. Most heart attacks involve discomfort in the center of the chest that lasts more than a few minutes, or that goes away and comes back. It can feel like uncomfortable pressure, squeezing, fullness, or pain.  Discomfort in other areas of the upper body. Symptoms can include pain or discomfort in one or both arms, the back, neck, jaw, or stomach.  Shortness of breath with or without chest discomfort.  Other signs may include breaking out in a cold sweat, nausea, or lightheadedness. Don't wait more than five minutes to call 911 - MINUTES MATTER! Fast action can save your life. Calling 911 is almost always the fastest way to get lifesaving treatment.  Emergency Medical Services staff can begin treatment when they arrive -- up to an hour sooner than if someone gets to the hospital by car. The discharge information has been reviewed with the patient. The patient verbalized understanding. Discharge medications reviewed with the patient and appropriate educational materials and side effects teaching were provided. ___________________________________________________________________________________________________________________________________     Colonoscopy: What to Expect at [address] [place] [address]  After you have a colonoscopy, you will stay at the clinic for 1 to 2 hours until the medicines wear off. Then you can go home. But you will need to arrange for a ride. Your doctor will tell you when you can eat and do your other usual activities. Your doctor will talk to you about when you will need your next colonoscopy. Your doctor can help you decide how often you need to be checked. This will depend on the results of your test and your risk for colorectal cancer. After the test, you may be bloated or have gas pains. You may need to pass gas. If a biopsy was done or a polyp was removed, you may have streaks of blood in your stool (feces) for a few days. This care sheet gives you a general idea about how long it will take for you to recover. But each person recovers at a different pace. Follow the steps below to get better as quickly as possible. How can you care for yourself at home? Activity  ? · Rest when you feel tired. ? · You can do your normal activities when it feels okay to do so. Diet  ? · Follow your doctor's directions for eating. ? · Unless your doctor has told you not to, drink plenty of fluids. This helps to replace the fluids that were lost during the colon prep. ? · Do not drink alcohol. Medicines  ? · Your doctor will tell you if and when you can restart your medicines. He or she will also give you instructions about taking any new medicines.    ? · If you take blood thinners, such as warfarin (Coumadin), clopidogrel (Plavix), or aspirin, be sure to talk to your doctor. He or she will tell you if and when to start taking those medicines again. Make sure that you understand exactly what your doctor wants you to do. ? · If polyps were removed or a biopsy was done during the test, your doctor may tell you not to take aspirin or other anti-inflammatory medicines for a few days. These include ibuprofen (Advil, Motrin) and naproxen (Aleve). Other instructions  ? · For your safety, do not drive or operate machinery until the medicine wears off and you can think clearly. Your doctor may tell you not to drive or operate machinery until the day after your test.   ? · Do not sign legal documents or make major decisions until the medicine wears off and you can think clearly. The anesthesia can make it hard for you to fully understand what you are agreeing to. Follow-up care is a key part of your treatment and safety. Be sure to make and go to all appointments, and call your doctor if you are having problems. It's also a good idea to know your test results and keep a list of the medicines you take. When should you call for help? Call 911 anytime you think you may need emergency care. For example, call if:  ? · You passed out (lost consciousness). ? · You pass maroon or bloody stools. ? · You have trouble breathing. ?Call your doctor now or seek immediate medical care if:  ? · You have pain that does not get better after you take pain medicine. ? · You are sick to your stomach or cannot drink fluids. ? · You have new or worse belly pain. ? · You have blood in your stools. ? · You have a fever. ? · You cannot pass stools or gas. ? Watch closely for changes in your health, and be sure to contact your doctor if you have any problems. Where can you learn more? Go to http://faye-jayda.info/. Enter E264 in the search box to learn more about \"Colonoscopy: What to Expect at Home. \"  Current as of:  May 12, 2017  Content Version: 11.4  © 6922-4220 [employer]. Care instructions adapted under license by [employer] (which disclaims liability or warranty for this information). If you have questions about a medical condition or this instruction, always ask your healthcare professional. [address] any warranty or liability for your use of this information. Gastrointestinal Bleeding: Care Instructions  Your Care Instructions    The digestive or gastrointestinal tract goes from the mouth to the anus. It is often called the GI tract. Bleeding can happen anywhere in the GI tract. It may be caused by an ulcer, an infection, or cancer. It may also be caused by medicines such as aspirin or ibuprofen. Light bleeding may not cause any symptoms at first. But if you continue to bleed for a while, you may feel very weak or tired. Sudden, heavy bleeding means you need to see a doctor right away. This kind of bleeding can be very dangerous. But it can usually be cured or controlled. The doctor may do some tests to find the cause of your bleeding. Follow-up care is a key part of your treatment and safety. Be sure to make and go to all appointments, and call your doctor if you are having problems. It's also a good idea to know your test results and keep a list of the medicines you take. How can you care for yourself at home? · Be safe with medicines. Take your medicines exactly as prescribed. Call your doctor if you think you are having a problem with your medicine. You will get more details on the specific medicines your doctor prescribes. · Do not take aspirin or other anti-inflammatory medicines, such as naproxen (Aleve) or ibuprofen (Advil, Motrin), without talking to your doctor first. Ask your doctor if it is okay to use acetaminophen (Tylenol). · Do not drink alcohol. · The bleeding may make you lose iron. So it's important to eat foods that have a lot of iron. These include red meat, shellfish, poultry, and eggs. They also include beans, raisins, whole-grain breads, and leafy green vegetables. If you want help planning meals, you can make an appointment with a dietitian. When should you call for help? Call 911 anytime you think you may need emergency care. For example, call if:  ? · You have sudden, severe belly pain. ? · You vomit blood or what looks like coffee grounds. ? · You passed out (lost consciousness). ? · Your stools are maroon or very bloody. ?Call your doctor now or seek immediate medical care if:  ? · You are dizzy or lightheaded, or you feel like you may faint. ? · Your stools are black and look like tar, or they have streaks of blood. ? · You have belly pain. ? · You vomit or have nausea. ? · You have trouble swallowing, or it hurts when you swallow. ? Watch closely for changes in your health, and be sure to contact your doctor if:  ? · You do not get better as expected. Where can you learn more? Go to http://fayeHuman Performance Integrated Systemsjayda.info/. Enter U502 in the search box to learn more about \"Gastrointestinal Bleeding: Care Instructions. \"  Current as of: March 20, 2017  Content Version: 11.4  © 0251-3838 [employer]. Care instructions adapted under license by [employer] (which disclaims liability or warranty for this information). If you have questions about a medical condition or this instruction, always ask your healthcare professional. [place] [address] any warranty or liability for your use of this information. Follow up with you PCP, and GI specialist. Follow up with your Cardiologist to determine need to restart Aspirin. Anemia: Care Instructions  Your Care Instructions    Anemia is a low level of red blood cells, which carry oxygen throughout your body. Many things can cause anemia. Lack of iron is one of the most common causes.  Your body needs iron to make hemoglobin, a substance in red blood cells that carries oxygen from the lungs to your body's cells. Without enough iron, the body produces fewer and smaller red blood cells. As a result, your body's cells do not get enough oxygen, and you feel tired and weak. And you may have trouble concentrating. Bleeding is the most common cause of a lack of iron. You may have heavy menstrual bleeding or bleeding caused by conditions such as ulcers, hemorrhoids, or cancer. Regular use of aspirin or other anti-inflammatory medicines (such as ibuprofen) also can cause bleeding in some people. A lack of iron in your diet also can cause anemia, especially at times when the body needs more iron, such as during pregnancy, infancy, and the teen years. Your doctor may have prescribed iron pills. It may take several months of treatment for your iron levels to return to normal. Your doctor also may suggest that you eat foods that are rich in iron, such as meat and beans. There are many other causes of anemia. It is not always due to a lack of iron. Finding the specific cause of your anemia will help your doctor find the right treatment for you. Follow-up care is a key part of your treatment and safety. Be sure to make and go to all appointments, and call your doctor if you are having problems. It's also a good idea to know your test results and keep a list of the medicines you take. How can you care for yourself at home? · Take your medicines exactly as prescribed. Call your doctor if you think you are having a problem with your medicine. · If your doctor recommends iron pills, take them as directed:  ¨ Try to take the pills on an empty stomach about 1 hour before or 2 hours after meals. But you may need to take iron with food to avoid an upset stomach. ¨ Do not take antacids or drink milk or caffeine drinks (such as coffee, tea, or cola) at the same time or within 2 hours of the time that you take your iron.  They can make it hard for your body to absorb the iron. ¨ Vitamin C (from food or supplements) helps your body absorb iron. Try taking iron pills with a glass of orange juice or some other food that is high in vitamin C, such as citrus fruits. ¨ Iron pills may cause stomach problems, such as heartburn, nausea, diarrhea, constipation, and cramps. Be sure to drink plenty of fluids, and include fruits, vegetables, and fiber in your diet each day. Iron pills often make your bowel movements dark or green. ¨ If you forget to take an iron pill, do not take a double dose of iron the next time you take a pill. ¨ Keep iron pills out of the reach of small children. An overdose of iron can be very dangerous. · Follow your doctor's advice about eating iron-rich foods. These include red meat, shellfish, poultry, eggs, beans, raisins, whole-grain bread, and leafy green vegetables. · Steam vegetables to help them keep their iron content. When should you call for help? Call 911 anytime you think you may need emergency care. For example, call if:  ? · You have symptoms of a heart attack. These may include:  ¨ Chest pain or pressure, or a strange feeling in the chest.  ¨ Sweating. ¨ Shortness of breath. ¨ Nausea or vomiting. ¨ Pain, pressure, or a strange feeling in the back, neck, jaw, or upper belly or in one or both shoulders or arms. ¨ Lightheadedness or sudden weakness. ¨ A fast or irregular heartbeat. After you call 911, the  may tell you to chew 1 adult-strength or 2 to 4 low-dose aspirin. Wait for an ambulance. Do not try to drive yourself. ? · You passed out (lost consciousness). ?Call your doctor now or seek immediate medical care if:  ? · You have new or increased shortness of breath. ? · You are dizzy or lightheaded, or you feel like you may faint. ? · Your fatigue and weakness continue or get worse. ? · You have any abnormal bleeding, such as:  ¨ Nosebleeds.   ¨ Vaginal bleeding that is different (heavier, more frequent, at a different time of the month) than what you are used to. ¨ Bloody or black stools, or rectal bleeding. ¨ Bloody or pink urine. ? Watch closely for changes in your health, and be sure to contact your doctor if:  ? · You do not get better as expected. Where can you learn more? Go to http://faye-jayda.info/. Enter R301 in the search box to learn more about \"Anemia: Care Instructions. \"  Current as of: October 13, 2016  Content Version: 11.4  © 8727-7004 [employer]. Care instructions adapted under license by [employer] (which disclaims liability or warranty for this information). If you have questions about a medical condition or this instruction, always ask your healthcare professional. [address] any warranty or liability for your use of this information.

## 2018-06-30 NOTE — ANESTHESIA POSTPROCEDURE EVALUATION
Post-Anesthesia Evaluation and Assessment    Patient: Alessandro Auguste MRN: [id number]  SSN: [id number]    YOB: 1960  Age: 56 y.o. Sex: female       Cardiovascular Function/Vital Signs  Visit Vitals    /83 (BP 1 Location: Left arm, BP Patient Position: At rest)    Pulse 79    Temp 36.1 °C (97 °F)    Resp 17    Ht 5' 5\" (1.651 m)    Wt 91.2 kg (201 lb)    SpO2 96%    Breastfeeding No    BMI 33.45 kg/m2       Patient is status post MAC anesthesia for Procedure(s):  COLONOSCOPY with clips, gold probe  ENDOSCOPY with bx's. Nausea/Vomiting: None    Postoperative hydration reviewed and adequate. Pain:  Pain Scale 1: Numeric (0 - 10) (06/30/18 0400)  Pain Intensity 1: 2 (06/30/18 0400)   Managed    Neurological Status: At baseline    Mental Status and Level of Consciousness: Arousable    Pulmonary Status:   O2 Device: Room air (06/29/18 1424)   Adequate oxygenation and airway patent    Complications related to anesthesia: None    Post-anesthesia assessment completed.  No concerns    Signed By: Kerry Tang MD     June 30, 2018

## 2018-06-30 NOTE — PROGRESS NOTES
Problem: Falls - Risk of  Goal: *Absence of Falls  Document Denis Fall Risk and appropriate interventions in the flowsheet.    Outcome: Progressing Towards Goal  Fall Risk Interventions:  Mobility Interventions: Patient to call before getting OOB         Medication Interventions: Patient to call before getting OOB

## 2018-06-30 NOTE — PROGRESS NOTES
Reason for Admission:   Lower GI bleed                   RRAT Score:        5             Plan for utilizing home health:     No                     Likelihood of Readmission:  Green/Low                         Transition of Care Plan:     Pt's plan is to return home. Pt's daughter will assist her with transportation.

## 2018-06-30 NOTE — DISCHARGE SUMMARY
[facility]  [facility]       Patient: Keven Bowman Age: 56 y.o. : 1960 MR#: [id number] [id number]: [id number]  PCP on record: Whitney Guaman MD  Admit date: 2018  Discharge date: 2018    Disposition:    []Home   []Home with Home Health   []SNF/NH   []Rehab   [x]Home with family   []Alternate Facility:____________________    Discharge Diagnoses:                             1. Lower GI bleed with + guaiac test: s/p EGD and Colonoscopy on 18   2. Symptomatic anemia 2ry to Acute GI Bleed  3. H/o Rectovaginal fistula s/p Subcutaneous fistulotomy posterior vaginal wall on 17  4. Asthma- moderate intermittent  5. Grade B esophagitis  6. Extensive left sided diverticulosis one with clot and visable vessel: s/p cauterization and clipx 3 on 18       Discharge Medications:     Current Discharge Medication List      START taking these medications    Details   oxyCODONE-acetaminophen (PERCOCET) 5-325 mg per tablet Take 1 Tab by mouth every six (6) hours as needed. Max Daily Amount: 4 Tabs. Qty: 12 Tab, Refills: 0    Associated Diagnoses: Headache disorder      pantoprazole (PROTONIX) 40 mg tablet Take 1 Tab by mouth Daily (before breakfast). Qty: 30 Tab, Refills: 0      psyllium husk-aspartame (METAMUCIL FIBER) 3.4 gram pwpk packet Take 1 Packet by mouth daily (with dinner). Qty: 30 Packet, Refills: 0         CONTINUE these medications which have NOT CHANGED    Details   mometasone (ASMANEX TWISTHALER) 220 mcg (120 doses) aepb inhaler Take 1 Puff by inhalation daily. loratadine (CLARITIN) 10 mg tablet Take 10 mg by mouth.      multivitamin (ONE A DAY) tablet Take 1 Tab by mouth daily. Cholecalciferol, Vitamin D3, 1,000 unit cap Take  by mouth.       fluticasone (FLONASE) 50 mcg/actuation nasal spray 2 squirts each nostril at bedtime  Qty: 1 Bottle, Refills: 6      albuterol (PROVENTIL VENTOLIN) 2.5 mg /3 mL (0.083 %) nebulizer solution 3 mL by Nebulization route every six (6) hours as needed for Wheezing. Qty: 24 Each, Refills: 3      albuterol (PROVENTIL HFA, VENTOLIN HFA, PROAIR HFA) 90 mcg/actuation inhaler Take 2 Puffs by inhalation every six (6) hours as needed for Wheezing. Qty: 1 Inhaler, Refills: 6      nitroglycerin (NITROSTAT) 0.4 mg SL tablet 1 Tab by SubLINGual route every five (5) minutes as needed for Chest Pain. Qty: 25 Tab, Refills: 1    Associated Diagnoses: Chest pain, unspecified type      magnesium 250 mg tab Take 250 mg by mouth.      cyanocobalamin (VITAMIN B-12) 1,000 mcg tablet Take 1,000 mcg by mouth daily. OTHER chromax plus take one cap every day      OMEGA-3 FATTY ACIDS/FISH OIL (FISH OIL EXTRA STRENGTH PO) Take  by mouth daily. zinc 50 mg Tab Take  by mouth daily. STOP taking these medications       aspirin delayed-release 81 mg tablet Comments:   Reason for Stopping:               Consults:  Gastroenterology  -   Procedures: EGD and Colonoscopy  -     Significant Diagnostic Studies:   Endoscopic Gastroduodenoscopy and Colonoscopy Procedure  Impression:    EGD: -Grade B esophagitis ? [place]   Colonoscopy: extensive left sided diverticulosis one with clot and visable vessel that was cauterized and clipped x 3      Recommendations:  EGD: -PPI daily for 8 weeks - elevate the head of the bed 3 inches - check bxs   Colonoscopy: -metamucil 1 tablespoon daily      Hospital Course by Problem   1. Lower GI bleed with + guaiac test: Admitted with GI bleed, pt was recently on steroids, and also ASA for PFO. GI consulted, s/p EGD and Colonoscopy on 6/29/18. Procedures showed diverticular bleed and esophagitis. managed with IV PPI which inpt. PPI  Prescription given at discharge. H/H was monitored and stable prior to discharge. Pt was advised to follow up with her PCP and Cardiology on when to aspirin. Also follow with GI.   2. Symptomatic anemia 2ry to Acute GI Bleed: monitored H/H.  Did not require transfusion while inpt. 3. H/o Rectovaginal fistula s/p Subcutaneous fistulotomy posterior vaginal wall on 17. TV US was ordered but still in progress. Asymptomatic per pt. 4. Asthma- moderate intermittent: no exacerbation, managed with BD prn.   5. Grade B esophagitis noted on EGD: -PPI daily for 8 weeks per GI. 6. Extensive left sided diverticulosis one with clot and visable vessel noted on Coloscopy: s/p cauterization and clipx 3. H/H stable and Had a BM with no blood noted prior to discharge.        Today's examination of the patient revealed:     Subjective:   Pt states she is doing well. I had a BM with no blood noted. Denies any nausea, vomiting, diarrhea, or constipation.    Objective:   VS:   Visit Vitals    /77 (BP 1 Location: Right arm, BP Patient Position: At rest)    Pulse 77    Temp 97.1 °F (36.2 °C)    Resp 18    Ht 5' 5\" (1.651 m)    Wt 91.2 kg (201 lb)    SpO2 94%    Breastfeeding No    BMI 33.45 kg/m2      Tmax/24hrs: Temp (24hrs), Av.6 °F (36.4 °C), Min:97 °F (36.1 °C), Max:98.3 °F (36.8 °C)     Input/Output:   Intake/Output Summary (Last 24 hours) at 18 0929  Last data filed at 18 1411   Gross per 24 hour   Intake              800 ml   Output                0 ml   Net              800 ml       General:  Alert, NAD  Cardiovascular:  RRR  Pulmonary:  LSC throughout; respiratory effort WNL  GI:  +BS in all four quadrants, soft, non-tender  Extremities:  No edema; 2+ dorsalis pedis pulses bilaterally  Additional:      Labs:    Recent Results (from the past 24 hour(s))   HGB & HCT    Collection Time: 18  5:30 PM   Result Value Ref Range    HGB 10.5 (L) 12.0 - 16.0 g/dL    HCT 33.0 (L) 35.0 - 45.0 %   CBC WITH AUTOMATED DIFF    Collection Time: 18  4:40 AM   Result Value Ref Range    WBC 5.6 4.6 - 13.2 K/uL    RBC 3.42 (L) 4.20 - 5.30 M/uL    HGB 9.9 (L) 12.0 - 16.0 g/dL    HCT 30.3 (L) 35.0 - 45.0 %    MCV 88.6 74.0 - 97.0 FL    MCH 28.9 24.0 - 34.0 PG    MCHC 32.7 31.0 - 37.0 g/dL    RDW 12.6 11.6 - 14.5 %    PLATELET 546 605 - 723 K/uL    MPV 9.7 9.2 - 11.8 FL    NEUTROPHILS 46 40 - 73 %    LYMPHOCYTES 43 21 - 52 %    MONOCYTES 8 3 - 10 %    EOSINOPHILS 3 0 - 5 %    BASOPHILS 0 0 - 2 %    ABS. NEUTROPHILS 2.6 1.8 - 8.0 K/UL    ABS. LYMPHOCYTES 2.4 0.9 - 3.6 K/UL    ABS. MONOCYTES 0.5 0.05 - 1.2 K/UL    ABS. EOSINOPHILS 0.2 0.0 - 0.4 K/UL    ABS. BASOPHILS 0.0 0.0 - 0.1 K/UL    DF AUTOMATED       Additional Data Reviewed:     Condition:   Follow-up Appointments:   1. Your PCP: Pooja Kidd MD, within 7-10days  2.  Your Gastroenterologist: Terrill Paget, MD within 2 weeks      >30 minutes spent coordinating this discharge (review instructions/follow-up, prescriptions, preparing report for sign off)    Signed:  Huang Ross PA-C  6/30/2018  9:29 AM

## 2018-07-02 ENCOUNTER — HOSPITAL ENCOUNTER (OUTPATIENT)
Dept: PHYSICAL THERAPY | Age: 58
Discharge: HOME | End: 2018-07-02
Payer: COMMERCIAL

## 2018-07-05 ENCOUNTER — APPOINTMENT (OUTPATIENT)
Dept: PHYSICAL THERAPY | Age: 58
End: 2018-07-05
Payer: COMMERCIAL

## 2018-07-09 ENCOUNTER — HOSPITAL ENCOUNTER (OUTPATIENT)
Dept: PHYSICAL THERAPY | Age: 58
End: 2018-07-09
Payer: COMMERCIAL

## 2018-07-11 ENCOUNTER — HOSPITAL ENCOUNTER (OUTPATIENT)
Dept: PHYSICAL THERAPY | Age: 58
Discharge: HOME | End: 2018-07-11
Payer: COMMERCIAL

## 2018-07-11 PROCEDURE — 97110 THERAPEUTIC EXERCISES: CPT

## 2018-07-11 NOTE — PROGRESS NOTES
PT DAILY TREATMENT NOTE     Patient Name: Nandini Vanessa  Date:2018  : 1960  [x]  Patient  Verified  Payor: Sherrudi Alex / Plan: [address] [place] [employer] / Product Type: HMO /    In time:1014  Out time:1049  Total Treatment Time (min): 35  Visit #: 2 of 12    Treatment Area: Low back pain [M54.5]    SUBJECTIVE  Pain Level (0-10 scale): 5/10 neck, 7/10 low back  Any medication changes, allergies to medications, adverse drug reactions, diagnosis change, or new procedure performed?: [x] No    [] Yes (see summary sheet for update)  Subjective functional status/changes:   [] No changes reported  Pt stated that she has some pain today, but is doing ok    OBJECTIVE    Modality rationale: decrease pain and increase tissue extensibility to improve the patients ability to increase ease with ADLs   Min Type Additional Details    [] Estim:  []Unatt       []IFC  []Premod                        []Other:  []w/ice   []w/heat  Position:  Location:    [] Estim: []Att    []TENS instruct  []NMES                    []Other:  []w/US   []w/ice   []w/heat  Position:  Location:    []  Traction: [] Cervical       []Lumbar                       [] Prone          []Supine                       []Intermittent   []Continuous Lbs:  [] before manual  [] after manual    []  Ultrasound: []Continuous   [] Pulsed                           []1MHz   []3MHz W/cm2:  Location:    []  Iontophoresis with dexamethasone         Location: [] Take home patch   [] In clinic   10 []  Ice     [x]  heat  []  Ice massage  []  Laser   []  Anodyne Position:seated  Location:neck and back    []  Laser with stim  []  Other:  Position:  Location:    []  Vasopneumatic Device Pressure:       [] lo [] med [] hi   Temperature: [] lo [] med [] hi   [x] Skin assessment post-treatment:  [x]intact []redness- no adverse reaction    []redness - adverse reaction:     25 min Therapeutic Exercise:  [x] See flow sheet :   Rationale: increase ROM and increase strength to improve the patients ability to increase ease with aDLs    With   [x] TE   [] TA   [] neuro   [] other: Patient Education: [x] Review HEP    [] Progressed/Changed HEP based on:   [] positioning   [] body mechanics   [] transfers   [] heat/ice application    [] other:      Other Objective/Functional Measures:   Had no complaint of increased pain with exercises  Had no difficulty with exercises  Did need cueing to not hold breath with exercises     Pain Level (0-10 scale) post treatment: 5/10 neck, 6/10 low back    ASSESSMENT/Changes in Function:   Initiated therex today per flow sheet. Pt reported compliance with HEP. Pt put forth good effort with all exercises    Patient will continue to benefit from skilled PT services to address functional mobility deficits, address ROM deficits and address strength deficits to attain remaining goals. [x]  See Plan of Care  []  See progress note/recertification  []  See Discharge Summary         Progress towards goals / Updated goals:  Short Term Goals: To be accomplished in 1 weeks:                         1. Pt will be compliant with a HEP to improve function,. Goal met. 7/11/18   Long Term Goals: To be accomplished in 6 weeks:                         1. Pt will increase FOTO score by  23  pts to improve function. 2. Pt will report pain <4/10 to ease with ADL's and return to gardening activities. 3. Pt will increase CS rotation to >55 deg bilaterally to return to driving activities. 4. Pt will ambulate >20 mins w/o pain to return to community ambulation and regular ex activities.                           5. Pt will report >75% reduction in HA's/Dizziness to ease with ADL's    PLAN  []  Upgrade activities as tolerated     [x]  Continue plan of care  []  Update interventions per flow sheet       []  Discharge due to:_  []  Other:_      Jamal Sainz, ISABEL 7/11/2018  10:17 AM    Future Appointments  Date Time Provider Wen Bird   7/16/2018 9:30 AM Renetta Quinteros PTA [facility]   7/18/2018 9:30 AM Ruslan Tanner PT [id number]   7/23/2018 10:00 AM Renetta Quinteros PTA [facility]   7/25/2018 10:00 AM Renetta Quinteros PTA [facility]   7/30/2018 10:30 AM Roque Sánchez [facility]   8/1/2018 10:30 AM Renetta Quinteros PTA [facility]

## 2018-07-16 ENCOUNTER — HOSPITAL ENCOUNTER (OUTPATIENT)
Dept: PHYSICAL THERAPY | Age: 58
Discharge: HOME | End: 2018-07-16
Payer: COMMERCIAL

## 2018-07-16 PROCEDURE — 97110 THERAPEUTIC EXERCISES: CPT

## 2018-07-16 NOTE — PROGRESS NOTES
PT DAILY TREATMENT NOTE     Patient Name: Will Etienne  Date:2018  : 1960  [x]  Patient  Verified  Payor: Mesha Shafer / Plan: [employer] / Product Type: HMO /    In time:933  Out time:1012  Total Treatment Time (min): 39  Visit #: 3 of 12    Treatment Area: Low back pain [M54.5]    SUBJECTIVE  Pain Level (0-10 scale): 6/10 neck and 7/10 low back  Any medication changes, allergies to medications, adverse drug reactions, diagnosis change, or new procedure performed?: [x] No    [] Yes (see summary sheet for update)  Subjective functional status/changes:   [] No changes reported  Pt stated that she has a little more pain in her neck today    OBJECTIVE    Modality rationale: decrease pain and increase tissue extensibility to improve the patients ability to increase ease with ADLs   Min Type Additional Details    [] Estim:  []Unatt       []IFC  []Premod                        []Other:  []w/ice   []w/heat  Position:  Location:    [] Estim: []Att    []TENS instruct  []NMES                    []Other:  []w/US   []w/ice   []w/heat  Position:  Location:    []  Traction: [] Cervical       []Lumbar                       [] Prone          []Supine                       []Intermittent   []Continuous Lbs:  [] before manual  [] after manual    []  Ultrasound: []Continuous   [] Pulsed                           []1MHz   []3MHz W/cm2:  Location:    []  Iontophoresis with dexamethasone         Location: [] Take home patch   [] In clinic   10 []  Ice     [x]  heat  []  Ice massage  []  Laser   []  Anodyne Position: seated  Location:neck and back    []  Laser with stim  []  Other:  Position:  Location:    []  Vasopneumatic Device Pressure:       [] lo [] med [] hi   Temperature: [] lo [] med [] hi   [x] Skin assessment post-treatment:  [x]intact []redness- no adverse reaction    []redness - adverse reaction:     26 min Therapeutic Exercise:  [x] See flow sheet :   Rationale: increase ROM and increase strength to improve the patients ability to increase ease with ADLs    3 min Manual Therapy:  Pelvic alignment check, MFR to correct left posterior sacral rotation   Rationale: decrease pain, increase ROM and increase tissue extensibility to increase ease with ADLs    With   [x] TE   [] TA   [] neuro   [] other: Patient Education: [x] Review HEP    [] Progressed/Changed HEP based on:   [] positioning   [] body mechanics   [] transfers   [] heat/ice application    [] other:      Other Objective/Functional Measures:   Had difficulty getting into sidelying bilaterally  Had no complaint of increased pain with exercises  Had slight left sacral rotation which was corrected with MFR     Pain Level (0-10 scale) post treatment: 5/10 neck, 6/10 low back    ASSESSMENT/Changes in Function:   Pt is making limited progress toward goals. Pt cont with moderate pain in neck and low back. Pt had no pelvic obliquity, but did have slight sacral rotation. Cont with decreased cervical range of motion. Cont with decreased standing and walking tolerance    Patient will continue to benefit from skilled PT services to modify and progress therapeutic interventions, address functional mobility deficits, address ROM deficits and address strength deficits to attain remaining goals. [x]  See Plan of Care  []  See progress note/recertification  []  See Discharge Summary         Progress towards goals / Updated goals:  Short Term Goals: To be accomplished in 1 weeks:                         1. Pt will be compliant with a HEP to improve function,. Goal met. 7/11/18   Long Term Goals: To be accomplished in 6 weeks:                         1. Pt will increase FOTO score by  23  pts to improve function.                          2. Pt will report pain <4/10 to ease with ADL's and return to gardening activities.                        6. Pt will increase CS rotation to >55 deg bilaterally to return to driving activities.                           0. Pt will ambulate >20 mins w/o pain to return to community ambulation and regular ex activities.                        2.  Pt will report >75% reduction in HA's/Dizziness to ease with ADL's    PLAN  []  Upgrade activities as tolerated     [x]  Continue plan of care  []  Update interventions per flow sheet       []  Discharge due to:_  []  Other:_      Saloni Kelly, ISABEL 7/16/2018  9:44 AM    Future Appointments  Date Time Provider Wen Bird   7/18/2018 9:30 AM Papa Mckeon, PT [id number]   7/23/2018 10:00 AM Saloni Kelly PTA [id number]   7/25/2018 10:00 AM Saloni Kelly PTA [id number]   7/30/2018 10:30 AM Bright Waller [facility]   8/1/2018 10:30 AM Saloni Kelly PTA [id number]

## 2018-07-18 ENCOUNTER — HOSPITAL ENCOUNTER (OUTPATIENT)
Dept: PHYSICAL THERAPY | Age: 58
Discharge: HOME | End: 2018-07-18
Payer: COMMERCIAL

## 2018-07-18 PROCEDURE — 97110 THERAPEUTIC EXERCISES: CPT

## 2018-07-18 NOTE — PROGRESS NOTES
PT DAILY TREATMENT NOTE - [facility]     Patient Name: Micheal Gray-Camas  Date:2018  : 1960  [x]  Patient  Verified  Payor: Eliezer Loco / Plan: [employer] / Product Type: HMO /    In time:937  Out time:1023  Total Treatment Time (min): 46    Visit #: 4 of 10-12    Treatment Area: Low back pain [M54.5]    SUBJECTIVE  Pain Level (0-10 scale): CS=3, LS=7.5  Any medication changes, allergies to medications, adverse drug reactions, diagnosis change, or new procedure performed?: [x] No    [] Yes (see summary sheet for update)  Subjective functional status/changes:   [] No changes reported  Back was a /10 this morning. Back pain seems to be constant.      OBJECTIVE    Modality rationale: decrease pain to improve the patients ability to ease with ADL's   Min Type Additional Details    [] Estim:  []Unatt       []IFC  []Premod                        []Other:  []w/ice   []w/heat  Position:  Location:    [] Estim: []Att    []TENS instruct  []NMES                    []Other:  []w/US   []w/ice   []w/heat  Position:  Location:    []  Traction: [] Cervical       []Lumbar                       [] Prone          []Supine                       []Intermittent   []Continuous Lbs:  [] before manual  [] after manual    []  Ultrasound: []Continuous   [] Pulsed                           []1MHz   []3MHz W/cm2:  Location:    []  Iontophoresis with dexamethasone         Location: [] Take home patch   [] In clinic   10 []  Ice     [x]  heat  []  Ice massage  []  Laser   []  Anodyne Position: seated  Location:LS    []  Laser with stim  []  Other:  Position:  Location:    []  Vasopneumatic Device Pressure:       [] lo [] med [] hi   Temperature: [] lo [] med [] hi   [] Skin assessment post-treatment:  []intact []redness- no adverse reaction    []redness - adverse reaction:     26 min Therapeutic Exercise:  [] See flow sheet :   Rationale: increase ROM and increase strength to improve the patients ability to ease with ADL's    10 NC min Manual Therapy:  Left upslip, left PostIliac, Left on left Sacral rotation, L3-L5 left rotation    Rationale: decrease pain to ease with Ambulation. With   [] TE   [] TA   [] neuro   [] other: Patient Education: [x] Review HEP    [] Progressed/Changed HEP based on:   [] positioning   [] body mechanics   [] transfers   [] heat/ice application    [] other:      Other Objective/Functional Measures: See manual , by ATC=NC  Tolerated MET with decreased pain levels. Pain Level (0-10 scale) post treatment: Right=6/10 left=0/10    ASSESSMENT/Changes in Function: Slow progress. MET done due to lack of progress so far in Therapy. Pt reports she is not getting much relief to her LS. Pain is still high and limiting. Patient will continue to benefit from skilled PT services to modify and progress therapeutic interventions, address functional mobility deficits, address ROM deficits, address strength deficits, analyze and address soft tissue restrictions, analyze and cue movement patterns, assess and modify postural abnormalities and address imbalance/dizziness to attain remaining goals. []  See Plan of Care  []  See progress note/recertification  []  See Discharge Summary         Progress towards goals / Updated goals:  Short Term Goals: To be accomplished in 1 weeks:                         1. Pt will be compliant with a HEP to improve function,. Goal met. 7/11/18   Long Term Goals: To be accomplished in 6 weeks:                         1. Pt will increase FOTO score by  23  pts to improve function.                          2. Pt will report pain <4/10 to ease with ADL's and return to gardening activities.                        6. Pt will increase CS rotation to >55 deg bilaterally to return to driving activities.                        0. Pt will ambulate >20 mins w/o pain to return to community ambulation and regular ex activities.                        7.  Pt will report >75% reduction in HA's/Dizziness to ease with ADL's       PLAN  []  Upgrade activities as tolerated     []  Continue plan of care  []  Update interventions per flow sheet       []  Discharge due to:_  []  Other:_      Maria R Wilson, PT 7/18/2018  10:03 AM    Future Appointments  Date Time Provider Wen Bird   7/23/2018 10:00 AM Taniya Fairbanks PTA [facility]   7/25/2018 10:00 AM Taniya Fairbanks PTA [facility]   7/30/2018 10:30 AM Frederick Duff [facility]   8/1/2018 10:30 AM Taniya Fairbanks PTA [facility]

## 2018-07-23 ENCOUNTER — HOSPITAL ENCOUNTER (OUTPATIENT)
Dept: PHYSICAL THERAPY | Age: 58
Discharge: HOME | End: 2018-07-23
Payer: COMMERCIAL

## 2018-07-23 PROCEDURE — 97110 THERAPEUTIC EXERCISES: CPT

## 2018-07-23 NOTE — PROGRESS NOTES
PT DAILY TREATMENT NOTE     Patient Name: Danette Curry  Date:2018  : 1960  [x]  Patient  Verified  Payor: Ana Luisa Dash / Plan: [address] [place] [address] [facility] / Product Type: HMO /    In time:1000  Out time:1042  Total Treatment Time (min): 42  Visit #: 5 of 10-12    Treatment Area: Low back pain [M54.5]    SUBJECTIVE  Pain Level (0-10 scale): 8.5 low and mid back, 5.5 neck  Any medication changes, allergies to medications, adverse drug reactions, diagnosis change, or new procedure performed?: [x] No    [] Yes (see summary sheet for update)  Subjective functional status/changes:   [] No changes reported  Pt stated that she cont with significant pain    OBJECTIVE    Modality rationale: decrease pain and increase tissue extensibility to improve the patients ability to increase ease with ADLs   Min Type Additional Details    [] Estim:  []Unatt       []IFC  []Premod                        []Other:  []w/ice   []w/heat  Position:  Location:    [] Estim: []Att    []TENS instruct  []NMES                    []Other:  []w/US   []w/ice   []w/heat  Position:  Location:    []  Traction: [] Cervical       []Lumbar                       [] Prone          []Supine                       []Intermittent   []Continuous Lbs:  [] before manual  [] after manual    []  Ultrasound: []Continuous   [] Pulsed                           []1MHz   []3MHz W/cm2:  Location:    []  Iontophoresis with dexamethasone         Location: [] Take home patch   [] In clinic   10 []  Ice     [x]  heat  []  Ice massage  []  Laser   []  Anodyne Position:seated  Location:back and neck    []  Laser with stim  []  Other:  Position:  Location:    []  Vasopneumatic Device Pressure:       [] lo [] med [] hi   Temperature: [] lo [] med [] hi   [x] Skin assessment post-treatment:  [x]intact []redness- no adverse reaction    []redness - adverse reaction:     32 min Therapeutic Exercise:  [x] See flow sheet :   Rationale: increase ROM and increase strength to improve the patients ability to increase ease with ADLs    With   [x] TE   [] TA   [] neuro   [] other: Patient Education: [x] Review HEP    [] Progressed/Changed HEP based on:   [] positioning   [] body mechanics   [] transfers   [] heat/ice application    [] other:      Other Objective/Functional Measures:   Pt had no report of increased pain with exercises     Pain Level (0-10 scale) post treatment: 4/10 neck, 8/10 back    ASSESSMENT/Changes in Function:   See progress note    Patient will continue to benefit from skilled PT services to modify and progress therapeutic interventions, address functional mobility deficits, address ROM deficits and address strength deficits to attain remaining goals. []  See Plan of Care  [x]  See progress note/recertification  []  See Discharge Summary         Progress towards goals / Updated goals:  Short Term Goals: To be accomplished in 1 weeks:                         1. Pt will be compliant with a HEP to improve function,. Goal met. 7/11/18   Long Term Goals: To be accomplished in 6 weeks:                         1. Pt will increase FOTO score by  23  pts to improve function. Not met. Decreased from 42 to 30. 7/23/18                         2. Pt will report pain <4/10 to ease with ADL's and return to gardening activities. Not met. Pt cont with 5-8/10 pain in low, mid and upper back. 7/23/18                         3. Pt will increase CS rotation to >55 deg bilaterally to return to driving activities. Goal met. Right C/S rotation is 68* and left is 58*. 7/23/18                         4. Pt will ambulate >20 mins w/o pain to return to community ambulation and regular ex activities. Not met. Pt reports only about 5 minutes of ambulation before pain increases. 7/23/18                         5. Pt will report >75% reduction in HA's/Dizziness to ease with ADL's   Not met. Pt reports no change in HA's or dizziness.  7/23/18    PLAN  []  Upgrade activities as tolerated     [x] Continue plan of care  []  Update interventions per flow sheet       []  Discharge due to:_  []  Other:_      Nicole Harris PTA 7/23/2018  10:05 AM    Future Appointments  Date Time Provider Wen Bird   7/25/2018 10:00 AM Khadijah Mullins PT [id number]   7/30/2018 10:30 AM Apollo Donis [id number]   8/1/2018 10:30 AM [facility]

## 2018-07-24 NOTE — PROGRESS NOTES
[facility] - Ritta Mantel  [address] [facility]  [phone] fax    Physical Therapy Progress Note  Patient name: Maty Oseguera Start of Care: 2018   Referral source: Crys Mcneil NP : 1960                          Medical Diagnosis: Low back pain [M54.5] Onset Date:[facility] 18                          Treatment Diagnosis: CS and LS Pain   Prior Hospitalization: see medical history Provider#: [id number]   Medications: Verified on Patient summary List    Comorbidities: Asthma   Prior Level of Function: Likes to do gardening activities. Visits from Start of Care: 5    Missed Visits: 0    Established Goals:         Excellent           Good         Limited           None  [x] Increased ROM   []  []  [x]  []  [] Increased Strength  []  []  []  []  [] Increased Mobility  []  []  [x]  []   [x] Decreased Pain   []  []  [x]  []  [] Decreased Swelling  []  []  []  []    Key Functional Changes: Pt is making slow progress in therapy. She reports her CS is improving, however her LS continues to constantly hurt. Pain=8-9/10. She reports LS pain along L3-L5 paraspinals. Right C/S rotation is 68* and left is 58* She has pain with LS extension vs flexion. She continues to have limited ambulation tolerance. Pt reports ongoing dizziness as well as HA's since onset, No change so far. Pt has pelvic obliquity that improves with MET. Pt is compliant with HEP and attendance. She is making slow progress toward goals. Pt will benefit with treatment plan with Exercise modifications as appropriate and as tolerated. Updated Goals: to be achieved in 4 weeks:  1. Pt will increase FOTO score by  23  pts to improve function. 2. Pt will report pain to LS and CS <4/10 to ease with ADL's and return to gardening activities. 3 Pt will ambulate >20 mins w/o pain to return to community ambulation and regular ex activities.    4. Pt will report 50% or greater improvement in therapy to improve QOL. ASSESSMENT/RECOMMENDATIONS:  [x]Continue therapy per initial plan/protocol at a frequency of  2 x per week for 4 weeks  []Continue therapy with the following recommended changes:_____________________      _____________________________________________________________________  []Discontinue therapy progressing towards or have reached established goals  []Discontinue therapy due to lack of appreciable progress towards goals  []Discontinue therapy due to lack of attendance or compliance  []Await Physician's recommendations/decisions regarding therapy  []Other:________________________________________________________________    Thank you for this referral.   Lester Haro, PT 7/24/2018 2:38 PM    NOTE TO PHYSICIAN:  PLEASE COMPLETE THE ORDERS BELOW AND   FAX TO [facility]: [phone]  If you are unable to process this request in 24 hours please contact our office: [phone] I have read the above report and request that my patient continue as recommended. ? I have read the above report and request that my patient continue therapy with the following changes/special instructions:____________________________________  ? I have read the above report and request that my patient be discharged from therapy.     Physicians signature: ______________________________Date: ______Time:______

## 2018-07-25 ENCOUNTER — APPOINTMENT (OUTPATIENT)
Dept: PHYSICAL THERAPY | Age: 58
End: 2018-07-25
Payer: COMMERCIAL

## 2018-07-30 ENCOUNTER — HOSPITAL ENCOUNTER (OUTPATIENT)
Dept: PHYSICAL THERAPY | Age: 58
Discharge: HOME | End: 2018-07-30
Payer: COMMERCIAL

## 2018-07-30 PROCEDURE — 97110 THERAPEUTIC EXERCISES: CPT

## 2018-07-30 NOTE — PROGRESS NOTES
PT DAILY TREATMENT NOTE     Patient Name: Agustina Gray-[place]  Date:2018  : 1960  [x]  Patient  Verified  Payor: Rupa Screws / Plan: [facility] [employer] / Product Type: HMO /    In time:10:30  Out time:11:29  Total Treatment Time (min): 61  Visit #: 6 of 10-12    Treatment Area: Low back pain [M54.5]    SUBJECTIVE  Pain Level (0-10 scale): 5/10 neck, 6/10 back  Any medication changes, allergies to medications, adverse drug reactions, diagnosis change, or new procedure performed?: [x] No    [] Yes (see summary sheet for update)  Subjective functional status/changes:   [] No changes reported  Pt reports that her back continues to be worse that her neck. Overall, she believes therapy is helping.       OBJECTIVE    Modality rationale: decrease pain and increase tissue extensibility to improve the patients ability to tolerate daily tasks    Min Type Additional Details    [] Estim:  []Unatt       []IFC  []Premod                        []Other:  []w/ice   []w/heat  Position:  Location:    [] Estim: []Att    []TENS instruct  []NMES                    []Other:  []w/US   []w/ice   []w/heat  Position:  Location:    []  Traction: [] Cervical       []Lumbar                       [] Prone          []Supine                       []Intermittent   []Continuous Lbs:  [] before manual  [] after manual    []  Ultrasound: []Continuous   [] Pulsed                           []1MHz   []3MHz W/cm2:  Location:    []  Iontophoresis with dexamethasone         Location: [] Take home patch   [] In clinic   10 []  Ice     [x]  heat  []  Ice massage  []  Laser   []  Anodyne Position: seated   Location: neck, back     []  Laser with stim  []  Other:  Position:  Location:    []  Vasopneumatic Device Pressure:       [] lo [] med [] hi   Temperature: [] lo [] med [] hi   [x] Skin assessment post-treatment:  [x]intact []redness- no adverse reaction    []redness - adverse reaction:       46 min Therapeutic Exercise:  [x] See flow sheet : Rationale: increase ROM and increase strength to improve the patients ability to improve ease of ambulation and daily tasks     3 minutes Manual: MET with shotgun to correct left AI  Rationale: reduce pain and increase tissue extensibility for improved ease of prolonged ambulation             With   [] TE   [] TA   [] neuro   [] other: Patient Education: [x] Review HEP    [] Progressed/Changed HEP based on:   [] positioning   [] body mechanics   [] transfers   [] heat/ice application    [] other:      Other Objective/Functional Measures:      Left upslip corrected with leg lengthening exercise  Left AI not changed with self-MET, corrected with manual MET with shotgun  Minimal lumbar pain following correction  Denied increased pain with any specific interventions, but reported return to l/s pain following session    Pain Level (0-10 scale) post treatment: 4/10 c/s, 6/10 l/s     ASSESSMENT/Changes in Function:     Pt is making slow progress towards updated goals in therapy. Pelvic instability continues to be present, evidenced by change in presentation of obliquity this visit. Will continue to monitor and address asymmetric deficits/establish self-MET if obliquity becomes consistent. Will continue to address strength and flexibility deficits for reduced pain with daily tasks and return to PLOF. Patient will continue to benefit from skilled PT services to modify and progress therapeutic interventions, address functional mobility deficits, address ROM deficits, address strength deficits, analyze and address soft tissue restrictions, analyze and cue movement patterns, assess and modify postural abnormalities and instruct in home and community integration to attain remaining goals. Progress towards goals / Updated goals:  1. Pt will increase FOTO score by  23  pts to improve function. 2. Pt will report pain to LS and CS <4/10 to ease with ADL's and return to gardening activities.    3  Pt will ambulate >20 mins w/o pain to return to community ambulation and regular ex activities.    4. Pt will report 50% or greater improvement in therapy to improve QOL.        PLAN  []  Upgrade activities as tolerated     [x]  Continue plan of care  []  Update interventions per flow sheet       []  Discharge due to:_  []  Other:_      Arnoldo Myles, PT 7/30/2018  10:38 AM    Future Appointments  Date Time Provider Wen Bird   8/1/2018 10:30 AM Saloni Kelly PTA [id number]

## 2018-08-01 ENCOUNTER — HOSPITAL ENCOUNTER (OUTPATIENT)
Dept: PHYSICAL THERAPY | Age: 58
Discharge: HOME | End: 2018-08-01
Payer: COMMERCIAL

## 2018-08-01 PROCEDURE — 97140 MANUAL THERAPY 1/> REGIONS: CPT

## 2018-08-01 PROCEDURE — 97110 THERAPEUTIC EXERCISES: CPT

## 2018-08-01 NOTE — PROGRESS NOTES
PT DAILY TREATMENT NOTE     Patient Name: Malgorzata Paulson  Date:2018  : 1960  [x]  Patient  Verified  Payor: Aide Pineda / Plan: [address] [place] [address] [place] HMO / Product Type: HMO /    In time:1035  Out time:1115  Total Treatment Time (min): 40  Visit #: 7 of 10-12    Treatment Area: Low back pain [M54.5]    SUBJECTIVE  Pain Level (0-10 scale): 5/10 neck and back  Any medication changes, allergies to medications, adverse drug reactions, diagnosis change, or new procedure performed?: [x] No    [] Yes (see summary sheet for update)  Subjective functional status/changes:   [] No changes reported  Pt stated that she is about the same    OBJECTIVE    Modality rationale: decrease pain and increase tissue extensibility to improve the patients ability to increase ease with ADls   Min Type Additional Details    [] Estim:  []Unatt       []IFC  []Premod                        []Other:  []w/ice   []w/heat  Position:  Location:    [] Estim: []Att    []TENS instruct  []NMES                    []Other:  []w/US   []w/ice   []w/heat  Position:  Location:    []  Traction: [] Cervical       []Lumbar                       [] Prone          []Supine                       []Intermittent   []Continuous Lbs:  [] before manual  [] after manual    []  Ultrasound: []Continuous   [] Pulsed                           []1MHz   []3MHz W/cm2:  Location:    []  Iontophoresis with dexamethasone         Location: [] Take home patch   [] In clinic   10 []  Ice     [x]  heat  []  Ice massage  []  Laser   []  Anodyne Position:seated  Location:low back    []  Laser with stim  []  Other:  Position:  Location:    []  Vasopneumatic Device Pressure:       [] lo [] med [] hi   Temperature: [] lo [] med [] hi   [x] Skin assessment post-treatment:  [x]intact []redness- no adverse reaction    []redness - adverse reaction:     22 min Therapeutic Exercise:  [x] See flow sheet :   Rationale: increase ROM and increase strength to improve the patients ability to increase ease with ADLs    8 min Manual Therapy:  Pelvic alignment check, MET and shot gun to correct right AI   Rationale: decrease pain, increase ROM and increase tissue extensibility to increase ease with ADLs    With   [x] TE   [] TA   [] neuro   [] other: Patient Education: [x] Review HEP    [] Progressed/Changed HEP based on:   [] positioning   [] body mechanics   [] transfers   [] heat/ice application    [] other:      Other Objective/Functional Measures:   Pt is very slow with exercises  Was 5 minutes late for session  No complaint of increased pain with exercises     Pain Level (0-10 scale) post treatment: 5/10 neck, 6/10 back    ASSESSMENT/Changes in Function:   Pt is making limited progress toward goals. Pt cont with moderate pain levels in her back and neck. Cont to report difficulty with ADLs. Cont with decreased ambulation tolerance    Patient will continue to benefit from skilled PT services to modify and progress therapeutic interventions, address functional mobility deficits, address ROM deficits and address strength deficits to attain remaining goals. [x]  See Plan of Care  []  See progress note/recertification  []  See Discharge Summary         Progress towards goals / Updated goals:  1. Pt will increase FOTO score by  23  pts to improve function. 2. Pt will report pain to LS and CS <4/10 to ease with ADL's and return to gardening activities. 3  Pt will ambulate >20 mins w/o pain to return to community ambulation and regular ex activities. 4. Pt will report 50% or greater improvement in therapy to improve QOL. PLAN  []  Upgrade activities as tolerated     [x]  Continue plan of care  []  Update interventions per flow sheet       []  Discharge due to:_  []  Other:_      Jesus Manuel Laurent, ISABEL 8/1/2018  12:02 PM    No future appointments.

## 2018-08-07 ENCOUNTER — HOSPITAL ENCOUNTER (OUTPATIENT)
Dept: PHYSICAL THERAPY | Age: 58
Discharge: HOME | End: 2018-08-07
Payer: COMMERCIAL

## 2018-08-07 PROCEDURE — 97110 THERAPEUTIC EXERCISES: CPT

## 2018-08-07 NOTE — PROGRESS NOTES
PT DAILY TREATMENT NOTE     Patient Name: Mel Alicea  Date:2018  : 1960  [x]  Patient  Verified  Payor: Anatoly Richter / Plan: [employer] / Product Type: HMO /    In time:1004  Out time:1059  Total Treatment Time (min): 54  Visit #: 8 of 10-12    Treatment Area: Low back pain [M54.5]    SUBJECTIVE  Pain Level (0-10 scale): 6/10 neck and back  Any medication changes, allergies to medications, adverse drug reactions, diagnosis change, or new procedure performed?: [x] No    [] Yes (see summary sheet for update)  Subjective functional status/changes:   [] No changes reported  Pt stated that she continues to have LBP>neck pain and its \"always constant\"    OBJECTIVE    Modality rationale: decrease pain and increase tissue extensibility to improve the patients ability to increase ease with ADls   Min Type Additional Details    [] Estim:  []Unatt       []IFC  []Premod                        []Other:  []w/ice   []w/heat  Position:  Location:    [] Estim: []Att    []TENS instruct  []NMES                    []Other:  []w/US   []w/ice   []w/heat  Position:  Location:    []  Traction: [] Cervical       []Lumbar                       [] Prone          []Supine                       []Intermittent   []Continuous Lbs:  [] before manual  [] after manual    []  Ultrasound: []Continuous   [] Pulsed                           []1MHz   []3MHz W/cm2:  Location:    []  Iontophoresis with dexamethasone         Location: [] Take home patch   [] In clinic   10 []  Ice     [x]  heat  []  Ice massage  []  Laser   []  Anodyne Position:seated  Location:low back    []  Laser with stim  []  Other:  Position:  Location:    []  Vasopneumatic Device Pressure:       [] lo [] med [] hi   Temperature: [] lo [] med [] hi   [x] Skin assessment post-treatment:  [x]intact []redness- no adverse reaction    []redness - adverse reaction:     45 min Therapeutic Exercise:  [x] See flow sheet :   Rationale: increase ROM and increase strength to improve the patients ability to increase ease with ADLs     min Manual Therapy:  SI level   Rationale: decrease pain, increase ROM and increase tissue extensibility to increase ease with ADLs    With   [x] TE   [] TA   [] neuro   [] other: Patient Education: [x] Review HEP    [] Progressed/Changed HEP based on:   [] positioning   [] body mechanics   [] transfers   [] heat/ice application    [] other:      Other Objective/Functional Measures:   Manual cues for TA contraction with therex  Added 1/2 foam for TA draws in [place]    Pain Level (0-10 scale) post treatment: 6/10    ASSESSMENT/Changes in Function:   Pt is making limited progress toward goals. Pt cont with moderate pain levels in her back and neck. Cont to report difficulty with ADLs. Pt reports only being able to ambulate 15minutes without pain at any given time. SI level this session    Patient will continue to benefit from skilled PT services to modify and progress therapeutic interventions, address functional mobility deficits, address ROM deficits and address strength deficits to attain remaining goals. [x]  See Plan of Care  []  See progress note/recertification  []  See Discharge Summary         Progress towards goals / Updated goals:  1. Pt will increase FOTO score by  23  pts to improve function. 2. Pt will report pain to LS and CS <4/10 to ease with ADL's and return to gardening activities. 3  Pt will ambulate >20 mins w/o pain to return to community ambulation and regular ex activities. -PROGRESSING 15MIN 8/7/18  4. Pt will report 50% or greater improvement in therapy to improve QOL. -PROGRESSING 35% 8/7/18    PLAN  []  Upgrade activities as tolerated     [x]  Continue plan of care  []  Update interventions per flow sheet       []  Discharge due to:_  []  Other:_      Justin Weinberg, PT 8/7/2018  12:02 PM    No future appointments.

## 2018-08-09 ENCOUNTER — HOSPITAL ENCOUNTER (OUTPATIENT)
Dept: PHYSICAL THERAPY | Age: 58
Discharge: HOME | End: 2018-08-09
Payer: COMMERCIAL

## 2018-08-09 PROCEDURE — 97110 THERAPEUTIC EXERCISES: CPT

## 2018-08-09 NOTE — PROGRESS NOTES
PT DAILY TREATMENT NOTE     Patient Name: Ev Brumfield  Date:2018  : 1960  [x]  Patient  Verified  Payor: Chanda Zamarripa / Plan: [place] [employer] / Product Type: HMO /    In time:1000  Out time:1045  Total Treatment Time (min): 45  Visit #: 9 of 10-12    Treatment Area: Low back pain [M54.5]    SUBJECTIVE  Pain Level (0-10 scale): 5/10 neck and back  Any medication changes, allergies to medications, adverse drug reactions, diagnosis change, or new procedure performed?: [x] No    [] Yes (see summary sheet for update)  Subjective functional status/changes:   [] No changes reported  Pt stated that she is finally getting some relief and feels better today.     OBJECTIVE    Modality rationale: decrease pain and increase tissue extensibility to improve the patients ability to increase ease with ADls   Min Type Additional Details    [] Estim:  []Unatt       []IFC  []Premod                        []Other:  []w/ice   []w/heat  Position:  Location:    [] Estim: []Att    []TENS instruct  []NMES                    []Other:  []w/US   []w/ice   []w/heat  Position:  Location:    []  Traction: [] Cervical       []Lumbar                       [] Prone          []Supine                       []Intermittent   []Continuous Lbs:  [] before manual  [] after manual    []  Ultrasound: []Continuous   [] Pulsed                           []1MHz   []3MHz W/cm2:  Location:    []  Iontophoresis with dexamethasone         Location: [] Take home patch   [] In clinic   10 []  Ice     [x]  heat  []  Ice massage  []  Laser   []  Anodyne Position:seated  Location:low back    []  Laser with stim  []  Other:  Position:  Location:    []  Vasopneumatic Device Pressure:       [] lo [] med [] hi   Temperature: [] lo [] med [] hi   [x] Skin assessment post-treatment:  [x]intact []redness- no adverse reaction    []redness - adverse reaction:     35 min Therapeutic Exercise:  [x] See flow sheet :   Rationale: increase ROM and increase strength to improve the patients ability to increase ease with ADLs     min Manual Therapy:  SI level   Rationale: decrease pain, increase ROM and increase tissue extensibility to increase ease with ADLs    With   [x] TE   [] TA   [] neuro   [] other: Patient Education: [x] Review HEP    [] Progressed/Changed HEP based on:   [] positioning   [] body mechanics   [] transfers   [] heat/ice application    [] other:      Other Objective/Functional Measures:   Manual cues for TA contraction with therex  Added: dead bug, ball to chest pass and knee tuck in hooklying with TA     Pain Level (0-10 scale) post treatment: 4/10 LS and 3/10 CS    ASSESSMENT/Changes in Function:   SI level this session and pt with improved ability to obtain and maintain TA contraction with therex with manual cueing. Patient will continue to benefit from skilled PT services to modify and progress therapeutic interventions, address functional mobility deficits, address ROM deficits and address strength deficits to attain remaining goals. [x]  See Plan of Care  []  See progress note/recertification  []  See Discharge Summary         Progress towards goals / Updated goals:  1. Pt will increase FOTO score by  23  pts to improve function. 2. Pt will report pain to LS and CS <4/10 to ease with ADL's and return to gardening activities. -PROGRESSING 5/10 8/9/18  3  Pt will ambulate >20 mins w/o pain to return to community ambulation and regular ex activities. -PROGRESSING 15MIN 8/7/18  4. Pt will report 50% or greater improvement in therapy to improve QOL.  -PROGRESSING 35% 8/7/18    PLAN  []  Upgrade activities as tolerated     [x]  Continue plan of care  []  Update interventions per flow sheet       []  Discharge due to:_  []  Other:_      David Moe, PT 8/9/2018  12:02 PM    Future Appointments  Date Time Provider Wen Bird   8/16/2018 10:30 AM [facility]   8/17/2018 9:30 AM [facility] [address] [facility] 8/20/2018 9:30 AM Yenny Garner PT [id number]

## 2018-08-16 ENCOUNTER — APPOINTMENT (OUTPATIENT)
Dept: PHYSICAL THERAPY | Age: 58
End: 2018-08-16
Payer: COMMERCIAL

## 2018-08-17 ENCOUNTER — APPOINTMENT (OUTPATIENT)
Dept: PHYSICAL THERAPY | Age: 58
End: 2018-08-17
Payer: COMMERCIAL

## 2018-08-20 ENCOUNTER — HOSPITAL ENCOUNTER (OUTPATIENT)
Dept: PHYSICAL THERAPY | Age: 58
Discharge: HOME | End: 2018-08-20
Payer: COMMERCIAL

## 2018-08-20 PROCEDURE — 97110 THERAPEUTIC EXERCISES: CPT

## 2018-08-20 NOTE — PROGRESS NOTES
PT DAILY TREATMENT NOTE     Patient Name: Otto Collins  Date:2018  : 1960  [x]  Patient  Verified  Payor: Lilian Munoz / Plan: [employer] / Product Type: HMO /    In time:9:40  Out time:10:26  Total Treatment Time (min): 46  Visit #: 10 of 10-12    Treatment Area: Low back pain [M54.5]    SUBJECTIVE  Pain Level (0-10 scale): back 6/10 neck 4/10  Any medication changes, allergies to medications, adverse drug reactions, diagnosis change, or new procedure performed?: [x] No    [] Yes (see summary sheet for update)  Subjective functional status/changes:   [] No changes reported  Pt reports being compliant with her HEP. OBJECTIVE    Modality rationale: decrease pain to improve the patients ability to move without pain. Min Type Additional Details    [] Estim:  []Unatt       []IFC  []Premod                        []Other:  []w/ice   []w/heat  Position:  Location:    [] Estim: []Att    []TENS instruct  []NMES                    []Other:  []w/US   []w/ice   []w/heat  Position:  Location:    []  Traction: [] Cervical       []Lumbar                       [] Prone          []Supine                       []Intermittent   []Continuous Lbs:  [] before manual  [] after manual    []  Ultrasound: []Continuous   [] Pulsed                           []1MHz   []3MHz W/cm2:  Location:    []  Iontophoresis with dexamethasone         Location: [] Take home patch   [] In clinic   10 []  Ice     [x]  heat  []  Ice massage  []  Laser   []  Anodyne Position:Seated  Location:L/S    []  Laser with stim  []  Other:  Position:  Location:    []  Vasopneumatic Device Pressure:       [] lo [] med [] hi   Temperature: [] lo [] med [] hi   [] Skin assessment post-treatment:  []intact []redness- no adverse reaction    []redness - adverse reaction:     36 min Therapeutic Exercise:  [x] See flow sheet :   Rationale: increase ROM and increase strength to improve the patients ability to perform ADL's without pain. Withperform ADL's  [x] TE   [] TA   [] neuro   [] other: Patient Education: [x] Review HEP    [] Progressed/Changed HEP based on:   [] positioning   [] body mechanics   [] transfers   [] heat/ice application    [] other:      Other Objective/Functional Measures: Assessed goals completed FOTO. Pain Level (0-10 scale) post treatment: 3/10    ASSESSMENT/Changes in Function: Pt progressed TE's evidenced by SNAGS. Also improved FOTO score of 44. Pt achieved LTG#4     Patient will continue to benefit from skilled PT services to modify and progress therapeutic interventions, address functional mobility deficits, address ROM deficits, address strength deficits, analyze and address soft tissue restrictions and analyze and cue movement patterns to attain remaining goals. [x]  See Plan of Care  []  See progress note/recertification  []  See Discharge Summary         Progress towards goals / Updated goals:  1. Pt will increase FOTO score by  23  pts to improve function. PROGRESSING 8/20/18 score=44  2. Pt will report pain to LS and CS <4/10 to ease with ADL's and return to gardening activities. -PROGRESSING 5/10 8/9/18  3  Pt will ambulate >20 mins w/o pain to return to community ambulation and regular ex activities. -PROGRESSING 15MIN 8/7/18  4. Pt will report 50% or greater improvement in therapy to improve QOL. -MET 60% 8/20/18       PLAN  [x]  Upgrade activities as tolerated     [x]  Continue plan of care  [x]  Update interventions per flow sheet       []  Discharge due to:_  []  Other:_      Arabella Brito, ISABEL 8/20/2018  9:28 AM    No future appointments.

## 2018-08-24 NOTE — ANCILLARY DISCHARGE INSTRUCTIONS
[facility] [address] [facility] 
[phone] fax Physician Update[x] Progress Note  [] Discharge Summary Patient name: Chaya Newsome Start of Care: 2018 Referral source: Lashonda Ford NP : 1960                         
Medical Diagnosis: Low back pain [M54.5] Onset Date:[facility] 18                         
Treatment Diagnosis: CS and LS Pain Prior Hospitalization: see medical history Provider#: [id number] Medications: Verified on Patient summary List  
 Comorbidities: Asthma 
 Prior Level of Function: Likes to do gardening activities.                       
  
 Visits from Start of Care: 10    Missed Visits: 2 Status at Evaluation/Last Progress Note: Pt is making slow progress in therapy. He LS pain continues to be worst than CS pain. Ambulation tolerance is approx 15 mins currently. Pt reports 60% improvement. Progress towards Goals: Pt will continue to benefit from skilled therapy to further improve LS pain and pelvic obliquilty as well as improving ambulation tolerance. Goals: to be achieved in 4 weeks: 2. Pt will report pain to LS and CS <4/10 to ease with ADL's and return to gardening activities. 3 Pt will ambulate >20 mins w/o pain to return to community ambulation and regular ex activities. 4. Pt will report 70% or greater improvement in therapy to improve QOL.  
  
ASSESSMENT/RECOMMENDATIONS: 
[]Continue therapy per initial plan/protocol at a frequency of  2 x per week for 4 weeks []Continue therapy with the following recommended changes:_____________________      _____________________________________________________________________ []Discontinue therapy progressing towards or have reached established goals []Discontinue therapy due to lack of appreciable progress towards goals []Discontinue therapy due to lack of attendance or compliance []Await Physician's recommendations/decisions regarding therapy []Other:________________________________________________________________ Thank you for this referral.  
Frederic Rollins, PT 8/24/2018 9:46 AM 
NOTE TO PHYSICIAN:  PLEASE COMPLETE THE ORDERS BELOW AND  
FAX TO [facility]: [phone] If you are unable to process this request in 24 hours please contact our office: [phone] ? I have read the above report and request that my patient continue as recommended. ? I have read the above report and request that my patient continue therapy with the following changes/special instructions:____________________________________ ? I have read the above report and request that my patient be discharged from therapy.  
 
[de-identified] Signature:____________Date:_________TIME:________ 
 
[employer], Date and Time must be completed for valid certification **

## 2018-08-24 NOTE — ANCILLARY DISCHARGE INSTRUCTIONS
[facility] - [employer]  [place]  [address] [place] [address]  [phone] fax    Physician Update[x] Progress Note  [x] Discharge Summary    Patient name: Kristen Harris Start of Care: 2018   Referral source: Yumiko Branch NP : 1960                          Medical Diagnosis: Low back pain [M54.5] Onset Date:[facility] 18                          Treatment Diagnosis: CS and LS Pain   Prior Hospitalization: see medical history Provider#: [id number]   Medications: Verified on Patient summary List    Comorbidities: Asthma   Prior Level of Function: Likes to do gardening activities.                            Visits from Start of Care: 10    Missed Visits: 2    Status at Evaluation/Last Progress Note: Pt is making slow progress in therapy. He LS pain continues to be worst than CS pain. Ambulation tolerance is approx 15 mins currently. Pt reports 60% improvement. Progress towards Goals: Pt will continue to benefit from skilled therapy to further improve LS pain and pelvic obliquilty as well as improving ambulation tolerance. Goals: to be achieved in 4 weeks:  2. Pt will report pain to LS and CS <4/10 to ease with ADL's and return to gardening activities. 3 Pt will ambulate >20 mins w/o pain to return to community ambulation and regular ex activities.    4. Pt will report 70% or greater improvement in therapy to improve QOL.      ASSESSMENT/RECOMMENDATIONS:  []Continue therapy per initial plan/protocol at a frequency of  2 x per week for 4 weeks  []Continue therapy with the following recommended changes:_____________________      _____________________________________________________________________  []Discontinue therapy progressing towards or have reached established goals  []Discontinue therapy due to lack of appreciable progress towards goals  []Discontinue therapy due to lack of attendance or compliance  []Await Physician's recommendations/decisions regarding therapy  []Other:________________________________________________________________    Thank you for this referral.   Su Hassan, PT 8/24/2018 9:46 AM  NOTE TO PHYSICIAN:  PLEASE COMPLETE THE ORDERS BELOW AND   FAX TO [facility]: [phone]  If you are unable to process this request in 24 hours please contact our office: [phone]    ? I have read the above report and request that my patient continue as recommended. ? I have read the above report and request that my patient continue therapy with the following changes/special instructions:____________________________________  ? I have read the above report and request that my patient be discharged from therapy.     [de-identified] Signature:____________Date:_________TIME:________    [employer], Date and Time must be completed for valid certification **

## 2018-08-29 ENCOUNTER — HOSPITAL ENCOUNTER (OUTPATIENT)
Dept: PHYSICAL THERAPY | Age: 58
Discharge: HOME | End: 2018-08-29
Payer: COMMERCIAL

## 2018-08-29 PROCEDURE — 97110 THERAPEUTIC EXERCISES: CPT

## 2018-08-29 NOTE — PROGRESS NOTES
PT DAILY TREATMENT NOTE     Patient Name: Jaren Mo  Date:2018  : 1960  [x]  Patient  Verified  Payor: Unknown Ades / Plan: [employer] / Product Type: HMO /    In time:1014  Out time:1050  Total Treatment Time (min): 36  Visit #:     Treatment Area: Low back pain [M54.5]    SUBJECTIVE  Pain Level (0-10 scale): 4  Any medication changes, allergies to medications, adverse drug reactions, diagnosis change, or new procedure performed?: [x] No    [] Yes (see summary sheet for update)  Subjective functional status/changes:   [] No changes reported  Pt c/o neck and low back pain, but feels improvement in sx since beginning therapy.     OBJECTIVE    Modality rationale: decrease pain and increase tissue extensibility to improve the patients ability to ease ADL's   Min Type Additional Details    [] Estim:  []Unatt       []IFC  []Premod                        []Other:  []w/ice   []w/heat  Position:  Location:    [] Estim: []Att    []TENS instruct  []NMES                    []Other:  []w/US   []w/ice   []w/heat  Position:  Location:    []  Traction: [] Cervical       []Lumbar                       [] Prone          []Supine                       []Intermittent   []Continuous Lbs:  [] before manual  [] after manual    []  Ultrasound: []Continuous   [] Pulsed                           []1MHz   []3MHz W/cm2:  Location:    []  Iontophoresis with dexamethasone         Location: [] Take home patch   [] In clinic   10 []  Ice     [x]  heat  []  Ice massage  []  Laser   []  Anodyne Position:seated  Location:L/S    []  Laser with stim  []  Other:  Position:  Location:    []  Vasopneumatic Device Pressure:       [] lo [] med [] hi   Temperature: [] lo [] med [] hi   [] Skin assessment post-treatment:  []intact []redness- no adverse reaction    []redness - adverse reaction:         26 min Therapeutic Exercise:  [] See flow sheet :   Rationale: increase ROM and increase strength to improve the patients ability to ease ADL's and return to activities. With   [x] TE   [] TA   [] neuro   [] other: Patient Education: [x] Review HEP    [] Progressed/Changed HEP based on:   [] positioning   [] body mechanics   [] transfers   [] heat/ice application    [] other:      Other Objective/Functional Measures: Pt tolerated progressions well today. Pain Level (0-10 scale) post treatment: 3    ASSESSMENT/Changes in Function: Pt arrived late and was unable to warm up prior to session. Pt tolerated all interventions in pain free range and reports improvement in sx since beginning PT. Patient will continue to benefit from skilled PT services to Patient will continue to benefit from skilled PT services to modify and progress therapeutic interventions, address functional mobility deficits, address ROM deficits, address strength deficits, analyze and address soft tissue restrictions, analyze and cue movement patterns, assess and modify postural abnormalities  to attain remaining goals. [x]  See Plan of Care  []  See progress note/recertification  []  See Discharge Summary         Progress towards goals / Updated goals:  1. Pt will increase FOTO score by  23  pts to improve function. PROGRESSING 8/20/18 score=44  2. Pt will report pain to LS and CS <4/10 to ease with ADL's and return to gardening activities. -PROGRESSING 5/10 8/9/18  3  Pt will ambulate >20 mins w/o pain to return to community ambulation and regular ex activities. -PROGRESSING 15MIN 8/7/18  4. Pt will report 50% or greater improvement in therapy to improve QOL.  -MET 60% 8/20/18    PLAN  []  Upgrade activities as tolerated     [x]  Continue plan of care  []  Update interventions per flow sheet       []  Discharge due to:_  []  Other:_      Kin Score 8/29/2018  10:30 AM    Future Appointments  Date Time Provider Wen Bird   9/5/2018 10:00 AM Irais Patel Heading, PT [id number]   9/10/2018 10:00 AM Irais Patel Heading, PT [id number] [facility]   9/12/2018 10:00 AM Kristopher Hancock, PT [id number] [facility]   9/17/2018 10:00 AM Chaya Kim, PTA [id number]   9/19/2018 10:00 AM Irais Younger, PT [id number] [facility]   9/24/2018 10:00 AM Kristopher Hancock, PT [id number] [facility]   9/26/2018 10:00 AM Chaya Kim, PTA [id number]

## 2018-09-05 ENCOUNTER — APPOINTMENT (OUTPATIENT)
Dept: PHYSICAL THERAPY | Age: 58
End: 2018-09-05
Payer: COMMERCIAL

## 2018-09-10 ENCOUNTER — HOSPITAL ENCOUNTER (OUTPATIENT)
Dept: PHYSICAL THERAPY | Age: 58
Discharge: HOME | End: 2018-09-10
Payer: COMMERCIAL

## 2018-09-10 PROCEDURE — 97110 THERAPEUTIC EXERCISES: CPT

## 2018-09-10 NOTE — PROGRESS NOTES
PT DAILY TREATMENT NOTE - [facility]     Patient Name: Elliott Chandler  Date:9/10/2018  : 1960  [x]  Patient  Verified  Payor: Freddy Magdielmario / Plan: [employer] / Product Type: HMO /    In time:1007 Out time:1050  Total Treatment Time (min): 43    Visit #: 2 of 4-8    Treatment Area: Low back pain [M54.5]    SUBJECTIVE  Pain Level (0-10 scale): 4 neck, 5 back  Any medication changes, allergies to medications, adverse drug reactions, diagnosis change, or new procedure performed?: [x] No    [] Yes (see summary sheet for update)  Subjective functional status/changes:   [] No changes reported  Reports she want s some ex's for her neck as well .     OBJECTIVE    Modality rationale: decrease pain and increase tissue extensibility to improve the patients ability to ease with ADL's   Min Type Additional Details    [] Estim:  []Unatt       []IFC  []Premod                        []Other:  []w/ice   []w/heat  Position:  Location:    [] Estim: []Att    []TENS instruct  []NMES                    []Other:  []w/US   []w/ice   []w/heat  Position:  Location:    []  Traction: [] Cervical       []Lumbar                       [] Prone          []Supine                       []Intermittent   []Continuous Lbs:  [] before manual  [] after manual    []  Ultrasound: []Continuous   [] Pulsed                           []1MHz   []3MHz W/cm2:  Location:    []  Iontophoresis with dexamethasone         Location: [] Take home patch   [] In clinic   10 []  Ice     [x]  heat  []  Ice massage  []  Laser   []  Anodyne Position:seated  Location:CS and neck    []  Laser with stim  []  Other:  Position:  Location:    []  Vasopneumatic Device Pressure:       [] lo [] med [] hi   Temperature: [] lo [] med [] hi   [] Skin assessment post-treatment:  []intact []redness- no adverse reaction    33 min Therapeutic Exercise:  [] See flow sheet :   Rationale: increase ROM, increase strength and improve balance to improve the patients ability to ease with ADL's          With   [] TE   [] TA   [] neuro   [] other: Patient Education: [x] Review HEP    [] Progressed/Changed HEP based on:   [] positioning   [] body mechanics   [] transfers   [] heat/ice application    [] other:      Other Objective/Functional Measures: Fair tolerance today. Introduced SNAGS for [employer] and [place] stretches. Pain Level (0-10 scale) post treatment: 6/10 LS and 4/10 CS    ASSESSMENT/Changes in Function: Slow progress. Pt tolerating ex's, but not making progress with pain levels. She     Patient will continue to benefit from skilled PT services to modify and progress therapeutic interventions, address ROM deficits, address strength deficits, analyze and address soft tissue restrictions, analyze and cue movement patterns and analyze and modify body mechanics/ergonomics to attain remaining goals.      []  See Plan of Care  []  See progress note/recertification  []  See Discharge Summary         Progress towards goals / Updated goals:      PLAN  []  Upgrade activities as tolerated     []  Continue plan of care  []  Update interventions per flow sheet       []  Discharge due to:_  []  Other:_      Vinayak Lepe, PT 9/10/2018  10:18 AM    Future Appointments  Date Time Provider Wen Bird   9/12/2018 10:00 AM Irais Vargas, PT [id number]   9/17/2018 10:00 AM ISABEL Díaz [facility]   9/19/2018 10:00 AM Vinayak Lepe, PT [id number] [facility]   9/24/2018 10:00 AM Vinayak Lepe, PT QUEENIE [facility]   9/26/2018 10:00 AM Bright Sanders PTA [id number]

## 2018-09-12 ENCOUNTER — APPOINTMENT (OUTPATIENT)
Dept: PHYSICAL THERAPY | Age: 58
End: 2018-09-12
Payer: COMMERCIAL

## 2018-09-17 ENCOUNTER — HOSPITAL ENCOUNTER (OUTPATIENT)
Dept: PHYSICAL THERAPY | Age: 58
Discharge: HOME | End: 2018-09-17
Payer: COMMERCIAL

## 2018-09-17 PROCEDURE — 97110 THERAPEUTIC EXERCISES: CPT

## 2018-09-17 NOTE — PROGRESS NOTES
PT DAILY TREATMENT NOTE     Patient Name: Galina Hemphill  Date:2018  : 1960  [x]  Patient  Verified  Payor: [employer] / Plan: [place] [employer] / Product Type: HMO /    In time:1005  Out time:1043  Total Treatment Time (min): 38  Visit #: 3 of 4-8    Treatment Area: Low back pain [M54.5]    SUBJECTIVE  Pain Level (0-10 scale): 4/10  Any medication changes, allergies to medications, adverse drug reactions, diagnosis change, or new procedure performed?: [x] No    [] Yes (see summary sheet for update)  Subjective functional status/changes:   [] No changes reported  Pt stated that the pain is 4/10 for back and neck today    OBJECTIVE    Modality rationale: decrease pain and increase tissue extensibility to improve the patients ability to increase ease with ADLs   Min Type Additional Details    [] Estim:  []Unatt       []IFC  []Premod                        []Other:  []w/ice   []w/heat  Position:  Location:    [] Estim: []Att    []TENS instruct  []NMES                    []Other:  []w/US   []w/ice   []w/heat  Position:  Location:    []  Traction: [] Cervical       []Lumbar                       [] Prone          []Supine                       []Intermittent   []Continuous Lbs:  [] before manual  [] after manual    []  Ultrasound: []Continuous   [] Pulsed                           []1MHz   []3MHz W/cm2:  Location:    []  Iontophoresis with dexamethasone         Location: [] Take home patch   [] In clinic   10 []  Ice     [x]  heat  []  Ice massage  []  Laser   []  Anodyne Position:seated  Location:low back and neck    []  Laser with stim  []  Other:  Position:  Location:    []  Vasopneumatic Device Pressure:       [] lo [] med [] hi   Temperature: [] lo [] med [] hi   [x] Skin assessment post-treatment:  [x]intact []redness- no adverse reaction    []redness - adverse reaction:     28 min Therapeutic Exercise:  [x] See flow sheet :   Rationale: increase ROM and increase strength to improve the patients ability to increase ease with ADLs    With   [x] TE   [] TA   [] neuro   [] other: Patient Education: [x] Review HEP    [] Progressed/Changed HEP based on:   [] positioning   [] body mechanics   [] transfers   [] heat/ice application    [] other:      Other Objective/Functional Measures:   Had no complaint of increased pain during session  No difficulty with exercises     Pain Level (0-10 scale) post treatment: 4/10    ASSESSMENT/Changes in Function:   Pt is making very slow progress toward goals. Pt cont with mild to moderate pain levels in her low back and neck. Pt cont with walking and standing tolerance. Patient will continue to benefit from skilled PT services to modify and progress therapeutic interventions, address functional mobility deficits, address ROM deficits and address strength deficits to attain remaining goals. []  See Plan of Care  [x]  See progress note/recertification  []  See Discharge Summary         Progress towards goals / Updated goals:  1. Pt will increase FOTO score by  23  pts to improve function. PROGRESSING 8/20/18 score=44  2. Pt will report pain to LS and CS <4/10 to ease with ADL's and return to gardening activities. -  PROGRESSING 5/10 8/9/18  3  Pt will ambulate >20 mins w/o pain to return to community ambulation and regular ex activities. -  PROGRESSING 15MIN 8/7/18  4.  Pt will report 50% or greater improvement in therapy to improve QOL. -  MET 60% 8/20/18    PLAN  []  Upgrade activities as tolerated     [x]  Continue plan of care  []  Update interventions per flow sheet       []  Discharge due to:_  []  Other:_      Taniya Fairbanks PTA 9/17/2018  10:07 AM    Future Appointments  Date Time Provider Wen Bird   9/19/2018 10:00 AM Irais Ramos, PT [facility]   9/24/2018 10:00 AM Irais Drake PT [facility]   9/26/2018 10:00 AM Taniya Fairbanks PTA [facility]

## 2018-09-19 ENCOUNTER — HOSPITAL ENCOUNTER (OUTPATIENT)
Dept: PHYSICAL THERAPY | Age: 58
Discharge: HOME | End: 2018-09-19
Payer: COMMERCIAL

## 2018-09-19 PROCEDURE — 97110 THERAPEUTIC EXERCISES: CPT

## 2018-09-19 NOTE — PROGRESS NOTES
PT DAILY TREATMENT NOTE - [facility]     Patient Name: Rodri Rater  Date:2018  : 1960  [x]  Patient  Verified  Payor: Karishma Suresh / Plan: [employer] / Product Type: HMO /    In time:1000  Out time:1046  Total Treatment Time (min): 55  Visit #: 4 of 6    Treatment Area: Low back pain [M54.5]    SUBJECTIVE  Pain Level (0-10 scale): 6/10  Any medication changes, allergies to medications, adverse drug reactions, diagnosis change, or new procedure performed?: [x] No    [] Yes (see summary sheet for update)  Subjective functional status/changes:   [] No changes reported  Pt reports 60% improvement    OBJECTIVE    Modality rationale: decrease pain to improve the patients ability to ease with ADL's   Min Type Additional Details    [] Estim:  []Unatt       []IFC  []Premod                        []Other:  []w/ice   []w/heat  Position:  Location:    [] Estim: []Att    []TENS instruct  []NMES                    []Other:  []w/US   []w/ice   []w/heat  Position:  Location:    []  Traction: [] Cervical       []Lumbar                       [] Prone          []Supine                       []Intermittent   []Continuous Lbs:  [] before manual  [] after manual    []  Ultrasound: []Continuous   [] Pulsed                           []1MHz   []3MHz W/cm2:  Location:    []  Iontophoresis with dexamethasone         Location: [] Take home patch   [] In clinic   10 []  Ice     [x]  heat  []  Ice massage  []  Laser   []  Anodyne Position:seated  Location:LS and bilat shoulders.     []  Laser with stim  []  Other:  Position:  Location:    []  Vasopneumatic Device Pressure:       [] lo [] med [] hi   Temperature: [] lo [] med [] hi   [] Skin assessment post-treatment:  []intact []redness- no adverse reaction    []redness - adverse reaction:       36 min Therapeutic Exercise:  [] See flow sheet :   Rationale: increase ROM and increase strength to improve the patients ability to ease with ADL's          With   [] TE   [] TA   [] neuro   [] other: Patient Education: [x] Review HEP    [] Progressed/Changed HEP based on:   [] positioning   [] body mechanics   [] transfers   [] heat/ice application    [] other:      Other Objective/Functional Measures: Including core strength with [place] resistance, with good tolerance. Concentrating on core activation. Pain Level (0-10 scale) post treatment: 6/10    ASSESSMENT/Changes in Function: Slow progress. Pt reports she had back pain while taking clothes out of the machine and into the dryer. Patient will continue to benefit from skilled PT services to modify and progress therapeutic interventions, address functional mobility deficits, address ROM deficits, address strength deficits, analyze and address soft tissue restrictions, analyze and cue movement patterns, analyze and modify body mechanics/ergonomics, assess and modify postural abnormalities and address imbalance/dizziness to attain remaining goals. [x]  See Plan of Care  []  See progress note/recertification  []  See Discharge Summary         Progress towards goals / Updated goals:  1. Pt will increase FOTO score by  23  pts to improve function. PROGRESSING 8/20/18 score=44  2. Pt will report pain to LS and CS <4/10 to ease with ADL's and return to gardening activities. -  PROGRESSING 5/10 8/9/18  3  Pt will ambulate >20 mins w/o pain to return to community ambulation and regular ex activities. -  PROGRESSING 15MIN 8/7/18  4.  Pt will report 50% or greater improvement in therapy to improve QOL. -  MET 60% 8/20/18       PLAN  [x]  Upgrade activities as tolerated     [x]  Continue plan of care  []  Update interventions per flow sheet       []  Discharge due to:_  []  Other:_      Maria R Wilson, PT 9/19/2018  10:32 AM    Future Appointments  Date Time Provider Wen Bird   9/24/2018 10:00 AM Irais Ramos, PT [facility]   9/26/2018 10:00 AM Taniya Fairbanks PTA [facility]

## 2018-09-24 ENCOUNTER — HOSPITAL ENCOUNTER (OUTPATIENT)
Dept: PHYSICAL THERAPY | Age: 58
End: 2018-09-24
Payer: COMMERCIAL

## 2018-09-26 ENCOUNTER — HOSPITAL ENCOUNTER (OUTPATIENT)
Dept: PHYSICAL THERAPY | Age: 58
End: 2018-09-26
Payer: COMMERCIAL

## 2018-09-26 NOTE — ANCILLARY DISCHARGE INSTRUCTIONS
[facility] [place] [employer]  [address] [facility]  [phone] fax    Physician Update[x] Progress Note  [] Discharge Summary    Patient name: Vinay Montenegro Start of Care: 2018   Referral source: Lissette Okeefe NP : 1960                          Medical Diagnosis: Low back pain [M54.5] Onset Date:[facility] 18                          Treatment Diagnosis: CS and LS Pain   Prior Hospitalization: see medical history Provider#: [id number]   Medications: Verified on Patient summary List    Comorbidities: Asthma   Prior Level of Function: Likes to do gardening activities.                            Visits from Start of Care: 10    Missed Visits: 2    Status at Evaluation/Last Progress Note: Pt is making slow progress in therapy. He LS pain continues to be worst than CS pain. Ambulation tolerance is approx 15 mins currently. Pt reports 60% improvement. Progress towards Goals: Pt will continue to benefit from skilled therapy to further improve LS pain and pelvic obliquilty as well as improving ambulation tolerance. Goals: to be achieved in 4 weeks:  2. Pt will report pain to LS and CS <4/10 to ease with ADL's and return to gardening activities. 3 Pt will ambulate >20 mins w/o pain to return to community ambulation and regular ex activities.    4. Pt will report 70% or greater improvement in therapy to improve QOL.      ASSESSMENT/RECOMMENDATIONS:  []Continue therapy per initial plan/protocol at a frequency of  2 x per week for 4 weeks  []Continue therapy with the following recommended changes:_____________________      _____________________________________________________________________  []Discontinue therapy progressing towards or have reached established goals  []Discontinue therapy due to lack of appreciable progress towards goals  []Discontinue therapy due to lack of attendance or compliance  []Await Physician's recommendations/decisions regarding therapy  []Other:________________________________________________________________    Thank you for this referral.   Laurita Ledezma, PT 9/26/2018 9:46 AM  NOTE TO PHYSICIAN:  PLEASE COMPLETE THE ORDERS BELOW AND   [facility]X TO [facility]: [phone]  If you are unable to process this request in 24 hours please contact our office: [phone]    ? I have read the above report and request that my patient continue as recommended. ? I have read the above report and request that my patient continue therapy with the following changes/special instructions:____________________________________  ? I have read the above report and request that my patient be discharged from therapy.     [de-identified] Signature:____________Date:_________TIME:________    [employer], Date and Time must be completed for valid certification **

## 2018-11-02 ENCOUNTER — OFFICE VISIT (OUTPATIENT)
Dept: CARDIOLOGY CLINIC | Age: 58
End: 2018-11-02

## 2018-11-02 VITALS
SYSTOLIC BLOOD PRESSURE: 108 MMHG | HEART RATE: 94 BPM | HEIGHT: 65 IN | WEIGHT: 210 LBS | DIASTOLIC BLOOD PRESSURE: 82 MMHG | BODY MASS INDEX: 34.99 KG/M2

## 2018-11-02 DIAGNOSIS — I34.0 NON-RHEUMATIC MITRAL REGURGITATION: ICD-10-CM

## 2018-11-02 DIAGNOSIS — I49.3 PVC (PREMATURE VENTRICULAR CONTRACTION): ICD-10-CM

## 2018-11-02 DIAGNOSIS — R07.9 CHEST PAIN, UNSPECIFIED TYPE: ICD-10-CM

## 2018-11-02 DIAGNOSIS — E78.5 HYPERLIPIDEMIA, UNSPECIFIED HYPERLIPIDEMIA TYPE: ICD-10-CM

## 2018-11-02 DIAGNOSIS — Q21.12 PFO (PATENT FORAMEN OVALE): ICD-10-CM

## 2018-11-02 DIAGNOSIS — R07.9 CHEST PAIN, UNSPECIFIED TYPE: Primary | ICD-10-CM

## 2018-11-02 RX ORDER — NITROGLYCERIN 0.4 MG/1
0.4 TABLET SUBLINGUAL
Qty: 25 TAB | Refills: 1 | Status: SHIPPED | OUTPATIENT
Start: 2018-11-02

## 2018-11-02 RX ORDER — ASPIRIN 81 MG/1
TABLET ORAL DAILY
COMMUNITY

## 2018-11-02 RX ORDER — MONTELUKAST SODIUM 10 MG/1
10 TABLET ORAL DAILY
COMMUNITY

## 2018-11-02 NOTE — PROGRESS NOTES
Patient didn't bring medications, verbally reviewed    1. Have you been to the ER, urgent care clinic since your last visit? Hospitalized since your last visit? Yes When: 06/18 Where: [facility] Reason for visit: Bloody Stool    2. Have you seen or consulted any other health care providers outside of the [address] [place] [address] since your last visit? Include any pap smears or colon screening.  No

## 2018-11-02 NOTE — PROGRESS NOTES
HISTORY OF PRESENT ILLNESS  La Nena Kidd is a 66 y.o. female. Cholesterol Problem   The history is provided by the patient. This is a chronic problem. The problem occurs constantly. Associated symptoms include chest pain. Pertinent negatives include no abdominal pain, no headaches and no shortness of breath. Chest Pain (Angina)    The history is provided by the patient. This is a new problem. The problem has been resolved. The problem occurs every several days. The pain is associated with exertion and rest. The pain is present in the substernal region. The pain is moderate. The quality of the pain is described as pressure-like. The pain does not radiate. Associated symptoms include dizziness. Pertinent negatives include no abdominal pain, no claudication, no cough, no fever, no headaches, no hemoptysis, no nausea, no orthopnea, no palpitations, no PND, no shortness of breath, no sputum production, no vomiting and no weakness. Shortness of Breath   The history is provided by the patient. This is a recurrent problem. The problem occurs intermittently. The problem has not changed since onset. Associated symptoms include wheezing and chest pain. Pertinent negatives include no fever, no headaches, no cough, no sputum production, no hemoptysis, no PND, no orthopnea, no vomiting, no abdominal pain, no rash, no leg swelling and no claudication. The problem's precipitants include exercise. Palpitations    The history is provided by the patient. This is a recurrent problem. The problem has not changed since onset. The problem occurs rarely. Associated symptoms include chest pain and dizziness. Pertinent negatives include no fever, no claudication, no orthopnea, no PND, no abdominal pain, no nausea, no vomiting, no headaches, no weakness, no cough, no hemoptysis, no shortness of breath and no sputum production. Review of Systems   Constitutional: Negative for chills and fever. HENT: Negative for nosebleeds. Eyes: Negative for blurred vision and double vision. Respiratory: Positive for wheezing. Negative for cough, hemoptysis, sputum production and shortness of breath. Cardiovascular: Positive for chest pain. Negative for palpitations, orthopnea, claudication, leg swelling and PND. Gastrointestinal: Negative for abdominal pain, heartburn, nausea and vomiting. Musculoskeletal: Negative for myalgias. Skin: Negative for rash. Neurological: Positive for dizziness. Negative for weakness and headaches. Endo/Heme/Allergies: Does not bruise/bleed easily. Family History   Problem Relation Age of Onset    Hypertension Mother     Diabetes Father     Colon Cancer Father     Cancer Father         prosate    Hypertension Maternal Grandmother     Heart Disease Neg Hx         no family history of heart disease       Past Medical History:   Diagnosis Date    Asthma     Chest pain, unspecified     Possible Angina, GERD, chest wall pains, recurrent pains, possible atypical angina, happens at rest, abnormal nuc scan in past, discussed risk benefit options of cardiac cath/pci, she wants to think it over, add sl ntg    Coronary atherosclerosis of native coronary artery     Abnormal NUC scan, patient's symptoms are better, will start meds and monitor    Heart murmur     Hypercholesterolemia     Ill-defined condition     Patent Foramen Ovale- asymptomatic    Obesity, unspecified     Discussed diet    Other and unspecified hyperlipidemia     no longer on meds    Pain of right thumb     Trigger thumb of right hand        Past Surgical History:   Procedure Laterality Date    HX  SECTION      HX CHOLECYSTECTOMY      HX COLONOSCOPY      HX OTHER SURGICAL      Subcutaneous fistulotomy posterior vaginal wall.     NM ANOSCOPY DX W/COLLJ SPEC BR/WA SPX WHEN PRFRMD N/A 2017    Dr. Yolonda Severs N/A 2017    Dr. Lei Resendiz History     Tobacco Use    Smoking status: Former Smoker     Packs/day: 0.20     Years: 5.00     Pack years: 1.00     Types: Cigarettes     Last attempt to quit: 1995     Years since quittin.7    Smokeless tobacco: Never Used   Substance Use Topics    Alcohol use: Yes     Alcohol/week: 0.0 oz     Frequency: Monthly or less     Drinks per session: 1 or 2     Binge frequency: Never     Comment: socially       Allergies   Allergen Reactions    Penicillins Other (comments)     Takes skin of body        Current Outpatient Medications   Medication Sig    montelukast (SINGULAIR) 10 mg tablet Take 10 mg by mouth daily.  Lactobacillus acidophilus (PROBIOTIC PO) Take  by mouth.  BIOTIN PO Take  by mouth.  ascorbic acid (JESSICA-C PO) Take  by mouth.  aspirin delayed-release 81 mg tablet Take  by mouth daily.  nitroglycerin (NITROSTAT) 0.4 mg SL tablet 1 Tab by SubLINGual route every five (5) minutes as needed for Chest Pain.  oxyCODONE-acetaminophen (PERCOCET) 5-325 mg per tablet Take 1 Tab by mouth every six (6) hours as needed. Max Daily Amount: 4 Tabs.  albuterol (PROVENTIL VENTOLIN) 2.5 mg /3 mL (0.083 %) nebulizer solution 3 mL by Nebulization route every six (6) hours as needed for Wheezing.  albuterol (PROVENTIL HFA, VENTOLIN HFA, PROAIR HFA) 90 mcg/actuation inhaler Take 2 Puffs by inhalation every six (6) hours as needed for Wheezing.  loratadine (CLARITIN) 10 mg tablet Take 10 mg by mouth.  cyanocobalamin (VITAMIN B-12) 1,000 mcg tablet Take 1,000 mcg by mouth daily.  multivitamin (ONE A DAY) tablet Take 1 Tab by mouth daily.  Cholecalciferol, Vitamin D3, 1,000 unit cap Take  by mouth.  zinc 50 mg Tab Take  by mouth daily. No current facility-administered medications for this visit. Visit Vitals  /82   Pulse 94   Ht 5' 5\" (1.651 m)   Wt 95.3 kg (210 lb)   BMI 34.95 kg/m²         Physical Exam   Constitutional: She is oriented to person, place, and time.  She appears well-developed and well-nourished. HENT:   Head: Normocephalic and atraumatic. Eyes: Conjunctivae are normal.   Neck: Neck supple. No JVD present. No tracheal deviation present. No thyromegaly present. Cardiovascular: Normal rate. An irregular rhythm present. PMI is not displaced. Exam reveals no gallop, no S3 and no decreased pulses. No murmur heard. Pulmonary/Chest: No respiratory distress. She has no wheezes. She has no rales. She exhibits no tenderness. Abdominal: Soft. There is no tenderness. Musculoskeletal: She exhibits no edema. Neurological: She is alert and oriented to person, place, and time. Skin: Skin is warm. Psychiatric: She has a normal mood and affect. Ms. Nessa Cunningham has a reminder for a \"due or due soon\" health maintenance. I have asked that she contact her primary care provider for follow-up on this health maintenance. No flowsheet data found. SUMMARY:2015:echo  Left ventricle: Systolic function was normal. Ejection fraction was  estimated in the range of 55 % to 60 %. There were no regional wall motion  abnormalities. Doppler parameters were consistent with abnormal left  ventricular relaxation (grade 1 diastolic dysfunction). Atrial septum: Positive bubble study with a right to left shunt induced by  the Valsalva maneuver. Interatrial septum is aneurysmal with a jump  rope-like appearance. Mitral valve: There was mild regurgitation. Tricuspid valve: There was mild regurgitation. Pulmonary artery systolic  pressure: 30 mmHg. NUCLEAR IMAGIN2015         Findings:   1. Stress images reveal small reduced Myoview uptake in moderate sized area of anterior wall seen in short axis and vertical long axis views. 2. Resting images have no evidence of redistribution in the anterior wall. 3. Gated images reveal normal wall motion and ejection fraction is calculated a 64%. Conclusion:   1. Normal scan.    2. Evidence of a fixed anterior defect is most likely due to soft tissue attenuation. 3. Normal wall motion and ejection fraction. 4. Low risk scan. I have personally reviewed patients ekg done at other facility. Sr,pvc-2016  I Have personally reviewed recent relevant labs available and discussed with patient  2016  High ldl  I have personally reviewed patient's records available from hospital and other providers and incorporated findings in patient care. 2017  Er  I have personally reviewed patients ekg done at other facility. SUMMARY:echo-2017  Left ventricle: Systolic function was normal. Ejection fraction was  estimated to be 55 %. There were no regional wall motion abnormalities. Wall thickness was mildly to moderately increased. Doppler parameters were  consistent with abnormal left ventricular relaxation (grade 1 diastolic  dysfunction). Atrial septum: Interatrial septum is aneurysmal with a jump rope-like  appearance. There was a positive bubble study on previous echo indicating  a PFO. Mitral valve: There was mild regurgitation. Tricuspid valve: There was mild regurgitation. NUCLEAR IMAGIN2017     Findings:   1. Stress images reveal normal Myoview distrubution in all the LV segments in short axis, vertical and horizontal long axis views. 2. Resting images have a normal uptake. 3. Gated images reveal normal wall motion and the ejection fraction is calculated to be 54%. Conclusion:   1. Normal perfusion scan. 2. Normal wall motion and ejection fraction. 3. No evidence of significant fixed or reversible defect suggesting ischemia or myocardial infarction noted from this nuclear study. 4. Low risk scan. 2017-event monitor rare pvc  Assessment       ICD-10-CM ICD-9-CM    1. Chest pain, unspecified type R07.9 786.50 nitroglycerin (NITROSTAT) 0.4 mg SL tablet    stable  rare use of ntg    2. PVC (premature ventricular contraction) I49.3 427.69     rare stable   3. Non-rheumatic mitral regurgitation I34.0 424.0     stable   4. Hyperlipidemia, unspecified hyperlipidemia type E78.5 272.4     stable  lab with pcp   5. PFO (patent foramen ovale) Q21.1 745.5     asymptomatic   6. Chest pain, unspecified type R07.9 786.50 nitroglycerin (NITROSTAT) 0.4 mg SL tablet    better  negative stres stest  medical managment       Medications Discontinued During This Encounter   Medication Reason    pantoprazole (PROTONIX) 40 mg tablet Not A Current Medication    psyllium husk-aspartame (METAMUCIL FIBER) 3.4 gram pwpk packet Not A Current Medication    fluticasone (FLONASE) 50 mcg/actuation nasal spray Not A Current Medication    mometasone (ASMANEX TWISTHALER) 220 mcg (120 doses) aepb inhaler Not A Current Medication    magnesium 250 mg tab Not A Current Medication    OTHER Not A Current Medication    OMEGA-3 FATTY ACIDS/FISH OIL (FISH OIL EXTRA STRENGTH PO) Not A Current Medication    nitroglycerin (NITROSTAT) 0.4 mg SL tablet Reorder       Orders Placed This Encounter    nitroglycerin (NITROSTAT) 0.4 mg SL tablet     Si Tab by SubLINGual route every five (5) minutes as needed for Chest Pain. Dispense:  25 Tab     Refill:  1       Follow-up Disposition:  Return in about 6 months (around 2019).

## 2019-03-07 ENCOUNTER — OFFICE VISIT (OUTPATIENT)
Dept: CARDIOLOGY CLINIC | Age: 59
End: 2019-03-07

## 2019-03-07 VITALS
WEIGHT: 210 LBS | SYSTOLIC BLOOD PRESSURE: 105 MMHG | DIASTOLIC BLOOD PRESSURE: 73 MMHG | BODY MASS INDEX: 34.99 KG/M2 | HEIGHT: 65 IN | HEART RATE: 90 BPM

## 2019-03-07 DIAGNOSIS — R07.9 CHEST PAIN, UNSPECIFIED TYPE: ICD-10-CM

## 2019-03-07 DIAGNOSIS — E78.5 HYPERLIPIDEMIA, UNSPECIFIED HYPERLIPIDEMIA TYPE: ICD-10-CM

## 2019-03-07 DIAGNOSIS — I49.3 PVC (PREMATURE VENTRICULAR CONTRACTION): ICD-10-CM

## 2019-03-07 DIAGNOSIS — I34.0 NON-RHEUMATIC MITRAL REGURGITATION: ICD-10-CM

## 2019-03-07 DIAGNOSIS — Q21.12 PFO (PATENT FORAMEN OVALE): Primary | ICD-10-CM

## 2019-03-07 NOTE — PROGRESS NOTES
Patient didn't bring medications, verbally reviewed    1. Have you been to the ER, urgent care clinic since your last visit? Hospitalized since your last visit? No    2. Have you seen or consulted any other health care providers outside of the [address] [place] [address] since your last visit? Include any pap smears or colon screening.  Yes Where: [facility] Reason for visit: Back/Neck Pain

## 2019-03-07 NOTE — PROGRESS NOTES
HISTORY OF PRESENT ILLNESS  Gissell Domingo is a 66 y.o. female. Cholesterol Problem   The history is provided by the patient. This is a chronic problem. The problem occurs constantly. Associated symptoms include chest pain. Pertinent negatives include no abdominal pain, no headaches and no shortness of breath. Chest Pain (Angina)    The history is provided by the patient. This is a new problem. The problem has been resolved. The problem occurs every several days. The pain is associated with exertion and rest. The pain is present in the substernal region. The pain is moderate. The quality of the pain is described as pressure-like. The pain does not radiate. Associated symptoms include dizziness. Pertinent negatives include no abdominal pain, no claudication, no cough, no fever, no headaches, no hemoptysis, no nausea, no orthopnea, no palpitations, no PND, no shortness of breath, no sputum production, no vomiting and no weakness. Shortness of Breath   The history is provided by the patient. This is a recurrent problem. The problem occurs intermittently. The problem has not changed since onset. Associated symptoms include wheezing and chest pain. Pertinent negatives include no fever, no headaches, no cough, no sputum production, no hemoptysis, no PND, no orthopnea, no vomiting, no abdominal pain, no rash, no leg swelling and no claudication. The problem's precipitants include exercise. Palpitations    The history is provided by the patient. This is a recurrent problem. The problem has not changed since onset. The problem occurs rarely. Associated symptoms include chest pain and dizziness. Pertinent negatives include no fever, no claudication, no orthopnea, no PND, no abdominal pain, no nausea, no vomiting, no headaches, no weakness, no cough, no hemoptysis, no shortness of breath and no sputum production. Review of Systems   Constitutional: Negative for chills and fever. HENT: Negative for nosebleeds. Eyes: Negative for blurred vision and double vision. Respiratory: Positive for wheezing. Negative for cough, hemoptysis, sputum production and shortness of breath. Cardiovascular: Positive for chest pain. Negative for palpitations, orthopnea, claudication, leg swelling and PND. Gastrointestinal: Negative for abdominal pain, heartburn, nausea and vomiting. Musculoskeletal: Negative for myalgias. Skin: Negative for rash. Neurological: Positive for dizziness. Negative for weakness and headaches. Endo/Heme/Allergies: Does not bruise/bleed easily. Family History   Problem Relation Age of Onset    Hypertension Mother     Diabetes Father     Colon Cancer Father     Cancer Father         prosate    Hypertension Maternal Grandmother     Heart Disease Neg Hx         no family history of heart disease       Past Medical History:   Diagnosis Date    Asthma     Chest pain, unspecified     Possible Angina, GERD, chest wall pains, recurrent pains, possible atypical angina, happens at rest, abnormal nuc scan in past, discussed risk benefit options of cardiac cath/pci, she wants to think it over, add sl ntg    Coronary atherosclerosis of native coronary artery     Abnormal NUC scan, patient's symptoms are better, will start meds and monitor    Heart murmur     Hypercholesterolemia     Ill-defined condition     Patent Foramen Ovale- asymptomatic    Obesity, unspecified     Discussed diet    Other and unspecified hyperlipidemia     no longer on meds    Pain of right thumb     Trigger thumb of right hand        Past Surgical History:   Procedure Laterality Date    COLONOSCOPY N/A 2018    COLONOSCOPY with clips, gold probe performed by Savita Willett MD at [facility]COPY    HX  SECTION      HX CHOLECYSTECTOMY      HX COLONOSCOPY      HX OTHER SURGICAL      Subcutaneous fistulotomy posterior vaginal wall.     MS ANOSCOPY DX W/COLLJ SPEC BR/WA SPX WHEN PRFRMD N/A 2017     Khang Rosas    [place] BIOPSY OF VAGINA,SIMPLE N/A 2017    Dr. Khang Rosas       Social History     Tobacco Use    Smoking status: Former Smoker     Packs/day: 0.20     Years: 5.00     Pack years: 1.00     Types: Cigarettes     Last attempt to quit: 1995     Years since quittin.0    Smokeless tobacco: Never Used   Substance Use Topics    Alcohol use: Yes     Alcohol/week: 0.0 oz     Frequency: Monthly or less     Drinks per session: 1 or 2     Binge frequency: Never     Comment: socially       Allergies   Allergen Reactions    Penicillins Other (comments)     Takes skin of body        Current Outpatient Medications   Medication Sig    montelukast (SINGULAIR) 10 mg tablet Take 10 mg by mouth daily.  Lactobacillus acidophilus (PROBIOTIC PO) Take  by mouth.  ascorbic acid (JESSICA-C PO) Take  by mouth.  aspirin delayed-release 81 mg tablet Take  by mouth daily.  nitroglycerin (NITROSTAT) 0.4 mg SL tablet 1 Tab by SubLINGual route every five (5) minutes as needed for Chest Pain.  oxyCODONE-acetaminophen (PERCOCET) 5-325 mg per tablet Take 1 Tab by mouth every six (6) hours as needed. Max Daily Amount: 4 Tabs.  albuterol (PROVENTIL VENTOLIN) 2.5 mg /3 mL (0.083 %) nebulizer solution 3 mL by Nebulization route every six (6) hours as needed for Wheezing.  albuterol (PROVENTIL HFA, VENTOLIN HFA, PROAIR HFA) 90 mcg/actuation inhaler Take 2 Puffs by inhalation every six (6) hours as needed for Wheezing.  loratadine (CLARITIN) 10 mg tablet Take 10 mg by mouth.  cyanocobalamin (VITAMIN B-12) 1,000 mcg tablet Take 1,000 mcg by mouth daily.  multivitamin (ONE A DAY) tablet Take 1 Tab by mouth daily.  Cholecalciferol, Vitamin D3, 1,000 unit cap Take  by mouth.  zinc 50 mg Tab Take  by mouth daily.  BIOTIN PO Take  by mouth. No current facility-administered medications for this visit.         Visit Vitals  /73   Pulse 90   Ht 5' 5\" (1.651 m)   Wt 95.3 kg (210 lb)   BMI 34.95 kg/m²         Physical Exam   Constitutional: She is oriented to person, place, and time. She appears well-developed and well-nourished. HENT:   Head: Normocephalic and atraumatic. Eyes: Conjunctivae are normal.   Neck: Neck supple. No JVD present. No tracheal deviation present. No thyromegaly present. Cardiovascular: Normal rate and regular rhythm. PMI is not displaced. Exam reveals no gallop, no S3 and no decreased pulses. No murmur heard. Pulmonary/Chest: No respiratory distress. She has no wheezes. She has no rales. She exhibits no tenderness. Abdominal: Soft. There is no tenderness. Musculoskeletal: She exhibits no edema. Neurological: She is alert and oriented to person, place, and time. Skin: Skin is warm. Psychiatric: She has a normal mood and affect. Ms. Mukund Way has a reminder for a \"due or due soon\" health maintenance. I have asked that she contact her primary care provider for follow-up on this health maintenance. No flowsheet data found. SUMMARY:2015:echo  Left ventricle: Systolic function was normal. Ejection fraction was  estimated in the range of 55 % to 60 %. There were no regional wall motion  abnormalities. Doppler parameters were consistent with abnormal left  ventricular relaxation (grade 1 diastolic dysfunction). Atrial septum: Positive bubble study with a right to left shunt induced by  the Valsalva maneuver. Interatrial septum is aneurysmal with a jump  rope-like appearance. Mitral valve: There was mild regurgitation. Tricuspid valve: There was mild regurgitation. Pulmonary artery systolic  pressure: 30 mmHg. NUCLEAR IMAGIN2015         Findings:   1. Stress images reveal small reduced Myoview uptake in moderate sized area of anterior wall seen in short axis and vertical long axis views. 2. Resting images have no evidence of redistribution in the anterior wall. 3. Gated images reveal normal wall motion and ejection fraction is calculated a 64%. Conclusion:   1. Normal scan. 2. Evidence of a fixed anterior defect is most likely due to soft tissue attenuation. 3. Normal wall motion and ejection fraction. 4. Low risk scan. I have personally reviewed patients ekg done at other facility. Sr,pvc-2016  I Have personally reviewed recent relevant labs available and discussed with patient  2016  High ldl  I have personally reviewed patient's records available from hospital and other providers and incorporated findings in patient care. 2017  Er  I have personally reviewed patients ekg done at other facility. SUMMARY:echo-2017  Left ventricle: Systolic function was normal. Ejection fraction was  estimated to be 55 %. There were no regional wall motion abnormalities. Wall thickness was mildly to moderately increased. Doppler parameters were  consistent with abnormal left ventricular relaxation (grade 1 diastolic  dysfunction). Atrial septum: Interatrial septum is aneurysmal with a jump rope-like  appearance. There was a positive bubble study on previous echo indicating  a PFO. Mitral valve: There was mild regurgitation. Tricuspid valve: There was mild regurgitation. NUCLEAR IMAGIN2017     Findings:   1. Stress images reveal normal Myoview distrubution in all the LV segments in short axis, vertical and horizontal long axis views. 2. Resting images have a normal uptake. 3. Gated images reveal normal wall motion and the ejection fraction is calculated to be 54%. Conclusion:   1. Normal perfusion scan. 2. Normal wall motion and ejection fraction. 3. No evidence of significant fixed or reversible defect suggesting ischemia or myocardial infarction noted from this nuclear study. 4. Low risk scan. 2017-event monitor rare pvc  Assessment       ICD-10-CM ICD-9-CM    1. PFO (patent foramen ovale) Q21.1 745.5     Continue aspirin. Asymptomatic   2.  PVC (premature ventricular contraction) I49.3 427.69     Stable asymptomatic continue monitoring   3. Non-rheumatic mitral regurgitation I34.0 424.0     Mild stable asymptomatic   4. Hyperlipidemia, unspecified hyperlipidemia type E78.5 272.4 CT HEART W/O CONT WITH CALCIUM    Continue diet exercise. Lab with PCP   5. Chest pain, unspecified type R07.9 786.50 AMB POC EKG ROUTINE W/ 12 LEADS, INTER & REP      CT HEART W/O CONT WITH CALCIUM    Recent increasing chest pain. Less likely angina will get a CTA to assess for calcium score. Negative stress test in past       There are no discontinued medications. Orders Placed This Encounter    CT HEART W/O CONT WITH CALCIUM     Standing Status:   Future     Standing Expiration Date:   4/5/2020     Order Specific Question:   Is Patient Allergic to Contrast Dye? Answer:   No    AMB POC EKG ROUTINE W/ 12 LEADS, INTER & REP     Order Specific Question:   Reason for Exam:     Answer:   Chest Pain       Follow-up Disposition:  Return for F/u after tests.

## 2019-03-07 NOTE — LETTER
Althea Duran 1960 
 
3/7/2019 Dear MD Marisa Cali MD 
 
I had the pleasure of evaluating  Ms. Jaffe in office today. Below are the relevant portions of my assessment and plan of care. ICD-10-CM ICD-9-CM 1. PFO (patent foramen ovale) Q21.1 745.5 Continue aspirin. Asymptomatic 2. PVC (premature ventricular contraction) I49.3 427.69 Stable asymptomatic continue monitoring 3. Non-rheumatic mitral regurgitation I34.0 424.0 Mild stable asymptomatic 4. Hyperlipidemia, unspecified hyperlipidemia type E78.5 272.4 CT HEART W/O CONT WITH CALCIUM Continue diet exercise. Lab with PCP  
5. Chest pain, unspecified type R07.9 786.50 AMB POC EKG ROUTINE W/ 12 LEADS, INTER & REP  
   CT HEART W/O CONT WITH CALCIUM Recent increasing chest pain. Less likely angina will get a CTA to assess for calcium score. Negative stress test in past  
 
 
Current Outpatient Medications Medication Sig Dispense Refill  montelukast (SINGULAIR) 10 mg tablet Take 10 mg by mouth daily.  Lactobacillus acidophilus (PROBIOTIC PO) Take  by mouth.  ascorbic acid (JESSICA-C PO) Take  by mouth.  aspirin delayed-release 81 mg tablet Take  by mouth daily.  nitroglycerin (NITROSTAT) 0.4 mg SL tablet 1 Tab by SubLINGual route every five (5) minutes as needed for Chest Pain. 25 Tab 1  
 oxyCODONE-acetaminophen (PERCOCET) 5-325 mg per tablet Take 1 Tab by mouth every six (6) hours as needed. Max Daily Amount: 4 Tabs. 12 Tab 0  
 albuterol (PROVENTIL VENTOLIN) 2.5 mg /3 mL (0.083 %) nebulizer solution 3 mL by Nebulization route every six (6) hours as needed for Wheezing. 24 Each 3  
 albuterol (PROVENTIL HFA, VENTOLIN HFA, PROAIR HFA) 90 mcg/actuation inhaler Take 2 Puffs by inhalation every six (6) hours as needed for Wheezing. 1 Inhaler 6  
 loratadine (CLARITIN) 10 mg tablet Take 10 mg by mouth.  cyanocobalamin (VITAMIN B-12) 1,000 mcg tablet Take 1,000 mcg by mouth daily.  multivitamin (ONE A DAY) tablet Take 1 Tab by mouth daily.  Cholecalciferol, Vitamin D3, 1,000 unit cap Take  by mouth.  zinc 50 mg Tab Take  by mouth daily.  BIOTIN PO Take  by mouth. Orders Placed This Encounter  CT HEART W/O CONT WITH CALCIUM Standing Status:   Future Standing Expiration Date:   4/5/2020 Order Specific Question:   Is Patient Allergic to Contrast Dye? Answer:   No  
 AMB POC EKG ROUTINE W/ 12 LEADS, INTER & REP Order Specific Question:   Reason for Exam: Answer:   Chest Pain If you have questions, please do not hesitate to call me. I look forward to following Ms. Jaffe along with you. Sincerely, Rodney Hills MD

## 2019-03-18 RX ORDER — ALBUTEROL SULFATE 0.83 MG/ML
SOLUTION RESPIRATORY (INHALATION)
Qty: 120 EACH | Refills: 1 | Status: SHIPPED | OUTPATIENT
Start: 2019-03-18 | End: 2019-07-16 | Stop reason: ALTCHOICE

## 2019-03-26 RX ORDER — ALBUTEROL SULFATE 90 UG/1
AEROSOL, METERED RESPIRATORY (INHALATION)
Qty: 18 INHALER | Refills: 1 | Status: SHIPPED | OUTPATIENT
Start: 2019-03-26 | End: 2019-07-16 | Stop reason: ALTCHOICE

## 2019-03-29 NOTE — ANCILLARY DISCHARGE INSTRUCTIONS
[facility] [employer] [facility]  [address]  [phone] fax    Physical Therapy Discharge Summary        Patient name: Anna Jeffery Start of Care: 2018   Referral source: Jed Miguel NP : 1960                          Medical Diagnosis: Low back pain [M54.5] Onset Date:[facility] 18                          Treatment Diagnosis: CS and LS Pain   Prior Hospitalization: see medical history Provider#: [id number]   Medications: Verified on Patient summary List    Comorbidities: Asthma   Prior Level of Function: Likes to do gardening activities.                             Visits from Start of Care: 14    Missed Visits: 2    Reporting Period : 18 to 18    Summary of Care:  1. Pt will increase FOTO score by  23  pts to improve function. PROGRESSING 18 score=44  2. Pt will report pain to LS and CS <4/10 to ease with ADL's and return to gardening activities. -  PROGRESSING 5/10 18  3  Pt will ambulate >20 mins w/o pain to return to community ambulation and regular ex activities. -  PROGRESSING 15MIN 18  4. Pt will report 50% or greater improvement in therapy to improve QOL. -  MET 60% 18      Pt continued to have high pain levels with minimal relief in Therapy.   Pt [address] with goals progressing    ASSESSMENT/RECOMMENDATIONS:  [x]Discontinue therapy: []Patient has reached or is progressing toward set goals      []Patient is non-compliant or has abdicated      []Due to lack of appreciable progress towards set goals    Mara Perez, PT 3/29/2019 9:43 AM

## 2019-04-09 ENCOUNTER — HOSPITAL ENCOUNTER (OUTPATIENT)
Dept: CT IMAGING | Age: 59
Discharge: HOME | End: 2019-04-09
Attending: INTERNAL MEDICINE
Payer: SELF-PAY

## 2019-04-09 DIAGNOSIS — E78.5 HYPERLIPIDEMIA, UNSPECIFIED HYPERLIPIDEMIA TYPE: ICD-10-CM

## 2019-04-09 DIAGNOSIS — R07.9 CHEST PAIN, UNSPECIFIED TYPE: ICD-10-CM

## 2019-04-09 PROCEDURE — 75571 CT HRT W/O DYE W/CA TEST: CPT

## 2019-05-02 ENCOUNTER — OFFICE VISIT (OUTPATIENT)
Dept: PULMONOLOGY | Age: 59
End: 2019-05-02

## 2019-05-02 VITALS
OXYGEN SATURATION: 99 % | RESPIRATION RATE: 16 BRPM | BODY MASS INDEX: 34.82 KG/M2 | HEART RATE: 92 BPM | HEIGHT: 65 IN | SYSTOLIC BLOOD PRESSURE: 110 MMHG | WEIGHT: 209 LBS | TEMPERATURE: 97.9 F | DIASTOLIC BLOOD PRESSURE: 80 MMHG

## 2019-05-02 DIAGNOSIS — E66.9 OBESITY (BMI 30.0-34.9): ICD-10-CM

## 2019-05-02 DIAGNOSIS — J45.909 ASTHMA, UNSPECIFIED ASTHMA SEVERITY, UNSPECIFIED WHETHER COMPLICATED, UNSPECIFIED WHETHER PERSISTENT: Primary | ICD-10-CM

## 2019-05-02 DIAGNOSIS — R06.2 WHEEZING: ICD-10-CM

## 2019-05-02 RX ORDER — PREDNISONE 20 MG/1
40 TABLET ORAL DAILY
Qty: 14 TAB | Refills: 0 | Status: SHIPPED | OUTPATIENT
Start: 2019-05-02 | End: 2019-05-09

## 2019-05-02 RX ORDER — IPRATROPIUM BROMIDE AND ALBUTEROL SULFATE 2.5; .5 MG/3ML; MG/3ML
3 SOLUTION RESPIRATORY (INHALATION)
Qty: 1 NEBULE | Refills: 0 | Status: SHIPPED | COMMUNITY
Start: 2019-05-02 | End: 2019-05-02
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
## 2019-05-02 NOTE — PROGRESS NOTES
Skylar Resendiz presents today for   Chief Complaint   Patient presents with    Asthma    Shortness of Breath       Is someone accompanying this pt? no    Is the patient using any DME equipment during OV? no    -DME Company N/A    Depression Screening:  3 most recent PHQ Screens 5/2/2019   Little interest or pleasure in doing things Not at all   Feeling down, depressed, irritable, or hopeless Not at all   Total Score PHQ 2 0       Learning Assessment:  Learning Assessment 5/2/2019   PRIMARY LEARNER Patient   HIGHEST LEVEL OF EDUCATION - PRIMARY LEARNER  -   BARRIERS PRIMARY LEARNER -   PRIMARY LANGUAGE ENGLISH   LEARNER PREFERENCE PRIMARY DEMONSTRATION     VIDEOS   ANSWERED BY Bryson Wiseman   RELATIONSHIP SELF       Abuse Screening:  Abuse Screening Questionnaire 5/2/2019   Do you ever feel afraid of your partner? N   Are you in a relationship with someone who physically or mentally threatens you? N   Is it safe for you to go home? Y       Fall Risk  Fall Risk Assessment, last 12 mths 5/2/2019   Able to walk? Yes   Fall in past 12 months? No         Coordination of Care:  1. Have you been to the ER, urgent care clinic since your last visit? Hospitalized since your last visit? Yes; Where: [facility], When: weekend of 4/20/19    2. Have you seen or consulted any other health care providers outside of the [address] [place] [address] since your last visit? Include any pap smears or colon screening. N/A    *.

## 2019-05-02 NOTE — PATIENT INSTRUCTIONS
Trial Arnuity Ellipta, 1 puff once daily, rinse mouth out after use    Continue Singulair 10 mg once daily    Continue claritin  daily    Start prednisone 40 mg (2 tabs) daily x7 days.   Take with food    Use albuterol nebulizer or rescue inhaler every 4-6 hours as needed, for shortness of breath, wheezing, cough    Recommend avoidance of triggers/precipitants as much as possible    Follow-up in pulmonary clinic in 2 weeks or sooner with worsening of symptoms    Report to the ER with chest pain or difficulty breathing

## 2019-07-12 ENCOUNTER — TELEPHONE (OUTPATIENT)
Dept: PULMONOLOGY | Age: 59
End: 2019-07-12

## 2019-07-16 ENCOUNTER — OFFICE VISIT (OUTPATIENT)
Dept: PULMONOLOGY | Age: 59
End: 2019-07-16

## 2019-07-16 ENCOUNTER — TELEPHONE (OUTPATIENT)
Dept: PULMONOLOGY | Age: 59
End: 2019-07-16

## 2019-07-16 VITALS
RESPIRATION RATE: 18 BRPM | OXYGEN SATURATION: 99 % | TEMPERATURE: 98 F | WEIGHT: 210 LBS | DIASTOLIC BLOOD PRESSURE: 78 MMHG | HEIGHT: 65 IN | SYSTOLIC BLOOD PRESSURE: 120 MMHG | BODY MASS INDEX: 34.99 KG/M2 | HEART RATE: 66 BPM

## 2019-07-16 DIAGNOSIS — J45.909 ASTHMA, UNSPECIFIED ASTHMA SEVERITY, UNSPECIFIED WHETHER COMPLICATED, UNSPECIFIED WHETHER PERSISTENT: Primary | ICD-10-CM

## 2019-07-16 DIAGNOSIS — J30.9 ALLERGIC RHINITIS, UNSPECIFIED SEASONALITY, UNSPECIFIED TRIGGER: ICD-10-CM

## 2019-07-16 DIAGNOSIS — J45.909 ASTHMA, UNSPECIFIED ASTHMA SEVERITY, UNSPECIFIED WHETHER COMPLICATED, UNSPECIFIED WHETHER PERSISTENT: ICD-10-CM

## 2019-07-16 DIAGNOSIS — E66.9 OBESITY (BMI 30-39.9): ICD-10-CM

## 2019-07-16 RX ORDER — PREDNISONE 20 MG/1
40 TABLET ORAL DAILY
Qty: 14 TAB | Refills: 0 | Status: SHIPPED | OUTPATIENT
Start: 2019-07-16 | End: 2019-07-23

## 2019-07-16 RX ORDER — ALBUTEROL SULFATE 90 UG/1
AEROSOL, METERED RESPIRATORY (INHALATION)
Qty: 1 INHALER | Refills: 3 | Status: SHIPPED | OUTPATIENT
Start: 2019-07-16 | End: 2020-03-24 | Stop reason: SDUPTHER
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
## 2019-07-16 NOTE — TELEPHONE ENCOUNTER
Arnuity is not covered by insurance. Asmanex Twisthaler, Flovent Diskus or HFA, Pulmicort or Qvar redihaler look to be preferred.

## 2019-07-16 NOTE — PATIENT INSTRUCTIONS
· Continue Arnuity Ellipta 1 inhalation once daily, rinse mouth out after use    · Trial Spiriva Respimat 1.25 mcg 2 inhalations once daily, rinse mouth out after use    · Continue albuterol nebulizer or rescue inhaler every 4-6 hours as needed for increased shortness of breath, wheezing, cough    · Take over-the-counter Mucinex as needed for cough    · Continue Singulair 10 mg daily and over-the-counter Claritin    · Use Flonase as needed for nasal congestion/drainage    · Complete course of prednisone- 40 mg daily for 7 days    · Complete blood work- IgE and eosinophils    · Recommend avoidance of allergens precipitants as much as possible    · Follow-up in pulmonary clinic in 3 months or sooner with worsening of symptoms    · Go to the ER with chest pain or difficulty breathing

## 2019-07-17 DIAGNOSIS — J45.909 ASTHMA, UNSPECIFIED ASTHMA SEVERITY, UNSPECIFIED WHETHER COMPLICATED, UNSPECIFIED WHETHER PERSISTENT: Primary | ICD-10-CM

## 2019-08-13 ENCOUNTER — TELEPHONE (OUTPATIENT)
Dept: PULMONOLOGY | Age: 59
End: 2019-08-13

## 2019-08-13 NOTE — TELEPHONE ENCOUNTER
Pt is requesting an order for a peak flow meter to be sent to [facility] in [place]. Please call pt at [phone] once done.

## 2019-09-27 ENCOUNTER — APPOINTMENT (OUTPATIENT)
Dept: GENERAL RADIOLOGY | Age: 59
End: 2019-09-27
Attending: EMERGENCY MEDICINE
Payer: COMMERCIAL

## 2019-09-27 ENCOUNTER — HOSPITAL ENCOUNTER (EMERGENCY)
Age: 59
Discharge: HOME | End: 2019-09-27
Attending: EMERGENCY MEDICINE | Admitting: EMERGENCY MEDICINE
Payer: COMMERCIAL

## 2019-09-27 VITALS
OXYGEN SATURATION: 100 % | DIASTOLIC BLOOD PRESSURE: 58 MMHG | BODY MASS INDEX: 32.78 KG/M2 | TEMPERATURE: 98 F | HEART RATE: 84 BPM | SYSTOLIC BLOOD PRESSURE: 99 MMHG | WEIGHT: 204 LBS | HEIGHT: 66 IN | RESPIRATION RATE: 12 BRPM

## 2019-09-27 DIAGNOSIS — R07.9 CHEST PAIN, UNSPECIFIED TYPE: Primary | ICD-10-CM

## 2019-09-27 LAB
ALBUMIN SERPL-MCNC: 3.9 G/DL (ref 3.4–5)
ALBUMIN/GLOB SERPL: 0.9 {RATIO} (ref 0.8–1.7)
ALP SERPL-CCNC: 68 U/L (ref 45–117)
ALT SERPL-CCNC: 18 U/L (ref 13–56)
ANION GAP SERPL CALC-SCNC: 4 MMOL/L (ref 3–18)
APPEARANCE UR: CLEAR
AST SERPL-CCNC: 13 U/L (ref 10–38)
ATRIAL RATE: 98 BPM
BASOPHILS # BLD: 0 K/UL (ref 0–0.1)
BASOPHILS NFR BLD: 0 % (ref 0–2)
BILIRUB SERPL-MCNC: 1.2 MG/DL (ref 0.2–1)
BILIRUB UR QL: NEGATIVE
BUN SERPL-MCNC: 8 MG/DL (ref 7–18)
BUN/CREAT SERPL: 9 (ref 12–20)
CALCIUM SERPL-MCNC: 9.3 MG/DL (ref 8.5–10.1)
CALCULATED P AXIS, ECG09: 19 DEGREES
CALCULATED R AXIS, ECG10: 18 DEGREES
CALCULATED T AXIS, ECG11: 21 DEGREES
CHLORIDE SERPL-SCNC: 109 MMOL/L (ref 100–111)
CO2 SERPL-SCNC: 28 MMOL/L (ref 21–32)
COLOR UR: YELLOW
CREAT SERPL-MCNC: 0.85 MG/DL (ref 0.6–1.3)
DIAGNOSIS, 93000: NORMAL
DIFFERENTIAL METHOD BLD: ABNORMAL
EOSINOPHIL # BLD: 0.2 K/UL (ref 0–0.4)
EOSINOPHIL NFR BLD: 4 % (ref 0–5)
ERYTHROCYTE [DISTWIDTH] IN BLOOD BY AUTOMATED COUNT: 12.5 % (ref 11.6–14.5)
GLOBULIN SER CALC-MCNC: 4.4 G/DL (ref 2–4)
GLUCOSE SERPL-MCNC: 101 MG/DL (ref 74–99)
GLUCOSE UR STRIP.AUTO-MCNC: NEGATIVE MG/DL
HCT VFR BLD AUTO: 43.4 % (ref 35–45)
HGB BLD-MCNC: 14.4 G/DL (ref 12–16)
HGB UR QL STRIP: NEGATIVE
KETONES UR QL STRIP.AUTO: NEGATIVE MG/DL
LEUKOCYTE ESTERASE UR QL STRIP.AUTO: NEGATIVE
LYMPHOCYTES # BLD: 1.8 K/UL (ref 0.9–3.6)
LYMPHOCYTES NFR BLD: 41 % (ref 21–52)
MAGNESIUM SERPL-MCNC: 2.1 MG/DL (ref 1.6–2.6)
MCH RBC QN AUTO: 30 PG (ref 24–34)
MCHC RBC AUTO-ENTMCNC: 33.2 G/DL (ref 31–37)
MCV RBC AUTO: 90.4 FL (ref 74–97)
MONOCYTES # BLD: 0.3 K/UL (ref 0.05–1.2)
MONOCYTES NFR BLD: 6 % (ref 3–10)
NEUTS SEG # BLD: 2.1 K/UL (ref 1.8–8)
NEUTS SEG NFR BLD: 49 % (ref 40–73)
NITRITE UR QL STRIP.AUTO: NEGATIVE
P-R INTERVAL, ECG05: 132 MS
PH UR STRIP: 7.5 [PH] (ref 5–8)
PLATELET # BLD AUTO: 288 K/UL (ref 135–420)
PMV BLD AUTO: 9.7 FL (ref 9.2–11.8)
POTASSIUM SERPL-SCNC: 4.1 MMOL/L (ref 3.5–5.5)
PROT SERPL-MCNC: 8.3 G/DL (ref 6.4–8.2)
PROT UR STRIP-MCNC: NEGATIVE MG/DL
Q-T INTERVAL, ECG07: 356 MS
QRS DURATION, ECG06: 84 MS
QTC CALCULATION (BEZET), ECG08: 454 MS
RBC # BLD AUTO: 4.8 M/UL (ref 4.2–5.3)
SODIUM SERPL-SCNC: 141 MMOL/L (ref 136–145)
SP GR UR REFRACTOMETRY: 1.01 (ref 1–1.03)
TROPONIN I SERPL-MCNC: <0.02 NG/ML (ref 0–0.04)
TROPONIN I SERPL-MCNC: <0.02 NG/ML (ref 0–0.04)
UROBILINOGEN UR QL STRIP.AUTO: 0.2 EU/DL (ref 0.2–1)
VENTRICULAR RATE, ECG03: 98 BPM
WBC # BLD AUTO: 4.4 K/UL (ref 4.6–13.2)

## 2019-09-27 PROCEDURE — 84484 ASSAY OF TROPONIN QUANT: CPT

## 2019-09-27 PROCEDURE — 81003 URINALYSIS AUTO W/O SCOPE: CPT

## 2019-09-27 PROCEDURE — 83735 ASSAY OF MAGNESIUM: CPT

## 2019-09-27 PROCEDURE — 80053 COMPREHEN METABOLIC PANEL: CPT

## 2019-09-27 PROCEDURE — 71045 X-RAY EXAM CHEST 1 VIEW: CPT

## 2019-09-27 PROCEDURE — 85025 COMPLETE CBC W/AUTO DIFF WBC: CPT

## 2019-09-27 PROCEDURE — 93005 ELECTROCARDIOGRAM TRACING: CPT

## 2019-09-27 PROCEDURE — 74011250637 HC RX REV CODE- 250/637: Performed by: EMERGENCY MEDICINE

## 2019-09-27 PROCEDURE — 99284 EMERGENCY DEPT VISIT MOD MDM: CPT

## 2019-09-27 RX ORDER — GUAIFENESIN 100 MG/5ML
324 LIQUID (ML) ORAL
Status: COMPLETED | OUTPATIENT
Start: 2019-09-27 | End: 2019-09-27

## 2019-09-27 RX ADMIN — ASPIRIN 81 MG 324 MG: 81 TABLET ORAL at 14:58

## 2019-09-27 NOTE — DISCHARGE INSTRUCTIONS

## 2019-09-27 NOTE — ED PROVIDER NOTES
EMERGENCY DEPARTMENT HISTORY AND PHYSICAL EXAM    3:52 PM  Date: 2019  Patient Name: Sarahi Brown    History of Presenting Illness     Chief Complaint   Patient presents with    Chest Pain    Cough    Urinary Frequency        History Provided By: Patient    HPI: Sarahi Brown is a 64 y.o. female with history of multiple medical problems as below. Patient is presenting with left-sided chest pain that felt like pressure, woke her up from sleep that started 2 AM.  She reports that the pain lasted about 8 hours. No history of shortness of breath but reports some nonproductive cough. She felt congested and used her inhaler at home which helped with that the chest pain persisted. Currently chest pain-free. No history of recent travel or prior PEs. No fever or chills. Location:  Severity:  Timing/course:    Onset/Duration:     PCP: Colby Napier MD    Past History     Past Medical History:  Past Medical History:   Diagnosis Date    Asthma     Chest pain, unspecified     Possible Angina, GERD, chest wall pains, recurrent pains, possible atypical angina, happens at rest, abnormal nuc scan in past, discussed risk benefit options of cardiac cath/pci, she wants to think it over, add sl ntg    Coronary atherosclerosis of native coronary artery     Abnormal NUC scan, patient's symptoms are better, will start meds and monitor    Heart murmur     Hypercholesterolemia     Ill-defined condition     Patent Foramen Ovale- asymptomatic    Obesity, unspecified     Discussed diet    Other and unspecified hyperlipidemia     no longer on meds    Pain of right thumb     Trigger thumb of right hand        Past Surgical History:  Past Surgical History:   Procedure Laterality Date    COLONOSCOPY N/A 2018    COLONOSCOPY with clips, gold probe performed by Trina Marinelli MD at [address] HX  SECTION      HX CHOLECYSTECTOMY      HX COLONOSCOPY      HX OTHER SURGICAL      Subcutaneous fistulotomy posterior vaginal wall.  [place] ANOSCOPY DX W/COLLJ SPEC BR/WA SPX WHEN PRFRMD N/A 2017    Dr. Jovana King N/A 2017    Dr. Opal Kyle       Family History:  Family History   Problem Relation Age of Onset    Hypertension Mother     Diabetes Father     Colon Cancer Father     Cancer Father         prosate    Hypertension Maternal Grandmother     Heart Disease Neg Hx         no family history of heart disease       Social History:  Social History     Tobacco Use    Smoking status: Former Smoker     Packs/day: 0.20     Years: 5.00     Pack years: 1.00     Types: Cigarettes     Last attempt to quit: 1995     Years since quittin.6    Smokeless tobacco: Never Used   Substance Use Topics    Alcohol use: Yes     Alcohol/week: 0.0 standard drinks     Frequency: Monthly or less     Drinks per session: 1 or 2     Binge frequency: Never     Comment: socially    Drug use: No       Allergies: Allergies   Allergen Reactions    Penicillins Other (comments)     Takes skin of body        Review of Systems   Review of Systems   Respiratory: Positive for cough and chest tightness. Cardiovascular: Positive for chest pain. All other systems reviewed and are negative. Physical Exam     Patient Vitals for the past 12 hrs:   Temp Pulse Resp BP SpO2   19 1430 98 °F (36.7 °C) 84 12 99/58 100 %   19 1430 -- -- -- -- 99 %   19 1252 98 °F (36.7 °C) 95 18 125/88 100 %       Physical Exam   Constitutional: She is oriented to person, place, and time. She appears well-developed and well-nourished. HENT:   Head: Normocephalic and atraumatic. Eyes: Conjunctivae and EOM are normal.   Neck: Normal range of motion. Neck supple. Cardiovascular: Normal rate and normal heart sounds. Pulmonary/Chest: Effort normal and breath sounds normal. No respiratory distress. Musculoskeletal: Normal range of motion. She exhibits no deformity.    Neurological: She is alert and oriented to person, place, and time. Skin: Skin is warm and dry. Psychiatric: She has a normal mood and affect. Her behavior is normal.       Diagnostic Study Results     Labs -  Recent Results (from the past 12 hour(s))   EKG, 12 LEAD, INITIAL    Collection Time: 09/27/19 12:42 PM   Result Value Ref Range    Ventricular Rate 98 BPM    Atrial Rate 98 BPM    P-R Interval 132 ms    QRS Duration 84 ms    Q-T Interval 356 ms    QTC Calculation (Bezet) 454 ms    Calculated P Axis 19 degrees    Calculated R Axis 18 degrees    Calculated T Axis 21 degrees    Diagnosis       Normal sinus rhythm  Normal ECG  When compared with ECG of 27-JUN-2018 19:11,  No significant change was found  Confirmed by Vignesh Munguia MD, ----- ([id number]) on 9/27/2019 6:17:96 PM     METABOLIC PANEL, COMPREHENSIVE    Collection Time: 09/27/19  1:01 PM   Result Value Ref Range    Sodium 141 136 - 145 mmol/L    Potassium 4.1 3.5 - 5.5 mmol/L    Chloride 109 100 - 111 mmol/L    CO2 28 21 - 32 mmol/L    Anion gap 4 3.0 - 18 mmol/L    Glucose 101 (H) 74 - 99 mg/dL    BUN 8 7.0 - 18 MG/DL    Creatinine 0.85 0.6 - 1.3 MG/DL    BUN/Creatinine ratio 9 (L) 12 - 20      GFR est AA >60 >60 ml/min/1.73m2    GFR est non-AA >60 >60 ml/min/1.73m2    Calcium 9.3 8.5 - 10.1 MG/DL    Bilirubin, total 1.2 (H) 0.2 - 1.0 MG/DL    ALT (SGPT) 18 13 - 56 U/L    AST (SGOT) 13 10 - 38 U/L    Alk.  phosphatase 68 45 - 117 U/L    Protein, total 8.3 (H) 6.4 - 8.2 g/dL    Albumin 3.9 3.4 - 5.0 g/dL    Globulin 4.4 (H) 2.0 - 4.0 g/dL    A-G Ratio 0.9 0.8 - 1.7     CBC WITH AUTOMATED DIFF    Collection Time: 09/27/19  1:01 PM   Result Value Ref Range    WBC 4.4 (L) 4.6 - 13.2 K/uL    RBC 4.80 4.20 - 5.30 M/uL    HGB 14.4 12.0 - 16.0 g/dL    HCT 43.4 35.0 - 45.0 %    MCV 90.4 74.0 - 97.0 FL    MCH 30.0 24.0 - 34.0 PG    MCHC 33.2 31.0 - 37.0 g/dL    RDW 12.5 11.6 - 14.5 %    PLATELET 233 574 - 460 K/uL    MPV 9.7 9.2 - 11.8 FL    NEUTROPHILS 49 40 - 73 %    LYMPHOCYTES 41 21 - 52 %    MONOCYTES 6 3 - 10 %    EOSINOPHILS 4 0 - 5 %    BASOPHILS 0 0 - 2 %    ABS. NEUTROPHILS 2.1 1.8 - 8.0 K/UL    ABS. LYMPHOCYTES 1.8 0.9 - 3.6 K/UL    ABS. MONOCYTES 0.3 0.05 - 1.2 K/UL    ABS. EOSINOPHILS 0.2 0.0 - 0.4 K/UL    ABS. BASOPHILS 0.0 0.0 - 0.1 K/UL    DF AUTOMATED     TROPONIN I    Collection Time: 09/27/19  1:01 PM   Result Value Ref Range    Troponin-I, QT <0.02 0.0 - 0.045 NG/ML   MAGNESIUM    Collection Time: 09/27/19  1:01 PM   Result Value Ref Range    Magnesium 2.1 1.6 - 2.6 mg/dL   URINALYSIS W/ RFLX MICROSCOPIC    Collection Time: 09/27/19  2:35 PM   Result Value Ref Range    Color YELLOW      Appearance CLEAR      Specific gravity 1.015 1.005 - 1.030      pH (UA) 7.5 5.0 - 8.0      Protein NEGATIVE  NEG mg/dL    Glucose NEGATIVE  NEG mg/dL    Ketone NEGATIVE  NEG mg/dL    Bilirubin NEGATIVE  NEG      Blood NEGATIVE  NEG      Urobilinogen 0.2 0.2 - 1.0 EU/dL    Nitrites NEGATIVE  NEG      Leukocyte Esterase NEGATIVE  NEG         Radiologic Studies -   Xr Chest Port    Result Date: 9/27/2019  Impression: No radiographic evidence of acute cardiopulmonary process. Medical Decision Making     ED Course: Progress Notes, Reevaluation, and Consults:    3:52 PM Initial assessment performed. The patients presenting problems have been discussed, and they/their family are in agreement with the care plan formulated and outlined with them. I have encouraged them to ask questions as they arise throughout their visit. Provider Notes (Medical Decision Making): 51-year-old female presenting with substernal chest pain that started at rest and woke her up from sleep lasting 8 hours. Patient is well-appearing on exam and has a non-concerning EKG. Will get screening labs including delta troponin given her risk factors. Given the duration of symptoms I am comfortable with 2 sets of troponin then outpatient follow-up.     Procedures:     Critical Care Time:     Vital Signs-Reviewed the patient's vital signs. Reviewed pt's pulse ox reading. EKG: Interpreted by the EP. Time Interpreted:    Rate:    Rhythm: Normal Sinus Rhythm 98   Interpretation: Normal EKG   Comparison: No significant interval changes    Records Reviewed: Nursing Notes, Old Medical Records and Previous electrocardiograms (Time of Review: 3:52 PM)  -I am the first provider for this patient.  -I reviewed the vital signs, available nursing notes, past medical history, past surgical history, family history and social history. Current Outpatient Medications   Medication Sig Dispense Refill    albuterol (VENTOLIN HFA) 90 mcg/actuation inhaler TAKE 2 PUFFS BY INHALATION EVERY SIX (6) HOURS AS NEEDED FOR WHEEZING. 1 Inhaler 3    calcium-cholecalciferol, d3, (CALCIUM 600 + D) 600-125 mg-unit tab Take  by mouth.  montelukast (SINGULAIR) 10 mg tablet Take 10 mg by mouth daily.  Lactobacillus acidophilus (PROBIOTIC PO) Take  by mouth.  BIOTIN PO Take  by mouth.  ascorbic acid (JESSICA-C PO) Take  by mouth.  aspirin delayed-release 81 mg tablet Take  by mouth daily.  nitroglycerin (NITROSTAT) 0.4 mg SL tablet 1 Tab by SubLINGual route every five (5) minutes as needed for Chest Pain. 25 Tab 1    oxyCODONE-acetaminophen (PERCOCET) 5-325 mg per tablet Take 1 Tab by mouth every six (6) hours as needed. Max Daily Amount: 4 Tabs. 12 Tab 0    loratadine (CLARITIN) 10 mg tablet Take 10 mg by mouth.  cyanocobalamin (VITAMIN B-12) 1,000 mcg tablet Take 1,000 mcg by mouth daily.  multivitamin (ONE A DAY) tablet Take 1 Tab by mouth daily.  Cholecalciferol, Vitamin D3, 1,000 unit cap Take  by mouth.  zinc 50 mg Tab Take  by mouth daily. Clinical Impression     Clinical Impression: No diagnosis found. Disposition: dc      DISCHARGE NOTE:     Pt has been reexamined. Patient has no new complaints, changes, or physical findings. Care plan outlined and precautions discussed.   Results of labs and imaging were reviewed with the patient. All medications were reviewed with the patient; will d/c home with return precautions. All of pt's questions and concerns were addressed. Patient was instructed and agrees to follow up with PCP, as well as to return to the ED upon further deterioration. Patient is ready to go home. This note was dictated utilizing voice recognition software which may lead to typographical errors. I apologize in advance if the situation occurs. If questions arise please do not hesitate to contact me or call our department.     Rolanda Batista MD  3:52 PM

## 2019-09-27 NOTE — ED TRIAGE NOTES
Patient arrived via triage complaining of substernal chest pain and discomfort that started early this morning. Patient advised that she has been coughing when the pain started.

## 2019-09-30 ENCOUNTER — OFFICE VISIT (OUTPATIENT)
Dept: CARDIOLOGY CLINIC | Age: 59
End: 2019-09-30

## 2019-09-30 VITALS
SYSTOLIC BLOOD PRESSURE: 114 MMHG | BODY MASS INDEX: 33.78 KG/M2 | HEART RATE: 77 BPM | HEIGHT: 66 IN | DIASTOLIC BLOOD PRESSURE: 84 MMHG | WEIGHT: 210.2 LBS

## 2019-09-30 DIAGNOSIS — I49.3 PVC (PREMATURE VENTRICULAR CONTRACTION): ICD-10-CM

## 2019-09-30 DIAGNOSIS — I34.0 NON-RHEUMATIC MITRAL REGURGITATION: ICD-10-CM

## 2019-09-30 DIAGNOSIS — E78.5 HYPERLIPIDEMIA, UNSPECIFIED HYPERLIPIDEMIA TYPE: ICD-10-CM

## 2019-09-30 DIAGNOSIS — R07.9 CHEST PAIN, UNSPECIFIED TYPE: Primary | ICD-10-CM

## 2019-09-30 DIAGNOSIS — Q21.12 PFO (PATENT FORAMEN OVALE): ICD-10-CM

## 2019-09-30 NOTE — PROGRESS NOTES
1. Have you been to the ER, urgent care clinic since your last visit? Hospitalized since your last visit? Yes When: 09/2019 Where: [facility] Reason for visit: Chest Pain    2. Have you seen or consulted any other health care providers outside of the [address] [place] [address] since your last visit? Include any pap smears or colon screening.  No

## 2019-09-30 NOTE — PROGRESS NOTES
HISTORY OF PRESENT ILLNESS  Terrance Mcknight is a 64 y.o. female. 9/2019  Recent ER evaluation with chest pain. Data reviewed. I have personally reviewed patient's records available from hospital and other providers and incorporated findings in patient care. Prior records reviewed that showed history of esophagitis. CTA calcium score 0    Cholesterol Problem   The history is provided by the patient. This is a chronic problem. The problem occurs constantly. Associated symptoms include chest pain. Pertinent negatives include no abdominal pain, no headaches and no shortness of breath. Chest Pain (Angina)    The history is provided by the patient. This is a new problem. The problem has been resolved. The problem occurs every several days. The pain is associated with exertion and rest. The pain is present in the substernal region. The pain is moderate. The quality of the pain is described as pressure-like. The pain does not radiate. Associated symptoms include dizziness. Pertinent negatives include no abdominal pain, no claudication, no cough, no fever, no headaches, no hemoptysis, no nausea, no orthopnea, no palpitations, no PND, no shortness of breath, no sputum production, no vomiting and no weakness. Shortness of Breath   The history is provided by the patient. This is a recurrent problem. The problem occurs intermittently. The problem has not changed since onset. Associated symptoms include wheezing and chest pain. Pertinent negatives include no fever, no headaches, no cough, no sputum production, no hemoptysis, no PND, no orthopnea, no vomiting, no abdominal pain, no rash, no leg swelling and no claudication. The problem's precipitants include exercise. Palpitations    The history is provided by the patient. This is a recurrent problem. The problem has not changed since onset. The problem occurs rarely. Associated symptoms include chest pain and dizziness.  Pertinent negatives include no fever, no claudication, no orthopnea, no PND, no abdominal pain, no nausea, no vomiting, no headaches, no weakness, no cough, no hemoptysis, no shortness of breath and no sputum production. Review of Systems   Constitutional: Negative for chills and fever. HENT: Negative for nosebleeds. Eyes: Negative for blurred vision and double vision. Respiratory: Positive for wheezing. Negative for cough, hemoptysis, sputum production and shortness of breath. Cardiovascular: Positive for chest pain. Negative for palpitations, orthopnea, claudication, leg swelling and PND. Gastrointestinal: Negative for abdominal pain, heartburn, nausea and vomiting. Musculoskeletal: Negative for myalgias. Skin: Negative for rash. Neurological: Positive for dizziness. Negative for weakness and headaches. Endo/Heme/Allergies: Does not bruise/bleed easily.      Family History   Problem Relation Age of Onset    Hypertension Mother     Diabetes Father     Colon Cancer Father     Cancer Father         prosate    Hypertension Maternal Grandmother     Heart Disease Neg Hx         no family history of heart disease       Past Medical History:   Diagnosis Date    Asthma     Chest pain, unspecified     Possible Angina, GERD, chest wall pains, recurrent pains, possible atypical angina, happens at rest, abnormal nuc scan in past, discussed risk benefit options of cardiac cath/pci, she wants to think it over, add sl ntg    Coronary atherosclerosis of native coronary artery     Abnormal NUC scan, patient's symptoms are better, will start meds and monitor    Heart murmur     Hypercholesterolemia     Ill-defined condition     Patent Foramen Ovale- asymptomatic    Obesity, unspecified     Discussed diet    Other and unspecified hyperlipidemia     no longer on meds    Pain of right thumb     Trigger thumb of right hand        Past Surgical History:   Procedure Laterality Date    COLONOSCOPY N/A 6/29/2018    COLONOSCOPY with clips, gold probe performed by Simeon eD Los Santos MD at [facility]     [address] HX CHOLECYSTECTOMY      HX COLONOSCOPY  2012    HX OTHER SURGICAL      Subcutaneous fistulotomy posterior vaginal wall.  [place] ANOSCOPY DX W/COLLJ SPEC BR/WA SPX WHEN PRFRMD N/A 2017    Dr. Fred Ramirez N/A 2017    Dr. Bob Garcia History     Tobacco Use    Smoking status: Former Smoker     Packs/day: 0.20     Years: 5.00     Pack years: 1.00     Types: Cigarettes     Last attempt to quit: 1995     Years since quittin.6    Smokeless tobacco: Never Used   Substance Use Topics    Alcohol use: Yes     Alcohol/week: 0.0 standard drinks     Frequency: Monthly or less     Drinks per session: 1 or 2     Binge frequency: Never     Comment: socially       Allergies   Allergen Reactions    Penicillins Other (comments)     Takes skin of body        Current Outpatient Medications   Medication Sig    albuterol (VENTOLIN HFA) 90 mcg/actuation inhaler TAKE 2 PUFFS BY INHALATION EVERY SIX (6) HOURS AS NEEDED FOR WHEEZING.  calcium-cholecalciferol, d3, (CALCIUM 600 + D) 600-125 mg-unit tab Take  by mouth.  montelukast (SINGULAIR) 10 mg tablet Take 10 mg by mouth daily.  BIOTIN PO Take  by mouth.  ascorbic acid (JESSICA-C PO) Take  by mouth.  aspirin delayed-release 81 mg tablet Take  by mouth daily.  nitroglycerin (NITROSTAT) 0.4 mg SL tablet 1 Tab by SubLINGual route every five (5) minutes as needed for Chest Pain.  oxyCODONE-acetaminophen (PERCOCET) 5-325 mg per tablet Take 1 Tab by mouth every six (6) hours as needed. Max Daily Amount: 4 Tabs.  loratadine (CLARITIN) 10 mg tablet Take 10 mg by mouth.  cyanocobalamin (VITAMIN B-12) 1,000 mcg tablet Take 1,000 mcg by mouth daily.  multivitamin (ONE A DAY) tablet Take 1 Tab by mouth daily.  Cholecalciferol, Vitamin D3, 1,000 unit cap Take  by mouth.  zinc 50 mg Tab Take  by mouth daily.      No current facility-administered medications for this visit. Visit Vitals  /84   Pulse 77   Ht 5' 6\" (1.676 m)   Wt 95.3 kg (210 lb 3.2 oz)   BMI 33.93 kg/m²         Physical Exam   Constitutional: She is oriented to person, place, and time. She appears well-developed and well-nourished. HENT:   Head: Normocephalic and atraumatic. Eyes: Conjunctivae are normal.   Neck: Neck supple. No JVD present. No tracheal deviation present. No thyromegaly present. Cardiovascular: Normal rate and regular rhythm. PMI is not displaced. Exam reveals no gallop, no S3 and no decreased pulses. No murmur heard. Pulmonary/Chest: No respiratory distress. She has no wheezes. She has no rales. She exhibits no tenderness. Abdominal: Soft. There is no tenderness. Musculoskeletal: She exhibits no edema. Neurological: She is alert and oriented to person, place, and time. Skin: Skin is warm. Psychiatric: She has a normal mood and affect. Ms. Estefani Young has a reminder for a \"due or due soon\" health maintenance. I have asked that she contact her primary care provider for follow-up on this health maintenance. No flowsheet data found. SUMMARY:2015:echo  Left ventricle: Systolic function was normal. Ejection fraction was  estimated in the range of 55 % to 60 %. There were no regional wall motion  abnormalities. Doppler parameters were consistent with abnormal left  ventricular relaxation (grade 1 diastolic dysfunction). Atrial septum: Positive bubble study with a right to left shunt induced by  the Valsalva maneuver. Interatrial septum is aneurysmal with a jump  rope-like appearance. Mitral valve: There was mild regurgitation. Tricuspid valve: There was mild regurgitation. Pulmonary artery systolic  pressure: 30 mmHg. NUCLEAR IMAGIN2015         Findings:   1. Stress images reveal small reduced Myoview uptake in moderate sized area of anterior wall seen in short axis and vertical long axis views.    2. Resting images have no evidence of redistribution in the anterior wall. 3. Gated images reveal normal wall motion and ejection fraction is calculated a 64%. Conclusion:   1. Normal scan. 2. Evidence of a fixed anterior defect is most likely due to soft tissue attenuation. 3. Normal wall motion and ejection fraction. 4. Low risk scan. I have personally reviewed patients ekg done at other facility. Sr,pvc-2016  I Have personally reviewed recent relevant labs available and discussed with patient  2016  High ldl  I have personally reviewed patient's records available from hospital and other providers and incorporated findings in patient care. 2017  Er  I have personally reviewed patients ekg done at other facility. SUMMARY:echo-2017  Left ventricle: Systolic function was normal. Ejection fraction was  estimated to be 55 %. There were no regional wall motion abnormalities. Wall thickness was mildly to moderately increased. Doppler parameters were  consistent with abnormal left ventricular relaxation (grade 1 diastolic  dysfunction). Atrial septum: Interatrial septum is aneurysmal with a jump rope-like  appearance. There was a positive bubble study on previous echo indicating  a PFO. Mitral valve: There was mild regurgitation. Tricuspid valve: There was mild regurgitation. NUCLEAR IMAGIN2017     Findings:   1. Stress images reveal normal Myoview distrubution in all the LV segments in short axis, vertical and horizontal long axis views. 2. Resting images have a normal uptake. 3. Gated images reveal normal wall motion and the ejection fraction is calculated to be 54%. Conclusion:   1. Normal perfusion scan. 2. Normal wall motion and ejection fraction. 3. No evidence of significant fixed or reversible defect suggesting ischemia or myocardial infarction noted from this nuclear study. 4. Low risk scan. 2017-event monitor rare pvc    Impression.   3/2019- CT scan      Coronary calcium score 0. IMPRESSION: 3/2019     1. Unremarkable limited study of the chest.        Assessment       ICD-10-CM ICD-9-CM    1. Chest pain, unspecified type R07.9 786.50     Atypical chest pain. Clinically noncardiac. CTA score 0. Had esophagitis in the past.  Follow-up with GI   2. PFO (patent foramen ovale) Q21.1 745.5     Stable continue to monitor. Continue aspirin   3. PVC (premature ventricular contraction) I49.3 427.69     Asymptomatic monitor normal ejection fraction   4. Non-rheumatic mitral regurgitation I34.0 424.0     Stable monitor asymptomatic   5. Hyperlipidemia, unspecified hyperlipidemia type E78.5 272.4     Continue dietary modification. Coronary calcium score 0.  3/2019 weight loss. Lab with PCP   9/2019  Cardiac status stable. Atypical chest pain clinically noncardiac. Calcium score 0. History of esophagitis. Follow-up with GI    Medications Discontinued During This Encounter   Medication Reason    Lactobacillus acidophilus (PROBIOTIC PO) Not A Current Medication       No orders of the defined types were placed in this encounter. Follow-up and Dispositions    · Return in about 6 months (around 3/30/2020).

## 2020-01-29 ENCOUNTER — HOSPITAL ENCOUNTER (OUTPATIENT)
Dept: MAMMOGRAPHY | Age: 60
Discharge: HOME | End: 2020-01-29
Attending: FAMILY MEDICINE
Payer: COMMERCIAL

## 2020-01-29 ENCOUNTER — HOSPITAL ENCOUNTER (OUTPATIENT)
Dept: ULTRASOUND IMAGING | Age: 60
Discharge: HOME | End: 2020-01-29
Attending: FAMILY MEDICINE
Payer: COMMERCIAL

## 2020-01-29 DIAGNOSIS — Z87.898 HX OF ABNORMAL MAMMOGRAM: ICD-10-CM

## 2020-01-29 PROCEDURE — 77063 BREAST TOMOSYNTHESIS BI: CPT

## 2020-01-29 PROCEDURE — 77066 DX MAMMO INCL CAD BI: CPT

## 2020-03-24 ENCOUNTER — OFFICE VISIT (OUTPATIENT)
Dept: PULMONOLOGY | Age: 60
End: 2020-03-24

## 2020-03-24 ENCOUNTER — TELEPHONE (OUTPATIENT)
Dept: PULMONOLOGY | Age: 60
End: 2020-03-24

## 2020-03-24 VITALS
OXYGEN SATURATION: 99 % | TEMPERATURE: 98.2 F | WEIGHT: 218.8 LBS | BODY MASS INDEX: 35.17 KG/M2 | HEIGHT: 66 IN | SYSTOLIC BLOOD PRESSURE: 110 MMHG | DIASTOLIC BLOOD PRESSURE: 70 MMHG | HEART RATE: 86 BPM | RESPIRATION RATE: 20 BRPM

## 2020-03-24 DIAGNOSIS — J45.40 MODERATE PERSISTENT ASTHMA WITHOUT COMPLICATION: Primary | ICD-10-CM

## 2020-03-24 DIAGNOSIS — J45.909 ASTHMA, UNSPECIFIED ASTHMA SEVERITY, UNSPECIFIED WHETHER COMPLICATED, UNSPECIFIED WHETHER PERSISTENT: ICD-10-CM

## 2020-03-24 DIAGNOSIS — Q21.12 PFO (PATENT FORAMEN OVALE): ICD-10-CM

## 2020-03-24 DIAGNOSIS — I34.0 NON-RHEUMATIC MITRAL REGURGITATION: ICD-10-CM

## 2020-03-24 DIAGNOSIS — E66.01 SEVERE OBESITY (HCC): ICD-10-CM

## 2020-03-24 RX ORDER — FLUTICASONE FUROATE 100 UG/1
1 POWDER RESPIRATORY (INHALATION) DAILY
Qty: 1 INHALER | Refills: 5 | Status: SHIPPED | OUTPATIENT
Start: 2020-03-24 | End: 2020-03-24 | Stop reason: CLARIF

## 2020-03-24 RX ORDER — ALBUTEROL SULFATE 90 UG/1
AEROSOL, METERED RESPIRATORY (INHALATION)
Qty: 1 INHALER | Refills: 3 | Status: SHIPPED | OUTPATIENT
Start: 2020-03-24 | End: 2021-04-06 | Stop reason: SDUPTHER

## 2020-03-24 RX ORDER — ALBUTEROL SULFATE 0.83 MG/ML
2.5 SOLUTION RESPIRATORY (INHALATION)
Qty: 120 NEBULE | Refills: 3 | Status: SHIPPED | OUTPATIENT
Start: 2020-03-24 | End: 2021-04-06 | Stop reason: SDUPTHER

## 2020-03-24 RX ORDER — PREDNISONE 10 MG/1
TABLET ORAL
Qty: 18 TAB | Refills: 0 | Status: SHIPPED | OUTPATIENT
Start: 2020-03-24 | End: 2020-11-10 | Stop reason: SDUPTHER

## 2020-03-24 RX ORDER — FLUTICASONE PROPIONATE 110 UG/1
2 AEROSOL, METERED RESPIRATORY (INHALATION) EVERY 12 HOURS
Qty: 1 INHALER | Refills: 3 | Status: SHIPPED | OUTPATIENT
Start: 2020-03-24 | End: 2021-04-06 | Stop reason: SDUPTHER

## 2020-03-24 NOTE — PROGRESS NOTES
The pt. C/o chest tightness and heaviness with prod. Of clear, thick sputum x 4 days. She is wheezing \"abit\" HS. Increased SOB with walking and exertion. Dotty Jeff presents today for   Chief Complaint   Patient presents with    Breathing Problem     F/U to 5/2 & 7/16 w/K. Julissa Jesus NP  ED 9/27 w/port. CXR    Wheezing       Is someone accompanying this pt? no    Is the patient using any DME equipment during [address]? nebulizer   -DME Company     Depression Screening:  3 most recent PHQ Screens 5/2/2019   Little interest or pleasure in doing things Not at all   Feeling down, depressed, irritable, or hopeless Not at all   Total Score PHQ 2 0       Learning Assessment:  Learning Assessment 5/2/2019   PRIMARY LEARNER Patient   HIGHEST LEVEL OF EDUCATION - PRIMARY LEARNER  -   BARRIERS PRIMARY LEARNER -   PRIMARY LANGUAGE ENGLISH   LEARNER PREFERENCE PRIMARY DEMONSTRATION     VIDEOS   ANSWERED BY Afua Lemus   RELATIONSHIP SELF       Abuse Screening:  Abuse Screening Questionnaire 5/2/2019   Do you ever feel afraid of your partner? N   Are you in a relationship with someone who physically or mentally threatens you? N   Is it safe for you to go home? Y       Fall Risk  Fall Risk Assessment, last 12 mths 5/2/2019   Able to walk? Yes   Fall in past 12 months? No         Coordination of Care:  1. Have you been to the ER, urgent care clinic since your last visit? Hospitalized since your last visit? No    2. Have you seen or consulted any other health care providers outside of the [address] [place] [address] since your last visit? No MDs outside of [place] [employer]. Mammo Feb. 2020    Medication variance in dosage/sig per patient as follows: Per Med. Rec. **She has not had Pneumo(s) nor Flu Vaccine.

## 2020-03-24 NOTE — PROGRESS NOTES
[facility]  Pulmonary, Critical Care, and Sleep Medicine      Pulmonary Office visit    Name: Natalie Templeton     : 1960     Date: 3/24/2020        Subjective:   Patient has been referred for evaluation of: Asthma    22   Complains of having increased symptoms of chest tightness over the past 4 days. Has a little bit of a scratchy throat. Denies increased nasal congestion or postnasal dripping. Denies any fever or chills  She has been using her Arnuity and Ventolin as needed  Also has a nebulizer through which she uses her albuterol-asking for a new machine since the machine is old  Uses her Flonase regularly and Claritin as well  Denies any ER/acute visits      HPI:  Patient is a 64 y.o. female who hs been diagnosed with asthma and seasonal allergies for several years and treated with different medications but still has breakthrough episodes of exacerbation needing ER visits and interventions. She has been on Singulair, Asmanex, ventolin- MDI and also has a nebulizer. She needs a new machine and supplies. Usually has symptoms of wheezing, cough, increased SOB. Denies orthopnea, PND. Has been noted by her children to be a snorer. She has seasonal increase in nasal stuffiness, postnasal drainage and sneezing- Fall and winter. She is a non smoker. 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 No exposure to industrial dust, inhalational agents. Has recent episodes x2 of syncope- seen in Er and following with Cardiology and neurology. .    C/o intermittent chest pain,   No fever, chills, night sweats       Past Medical History:   Diagnosis Date    Asthma     Chest pain, unspecified     Possible Angina, GERD, chest wall pains, recurrent pains, possible atypical angina, happens at rest, abnormal nuc scan in past, discussed risk benefit options of cardiac cath/pci, she wants to think it over, add sl ntg    Coronary atherosclerosis of native coronary artery     Abnormal NUC scan, patient's symptoms are better, will start meds and monitor    Heart murmur     Hypercholesterolemia     Ill-defined condition     Patent Foramen Ovale- asymptomatic    Obesity, unspecified     Discussed diet    Other and unspecified hyperlipidemia     no longer on meds    Pain of right thumb     Trigger thumb of right hand        Past Surgical History:   Procedure Laterality Date    COLONOSCOPY N/A 6/29/2018    COLONOSCOPY with clips, gold probe performed by Dc De La Garza MD at [facility] HX CHOLECYSTECTOMY      HX COLONOSCOPY  2012    HX OTHER SURGICAL      Subcutaneous fistulotomy posterior vaginal wall.  VT ANOSCOPY DX W/COLLJ SPEC BR/WA SPX WHEN PRFRMD N/A 05/05/2017    Dr. Alin Ya N/A 05/05/2017    Dr. Adalberto Tapia     Allergies   Allergen Reactions    Penicillins Other (comments)     Takes skin of body      Current Outpatient Medications   Medication Sig Dispense Refill    albuterol (VENTOLIN HFA) 90 mcg/actuation inhaler TAKE 2 PUFFS BY INHALATION EVERY SIX (6) HOURS AS NEEDED FOR WHEEZING. 1 Inhaler 3    montelukast (SINGULAIR) 10 mg tablet Take 10 mg by mouth daily.  ascorbic acid (JESSICA-C PO) Take  by mouth Daily (before breakfast).  aspirin delayed-release 81 mg tablet Take  by mouth daily.  nitroglycerin (NITROSTAT) 0.4 mg SL tablet 1 Tab by SubLINGual route every five (5) minutes as needed for Chest Pain. 25 Tab 1    loratadine (CLARITIN) 10 mg tablet Take 10 mg by mouth.  cyanocobalamin (VITAMIN B-12) 1,000 mcg tablet Take 1,000 mcg by mouth daily.  multivitamin (ONE A DAY) tablet Take 1 Tab by mouth daily.  Cholecalciferol, Vitamin D3, 1,000 unit cap Take 1,000 Units by mouth daily.  zinc 50 mg Tab Take  by mouth daily.  calcium-cholecalciferol, d3, (CALCIUM 600 + D) 600-125 mg-unit tab Take  by mouth.  BIOTIN PO Take  by mouth.  oxyCODONE-acetaminophen (PERCOCET) 5-325 mg per tablet Take 1 Tab by mouth every six (6) hours as needed. Max Daily Amount: 4 Tabs.  12 Tab 0         Review of Systems:  HEENT: No epistaxis, no nasal drainage, no difficulty in swallowing, no redness in eyes  Respiratory: as above  Cardiovascular: no chest pain, no palpitations, no chronic leg edema, no syncope  Gastrointestinal: no abd pain, no vomiting, no diarrhea, no bleeding symptoms  Genitourinary: No urinary symptoms or hematuria  Integument/breast: No ulcers or rashes  Musculoskeletal:Neg  Neurological: No focal weakness, no seizures, no headaches  Behvioral/Psych: No anxiety, no depression  Constitutional: No fever, no chills, no weight loss, no night sweats     Objective:     Visit Vitals  /70 (BP 1 Location: Left arm, BP Patient Position: Sitting)   Pulse 86   Temp 98.2 °F (36.8 °C)   Resp 20   Ht 5' 6\" (1.676 m)   Wt 99.2 kg (218 lb 12.8 oz)   SpO2 99%   BMI 35.32 kg/m²        Physical Exam:   General: comfortable, no acute distress  HEENT: pupils reactive, sclera anicteric, EOM intact  Neck: No adenopathy or thyroid swelling, no lymphadenopathy or JVD, supple  CVS: S1S2 no murmurs  RS: Mod AE bilaterally, no tactile fremitus or egophony, no accessory muscle use  Abd: soft, non tender, no hepatosplenomegaly  Neuro: non focal, awake, alert  Extrm: no leg edema, clubbing or cyanosis  Skin: no rash    Data review:   Pertinent labs: CBC, BMP, LFT's      PFT:  Pulmonary Function Test    11/21/17  Patient effort:   Good  Meets ATS criteria for interpretation  Flows:  Maximal Mid Expiratory Flow rate is reduced to 58 % predicted  Forced Expiratory Volume in one second is normal  FEV 1% is reduced  FEF50/FIF50 is reduced  Flow Volume Loop:  Reduced Terminal Flows in the Flow Volume Loop  Bronchodilator:  Significant improvement with bronchodilator in FEF 50/FIF50  Impression:  Mild obstructive defect, Predominately small airways    Date FVC FEV1  FEV1/FVC [id number] TLC RV RV/TLC VC DLCO   11/20/2017    58%                                              Echo:  11/22/2017    Left ventricle: Systolic function was normal. Ejection fraction was  estimated to be 55 %. There were no regional wall motion abnormalities. Wall thickness was mildly to moderately increased. Doppler parameters were  consistent with abnormal left ventricular relaxation (grade 1 diastolic  dysfunction). Atrial septum: Interatrial septum is aneurysmal with a jump rope-like  appearance. There was a positive bubble study on previous echo indicating  a PFO. Mitral valve: There was mild regurgitation. Tricuspid valve: There was mild regurgitation. COMPARISONS:  There has been no significant change. Comparison was made with the  previous study of 14-Jan-2015. Imaging:  I have personally reviewed the patients radiographs and have reviewed the reports:    CXR Results     11/16/2017    Streaky densities in the left lung base are likely atelectasis. No  definite focal consolidation, pleural effusion, pulmonary edema, or  pneumothorax. Heart is top normal in size. Thoracic aorta is mildly tortuous. There are no acute osseous abnormalities. Surgical clips in the right upper  quadrant suggest prior cholecystectomy. Impression:  1. Streaky densities in the left lung base are potentially atelectasis. 1/14/2017    There is no pneumothorax, pneumonia or pleural effusions. Heart and  mediastinal structures are unremarkable. Visualized bony thorax and soft tissues  are within normal limits.      IMPRESSION:  1. No acute cardiopulmonary process. CT Results  (Last 48 hours)    None        .      Patient Active Problem List   Diagnosis Code    Coronary atherosclerosis of native coronary artery I25.10    Chest pain R07.9    Hyperlipidemia E78.5    Obesity E66.9    Asthma J45.909    Hypercholesterolemia E78.00    PVC (premature ventricular contraction) I49.3    PFO (patent foramen ovale) Q21.1    Non-rheumatic mitral regurgitation I34.0    Syncope R55    Lower GI bleed K92.2    Rectovaginal fistula N82.3    Symptomatic anemia D64.9    Severe obesity (HCC) E66.01     IMPRESSION:   · Asthma- moderate intermittent-currently moderate persistent likely triggered by seasonal change. Sub optimal control related to triggers as well as seasonal allergies and GERD as triggers. Spirometry with reduced MMEFR and FEF50/FIF50 with bronchodilator response. Flow volume loop suggests upper airway redundacy. · PFO with non rheumatic MR  · Obesity- BMI 36.15      RECOMMENDATIONS:   · Optimize treatment for asthma- discussed need for trigger control, maintenance therapy and rescue treatment with peak flows monitoring and action plan. · Discussed green, yellow, red zone concept- provided peak flow meter with written action plan instructions  · Discussed spirometry results and rationale for treatment  · Discussed IGE results (119). Repeat labs were ordered on her last visit in July 2019 but patient did not get them done  · Continue  Flonase , Arnuity Claritin and Singulair   · Consider a taper of prednisone for acute flareup  · New nebulizer ordered via DME  · GERD control- written instructions provided. Can consider trial with short term PPI  · Healthy weight and activity - exercise discussed. · Continue follow up with Cardiology for PFO  · Will follow for asthma management     Health maintenance screens deferred to Primary care provider.      Angela Garcia MD

## 2020-03-24 NOTE — LETTER
3/24/20 Patient: Elias Garzon YOB: 1960 Date of Visit: 3/24/2020 Gee Amaro MD 
[address] [place] [address] [address] [place] [id number] VIA Facsimile: [phone] Dear Gee Amaro MD, Thank you for referring Ms. Steve Roberts to [address] [place] [address] for evaluation. My notes for this consultation are attached. If you have questions, please do not hesitate to call me. I look forward to following your patient along with you.  
 
 
Sincerely, 
 
Jose Raul Kinsey MD

## 2020-03-24 NOTE — TELEPHONE ENCOUNTER
DEONNA FROM [facility] CALLED([phone]). THEY RECEIVED ORDER FOR NEBULIZER AND SUPPLIES. THEY ARE NOT ABLE TO PROCESS THIS ORDER BECAUSE THEY DO NOT PARTICIPATE WITH PT'S INSURANCE. PLEASE NOTIFY DR NEGRON AND CALL IF ANY QUESTIONS.

## 2020-04-02 ENCOUNTER — TELEPHONE (OUTPATIENT)
Dept: PULMONOLOGY | Age: 60
End: 2020-04-02

## 2020-04-03 NOTE — TELEPHONE ENCOUNTER
Pt has not heard from a [employer] company re nebulizer or supplies. She was using a friends nebulizer.   Called Fidelina with [employer], she is looking into why the patient has not received the nebulizer and supplies ordered 3/24/20

## 2020-04-06 NOTE — TELEPHONE ENCOUNTER
Per Fidelina with  [employer] they have gotten everything they need and the nebulizer and supplies will be to pt in the next couple days

## 2020-11-10 ENCOUNTER — OFFICE VISIT (OUTPATIENT)
Dept: ORTHOPEDIC SURGERY | Age: 60
End: 2020-11-10
Payer: COMMERCIAL

## 2020-11-10 VITALS
RESPIRATION RATE: 18 BRPM | HEIGHT: 66 IN | HEART RATE: 84 BPM | DIASTOLIC BLOOD PRESSURE: 78 MMHG | TEMPERATURE: 97.8 F | BODY MASS INDEX: 33.27 KG/M2 | SYSTOLIC BLOOD PRESSURE: 111 MMHG | WEIGHT: 207 LBS

## 2020-11-10 DIAGNOSIS — M54.59 MECHANICAL LOW BACK PAIN: ICD-10-CM

## 2020-11-10 DIAGNOSIS — G89.29 CHRONIC RIGHT SHOULDER PAIN: ICD-10-CM

## 2020-11-10 DIAGNOSIS — M25.511 CHRONIC RIGHT SHOULDER PAIN: ICD-10-CM

## 2020-11-10 DIAGNOSIS — M79.18 CERVICAL MYOFASCIAL PAIN SYNDROME: ICD-10-CM

## 2020-11-10 DIAGNOSIS — M79.18 MYOFASCIAL PAIN: ICD-10-CM

## 2020-11-10 DIAGNOSIS — M25.552 BILATERAL HIP PAIN: ICD-10-CM

## 2020-11-10 DIAGNOSIS — G89.29 CHRONIC PRIMARY MUSCULOSKELETAL PAIN: Primary | ICD-10-CM

## 2020-11-10 DIAGNOSIS — M25.551 BILATERAL HIP PAIN: ICD-10-CM

## 2020-11-10 DIAGNOSIS — M79.18 CHRONIC PRIMARY MUSCULOSKELETAL PAIN: Primary | ICD-10-CM

## 2020-11-10 DIAGNOSIS — G57.12 MERALGIA PARAESTHETICA, LEFT: ICD-10-CM

## 2020-11-10 PROCEDURE — 99204 OFFICE O/P NEW MOD 45 MIN: CPT | Performed by: PHYSICAL MEDICINE & REHABILITATION

## 2020-11-10 RX ORDER — PREDNISONE 10 MG/1
TABLET ORAL
Qty: 21 TAB | Refills: 0 | Status: SHIPPED | OUTPATIENT
Start: 2020-11-10 | End: 2022-03-08 | Stop reason: ALTCHOICE

## 2020-11-10 RX ORDER — IBUPROFEN 800 MG/1
TABLET ORAL
COMMUNITY
Start: 2020-10-01 | End: 2022-04-05 | Stop reason: SDUPTHER

## 2020-11-10 RX ORDER — GABAPENTIN 300 MG/1
CAPSULE ORAL
COMMUNITY
Start: 2020-10-17

## 2020-11-10 RX ORDER — DICLOFENAC SODIUM 75 MG/1
75 TABLET, DELAYED RELEASE ORAL
Qty: 60 TAB | Refills: 2 | Status: SHIPPED | OUTPATIENT
Start: 2020-11-10 | End: 2022-04-05

## 2020-11-10 NOTE — PATIENT INSTRUCTIONS
Rotator Cuff: Exercises  Introduction  Here are some examples of exercises for you to try. The exercises may be suggested for a condition or for rehabilitation. Start each exercise slowly. Ease off the exercises if you start to have pain. You will be told when to start these exercises and which ones will work best for you. How to do the exercises  Pendulum swing   If you have pain in your back, do not do this exercise. 1. Hold on to a table or the back of a chair with your good arm. Then bend forward a little and let your sore arm hang straight down. This exercise does not use the arm muscles. Rather, use your legs and your hips to create movement that makes your arm swing freely. 2. Use the movement from your hips and legs to guide the slightly swinging arm back and forth like a pendulum (or elephant trunk). Then guide it in circles that start small (about the size of a dinner plate). Make the circles a bit larger each day, as your pain allows. 3. Do this exercise for 5 minutes, 5 to 7 times each day. 4. As you have less pain, try bending over a little farther to do this exercise. This will increase the amount of movement at your shoulder. Posterior stretching exercise   1. Hold the elbow of your injured arm with your other hand. 2. Use your hand to pull your injured arm gently up and across your body. You will feel a gentle stretch across the back of your injured shoulder. 3. Hold for at least 15 to 30 seconds. Then slowly lower your arm. 4. Repeat 2 to 4 times. Up-the-back stretch   Your doctor or physical therapist may want you to wait to do this stretch until you have regained most of your range of motion and strength. You can do this stretch in different ways. Hold any of these stretches for at least 15 to 30 seconds. Repeat them 2 to 4 times. 1. Light stretch: Put your hand in your back pocket. Let it rest there to stretch your shoulder. 2. Moderate stretch:  With your other hand, hold your injured arm (palm outward) behind your back by the wrist. Pull your arm up gently to stretch your shoulder. 3. Advanced stretch: Put a towel over your other shoulder. Put the hand of your injured arm behind your back. Now hold the back end of the towel. With the other hand, hold the front end of the towel in front of your body. Pull gently on the front end of the towel. This will bring your hand farther up your back to stretch your shoulder. Overhead stretch   1. Standing about an arm's length away, grasp onto a solid surface. You could use a countertop, a doorknob, or the back of a sturdy chair. 2. With your knees slightly bent, bend forward with your arms straight. Lower your upper body, and let your shoulders stretch. 3. As your shoulders are able to stretch farther, you may need to take a step or two backward. 4. Hold for at least 15 to 30 seconds. Then stand up and relax. If you had stepped back during your stretch, step forward so you can keep your hands on the solid surface. 5. Repeat 2 to 4 times. Shoulder flexion (lying down)   To make a wand for this exercise, use a piece of PVC pipe or a broom handle with the broom removed. Make the wand about a foot wider than your shoulders. 1. Lie on your back, holding a wand with both hands. Your palms should face down as you hold the wand. 2. Keeping your elbows straight, slowly raise your arms over your head. Raise them until you feel a stretch in your shoulders, upper back, and chest.  3. Hold for 15 to 30 seconds. 4. Repeat 2 to 4 times. Shoulder rotation (lying down)   To make a wand for this exercise, use a piece of PVC pipe or a broom handle with the broom removed. Make the wand about a foot wider than your shoulders. 1. Lie on your back. Hold a wand with both hands with your elbows bent and palms up. 2. Keep your elbows close to your body, and move the wand across your body toward the sore arm. 3. Hold for 8 to 12 seconds.   4. Repeat 2 to 4 times. Wall climbing (to the side)   Avoid any movement that is straight to your side, and be careful not to arch your back. Your arm should stay about 30 degrees to the front of your side. 1. Stand with your side to a wall so that your fingers can just touch it at an angle about 30 degrees toward the front of your body. 2. Walk the fingers of your injured arm up the wall as high as pain permits. Try not to shrug your shoulder up toward your ear as you move your arm up. 3. Hold that position for a count of at least 15 to 20.  4. Walk your fingers back down to the starting position. 5. Repeat at least 2 to 4 times. Try to reach higher each time. Wall climbing (to the front)   During this stretching exercise, be careful not to arch your back. 1. Face a wall, and stand so your fingers can just touch it. 2. Keeping your shoulder down, walk the fingers of your injured arm up the wall as high as pain permits. (Don't shrug your shoulder up toward your ear.)  3. Hold your arm in that position for at least 15 to 30 seconds. 4. Slowly walk your fingers back down to where you started. 5. Repeat at least 2 to 4 times. Try to reach higher each time. Shoulder blade squeeze   1. Stand with your arms at your sides, and squeeze your shoulder blades together. Do not raise your shoulders up as you squeeze. 2. Hold 6 seconds. 3. Repeat 8 to 12 times. Scapular exercise: Arm reach   1. Lie flat on your back. This exercise is a very slight motion that starts with your arms raised (elbows straight, arms straight). 2. From this position, reach higher toward the yaritza or ceiling. Keep your elbows straight. All motion should be from your shoulder blade only. 3. Relax your arms back to where you started. 4. Repeat 8 to 12 times. Arm raise to the side   During this strengthening exercise, your arm should stay about 30 degrees to the front of your side.   1. Slowly raise your injured arm to the side, with your thumb facing up. Raise your arm 60 degrees at the most (shoulder level is 90 degrees). 2. Hold the position for 3 to 5 seconds. Then lower your arm back to your side. If you need to, bring your \"good\" arm across your body and place it under the elbow as you lower your injured arm. Use your good arm to keep your injured arm from dropping down too fast.  3. Repeat 8 to 12 times. 4. When you first start out, don't hold any extra weight in your hand. As you get stronger, you may use a 1-pound to 2-pound dumbbell or a small can of food. Shoulder flexor and extensor exercise   These are isometric exercises. That means you contract your muscles without actually moving. 1. Push forward (flex): Stand facing a wall or doorjamb, about 6 inches or less back. Hold your injured arm against your body. Make a closed fist with your thumb on top. Then gently push your hand forward into the wall with about 25% to 50% of your strength. Don't let your body move backward as you push. Hold for about 6 seconds. Relax for a few seconds. Repeat 8 to 12 times. 2. Push backward (extend): Stand with your back flat against a wall. Your upper arm should be against the wall, with your elbow bent 90 degrees (your hand straight ahead). Push your elbow gently back against the wall with about 25% to 50% of your strength. Don't let your body move forward as you push. Hold for about 6 seconds. Relax for a few seconds. Repeat 8 to 12 times. Scapular exercise: Wall push-ups   This exercise is best done with your fingers somewhat turned out, rather than straight up and down. 1. Stand facing a wall, about 12 inches to 18 inches away. 2. Place your hands on the wall at shoulder height. 3. Slowly bend your elbows and bring your face to the wall. Keep your back and hips straight. 4. Push back to where you started. 5. Repeat 8 to 12 times. 6. When you can do this exercise against a wall comfortably, you can try it against a counter.  You can then slowly progress to the end of a couch, then to a sturdy chair, and finally to the floor. Scapular exercise: Retraction   For this exercise, you will need elastic exercise material, such as surgical tubing or Thera-Band. 1. Put the band around a solid object at about waist level. (A bedpost will work well.) Each hand should hold an end of the band. 2. With your elbows at your sides and bent to 90 degrees, pull the band back. Your shoulder blades should move toward each other. Then move your arms back where you started. 3. Repeat 8 to 12 times. 4. If you have good range of motion in your shoulders, try this exercise with your arms lifted out to the sides. Keep your elbows at a 90-degree angle. Raise the elastic band up to about shoulder level. Pull the band back to move your shoulder blades toward each other. Then move your arms back where you started. Internal rotator strengthening exercise   1. Start by tying a piece of elastic exercise material to a doorknob. You can use surgical tubing or Thera-Band. 2. Stand or sit with your shoulder relaxed and your elbow bent 90 degrees. Your upper arm should rest comfortably against your side. Squeeze a rolled towel between your elbow and your body for comfort. This will help keep your arm at your side. 3. Hold one end of the elastic band in the hand of the painful arm. 4. Slowly rotate your forearm toward your body until it touches your belly. Slowly move it back to where you started. 5. Keep your elbow and upper arm firmly tucked against the towel roll or at your side. 6. Repeat 8 to 12 times. External rotator strengthening exercise   1. Start by tying a piece of elastic exercise material to a doorknob. You can use surgical tubing or Thera-Band. (You may also hold one end of the band in each hand.)  2. Stand or sit with your shoulder relaxed and your elbow bent 90 degrees. Your upper arm should rest comfortably against your side.  Squeeze a rolled towel between your elbow and your body for comfort. This will help keep your arm at your side. 3. Hold one end of the elastic band with the hand of the painful arm. 4. Start with your forearm across your belly. Slowly rotate the forearm out away from your body. Keep your elbow and upper arm tucked against the towel roll or the side of your body until you begin to feel tightness in your shoulder. Slowly move your arm back to where you started. 5. Repeat 8 to 12 times. Follow-up care is a key part of your treatment and safety. Be sure to make and go to all appointments, and call your doctor if you are having problems. It's also a good idea to know your test results and keep a list of the medicines you take. Where can you learn more? Go to http://www.gray.com/  Enter J005 in the search box to learn more about \"Rotator Cuff: Exercises. \"  Current as of: March 2, 2020               Content Version: 12.6  © 2006-2020 [employer]. Care instructions adapted under license by [employer] (which disclaims liability or warranty for this information). If you have questions about a medical condition or this instruction, always ask your healthcare professional. [place] [address] any warranty or liability for your use of this information. Sacroiliac Pain: Exercises  Introduction  Here are some examples of exercises for you to try. The exercises may be suggested for a condition or for rehabilitation. Start each exercise slowly. Ease off the exercises if you start to have pain. You will be told when to start these exercises and which ones will work best for you. How to do the exercises  Knee-to-chest stretch   1. Do not do the knee-to-chest exercise if it causes or increases back or leg pain. 2. Lie on your back with your knees bent and your feet flat on the floor. You can put a small pillow under your head and neck if it is more comfortable.   3. Grasp your hands under one knee and bring the knee to your chest, keeping the other foot flat on the floor. 4. Keep your lower back pressed to the floor. Hold for at least 15 to 30 seconds. 5. Relax and lower the knee to the starting position. Repeat with the other leg. 6. Repeat 2 to 4 times with each leg. 7. To get more stretch, keep your other leg flat on the floor while pulling your knee to your chest.    Bridging   1. Lie on your back with both knees bent. Your knees should be bent about 90 degrees. 2. Tighten your belly muscles by pulling in your belly button toward your spine. Then push your feet into the floor, squeeze your buttocks, and lift your hips off the floor until your shoulders, hips, and knees are all in a straight line. 3. Hold for about 6 seconds as you continue to breathe normally, and then slowly lower your hips back down to the floor and rest for up to 10 seconds. 4. Repeat 8 to 12 times. Hip extension   1. Get down on your hands and knees on the floor. 2. Keeping your back and neck straight, lift one leg straight out behind you. When you lift your leg, keep your hips level. Don't let your back twist, and don't let your hip drop toward the floor. 3. Hold for 6 seconds. Repeat 8 to 12 times with each leg. 4. If you feel steady and strong when you do this exercise, you can make it more difficult. To do this, when you lift your leg, also lift the opposite arm straight out in front of you. For example, lift the left leg and the right arm at the same time. (This is sometimes called the \"bird dog exercise. \") Hold for 6 seconds, and repeat 8 to 12 times on each side. Clamshell   1. Lie on your side with a pillow under your head. Keep your feet and knees together and your knees bent. 2. Raise your top knee, but keep your feet together. Do not let your hips roll back. Your legs should open up like a clamshell. 3. Hold for 6 seconds. 4. Slowly lower your knee back down.  Rest for 10 seconds. 5. Repeat 8 to 12 times. 6. Switch to your other side and repeat steps 1 through 5. Hamstring wall stretch   1. Lie on your back in a doorway, with one leg through the open door. 2. Slide your affected leg up the wall to straighten your knee. You should feel a gentle stretch down the back of your leg. 3. Hold the stretch for at least 1 minute to begin. Then try to lengthen the time you hold the stretch to as long as 6 minutes. 4. Switch legs, and repeat steps 1 through 3.  5. Repeat 2 to 4 times. 6. If you do not have a place to do this exercise in a doorway, there is another way to do it:  7. Lie on your back, and bend one knee. 8. Loop a towel under the ball and toes of that foot, and hold the ends of the towel in your hands. 9. Straighten your knee, and slowly pull back on the towel. You should feel a gentle stretch down the back of your leg. 10. Switch legs, and repeat steps 1 through 3.  11. Repeat 2 to 4 times. 1. Do not arch your back. 2. Do not bend either knee. 3. Keep one heel touching the floor and the other heel touching the wall. Do not point your toes. Lower abdominal strengthening   1. Lie on your back with your knees bent and your feet flat on the floor. 2. Tighten your belly muscles by pulling your belly button in toward your spine. 3. Lift one foot off the floor and bring your knee toward your chest, so that your knee is straight above your hip and your leg is bent like the letter \"L. \"  4. Lift the other knee up to the same position. 5. Lower one leg at a time to the starting position. 6. Keep alternating legs until you have lifted each leg 8 to 12 times. 7. Be sure to keep your belly muscles tight and your back still as you are moving your legs. Be sure to breathe normally. Piriformis stretch   1. Lie on your back with your legs straight. 2. Lift your affected leg, and bend your knee.  With your opposite hand, reach across your body, and then gently pull your knee toward your opposite shoulder. 3. Hold the stretch for 15 to 30 seconds. 4. Switch legs and repeat steps 1 through 3.  5. Repeat 2 to 4 times. Follow-up care is a key part of your treatment and safety. Be sure to make and go to all appointments, and call your doctor if you are having problems. It's also a good idea to know your test results and keep a list of the medicines you take. Where can you learn more? Go to http://www.gray.com/  Enter H589 in the search box to learn more about \"Sacroiliac Pain: Exercises. \"  Current as of: March 2, 2020               Content Version: 12.6  © 6371-3828 [employer]. Care instructions adapted under license by [employer] (which disclaims liability or warranty for this information). If you have questions about a medical condition or this instruction, always ask your healthcare professional. [place] [address] any warranty or liability for your use of this information. Hip Bursitis: Exercises  Introduction  Here are some examples of exercises for you to try. The exercises may be suggested for a condition or for rehabilitation. Start each exercise slowly. Ease off the exercises if you start to have pain. You will be told when to start these exercises and which ones will work best for you. How to do the exercises  Hip rotator stretch   1. Lie on your back with both knees bent and your feet flat on the floor. 2. Put the ankle of your affected leg on your opposite thigh near your knee. 3. Use your hand to gently push your knee away from your body until you feel a gentle stretch around your hip. 4. Hold the stretch for 15 to 30 seconds. 5. Repeat 2 to 4 times. 6. Repeat steps 1 through 5, but this time use your hand to gently pull your knee toward your opposite shoulder. Iliotibial band stretch   1. Lean sideways against a wall.  If you are not steady on your feet, hold on to a chair or counter. 2. Stand on the leg with the affected hip, with that leg close to the wall. Then cross your other leg in front of it. 3. Let your affected hip drop out to the side of your body and against wall. Then lean away from your affected hip until you feel a stretch. 4. Hold the stretch for 15 to 30 seconds. 5. Repeat 2 to 4 times. Straight-leg raises to the outside   1. Lie on your side, with your affected hip on top. 2. Tighten the front thigh muscles of your top leg to keep your knee straight. 3. Keep your hip and your leg straight in line with the rest of your body, and keep your knee pointing forward. Do not drop your hip back. 4. Lift your top leg straight up toward the ceiling, about 12 inches off the floor. Hold for about 6 seconds, then slowly lower your leg. 5. Repeat 8 to 12 times. Clamshell   1. Lie on your side, with your affected hip on top and your head propped on a pillow. Keep your feet and knees together and your knees bent. 2. Raise your top knee, but keep your feet together. Do not let your hips roll back. Your legs should open up like a clamshell. 3. Hold for 6 seconds. 4. Slowly lower your knee back down. Rest for 10 seconds. 5. Repeat 8 to 12 times. Follow-up care is a key part of your treatment and safety. Be sure to make and go to all appointments, and call your doctor if you are having problems. It's also a good idea to know your test results and keep a list of the medicines you take. Where can you learn more? Go to http://www.[employer].com/  Enter H674 in the search box to learn more about \"Hip Bursitis: Exercises. \"  Current as of: March 2, 2020               Content Version: 12.6  © 9815-6688 [employer]. Care instructions adapted under license by [employer] (which disclaims liability or warranty for this information).  If you have questions about a medical condition or this instruction, always ask your healthcare professional. [address] any warranty or liability for your use of this information. Back Stretches: Exercises  Introduction  Here are some examples of exercises for stretching your back. Start each exercise slowly. Ease off the exercise if you start to have pain. Your doctor or physical therapist will tell you when you can start these exercises and which ones will work best for you. How to do the exercises  Overhead stretch   5. Stand comfortably with your feet shoulder-width apart. 6. Looking straight ahead, raise both arms over your head and reach toward the ceiling. Do not allow your head to tilt back. 7. Hold for 15 to 30 seconds, then lower your arms to your sides. 8. Repeat 2 to 4 times. Side stretch   5. Stand comfortably with your feet shoulder-width apart. 6. Raise one arm over your head, and then lean to the other side. 7. Slide your hand down your leg as you let the weight of your arm gently stretch your side muscles. Hold for 15 to 30 seconds. 8. Repeat 2 to 4 times on each side. Press-up   4. Lie on your stomach, supporting your body with your forearms. 5. Press your elbows down into the floor to raise your upper back. As you do this, relax your stomach muscles and allow your back to arch without using your back muscles. As your press up, do not let your hips or pelvis come off the floor. 6. Hold for 15 to 30 seconds, then relax. 7. Repeat 2 to 4 times. Relax and rest   6. Lie on your back with a rolled towel under your neck and a pillow under your knees. Extend your arms comfortably to your sides. 7. Relax and breathe normally. 8. Remain in this position for about 10 minutes. 9. If you can, do this 2 or 3 times each day. Follow-up care is a key part of your treatment and safety. Be sure to make and go to all appointments, and call your doctor if you are having problems.  It's also a good idea to know your test results and keep a list of the medicines you take. Where can you learn more? Go to http://www.[employer].com/  Enter Y090 in the search box to learn more about \"Back Stretches: Exercises. \"  Current as of: March 2, 2020               Content Version: 12.6  © 7127-6913 [employer]. Care instructions adapted under license by [employer] (which disclaims liability or warranty for this information). If you have questions about a medical condition or this instruction, always ask your healthcare professional. [address] any warranty or liability for your use of this information.
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
## 2020-11-17 ENCOUNTER — HOSPITAL ENCOUNTER (OUTPATIENT)
Dept: PHYSICAL THERAPY | Age: 60
Discharge: HOME | End: 2020-11-17
Payer: COMMERCIAL

## 2020-11-17 PROCEDURE — 97162 PT EVAL MOD COMPLEX 30 MIN: CPT

## 2020-11-17 PROCEDURE — 97530 THERAPEUTIC ACTIVITIES: CPT
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
## 2020-11-20 ENCOUNTER — HOSPITAL ENCOUNTER (OUTPATIENT)
Dept: PHYSICAL THERAPY | Age: 60
Discharge: HOME | End: 2020-11-20
Payer: COMMERCIAL

## 2020-11-20 PROCEDURE — 97110 THERAPEUTIC EXERCISES: CPT

## 2020-11-20 PROCEDURE — 97530 THERAPEUTIC ACTIVITIES: CPT

## 2020-11-20 PROCEDURE — 97140 MANUAL THERAPY 1/> REGIONS: CPT

## 2020-11-23 ENCOUNTER — HOSPITAL ENCOUNTER (OUTPATIENT)
Dept: PHYSICAL THERAPY | Age: 60
Discharge: HOME | End: 2020-11-23
Payer: COMMERCIAL

## 2020-11-23 PROCEDURE — 97110 THERAPEUTIC EXERCISES: CPT

## 2020-11-23 PROCEDURE — 97140 MANUAL THERAPY 1/> REGIONS: CPT

## 2020-11-23 NOTE — PROGRESS NOTES
PT DAILY TREATMENT NOTE     Patient Name: Vicky Gagnon  Date:2020  : 1960  [x]  Patient  Verified  Payor: Priyanka Raymond / Plan: [employer] PPO / Product Type: PPO /    In time: 9:50   Out time: 10:48  Total Treatment Time (min): 58  Visit #: 3 of 12    Treatment Area: Pain in right shoulder [M25.511]  Pain in right hip [M25.551]  Pain in left hip [M25.552]  Cervicalgia [M54.2]  Right shoulder pain [M25.511]    SUBJECTIVE  Pain Level (0-10 scale): 6/10 right shoulder  Any medication changes, allergies to medications, adverse drug reactions, diagnosis change, or new procedure performed?: [x] No    [] Yes (see summary sheet for update)  Subjective functional status/changes:   [] No changes reported  Pt reports stiffness and pain in her right shoulder. She states she bought a new pillow for sleeping. She did some of her exercises over the weekend.      OBJECTIVE    Modality rationale: decrease pain and increase tissue extensibility to improve the patients ability to improve ease of OH reaching for ADLs   Min Type Additional Details    [] Estim:  []Unatt       []IFC  []Premod                        []Other:  []w/ice   []w/heat  Position:  Location:    [] Estim: []Att    []TENS instruct  []NMES                    []Other:  []w/US   []w/ice   []w/heat  Position:  Location:    []  Traction: [] Cervical       []Lumbar                       [] Prone          []Supine                       []Intermittent   []Continuous Lbs:  [] before manual  [] after manual    []  Ultrasound: []Continuous   [] Pulsed                           []1MHz   []3MHz W/cm2:  Location:    []  Iontophoresis with dexamethasone         Location: [] Take home patch   [] In clinic   10 []  Ice     [x]  heat  []  Ice massage  []  Laser   []  Anodyne Position: seated  Location: low back and right shoulder    []  Laser with stim  []  Other:  Position:  Location:    []  Vasopneumatic Device Pressure:       [] lo [] med [] hi   Temperature: [] lo [] med [] hi   [x] Skin assessment post-treatment:  [x]intact []redness- no adverse reaction    []redness - adverse reaction:     33 min Therapeutic Exercise:  [x] See flow sheet :   Rationale: increase ROM and increase strength to improve the patients ability to improve ease of OH reaching for household chores and ADLs     15 min Manual Therapy:  SC/AC joint mobs grade III-IV; posterior/inferior Grade III-IV GH mobs on the right; c/s manual traction   The manual therapy interventions were performed at a separate and distinct time from the therapeutic activities interventions. Rationale: decrease pain, increase ROM and increase tissue extensibility to improve ease of OH reaching          With   [] TE   [] TA   [] neuro   [] other: Patient Education: [x] Review HEP    [] Progressed/Changed HEP based on:   [] positioning   [] body mechanics   [] transfers   [] heat/ice application    [] other:      Other Objective/Functional Measures:  Decreased GH mobility noted during manual  Good PROM post manual  Decrease in right shoulder pain with supine c/s traction manually  Discussed with PT about trial of manual traction for 10 minutes next session prior to continuing with mechanical  Decreased open book rotation to the left  Per x ray of c/s noted left cervicothoracic convexity and right concavity  Forward shoulder posture  Educated to work on left c/s side bends at home to assess ability to decrease right shoulder pain    Pain Level (0-10 scale) post treatment: 4/10    ASSESSMENT/Changes in Function: Pt progressing with shoulder strengthening and mobility. She has decreased [address] mobility with inferior/posterior glides likely due to disuse from pain. She had reduction of right shoulder pain with c/s manual traction indicating a radicular component to her pain.  Will trial manual traction before formally adding mechanical traction to aide in centralization of symptoms and decreased pain for ease of carrying her grandson, performing ADLs and improving ease of sleeping. Patient will continue to benefit from skilled PT services to modify and progress therapeutic interventions, address functional mobility deficits, address ROM deficits, address strength deficits, analyze and address soft tissue restrictions, analyze and cue movement patterns, analyze and modify body mechanics/ergonomics, assess and modify postural abnormalities, address imbalance/dizziness and instruct in home and community integration to attain remaining goals. [x]  See Plan of Care  []  See progress note/recertification  []  See Discharge Summary         Progress towards goals / Updated goals:  Short Term Goals: To be accomplished in 1 weeks:  1. Pt will be compliant with initial HEP in order to optimize therapy outcomes. Met  Long Term Goals: To be accomplished in 4 weeks:  1. Pt will improve FOTO by 5 points in order to demonstrate functional improvement. 2.  Pt will report 50% improvement in order to improve QOL. 3.  Pt will improve right shoulder flexion/abduction AROM to 130* in order to improve ease of reaching overhead with ADLs. 4.  Pt will improve right shoulder functional IR to T11 in order to improve ease of dressing/showering. 5.  Pt will improve B cervical rotation AROM to 65* in order to improve ease of checking blind spots while driving. 6.  Pt will improve B shoulder flexion/abduction strength to 4+/5 in order to improve ease of reaching/lifting with household management.      PLAN  [x]  Upgrade activities as tolerated     [x]  Continue plan of care  []  Update interventions per flow sheet       []  Discharge due to:_  []  Other:_      Alberto Hernandez, PTA 11/23/2020  12:51 PM    Future Appointments   Date Time Provider Wen Bird   11/23/2020  9:45 AM Morgan Pitch, PTA [facility]   12/1/2020 10:30 AM Hood Pitch, PTA [facility]   12/3/2020 11:15 AM [facility]itch[facility]   12/8/2020 10:30 AM Alejandra Petit Zen Ron, PT [id number]   12/10/2020  9:45 AM Bernardo Ybarra, PTA [facility]   12/15/2020 10:30 AM Blanche Thao, PT [facility]   12/17/2020  9:00 AM Bernardo Ybarra, PTA [facility]   12/22/2020 11:15 AM Michoacano Blank MD [facility]

## 2020-12-01 ENCOUNTER — HOSPITAL ENCOUNTER (OUTPATIENT)
Dept: PHYSICAL THERAPY | Age: 60
Discharge: HOME | End: 2020-12-01
Payer: COMMERCIAL

## 2020-12-01 PROCEDURE — 97110 THERAPEUTIC EXERCISES: CPT

## 2020-12-01 PROCEDURE — 97140 MANUAL THERAPY 1/> REGIONS: CPT

## 2020-12-01 NOTE — PROGRESS NOTES
PT DAILY TREATMENT NOTE     Patient Name: Rosanna Cope  Date:2020  : 1960  [x]  Patient  Verified  Payor: Carolyn Hidalgo / Plan: [employer] PPO / Product Type: PPO /    In time: 10:30   Out time: 11:15   Total Treatment Time (min): 45  Visit #: 4 of 12    Treatment Area: Pain in right shoulder [M25.511]  Pain in right hip [M25.551]  Pain in left hip [M25.552]  Cervicalgia [M54.2]  Right shoulder pain [M25.511]    SUBJECTIVE  Pain Level (0-10 scale): 5/10  Any medication changes, allergies to medications, adverse drug reactions, diagnosis change, or new procedure performed?: [x] No    [] Yes (see summary sheet for update)  Subjective functional status/changes:   [] No changes reported  Pt reports she had relief the rest of the day into the evening last session. She did a lot of mopping and cleaning over the weekend. She notes pain down the top of her right shoulder to mid arm. She has been trying to not shrug.        OBJECTIVE    Modality rationale: decrease pain and increase tissue extensibility to improve the patients ability to improve ease of OH reaching for ADLs   Min Type Additional Details    [] Estim:  []Unatt       []IFC  []Premod                        []Other:  []w/ice   []w/heat  Position:  Location:    [] Estim: []Att    []TENS instruct  []NMES                    []Other:  []w/US   []w/ice   []w/heat  Position:  Location:    []  Traction: [] Cervical       []Lumbar                       [] Prone          []Supine                       []Intermittent   []Continuous Lbs:  [] before manual  [] after manual    []  Ultrasound: []Continuous   [] Pulsed                           []1MHz   []3MHz W/cm2:  Location:    []  Iontophoresis with dexamethasone         Location: [] Take home patch   [] In clinic   5 []  Ice     []  heat  [x]  Ice massage  []  Laser   []  Anodyne Position: seated  Location: right subacromial space    []  Laser with stim  []  Other:  Position:  Location:    []  Vasopneumatic Device Pressure:       [] lo [] med [] hi   Temperature: [] lo [] med [] hi   [x] Skin assessment post-treatment:  [x]intact []redness- no adverse reaction    []redness - adverse reaction:     25 min Therapeutic Exercise:  [x] See flow sheet :   Rationale: increase ROM and increase strength to improve the patients ability to improve ease of OH reaching for household chores and ADLs     15 min Manual Therapy: manual intermittent traction with a mob belt; AC joint mobs grade III-IV   The manual therapy interventions were performed at a separate and distinct time from the therapeutic activities interventions. Rationale: decrease pain, increase ROM and increase tissue extensibility to improve ease of OH reaching          With   [] TE   [] TA   [] neuro   [] other: Patient Education: [x] Review HEP    [] Progressed/Changed HEP based on:   [] positioning   [] body mechanics   [] transfers   [] heat/ice application    [] other:      Other Objective/Functional Measures:  TTP with swelling to subacromial space; educated on ice massage and performed with pt  Centralization of symptoms with manual traction  UT hiking with right shoulder elevation   Educated about trying mechanical traction next session    Pain Level (0-10 scale) post treatment: 3/10    ASSESSMENT/Changes in Function: Pt gets centralization of right radicular symptoms with manual traction. She has UT hiking with right shoulder elevation. She has some subacromial swelling and pain with OH reaching likely due to posture and decreased shoulder strength. Will continue working to centralize symptoms and improve shoulder mobility for ease of performing household chores like loading the washer and mopping floors.      Patient will continue to benefit from skilled PT services to modify and progress therapeutic interventions, address functional mobility deficits, address ROM deficits, address strength deficits, analyze and address soft tissue restrictions, analyze and cue movement patterns, analyze and modify body mechanics/ergonomics, assess and modify postural abnormalities, address imbalance/dizziness and instruct in home and community integration to attain remaining goals. [x]  See Plan of Care  []  See progress note/recertification  []  See Discharge Summary         Progress towards goals / Updated goals:  Short Term Goals: To be accomplished in 1 weeks:  1. Pt will be compliant with initial HEP in order to optimize therapy outcomes. Met  Long Term Goals: To be accomplished in 4 weeks:  1. Pt will improve FOTO by 5 points in order to demonstrate functional improvement. 2.  Pt will report 50% improvement in order to improve QOL. 3.  Pt will improve right shoulder flexion/abduction AROM to 130* in order to improve ease of reaching overhead with ADLs. 4.  Pt will improve right shoulder functional IR to T11 in order to improve ease of dressing/showering. 5.  Pt will improve B cervical rotation AROM to 65* in order to improve ease of checking blind spots while driving. 6.  Pt will improve B shoulder flexion/abduction strength to 4+/5 in order to improve ease of reaching/lifting with household management.      PLAN  [x]  Upgrade activities as tolerated     [x]  Continue plan of care  []  Update interventions per flow sheet       []  Discharge due to:_  []  Other:_      Liza Infante PTA 12/1/2020  12:51 PM    Future Appointments   Date Time Provider Wen Bird   12/1/2020 10:30 AM Vi Acevedo PTA [facility]   12/3/2020 11:15 AM Vi Acevedo PTA [facility]   12/8/2020 10:30 AM Shana ALICIA, PT [facility]   12/10/2020  9:45 AM Vi Acevedo PTA [facility]   12/15/2020 10:30 AM Blanche Francis PT [facility]   12/17/2020  9:00 AM Vi Acevedo PTA [facility]   12/22/2020 11:15 AM Nette Jay MD [facility]

## 2020-12-03 ENCOUNTER — HOSPITAL ENCOUNTER (OUTPATIENT)
Dept: PHYSICAL THERAPY | Age: 60
Discharge: HOME | End: 2020-12-03
Payer: COMMERCIAL

## 2020-12-03 PROCEDURE — 97110 THERAPEUTIC EXERCISES: CPT

## 2020-12-03 PROCEDURE — 97012 MECHANICAL TRACTION THERAPY: CPT

## 2020-12-03 NOTE — PROGRESS NOTES
PT DAILY TREATMENT NOTE     Patient Name: Yosvany Whiting  Date:12/3/2020  : 1960  [x]  Patient  Verified  Payor: Evelia Guevara / Plan: [employer] PPO / Product Type: PPO /    In time: 11:15   Out time: 12:10  Total Treatment Time (min):55  Visit #: 5 of 12    Treatment Area: Pain in right shoulder [M25.511]  Pain in right hip [M25.551]  Pain in left hip [M25.552]  Cervicalgia [M54.2]  Right shoulder pain [M25.511]    SUBJECTIVE  Pain Level (0-10 scale): 310  Any medication changes, allergies to medications, adverse drug reactions, diagnosis change, or new procedure performed?: [x] No    [] Yes (see summary sheet for update)  Subjective functional status/changes:   [] No changes reported  Pt reports relief of shoulder pain until the evening after last session. She has been doing the ice massage to the front of her right shoulder to help with the soreness/twinge there. She reports improving ease of raising her arm. She continues to need strength and work on ability to lift her grandson and perform household chores like loading/unloading her front loading washing machine.        OBJECTIVE    Modality rationale: decrease pain and increase tissue extensibility to improve the patients ability to improve ease of OH reaching for ADLs   Min Type Additional Details    [] Estim:  []Unatt       []IFC  []Premod                        []Other:  []w/ice   []w/heat  Position:  Location:    [] Estim: []Att    []TENS instruct  []NMES                    []Other:  []w/US   []w/ice   []w/heat  Position:  Location:   20 including set up [x]  Traction: [x] Cervical       []Lumbar                       [] Prone          [x]Supine                       [x]Intermittent   []Continuous Lbs: 14#/9#  [] before manual  [] after manual    []  Ultrasound: []Continuous   [] Pulsed                           []1MHz   []3MHz W/cm2:  Location:    []  Iontophoresis with dexamethasone         Location: [] Take home patch   [] In clinic    []  Ice []  heat  []  Ice massage  []  Laser   []  Anodyne Position:   Location:     []  Laser with stim  []  Other:  Position:  Location:    []  Vasopneumatic Device Pressure:       [] lo [] med [] hi   Temperature: [] lo [] med [] hi   [x] Skin assessment post-treatment:  [x]intact []redness- no adverse reaction    []redness - adverse reaction:     35 min Therapeutic Exercise:  [x] See flow sheet :   Rationale: increase ROM and increase strength to improve the patients ability to improve ease of OH reaching for household chores and ADLs           With   [] TE   [] TA   [] neuro   [] other: Patient Education: [x] Review HEP    [] Progressed/Changed HEP based on:   [] positioning   [] body mechanics   [] transfers   [] heat/ice application    [] other:      Other Objective/Functional Measures: FOTO: 49  % Improvement: 60%  Right shoulder flexion AROM: 120 degrees left: 150 degrees  Right shoulder abduction AROM: 110 degrees left: 145 degrees  Right shoulder functional IR AROM: T12 left T6  C/s rotation AROM: left 48 degrees right 36 degrees (42 degrees after c/s MET for left C2-C7 rotation)  Shoulder flexion MMT: left 4+/5 right 4/5  Shoulder abduction MMT: left 4+/5 right 4/5    Pain Level (0-10 scale) post treatment: 0/10    ASSESSMENT/Changes in Function: Pt progressing well towards initial goals meeting 3/7 goals with 60% improvement since [facility]. She has centralization of C5 symptoms with c/s traction and initiated mechanical traction this visit with good relief. She continues to have swelling of the right shoulder bursa contributing to decreased strength and AROM of the right shoulder. She has improving functional IR of the right shoulder. Skilled PT is medically necessary to progress centralizing symptoms and improving right shoulder strength and mobility for ease of holding her grandson and performing household chores like loading her front load washer.      Patient will continue to benefit from skilled PT services to modify and progress therapeutic interventions, address functional mobility deficits, address ROM deficits, address strength deficits, analyze and address soft tissue restrictions, analyze and cue movement patterns, analyze and modify body mechanics/ergonomics, assess and modify postural abnormalities, address imbalance/dizziness and instruct in home and community integration to attain remaining goals. [x]  See Plan of Care  []  See progress note/recertification  []  See Discharge Summary         Progress towards goals / Updated goals:  Short Term Goals: To be accomplished in 1 weeks:  1. Pt will be compliant with initial HEP in order to optimize therapy outcomes. Met  Long Term Goals: To be accomplished in 4 weeks:  1. Pt will improve FOTO by 5 points in order to demonstrate functional improvement. Met  2. Pt will report 50% improvement in order to improve QOL. Met  3. Pt will improve right shoulder flexion/abduction AROM to 130* in order to improve ease of reaching overhead with ADLs. Progressing 120 flexion /110 abduction degrees  4. Pt will improve right shoulder functional IR to T11 in order to improve ease of dressing/showering. Progressing T12  5. Pt will improve B cervical rotation AROM to 65* in order to improve ease of checking blind spots while driving. Progressing right 42 degrees; left 48 degrees   6. Pt will improve B shoulder flexion/abduction strength to 4+/5 in order to improve ease of reaching/lifting with household management.    Shoulder flexion MMT: left 4+/5 right 4/5  Shoulder abduction MMT: left 4+/5 right 4/5    PLAN  [x]  Upgrade activities as tolerated     [x]  Continue plan of care  []  Update interventions per flow sheet       []  Discharge due to:_  []  Other:_      Adelia Hernadez PTA 12/3/2020  12:51 PM    Future Appointments   Date Time Provider Wen Bird   12/3/2020 11:15 AM Molly Garrison PTA [facility]   12/8/2020 10:30 AM Maryanne ALICIA, PT [facility]   12/10/2020  9:45 AM Latoya Sanchez, PTA [facility]   12/15/2020 10:30 AM Savita Thao, PT [facility]   12/17/2020  9:00 AM Latoya Sanchez, PTA [facility]   12/22/2020 11:15 AM Ebony Coleman MD [facility] BS AMB

## 2020-12-08 ENCOUNTER — HOSPITAL ENCOUNTER (OUTPATIENT)
Dept: PHYSICAL THERAPY | Age: 60
Discharge: HOME | End: 2020-12-08
Payer: COMMERCIAL

## 2020-12-08 PROCEDURE — 97012 MECHANICAL TRACTION THERAPY: CPT

## 2020-12-08 PROCEDURE — 97110 THERAPEUTIC EXERCISES: CPT

## 2020-12-08 PROCEDURE — 97112 NEUROMUSCULAR REEDUCATION: CPT

## 2020-12-08 NOTE — PROGRESS NOTES
PT DAILY TREATMENT NOTE     Patient Name: Deion Hunter  Date:2020  : 1960  [x]  Patient  Verified  Payor: Nikki Hdez / Plan: [employer] PPO / Product Type: PPO /    In time:10:30  Out time:11:28  Total Treatment Time (min): 62  Visit #: 4 of 12      Treatment Area: Pain in right shoulder [M25.511]  Pain in right hip [M25.551]  Pain in left hip [M25.552]  Cervicalgia [M54.2]  Right shoulder pain [M25.511]    SUBJECTIVE  Pain Level (0-10 scale): 7/10  Any medication changes, allergies to medications, adverse drug reactions, diagnosis change, or new procedure performed?: [x] No    [] Yes (see summary sheet for update)  Subjective functional status/changes:   [] No changes reported  Pt reports that she felt like she was getting better until yesterday. She had more pain yesterday and today, which she believes was d/t the rain. Her pain had been remaining around 4-5/10 prior to yesterday. She woke up with more pain yesterday, and she did a lot of activity yesterday. She follows up with MD 20.       OBJECTIVE    Modality rationale: decrease pain and increase tissue extensibility to improve the patients ability to improve ease of reaching with ADLs   Min Type Additional Details    [] Estim:  []Unatt       []IFC  []Premod                        []Other:  []w/ice   []w/heat  Position:  Location:    [] Estim: []Att    []TENS instruct  []NMES                    []Other:  []w/US   []w/ice   []w/heat  Position:  Location:   20 (with setup) [x]  Traction: [x] Cervical       []Lumbar                       [] Prone          [x]Supine                       [x]Intermittent   []Continuous Lbs: 14#/9#  [] before manual  [] after manual    []  Ultrasound: []Continuous   [] Pulsed                           []1MHz   []3MHz W/cm2:  Location:    []  Iontophoresis with dexamethasone         Location: [] Take home patch   [] In clinic    []  Ice     []  heat  []  Ice massage  []  Laser   []  Anodyne Position:  Location:    []  Laser with stim  []  Other:  Position:  Location:    []  Vasopneumatic Device Pressure:       [] lo [] med [] hi   Temperature: [] lo [] med [] hi   [x] Skin assessment post-treatment:  [x]intact []redness- no adverse reaction    []redness - adverse reaction:       30 min Therapeutic Exercise:  [x] See flow sheet :   Rationale: increase ROM and increase strength to improve the patients ability to improve ability with reaching with ADLs    8 min Neuromuscular Re-education:  [x]  See flow sheet : scapular stabilization with Kieser rows/extension and wall push up plus    Rationale: increase strength and increase proprioception  to improve the patients ability to improve ability with reaching with daily tasks             With   [] TE   [] TA   [] neuro   [] other: Patient Education: [x] Review HEP    [] Progressed/Changed HEP based on:   [] positioning   [] body mechanics   [] transfers   [] heat/ice application    [x] other: reviewed sleeping postures with c/s in neutral, reviewed supine deep neck flexor with HEP       Other Objective/Functional Measures:     Significant reduction in right shoulder pain with ~30 seconds of manual traction  Pain at end-range with supine wand flexion and abduction, pain subsided immediately when removed from end-range    Reduced right shoulder pain with supine deep neck flexors   No change in right shoulder pain with seated c/s retraction     Pain Level (0-10 scale) post treatment: 6/10    ASSESSMENT/Changes in Function:     Pt is making slow, steady progress towards initial goals in therapy. She presented with increased pain with insidious onset upon waking yesterday, and pain was reduced with traction and deep neck flexor in supine this visit. Reviewed c/s in neutral with sleeping to attempt to reduce radicular symptoms. Will continue to address strength, ROM, flexibility, and postural deficits in order to reduce pain and improve ease with daily tasks. Patient will continue to benefit from skilled PT services to modify and progress therapeutic interventions, address ROM deficits, address strength deficits, analyze and address soft tissue restrictions, analyze and cue movement patterns, analyze and modify body mechanics/ergonomics, assess and modify postural abnormalities and instruct in home and community integration to attain remaining goals. Progress towards goals / Updated goals:  Short Term Goals: To be accomplished in 1 weeks:  1. Pt will be compliant with initial HEP in order to optimize therapy outcomes.  Met  Long Term Goals: To be accomplished in 4 weeks:  1.  Pt will improve FOTO by 5 points in order to demonstrate functional improvement. Met  2.  Pt will report 50% improvement in order to improve QOL.  Met  3.  Pt will improve right shoulder flexion/abduction AROM to 130* in order to improve ease of reaching overhead with ADLs. Progressing 120 flexion /110 abduction degrees  4.  Pt will improve right shoulder functional IR to T11 in order to improve ease of dressing/showering. Progressing T12  5.  Pt will improve B cervical rotation AROM to 65* in order to improve ease of checking blind spots while driving. Progressing right 42 degrees; left 48 degrees   6.  Pt will improve B shoulder flexion/abduction strength to 4+/5 in order to improve ease of reaching/lifting with household management.    Shoulder flexion MMT: left 4+/5 right 4/5  Shoulder abduction MMT: left 4+/5 right 4/5       PLAN  [x]  Upgrade activities as tolerated     [x]  Continue plan of care  []  Update interventions per flow sheet       []  Discharge due to:_  []  Other:_      Ruth Enamorado, PT 12/8/2020  10:38 AM    Future Appointments   Date Time Provider eWn Bird   12/10/2020  9:45 AM Hemal Gravel, PTA [facility]   12/15/2020 10:30 AM Tab Kruse S, PT [facility]   12/17/2020  9:00 AM Hemal Gravel, PTA [facility]   12/22/2020 11:15 AM Twila Casey MD [facility] AM[facility]

## 2020-12-10 ENCOUNTER — APPOINTMENT (OUTPATIENT)
Dept: PHYSICAL THERAPY | Age: 60
End: 2020-12-10
Payer: COMMERCIAL

## 2020-12-15 ENCOUNTER — TELEPHONE (OUTPATIENT)
Dept: PHYSICAL THERAPY | Age: 60
End: 2020-12-15

## 2020-12-15 ENCOUNTER — APPOINTMENT (OUTPATIENT)
Dept: PHYSICAL THERAPY | Age: 60
End: 2020-12-15
Payer: COMMERCIAL

## 2020-12-17 ENCOUNTER — HOSPITAL ENCOUNTER (OUTPATIENT)
Dept: PHYSICAL THERAPY | Age: 60
Discharge: HOME | End: 2020-12-17
Payer: COMMERCIAL

## 2020-12-17 PROCEDURE — 97110 THERAPEUTIC EXERCISES: CPT

## 2020-12-17 PROCEDURE — 97012 MECHANICAL TRACTION THERAPY: CPT

## 2020-12-17 PROCEDURE — 97530 THERAPEUTIC ACTIVITIES: CPT

## 2020-12-17 NOTE — PROGRESS NOTES
[facility] - Paul Velasquez  [address] [facility]  [phone] fax    Physical Therapy Progress Note  Patient name: Genie Gomez Start of Care: 2020   Referral source: Carlos Miranda MD : 1960   Medical/Treatment Diagnosis: Pain in right shoulder [M25.511]  Pain in right hip [M25.551]  Pain in left hip [M25.552]  Cervicalgia [M54.2]  Right shoulder pain [M25.511]  Payor: Ruddy [place] / Plan: [employer] PPO / Product Type: PPO /  Onset Date:2018     Prior Hospitalization: see medical history Provider#: [id number]   Medications: Verified on Patient Summary List     Comorbidities: heart disease, asthma, obesity, arthritis     Prior Level of Function: lives with children in a 1 story with 4 steps to enter, right-handed   Visits from Start of Care: 7    Missed Visits: 1    Established Goals:         Excellent           Good         Limited           None  [x] Increased ROM   []  [x]  []  []  [x] Increased Strength  []  [x]  []  []  [x] Increased Mobility  []  [x]  []  []   [x] Decreased Pain   []  [x]  []  []  [] Decreased Swelling  []  []  []  []    Key Functional Changes: 50% improvement, improving right shoulder AROM and strength, decreasing pain, centralizing radicular symptoms with traction, improved c/s AROM rotation for driving    Updated Goals: to be achieved in 4 weeks:  1 . Pt will improve FOTO by 6 points in order to demonstrate functional improvement. 2. Pt will report little to no difficulty with lifting/carrying items like groceries on the FOTO assessment to demonstrate improvement in pain and strength for completing the task. 3. Pt will improve B shoulder flexion/abduction strength to 4+/5 in order to improve ease of reaching/lifting with household management. 4. Pt will report 80% improvement in order to improve QOL.   5. Pt will report <5/10 average back pain with ADLs and carrying her grandchild for improved QOL.    ASSESSMENT/RECOMMENDATIONS: Ms. Liliam Baez is progressing well in her first 7 visits of physical therapy meeting 1/1 STGs and 3/6 LTGs. She reports 50% improvement since start of care with main focus during the initial sessions on the c/s and right shoulder pain. She has good centralization of right C5 symptoms with mechanical cervical traction. She has improving Right shoulder AROM (145 degrees flexion and 110 degrees abduction) and c/s rotation AROM (right 50 degrees; left 54 degrees). She is working on her HEP at home for symptom management and will begin in upcoming session working on core strength and posture to address l/s pain. In standing, she has increased t/s extension to compensate for posture contributing to l/s paraspinal hypertonicity. Skilled PT remains medically necessary to continue with centralization of right C5 radicular symptoms, postural strengthening, core strengthening for improved ease of completing household chores, ADLs and lifting/carrying her grandson. [x]Continue therapy per initial plan/protocol at a frequency of 2 x per week for 4 weeks  []Continue therapy with the following recommended changes:_____________________      _____________________________________________________________________  []Discontinue therapy progressing towards or have reached established goals  []Discontinue therapy due to lack of appreciable progress towards goals  []Discontinue therapy due to lack of attendance or compliance  []Await Physician's recommendations/decisions regarding therapy  []Other:________________________________________________________________    Thank you for this referral.   Jessica Laguerre, PTA 12/17/2020 1:23 PM    NOTE TO PHYSICIAN:  Yareli Spencer 172   [facility]: [phone]  If you are unable to process this request in 24 hours please contact our office: [phone]  I have read the above report and request that my patient continue as recommended. ? I have read the above report and request that my patient continue therapy with the following changes/special instructions:____________________________________  ? I have read the above report and request that my patient be discharged from therapy.     Physicians signature: ______________________________Date: ______Time:______

## 2020-12-17 NOTE — PROGRESS NOTES
PT DAILY TREATMENT NOTE     Patient Name: Etelvina Lebron  Date:2020  : 1960  [x]  Patient  Verified  Payor: Tito Level / Plan: [employer] F[employer]IX PPO / Product Type: PPO /    In time: 9:00  Out time: 10:06  Total Treatment Time (min): 66  Visit #: 5 of 12      Treatment Area: Pain in right shoulder [M25.511]  Pain in right hip [M25.551]  Pain in left hip [M25.552]  Cervicalgia [M54.2]  Right shoulder pain [M25.511]    SUBJECTIVE  Pain Level (0-10 scale): 3/10 right shoulder  Any medication changes, allergies to medications, adverse drug reactions, diagnosis change, or new procedure performed?: [x] No    [] Yes (see summary sheet for update)  Subjective functional status/changes:   [] No changes reported  Pt reports 50% improvement overall. She states her back is much better today and wasn't sure what flared it last time. She notes improvement overall in her right shoulder and neck pain. She has improved ease of OH reaching and turning her head. She feels the traction is helping her pain. She goes back to see the MD next week.      OBJECTIVE    Modality rationale: decrease pain and increase tissue extensibility to improve the patients ability to improve ease of reaching with ADLs   Min Type Additional Details    [] Estim:  []Unatt       []IFC  []Premod                        []Other:  []w/ice   []w/heat  Position:  Location:    [] Estim: []Att    []TENS instruct  []NMES                    []Other:  []w/US   []w/ice   []w/heat  Position:  Location:   20 (with setup) [x]  Traction: [x] Cervical       []Lumbar                       [] Prone          [x]Supine                       [x]Intermittent   []Continuous Lbs: 16#/11#  [] before manual  [] after manual    []  Ultrasound: []Continuous   [] Pulsed                           []1MHz   []3MHz W/cm2:  Location:    []  Iontophoresis with dexamethasone         Location: [] Take home patch   [] In clinic    []  Ice     []  heat  []  Ice massage  []  Laser   [] Anodyne Position:  Location:    []  Laser with stim  []  Other:  Position:  Location:    []  Vasopneumatic Device Pressure:       [] lo [] med [] hi   Temperature: [] lo [] med [] hi   [x] Skin assessment post-treatment:  [x]intact []redness- no adverse reaction    []redness - adverse reaction:       31 min Therapeutic Exercise:  [x] See flow sheet :   Rationale: increase ROM and increase strength to improve the patients ability to improve ability with reaching with ADLs    15 min Therapeutic Activity:  [x]  See flow sheet : goal reassessment   Rationale: increase strength and increase proprioception  to improve the patients ability to improve ability with reaching with daily tasks             With   [] TE   [] TA   [] neuro   [] other: Patient Education: [x] Review HEP    [] Progressed/Changed HEP based on:   [] positioning   [] body mechanics   [] transfers   [] heat/ice application    [x] other: reviewed sleeping postures with c/s in neutral, reviewed supine deep neck flexor with HEP       Other Objective/Functional Measures:   Right shoulder flexion AROM: 145 degrees  Right shoulder abduction AROM: 110 degrees  Right c/s rotation: 50 degrees  Left c/s rotation: 54 degrees  Functional right IR: T11  In standing increased t/s extension with hinging in the l/s  Worked at [facility] to reduce rib flare for better core activation and decreased t/s extension  Added PPT and SB#1 to start addressing core strength  Reviewed self correction of left c/s rotation  No major pelvic obliquity noted just a left lower t/s upper l/s rotation    Pain Level (0-10 scale) post treatment: 0/10 shoulder; 3/10 back    ASSESSMENT/Changes in Function: See Progress Note    Patient will continue to benefit from skilled PT services to modify and progress therapeutic interventions, address ROM deficits, address strength deficits, analyze and address soft tissue restrictions, analyze and cue movement patterns, analyze and modify body mechanics/ergonomics, assess and modify postural abnormalities and instruct in home and community integration to attain remaining goals. Progress towards goals / Updated goals:  Short Term Goals: To be accomplished in 1 weeks:  1. Pt will be compliant with initial HEP in order to optimize therapy outcomes.  Met  Long Term Goals: To be accomplished in 4 weeks:  1.  Pt will improve FOTO by 5 points in order to demonstrate functional improvement. Met  2.  Pt will report 50% improvement in order to improve QOL.  Met 50%  3.  Pt will improve right shoulder flexion/abduction AROM to 130* in order to improve ease of reaching overhead with ADLs. Progressing 120 flexion /110 abduction degrees  145 degrees/110 degrees  4.  Pt will improve right shoulder functional IR to T11 in order to improve ease of dressing/showering. MET T11  5.  Pt will improve B cervical rotation AROM to 65* in order to improve ease of checking blind spots while driving. Progressing right 42 degrees; left 48 degrees   Right 50 degrees left 54 degrees  6.  Pt will improve B shoulder flexion/abduction strength to 4+/5 in order to improve ease of reaching/lifting with household management.    Shoulder flexion MMT: left 4+/5 right 4/5  Shoulder abduction MMT: left 4+/5 right 4/5       PLAN  [x]  Upgrade activities as tolerated     [x]  Continue plan of care  []  Update interventions per flow sheet       []  Discharge due to:_  []  Other:_      Liza Infante PTA 12/17/2020  10:38 AM    Future Appointments   Date Time Provider Wen Bird   12/17/2020  9:00 AM Vi Acevedo PTA [facility]   12/22/2020 11:15 AM Se Bravo MD [facility] BS AMB   12/28/2020 12:45 PM Blanche Francis PT [facility]

## 2020-12-22 ENCOUNTER — VIRTUAL VISIT (OUTPATIENT)
Dept: ORTHOPEDIC SURGERY | Age: 60
End: 2020-12-22
Payer: COMMERCIAL

## 2020-12-22 DIAGNOSIS — M25.552 BILATERAL HIP PAIN: ICD-10-CM

## 2020-12-22 DIAGNOSIS — M79.18 MYOFASCIAL PAIN: ICD-10-CM

## 2020-12-22 DIAGNOSIS — M25.551 BILATERAL HIP PAIN: ICD-10-CM

## 2020-12-22 DIAGNOSIS — M25.511 CHRONIC RIGHT SHOULDER PAIN: ICD-10-CM

## 2020-12-22 DIAGNOSIS — M79.18 CHRONIC PRIMARY MUSCULOSKELETAL PAIN: Primary | ICD-10-CM

## 2020-12-22 DIAGNOSIS — G89.29 CHRONIC RIGHT SHOULDER PAIN: ICD-10-CM

## 2020-12-22 DIAGNOSIS — G89.29 CHRONIC PRIMARY MUSCULOSKELETAL PAIN: Primary | ICD-10-CM

## 2020-12-22 DIAGNOSIS — G57.12 MERALGIA PARAESTHETICA, LEFT: ICD-10-CM

## 2020-12-22 DIAGNOSIS — M79.18 CERVICAL MYOFASCIAL PAIN SYNDROME: ICD-10-CM

## 2020-12-22 DIAGNOSIS — M54.59 MECHANICAL LOW BACK PAIN: ICD-10-CM

## 2020-12-22 PROCEDURE — 99213 OFFICE O/P EST LOW 20 MIN: CPT | Performed by: PHYSICAL MEDICINE & REHABILITATION
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
## 2020-12-22 NOTE — PROGRESS NOTES
20 Tiny Carolina is a 64 y.o. female is here for   Chief Complaint   Patient presents with    Follow-up    Back Pain    Arm Pain     right    Shoulder Pain     right   . There were no vitals filed for this visit. There is no height or weight on file to calculate BMI. Chart reviewed including medical/surgical/family history. Allergies   Allergen Reactions    Penicillins Other (comments)     Takes skin of body     has a current medication list which includes the following prescription(s): gabapentin, ibuprofen, diclofenac ec, albuterol, albuterol, fluticasone propionate, calcium-cholecalciferol (d3), biotin, ascorbic acid, aspirin delayed-release, loratadine, cyanocobalamin, multivitamin, cholecalciferol, zinc, prednisone, montelukast, nitroglycerin, and oxycodone-acetaminophen. Social History     Tobacco Use    Smoking status: Former Smoker     Packs/day: 0.20     Years: 5.00     Pack years: 1.00     Types: Cigarettes     Quit date: 1995     Years since quittin.8    Smokeless tobacco: Never Used   Substance Use Topics    Alcohol use: Yes     Alcohol/week: 0.0 standard drinks     Frequency: Monthly or less     Drinks per session: 1 or 2     Binge frequency: Never     Comment: socially    Drug use: No    Not Received     Is someone accompanying this pt? No virtual appt    Is the patient using any DME equipment during OV?  No virtual appt    Pain Assessment:  Pain Assessment  2020   Location of Pain Shoulder;Back   Location Modifiers Right   Severity of Pain 3   Quality of Pain Throbbing;Dull;Aching   Duration of Pain Persistent   Frequency of Pain Constant       Depression Screening:  3 most recent PHQ Screens 2019   Little interest or pleasure in doing things Not at all   Feeling down, depressed, irritable, or hopeless Not at all   Total Score PHQ 2 0       Learning Assessment:  Learning Assessment 2019   PRIMARY LEARNER Patient   HIGHEST LEVEL OF EDUCATION - PRIMARY LEARNER -   BARRIERS PRIMARY LEARNER -   PRIMARY LANGUAGE ENGLISH   LEARNER PREFERENCE PRIMARY DEMONSTRATION     VIDEOS   ANSWERED BY Teo Sandoval   RELATIONSHIP SELF       Abuse Screening:  Abuse Screening Questionnaire 5/2/2019   Do you ever feel afraid of your partner? N   Are you in a relationship with someone who physically or mentally threatens you? N   Is it safe for you to go home? Y       Fall Risk  Fall Risk Assessment, last 12 mths 5/2/2019   Able to walk? Yes   Fall in past 12 months? No       OPIOID RISK TOOL  No flowsheet data found. Pt currently taking Antiplatelet therapy? Yes ASA 81mg    Coordination of Care:  1. Have you been to the ER, urgent care clinic since your last visit? no  Hospitalized since your last visit? no    2. Have you seen or consulted any other health care providers outside of the [address] [place] [address] [address] since your last visit? no Include any pap smears or colon screening.  no

## 2020-12-23 ENCOUNTER — APPOINTMENT (OUTPATIENT)
Dept: PHYSICAL THERAPY | Age: 60
End: 2020-12-23
Payer: COMMERCIAL

## 2020-12-28 ENCOUNTER — HOSPITAL ENCOUNTER (OUTPATIENT)
Dept: PHYSICAL THERAPY | Age: 60
Discharge: HOME | End: 2020-12-28
Payer: COMMERCIAL

## 2020-12-28 PROCEDURE — 97110 THERAPEUTIC EXERCISES: CPT

## 2020-12-28 PROCEDURE — 97012 MECHANICAL TRACTION THERAPY: CPT

## 2020-12-28 PROCEDURE — 97530 THERAPEUTIC ACTIVITIES: CPT

## 2020-12-28 NOTE — PROGRESS NOTES
PT DAILY TREATMENT NOTE     Patient Name: Archie Echevarria  Date:2020  : 1960  [x]  Patient  Verified  Payor: Ofelia Cantu / Plan: MOSHE WASHINGTON PPO / Product Type: PPO /    In time:12:45  Out time: 1:47  Total Treatment Time (min): 62  Visit #: 1 of 12    Treatment Area: Pain in right shoulder [M25.511]  Pain in right hip [M25.551]  Pain in left hip [M25.552]  Cervicalgia [M54.2]  Right shoulder pain [M25.511]    SUBJECTIVE  Pain Level (0-10 scale): 5/10 right shoulder and c/s, 4/10 LBP  Any medication changes, allergies to medications, adverse drug reactions, diagnosis change, or new procedure performed?: [x] No    [] Yes (see summary sheet for update)  Subjective functional status/changes:   [] No changes reported  Pt reports that she saw Dr. Ingris Gomez last week, and he wants more focus on her LBP in physical therapy per pt.       OBJECTIVE    Modality rationale: decrease pain to improve the patients ability to improve ease of reaching with ADLs   Min Type Additional Details    [] Estim:  []Unatt       []IFC  []Premod                        []Other:  []w/ice   []w/heat  Position:  Location:    [] Estim: []Att    []TENS instruct  []NMES                    []Other:  []w/US   []w/ice   []w/heat  Position:  Location:   17 (with set up)  [x]  Traction: [x] Cervical       []Lumbar                       [] Prone          [x]Supine                       [x]Intermittent   []Continuous Lbs: 16#/11#  [] before manual  [] after manual    []  Ultrasound: []Continuous   [] Pulsed                           []1MHz   []3MHz W/cm2:  Location:    []  Iontophoresis with dexamethasone         Location: [] Take home patch   [] In clinic    []  Ice     []  heat  []  Ice massage  []  Laser   []  Anodyne Position:  Location:    []  Laser with stim  []  Other:  Position:  Location:    []  Vasopneumatic Device Pressure:       [] lo [] med [] hi   Temperature: [] lo [] med [] hi   [x] Skin assessment post-treatment:  [x]intact []redness- no adverse reaction    []redness - adverse reaction:       33 min Therapeutic Exercise:  [x] See flow sheet : leg lengthening exercise, self-MET x 2, therex per hard card    Rationale: increase ROM and increase strength to improve the patients ability to improve ease of lifting with daily tasks     12 min Therapeutic Activity:  []  See flow sheet : lumbar movement screen and hip MMT to help determine therapy POC, patient education    Rationale: to optimize therapy outcomes             With   [] TE   [] TA   [] neuro   [] other: Patient Education: [x] Review HEP    [] Progressed/Changed HEP based on:   [] positioning   [] body mechanics   [] transfers   [] heat/ice application    [x] other: reviewed anatomy of spine using spine model, trial of repeated lumbar extension in standing or repeated prone on elbows to assist with pain management, importance of symmetric sitting/standing to reduce stress on SIJ, TA hold with asymmetric functional tasks       Other Objective/Functional Measures:     Lumbar AROM  Flexion 100%  Extension 100%  Left Sidebend, increased central LBP   Right sidebend: 100%, increased pain in right lumbar region    Lumbar Movement Screen in Standing    Flexion: no change in pain  Extension: reduced pain  Left Sidebend: increased central LBP  Right Sidebend: increased right lumbar pain    Right upslip corrected with leg lengthening exercise  Right PI corrected with self MET with manual shotgun x 2  Reduced LBP following correction of pelvic malalignment     MMT B hips  Flexion: 4+/5 B  Abduction: Right 4-/5   Extension: 4/5 B  IR: 4+/5 B  ER: 4+/5 B    Increased LBP with prone and ANA  Reduced LBP with repeated ANA    Required cues to roll hips forward to reduce TFL substitution with clams and sidelying hip abduction    Gave and reviewed updated HEP  Gave pt orange TB for clams/reverse clams and blue TB for rows with HEP     Reduced pain following session     Pain Level (0-10 scale) post treatment: 3/10 c/s, right shoulder, and LBP     ASSESSMENT/Changes in Function:     Pt is making slow progress towards updated goals in therapy. Updated therapy plan to focus on core/hip stability deficits that are likely contributing to LBP. She presented with pelvic malalignment and extension directional preference this visit. Pt was given updated HEP to address core/hip stability deficits. Will continue to address strength and postural deficits in order to reduce pain with daily tasks and improve QOL. Patient will continue to benefit from skilled PT services to modify and progress therapeutic interventions, address functional mobility deficits, address ROM deficits, address strength deficits, analyze and address soft tissue restrictions, analyze and cue movement patterns, analyze and modify body mechanics/ergonomics, assess and modify postural abnormalities and instruct in home and community integration to attain remaining goals. Progress towards goals / Updated goals:  1 . Pt will improve FOTO by 6 points in order to demonstrate functional improvement. 2. Pt will report little to no difficulty with lifting/carrying items like groceries on the FOTO assessment to demonstrate improvement in pain and strength for completing the task. 3. Pt will improve B shoulder flexion/abduction strength to 4+/5 in order to improve ease of reaching/lifting with household management. 4. Pt will report 80% improvement in order to improve QOL. 5. Pt will report <5/10 average back pain with ADLs and carrying her grandchild for improved QOL.     PLAN  [x]  Upgrade activities as tolerated     [x]  Continue plan of care  [x]  Update interventions per flow sheet       []  Discharge due to:_  []  Other:_      Greta Gilford, PT 12/28/2020  1:00 PM    Future Appointments   Date Time Provider Wen Bird   2/16/2021  9:15 AM Italo Elias MD [facility]

## 2021-01-04 ENCOUNTER — APPOINTMENT (OUTPATIENT)
Dept: PHYSICAL THERAPY | Age: 61
End: 2021-01-04
Payer: COMMERCIAL

## 2021-01-05 ENCOUNTER — HOSPITAL ENCOUNTER (OUTPATIENT)
Dept: PHYSICAL THERAPY | Age: 61
Discharge: HOME | End: 2021-01-05
Payer: COMMERCIAL

## 2021-01-05 PROCEDURE — 97112 NEUROMUSCULAR REEDUCATION: CPT

## 2021-01-05 PROCEDURE — 97012 MECHANICAL TRACTION THERAPY: CPT

## 2021-01-05 PROCEDURE — 97016 VASOPNEUMATIC DEVICE THERAPY: CPT

## 2021-01-05 PROCEDURE — 97110 THERAPEUTIC EXERCISES: CPT

## 2021-01-05 NOTE — PROGRESS NOTES
PT DAILY TREATMENT NOTE 11    Patient Name: Marichuy Area  Date:2021  : 1960  [x]  Patient  Verified  Payor: Samira Ibarra / Plan: [place] [place] PPO / Product Type: PPO /    In time:9:03  Out time:10:05  Total Treatment Time (min): 62  Visit #: 2 of 12      Treatment Area: Pain in right shoulder [M25.511]  Pain in right hip [M25.551]  Pain in left hip [M25.552]  Cervicalgia [M54.2]  Right shoulder pain [M25.511]    SUBJECTIVE  Pain Level (0-10 scale): 2/10 right shoulder, 4/10 LBP   Any medication changes, allergies to medications, adverse drug reactions, diagnosis change, or new procedure performed?: [x] No    [] Yes (see summary sheet for update)  Subjective functional status/changes:   [] No changes reported  Pt reports that she has been doing her exercises in the morning for the past 3 days and that's been helping. Her pain is not too bad today. She has the most LBP with prolonged walking and bending. She still has good days and bad days, but she's having more good days now. The blue TB for the rows with her HEP are very challenging, and she would like to try a lighter band if possible.       OBJECTIVE    Modality rationale: decrease pain to improve the patients ability to improve ease of reaching with ADLs   Min Type Additional Details    - Estim:  -Unatt       -IFC  -Premod                        -Other:  -w/ice   -w/heat  Position:  Location:    - Estim: -Att    -TENS instruct  -NMES                    -Other:  -w/US   -w/ice   -w/heat  Position:  Location:   20 (with set up) -  Traction: - Cervical       -Lumbar                       - Prone          -Supine                       -Intermittent   -Continuous Lbs:  - before manual  - after manual    -  Ultrasound: -Continuous   - Pulsed                           -1MHz   -3MHz W/cm2:  Location:    -  Iontophoresis with dexamethasone         Location: - Take home patch   - In clinic    -  Ice     -  heat  -  Ice massage  -  Laser   -  Anodyne Position:  Location:    -  Laser with stim  -  Other:  Position:  Location:    -  Vasopneumatic Device Pressure:       - lo - med - hi   Temperature: - lo - med - hi   X Skin assessment post-treatment:  X intact -redness- no adverse reaction    -redness - adverse reaction:       42 min Therapeutic Exercise:  [x] See flow sheet :   Rationale: increase ROM and increase strength to improve the patients ability to improve ambulatory tolerance and ease of daily tasks            With   [] TE   [] TA   [] neuro   [] other: Patient Education: [x] Review HEP    [] Progressed/Changed HEP based on:   [] positioning   [] body mechanics   [] transfers   [] heat/ice application    [] other:      Other Objective/Functional Measures:     Right PI corrected with self MET with manual shotgun x 2   Significant pain reduction reported following correction of right PI   Gave and reviewed self MET for right PI and instructed pt to perform at home when increased LBP   Gave pt green TB for rows with HEP   Pt requested increased resistance with Vinicio rows/extension  Required tactile cues to achieve depression/retraction with SB depression      Pain Level (0-10 scale) post treatment: 4/10 LBP, 2/10 right shoulder     ASSESSMENT/Changes in Function:     Pt is making slow, steady progress towards updated goals in therapy. She continues to present with pelvic obliquity of right PI, and she was given self-MET to attempt to independently manage continued obliquity. Pt was challenged with upgraded interventions.   Will continue to address strength, ROM, and flexibility deficits in order to reduce pain and improve ease/ability with daily tasks      Patient will continue to benefit from skilled PT services to modify and progress therapeutic interventions, address functional mobility deficits, address ROM deficits, address strength deficits, analyze and address soft tissue restrictions, analyze and cue movement patterns, analyze and modify body mechanics/ergonomics, assess and modify postural abnormalities and instruct in home and community integration to attain remaining goals. Progress towards goals / Updated goals:  1 . Pt will improve FOTO by 6 points in order to demonstrate functional improvement. 2. Pt will report little to no difficulty with lifting/carrying items like groceries on the FOTO assessment to demonstrate improvement in pain and strength for completing the task. 3. Pt will improve B shoulder flexion/abduction strength to 4+/5 in order to improve ease of reaching/lifting with household management.   4. Pt will report 80% improvement in order to improve QOL. Pt reports 60% improvement with right shoulder pain and 40% improvement with LBP 1/5/21  5. Pt will report <5/10 average back pain with ADLs and carrying her grandchild for improved QOL. Progressing. Max pain 10/10, average pain 5/10, least pain 3/10 within the past week. Pt reports average pain is decreasing, and she's having more \"good days\".   1/5/21     PLAN  [x]  Upgrade activities as tolerated     [x]  Continue plan of care  []  Update interventions per flow sheet       []  Discharge due to:_  []  Other:_      Sushil Sloan, PT 1/5/2021  9:05 AM    Future Appointments   Date Time Provider Wen Allisoni   1/7/2021 12:45 PM Inez Chicas, PT [facility]   1/11/2021 11:15 AM Aneta Lopez PTA [facility]   1/14/2021  9:45 AM Say Thao, PT [facility]   2/16/2021  9:15 AM Daisha Caban MD [facility]

## 2021-01-07 ENCOUNTER — HOSPITAL ENCOUNTER (OUTPATIENT)
Dept: PHYSICAL THERAPY | Age: 61
Discharge: HOME | End: 2021-01-07
Payer: COMMERCIAL

## 2021-01-07 PROCEDURE — 97110 THERAPEUTIC EXERCISES: CPT

## 2021-01-07 PROCEDURE — 97012 MECHANICAL TRACTION THERAPY: CPT

## 2021-01-11 ENCOUNTER — HOSPITAL ENCOUNTER (OUTPATIENT)
Dept: PHYSICAL THERAPY | Age: 61
Discharge: HOME | End: 2021-01-11
Payer: COMMERCIAL

## 2021-01-11 PROCEDURE — 97110 THERAPEUTIC EXERCISES: CPT

## 2021-01-11 PROCEDURE — 97140 MANUAL THERAPY 1/> REGIONS: CPT

## 2021-01-11 NOTE — PROGRESS NOTES
PT DAILY TREATMENT NOTE     Patient Name: Edgardo Alexis  Date:2021  : 1960  [x]  Patient  Verified  Payor: Brooklyn Moncada / Plan: [employer] PPO / Product Type: PPO /    In time: 11:30  Out time: 12:15  Total Treatment Time (min): 45  Visit #: 4 of 12      Treatment Area: Pain in right shoulder [M25.511]  Pain in right hip [M25.551]  Pain in left hip [M25.552]  Cervicalgia [M54.2]  Right shoulder pain [M25.511]    SUBJECTIVE  Pain Level (0-10 scale): 3/10  Any medication changes, allergies to medications, adverse drug reactions, diagnosis change, or new procedure performed?: [x] No    [] Yes (see summary sheet for update)  Subjective functional status/changes:   [] No changes reported  Pt reports no shoulder or neck pain today just left knee and central low back soreness. She reports decreased walking tolerance due to the back. She has been able to hold her 3year old grandson. She is okay with a trial of no traction.        OBJECTIVE      35 min Therapeutic Exercise:  [x] See flow sheet :   Rationale: increase ROM and increase strength to improve the patients ability to improve ambulatory tolerance and ease of daily tasks     10 min Manual Therapy:  [x] See flow sheet : leg lengthening for right upslip; shotgun technique MET for left sacral torsion MET for left l/s rotation   Rationale: increase ROM and increase strength to improve the patients ability to improve ambulatory tolerance and ease of daily tasks              With   [] TE   [] TA   [] neuro   [] other: Patient Education: [x] Review HEP    [] Progressed/Changed HEP based on:   [] positioning   [] body mechanics   [] transfers   [] heat/ice application    [] other:      Other Objective/Functional Measures:   Trial of hold traction; see result next session  Discussed upcoming MNR next session to have pt think functionally what she can/can't do and how she feels in regards to continuation versus D/C from therapy  Discussed importance of core endurance building so added hollow tuck holds and planks to mix  Reviewed posture at mirror to reduce knee locking and reduce hinging in l/s  Added wall PPT endurance holds with ABCs and pallof press      Pain Level (0-10 scale) post treatment: 2/10 back and no knee pain    ASSESSMENT/Changes in Function: Pt continues to progress in therapy with reduced to no shoulder and neck pain. She continues with central LBP with prolonged ambulation but tends to having hinging in the l/s and in standing locks her knees. She will benefit from continued work on core endurance for carryover to standing and walking in order to decrease pain and maintain pelvic alignment. Patient will continue to benefit from skilled PT services to modify and progress therapeutic interventions, address functional mobility deficits, address ROM deficits, address strength deficits, analyze and address soft tissue restrictions, analyze and cue movement patterns, analyze and modify body mechanics/ergonomics, assess and modify postural abnormalities and instruct in home and community integration to attain remaining goals. Progress towards goals / Updated goals:  1 . Pt will improve FOTO by 6 points in order to demonstrate functional improvement. 2. Pt will report little to no difficulty with lifting/carrying items like groceries on the FOTO assessment to demonstrate improvement in pain and strength for completing the task. 3. Pt will improve B shoulder flexion/abduction strength to 4+/5 in order to improve ease of reaching/lifting with household management.   4. Pt will report 80% improvement in order to improve QOL. Pt reports 60% improvement with right shoulder pain and 40% improvement with LBP 1/5/21  5. Pt will report <5/10 average back pain with ADLs and carrying her grandchild for improved QOL. Progressing. Max pain 10/10, average pain 5/10, least pain 3/10 within the past week.   Pt reports average pain is decreasing, and she's having more \"good days\".   1/5/21     PLAN  [x]  Upgrade activities as tolerated     [x]  Continue plan of care  []  Update interventions per flow sheet       []  Discharge due to:_  []  Other:_      Xiao Moore, PTA 1/11/2021  9:05 AM    Future Appointments   Date Time Provider Wen Marge   1/11/2021 11:15 AM Lee Mcdermott, PTA [facility]   1/14/2021  9:45 AM Juana Thao, PT [facility]   2/16/2021  9:15 AM Mina Vargas MD [facility]

## 2021-01-14 ENCOUNTER — APPOINTMENT (OUTPATIENT)
Dept: PHYSICAL THERAPY | Age: 61
End: 2021-01-14
Payer: COMMERCIAL

## 2021-01-20 ENCOUNTER — APPOINTMENT (OUTPATIENT)
Dept: PHYSICAL THERAPY | Age: 61
End: 2021-01-20
Payer: COMMERCIAL

## 2021-01-21 ENCOUNTER — APPOINTMENT (OUTPATIENT)
Dept: PHYSICAL THERAPY | Age: 61
End: 2021-01-21
Payer: COMMERCIAL

## 2021-01-26 ENCOUNTER — APPOINTMENT (OUTPATIENT)
Dept: PHYSICAL THERAPY | Age: 61
End: 2021-01-26
Payer: COMMERCIAL

## 2021-02-03 ENCOUNTER — HOSPITAL ENCOUNTER (OUTPATIENT)
Dept: PHYSICAL THERAPY | Age: 61
Discharge: HOME | End: 2021-02-03
Payer: COMMERCIAL

## 2021-02-03 PROCEDURE — 97140 MANUAL THERAPY 1/> REGIONS: CPT

## 2021-02-03 PROCEDURE — 97530 THERAPEUTIC ACTIVITIES: CPT

## 2021-02-03 PROCEDURE — 97110 THERAPEUTIC EXERCISES: CPT

## 2021-02-03 NOTE — PROGRESS NOTES
[facility] - Evelyn Bautista  [address] [facility]  [phone] fax    Physical Therapy Progress Note  Patient name: Liudmila Hall Start of Care: 2020   Referral source: Maggei Yang MD : 1960   Medical/Treatment Diagnosis: Pain in right shoulder [M25.511]  Pain in right hip [M25.551]  Pain in left hip [M25.552]  Cervicalgia [M54.2]  Right shoulder pain [M25.511]  Payor: Anh Ripper / Plan: [employer] PPO / Product Type: PPO /  Onset Date:2018     Prior Hospitalization: see medical history Provider#: [id number]   Medications: Verified on Patient Summary List     Comorbidities: heart disease, asthma, obesity, arthritis     Prior Level of Function: lives with children in a 1 story with 4 steps to enter, right-handed   Visits from Start of Care: 12    Missed Visits: 3    Established Goals:         Excellent           Good         Limited           None  [x] Increased ROM   []  []  [x]  []  [x] Increased Strength  []  [x]  []  []  [x] Increased Mobility  []  [x]  []  []   [x] Decreased Pain   []  []  [x]  []  [] Decreased Swelling  []  []  []  []    Key Functional Changes:  50% improvement in neck/shoulder symptoms; improved B shoulder strength; 1 point FOTO improvement; reports little difficulty with lifting/carrying groceries    Updated Goals: to be achieved in 4 weeks:  1. Pt will improve FOTO by 6 points in order to demonstrate functional improvement. 2. Pt will report little to no difficulty with walking a mile and performing moderate household activities like pushing a vacuum  on the FOTO assessment to demonstrate improvement in pain and strength for completing the task. 3. Pt will report 75% improvement in order to improve QOL. 4. Pt will report <5/10 average back pain and right shoulder pain with ADLs and carrying her grandchild for improved QOL.   5. Pt will be independent with a final home program for self progression of shoulder and core/hip strength and pain control in order to perform household chores, ambulation and caring for her grandson. ASSESSMENT/RECOMMENDATIONS: Pt missed 3 weeks of physical therapy due to scheduling conflicts, sickness and the holidays but has attended 12 visits overall with 50% improvement in c/s and shoulder pain. She is progressing slowly towards her updated goals in regards to reduced neck, right shoulder and central back pain (back pain: 7.5/10 average; 10/10 at worst; 5-6/10 at best; Average neck/shoulder: 6/10; 9/10 at worst; 2-3/10 at best). She continues to present with a pelvic obliquity and l/s rotation indicative of decreased core/hip strength. She has a c/s rotation to the left that when corrected centralizes her right C5 dermatome symptoms and was also using c/s traction for centralization. 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 Skilled PT remains medically necessary to maintain centralized C5 symptoms, strengthen her posterior kinetic chain for improved posture, strengthen her hips/core to maintain neutral spine and reduce back pain for ease of household chores and ADLs.      [x]Continue therapy per initial plan/protocol at a frequency of  2 x per week for 4 weeks  []Continue therapy with the following recommended changes:_____________________      _____________________________________________________________________  []Discontinue therapy progressing towards or have reached established goals  []Discontinue therapy due to lack of appreciable progress towards goals  []Discontinue therapy due to lack of attendance or compliance  []Await Physician's recommendations/decisions regarding therapy  []Other:________________________________________________________________    Thank you for this referral.   Xiao Service, PTA 2/3/2021 1:28 PM    NOTE TO PHYSICIAN:  John Rodriguez ORDERS BELOW AND   FAX TO [facility]: [phone]  If you are unable to process this request in 24 hours please contact our office: [phone]    ? I have read the above report and request that my patient continue as recommended. ? I have read the above report and request that my patient continue therapy with the following changes/special instructions:____________________________________  ? I have read the above report and request that my patient be discharged from therapy.     Physicians signature: ______________________________Date: ______Time:______

## 2021-02-03 NOTE — PROGRESS NOTES
PT DAILY TREATMENT NOTE     Patient Name: Suri Dash  Date:2/3/2021  : 1960  [x]  Patient  Verified  Payor: Komal Kraus / Plan: [employer] PPO / Product Type: PPO /    In time: 9:03   Out time: 9:50  Total Treatment Time (min): 47  Visit #: 5 of 12      Treatment Area: Pain in right shoulder [M25.511]  Pain in right hip [M25.551]  Pain in left hip [M25.552]  Cervicalgia [M54.2]  Right shoulder pain [M25.511]    SUBJECTIVE  Pain Level (0-10 scale): 7/10 neck/shoulder; 5/10 back   Any medication changes, allergies to medications, adverse drug reactions, diagnosis change, or new procedure performed?: [x] No    [] Yes (see summary sheet for update)  Subjective functional status/changes:   [] No changes reported  Pt reports 3 week gap from holidays to sickness to scheduling conflicts. She doesn't get symptoms as much down her right arm but has recently been getting soreness to the tip of her right shoulder and having pain turning her head to the right. She averages back pain: 7.5/10 average; 10/10 at worst; 5-6/10 at best; Average neck/shoulder: 6/10; 9/10 at worst; 2-3/10 at best. 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      OBJECTIVE    10 min Therapeutic Exercise:  [x] See flow sheet :   Rationale: increase ROM and increase strength to improve the patients ability to improve ambulatory tolerance and ease of daily tasks    20 min Therapeutic Activity:  [x] See flow sheet : FOTO; goal reassessment   Rationale: increase ROM and increase strength to improve the patients ability to improve ambulatory tolerance and ease of daily tasks      17 min Manual Therapy:  [x] See flow sheet : leg lengthening for right upslip; MET for right AI; shotgun technique; MET for FRSL L3-L5; MET For FRSL C3-C6   Rationale: increase ROM and increase strength to improve the patients ability to improve ambulatory tolerance and ease of daily tasks              With   [] TE   [] TA   [] neuro   [] other: Patient Education: [x] Review HEP    [] Progressed/Changed HEP based on:   [] positioning   [] body mechanics   [] transfers   [] heat/ice application    [] other:      Other Objective/Functional Measures: FOTO: 39  Carrying items like groceries: little difficulty  Shoulder flexion MMT: left 4/5 right 4+/5  Shoulder abduction MMT: left 4/5 right 4/5  % Improvement: neck/shoulder-50% back-0%  Average back pain: 7.5/10 average; 10/10 at worst; 5-6/10 at best  Average neck/shoulder: 6/10; 9/10 at worst; 2-3/10 at best    Pain Level (0-10 scale) post treatment: 2/10    ASSESSMENT/Changes in Function:  See Progress Note. Patient will continue to benefit from skilled PT services to modify and progress therapeutic interventions, address functional mobility deficits, address ROM deficits, address strength deficits, analyze and address soft tissue restrictions, analyze and cue movement patterns, analyze and modify body mechanics/ergonomics, assess and modify postural abnormalities and instruct in home and community integration to attain remaining goals. Progress towards goals / Updated goals:  1 . Pt will improve FOTO by 6 points in order to demonstrate functional improvement. Not met 1 point improvement  2. Pt will report little to no difficulty with lifting/carrying items like groceries on the FOTO assessment to demonstrate improvement in pain and strength for completing the task. Met limited a little   3. Pt will improve B shoulder flexion/abduction strength to 4+/5 in order to improve ease of reaching/lifting with household management. Progressing; Shoulder flexion MMT: left 4/5 right 4+/5; Shoulder abduction MMT: left 4/5 right 4/5   4. Pt will report 80% improvement in order to improve QOL.  Pt reports 60% improvement with right shoulder pain and 40% improvement with LBP 1/5/21  % Improvement: neck/shoulder-50% back-0%  5. Pt will report <5/10 average back pain with ADLs and carrying her grandchild for improved QOL. Progressing. Max pain 10/10, average pain 5/10, least pain 3/10 within the past week. Pt reports average pain is decreasing, and she's having more \"good days\".   1/5/21   Average back pain: 7.5/10 average; 10/10 at worst; 5-6/10 at best  Average neck/shoulder: 6/10; 9/10 at worst; 2-3/10 at best    PLAN  [x]  Upgrade activities as tolerated     [x]  Continue plan of care  []  Update interventions per flow sheet       []  Discharge due to:_  []  Other:_      Olya Vale PTA 2/3/2021  9:05 AM    Future Appointments   Date Time Provider Wen Bird   2/3/2021  9:00 AM Juan R Christy PTA [facility]   2/16/2021  9:15 AM Sarah Marks MD [facility]

## 2021-04-06 RX ORDER — ALBUTEROL SULFATE 90 UG/1
2 AEROSOL, METERED RESPIRATORY (INHALATION)
Qty: 1 INHALER | Refills: 3 | Status: SHIPPED | OUTPATIENT
Start: 2021-04-06

## 2021-04-06 RX ORDER — ALBUTEROL SULFATE 0.83 MG/ML
2.5 SOLUTION RESPIRATORY (INHALATION)
Qty: 120 NEBULE | Refills: 3 | Status: SHIPPED | OUTPATIENT
Start: 2021-04-06 | End: 2022-04-05

## 2021-04-06 RX ORDER — FLUTICASONE PROPIONATE 110 UG/1
2 AEROSOL, METERED RESPIRATORY (INHALATION) EVERY 12 HOURS
Qty: 1 INHALER | Refills: 3 | Status: SHIPPED | OUTPATIENT
Start: 2021-04-06

## 2021-04-06 NOTE — TELEPHONE ENCOUNTER
Pt requesting refill for albuterol inhaler, nebulizer solution, and flovent to be sent to [facility] in [facility]. Please advise [phone].

## 2021-04-16 NOTE — PROGRESS NOTES
[facility]  [address] [place] [address]  [phone] fax    Physical Therapy Discharge Summary    Patient name: Augusto Barrera Start of Care: 2020   Referral source: Priyanka Metcalf MD : 1960   Medical/Treatment Diagnosis: Pain in right shoulder [M25.511]  Pain in right hip [M25.551]  Pain in left hip [M25.552]  Cervicalgia [M54.2]  Right shoulder pain [M25.511]  Payor: Tamera Pham / Plan: Jatin Pink RPN / Product Type: Commerical /  Onset Date:2018     Prior Hospitalization: see medical history Provider#: [id number]   Medications: Verified on Patient Summary List     Comorbidities: heart disease, asthma, obesity, arthritis     Prior Level of Function: lives with children in a 1 story with 4 steps to enter, right-handed     Visits from Start of Care: 12    Missed Visits: 4    Reporting Period : 2021 to 2021    Summary of Care:  Goal: Pt will improve FOTO by 6 points in order to demonstrate functional improvement. Status at last note/certification: 45  Status at discharge: not met    Goal: Pt will report little to no difficulty with walking a mile and performing moderate household activities like pushing a vacuum  on the FOTO assessment to demonstrate improvement in pain and strength for completing the task. Status at last note/certification: limited a lot  Status at discharge: not met    Goal: Pt will report 75% improvement in order to improve QOL. Status at last note/certification:neck/shoulder: 50% back: 0%  Status at discharge: not met    Goal: Pt will report <5/10 average back pain and right shoulder pain with ADLs and carrying her grandchild for improved QOL.   Status at last note/certification: back: 7.5/10; right shoulder: 6/10  Status at discharge: not met    Goal: Pt will be independent with a final home program for self progression of shoulder and core/hip strength and pain control in order to perform household chores, ambulation and caring for her grandson. Status at last note/certification: not yet initiated  Status at discharge: not met      ASSESSMENT/RECOMMENDATIONS: Per most recent progress note on 2/3/21, \"Pt missed 3 weeks of physical therapy due to scheduling conflicts, sickness and the holidays but has attended 12 visits overall with 50% improvement in c/s and shoulder pain. She is progressing slowly towards her updated goals in regards to reduced neck, right shoulder and central back pain (back pain: 7.5/10 average; 10/10 at worst; 5-6/10 at best; Average neck/shoulder: 6/10; 9/10 at worst; 2-3/10 at best). She continues to present with a pelvic obliquity and l/s rotation indicative of decreased core/hip strength. She has a c/s rotation to the left that when corrected centralizes her right C5 dermatome symptoms and was also using c/s traction for centralization. 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 \" Ms. Teretha Kawasaki did not return to therapy after that progress note due to familial stress and deaths within the family. Her goals were unable to be reassessed and she will benefit from a follow up with the MD to assess need of a new evaluation due to lapse in time since last treatment. Will discharge from skilled PT at this time.      [x]Discontinue therapy: []Patient has reached or is progressing toward set goals      [x]Patient is non-compliant or has abdicated      []Due to lack of appreciable progress towards set Theresa 296, PTA 4/16/2021 10:55 AM

## 2021-04-29 ENCOUNTER — TRANSCRIBE ORDER (OUTPATIENT)
Dept: SCHEDULING | Age: 61
End: 2021-04-29

## 2021-04-29 DIAGNOSIS — Z12.31 SCREENING MAMMOGRAM, ENCOUNTER FOR: Primary | ICD-10-CM

## 2021-05-27 ENCOUNTER — HOSPITAL ENCOUNTER (OUTPATIENT)
Dept: MAMMOGRAPHY | Age: 61
Discharge: HOME | End: 2021-05-27
Attending: FAMILY MEDICINE
Payer: COMMERCIAL

## 2021-05-27 DIAGNOSIS — Z12.31 SCREENING MAMMOGRAM, ENCOUNTER FOR: ICD-10-CM

## 2021-05-27 PROCEDURE — 77063 BREAST TOMOSYNTHESIS BI: CPT

## 2021-08-10 ENCOUNTER — HOSPITAL ENCOUNTER (OUTPATIENT)
Dept: GENERAL RADIOLOGY | Age: 61
Discharge: HOME | End: 2021-08-10
Payer: COMMERCIAL

## 2021-08-10 DIAGNOSIS — R10.9 RIGHT FLANK PAIN: ICD-10-CM

## 2021-08-10 DIAGNOSIS — J45.901 MODERATE ASTHMA WITH ACUTE EXACERBATION: ICD-10-CM

## 2021-08-10 PROCEDURE — 72100 X-RAY EXAM L-S SPINE 2/3 VWS: CPT

## 2021-08-10 PROCEDURE — 71046 X-RAY EXAM CHEST 2 VIEWS: CPT

## 2022-02-17 ENCOUNTER — HOSPITAL ENCOUNTER (OUTPATIENT)
Dept: GENERAL RADIOLOGY | Age: 62
Discharge: HOME | End: 2022-02-17
Payer: COMMERCIAL

## 2022-02-17 DIAGNOSIS — M79.605 LEFT LEG PAIN: ICD-10-CM

## 2022-02-17 PROCEDURE — 73521 X-RAY EXAM HIPS BI 2 VIEWS: CPT

## 2022-03-08 ENCOUNTER — OFFICE VISIT (OUTPATIENT)
Dept: ORTHOPEDIC SURGERY | Age: 62
End: 2022-03-08
Payer: MEDICAID

## 2022-03-08 VITALS
TEMPERATURE: 97.4 F | RESPIRATION RATE: 16 BRPM | BODY MASS INDEX: 32.69 KG/M2 | WEIGHT: 203.4 LBS | DIASTOLIC BLOOD PRESSURE: 69 MMHG | SYSTOLIC BLOOD PRESSURE: 116 MMHG | HEART RATE: 56 BPM | HEIGHT: 66 IN

## 2022-03-08 DIAGNOSIS — M79.18 MYOFASCIAL PAIN: ICD-10-CM

## 2022-03-08 DIAGNOSIS — M54.59 MECHANICAL LOW BACK PAIN: Primary | ICD-10-CM

## 2022-03-08 DIAGNOSIS — M16.0 PRIMARY OSTEOARTHRITIS OF BOTH HIPS: ICD-10-CM

## 2022-03-08 DIAGNOSIS — G57.12 MERALGIA PARAESTHETICA, LEFT: ICD-10-CM

## 2022-03-08 PROCEDURE — 99213 OFFICE O/P EST LOW 20 MIN: CPT | Performed by: PHYSICAL MEDICINE & REHABILITATION

## 2022-03-08 RX ORDER — PANTOPRAZOLE SODIUM 40 MG/1
40 TABLET, DELAYED RELEASE ORAL DAILY
COMMUNITY
Start: 2021-12-14

## 2022-03-08 RX ORDER — MELOXICAM 15 MG/1
15 TABLET ORAL DAILY
Qty: 30 TABLET | Refills: 2 | Status: SHIPPED | OUTPATIENT
Start: 2022-03-08

## 2022-03-08 NOTE — LETTER
3/8/2022    Patient: Thong Gonzalez   YOB: 1960   Date of Visit: 3/8/2022     Bard Alicia MD  [address] [address]  Via Fax: [phone]    Dear Bard Alicia MD,      Thank you for referring Ms. Deric Khan to [address] [place] [address] [facility] for evaluation. My notes for this consultation are attached. If you have questions, please do not hesitate to call me. I look forward to following your patient along with you.       Sincerely,    Sherrie Wagner MD
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
## 2022-03-17 ENCOUNTER — HOSPITAL ENCOUNTER (OUTPATIENT)
Dept: PHYSICAL THERAPY | Age: 62
End: 2022-03-17
Attending: PHYSICAL MEDICINE & REHABILITATION

## 2022-03-18 PROBLEM — N82.3 RECTOVAGINAL FISTULA: Status: ACTIVE | Noted: 2018-06-27

## 2022-03-19 PROBLEM — D64.9 SYMPTOMATIC ANEMIA: Status: ACTIVE | Noted: 2018-06-27

## 2022-03-19 PROBLEM — K92.2 LOWER GI BLEED: Status: ACTIVE | Noted: 2018-06-27

## 2022-03-19 PROBLEM — R55 SYNCOPE: Status: ACTIVE | Noted: 2017-11-27

## 2022-03-20 PROBLEM — I34.0 NON-RHEUMATIC MITRAL REGURGITATION: Status: ACTIVE | Noted: 2017-11-27

## 2022-03-20 PROBLEM — E66.01 SEVERE OBESITY (HCC): Status: ACTIVE | Noted: 2020-03-24

## 2022-03-28 ENCOUNTER — APPOINTMENT (OUTPATIENT)
Dept: PHYSICAL THERAPY | Age: 62
End: 2022-03-28
Attending: PHYSICAL MEDICINE & REHABILITATION

## 2022-04-05 ENCOUNTER — OFFICE VISIT (OUTPATIENT)
Dept: ORTHOPEDIC SURGERY | Age: 62
End: 2022-04-05
Payer: COMMERCIAL

## 2022-04-05 VITALS
BODY MASS INDEX: 32.78 KG/M2 | HEIGHT: 66 IN | HEART RATE: 81 BPM | DIASTOLIC BLOOD PRESSURE: 74 MMHG | OXYGEN SATURATION: 99 % | SYSTOLIC BLOOD PRESSURE: 107 MMHG | WEIGHT: 204 LBS | TEMPERATURE: 98.1 F

## 2022-04-05 DIAGNOSIS — M25.552 BILATERAL HIP PAIN: Primary | ICD-10-CM

## 2022-04-05 DIAGNOSIS — M25.551 BILATERAL HIP PAIN: Primary | ICD-10-CM

## 2022-04-05 PROCEDURE — 99214 OFFICE O/P EST MOD 30 MIN: CPT | Performed by: PHYSICAL MEDICINE & REHABILITATION

## 2022-04-05 RX ORDER — ACETAMINOPHEN 500 MG
2 TABLET ORAL AS NEEDED
COMMUNITY
Start: 2022-03-23

## 2022-04-05 RX ORDER — DOXYCYCLINE 100 MG/1
1 TABLET ORAL DAILY
COMMUNITY
Start: 2022-03-23 | End: 2022-04-05

## 2022-04-05 RX ORDER — IBUPROFEN 800 MG/1
800 TABLET ORAL
Qty: 30 TABLET | Refills: 0 | Status: SHIPPED | OUTPATIENT
Start: 2022-04-05 | End: 2022-05-05

## 2022-04-05 NOTE — PROGRESS NOTES
King Mijares presents today for   Chief Complaint   Patient presents with    Back Pain     lower       Is someone accompanying this pt? no    Is the patient using any DME equipment during OV? no    Depression Screening:  3 most recent PHQ Screens 5/2/2019   Little interest or pleasure in doing things Not at all   Feeling down, depressed, irritable, or hopeless Not at all   Total Score PHQ 2 0       Learning Assessment:  Learning Assessment 5/2/2019   PRIMARY LEARNER Patient   HIGHEST LEVEL OF EDUCATION - PRIMARY LEARNER  -   BARRIERS PRIMARY LEARNER -   PRIMARY LANGUAGE ENGLISH   LEARNER PREFERENCE PRIMARY DEMONSTRATION     VIDEOS   ANSWERED BY Rudy Thomson   RELATIONSHIP SELF       Abuse Screening:  Abuse Screening Questionnaire 5/2/2019   Do you ever feel afraid of your partner? N   Are you in a relationship with someone who physically or mentally threatens you? N   Is it safe for you to go home? Y       Fall Risk  Fall Risk Assessment, last 12 mths 5/2/2019   Able to walk? Yes   Fall in past 12 months? No       OPIOID RISK TOOL  No flowsheet data found. Coordination of Care:  1. Have you been to the ER, urgent care clinic since your last visit? no  Hospitalized since your last visit? no    2. Have you seen or consulted any other health care providers outside of the [address] [place] [address] since your last visit? Yes, PCP Include any pap smears or colon screening.  no

## 2022-04-05 NOTE — PROGRESS NOTES
[address]  [address], [address]  [facility], [address]  Phone: [phone]  Fax: [phone]      Patient: Sonali Dacosta                                                                              MRN: [id number]        YOB: 1960          AGE: 63 y.o. PCP: Nilson Dubose MD  Date:  04/05/22    Reason for Consultation: Back Pain (lower)      HPI:  Sonali Dacosta is a 63 y.o. female with relevant PMH of CAD, PFO, asthma, MVR who presents with low back pain and bilateral hip pain. Symptoms began over one year ago but worsened since 11/2021. She saw Dr. Hailee Wilson recently and had x-rays which demonstrate b/l moderate to severe hip OA. She was referred here to evaluate for possible hip injections. She is starting PT next week. Neurologic symptoms: No numbness, tingling, weakness, bowel or bladder changes. No recent falls      Location: The pain is located in the low back and bilateral hips L>R  Radiation: The pain does radiate down the left anterior leg  Pain Score: Currently: 8/10    Quality: Pain is of a Achy quality. Aggravating: Pain is exacerbated by walking, sitting, standing, lying down and exercise  Alleviating: The pain is alleviated by nothing, sitting in chair with lumbar support    Prior Treatments:  Physical therapy: YES- starting next week [place]  Injections:NO    Previous Medications: gabapentin 300mg-  Makes her feel drowsy  Current Medications:Asparcreme, meloxicam   Previous work-up has included:   X-ray bilateral hips 2/17/2022  The bone density is grossly unremarkable. No evidence of acute fracture of the pelvis. The sacroiliac joints are unremarkable. The pubic symphysis is unremarkable. No lytic or blastic focus identified. No erosions or periostitis appreciated.      The femoral heads are well aligned with the osseous pelvis. No evidence of hip fracture or dislocation.   There is asymmetric joint space narrowing with osteophyte formation and subchondral sclerosis with femoral neck buttressing    X-ray lumbar spine 8/10/2021  L5 is partially sacralized. Small right ribs on T12.   Vertebral body heights are normal. Relative preservation of the disc spaces. Minimal anterolisthesis of L3 in relation to L4 stable. SI joints grossly  intact. Postsurgical cholecystectomy clips. Past Medical History:   Past Medical History:   Diagnosis Date    Asthma     Chest pain, unspecified     Possible Angina, GERD, chest wall pains, recurrent pains, possible atypical angina, happens at rest, abnormal nuc scan in past, discussed risk benefit options of cardiac cath/pci, she wants to think it over, add sl ntg    Coronary atherosclerosis of native coronary artery     Abnormal NUC scan, patient's symptoms are better, will start meds and monitor    Heart murmur     Hypercholesterolemia     Ill-defined condition     Patent Foramen Ovale- asymptomatic    Obesity, unspecified     Discussed diet    Other and unspecified hyperlipidemia     no longer on meds    Pain of right thumb     Trigger thumb of right hand       Past Surgical History:   Past Surgical History:   Procedure Laterality Date    COLONOSCOPY N/A 2018    COLONOSCOPY with clips, gold probe performed by Guerita Maynard MD at [facility]    HX  SECTION      HX CHOLECYSTECTOMY      HX COLONOSCOPY      HX OTHER SURGICAL      Subcutaneous fistulotomy posterior vaginal wall.  SC ANOSCOPY DX W/COLLJ SPEC BR/WA SPX WHEN PRFRMD N/A 2017    Dr. Clark Esteban N/A 2017    Dr. Mckenzie Vann      SocHx:   Social History     Tobacco Use    Smoking status: Former Smoker     Packs/day: 0.20     Years: 5.00     Pack years: 1.00     Types: Cigarettes     Quit date: 1995     Years since quittin.1    Smokeless tobacco: Never Used   Substance Use Topics    Alcohol use:  Yes     Alcohol/week: 0.0 standard drinks     Comment: socially      FamHx:? Family History   Problem Relation Age of Onset    Hypertension Mother     Diabetes Father     Colon Cancer Father     Cancer Father         prosate    Hypertension Maternal Grandmother     Heart Disease Neg Hx         no family history of heart disease       Current Medications:    Current Outpatient Medications   Medication Sig Dispense Refill    acetaminophen (TYLENOL) 500 mg tablet Take 2 Tablets by mouth as needed.  pantoprazole (PROTONIX) 40 mg tablet Take 40 mg by mouth daily.  meloxicam (Mobic) 15 mg tablet Take 1 Tablet by mouth daily. 30 Tablet 2    fluticasone propionate (FLOVENT HFA) 110 mcg/actuation inhaler Take 2 Puffs by inhalation every twelve (12) hours. 1 Inhaler 3    albuterol (PROVENTIL HFA, VENTOLIN HFA, PROAIR HFA) 90 mcg/actuation inhaler Take 2 Puffs by inhalation every six (6) hours as needed for Wheezing or Shortness of Breath. 1 Inhaler 3    calcium-cholecalciferol, d3, (CALCIUM 600 + D) 600-125 mg-unit tab Take  by mouth.  montelukast (SINGULAIR) 10 mg tablet Take 10 mg by mouth daily.  BIOTIN PO Take 1 Tablet by mouth daily.  ascorbic acid (JESSICA-C PO) Take  by mouth Daily (before breakfast).  aspirin delayed-release 81 mg tablet Take  by mouth daily.  nitroglycerin (NITROSTAT) 0.4 mg SL tablet 1 Tab by SubLINGual route every five (5) minutes as needed for Chest Pain. 25 Tab 1    loratadine (CLARITIN) 10 mg tablet Take 10 mg by mouth.  cyanocobalamin (VITAMIN B-12) 1,000 mcg tablet Take 1,000 mcg by mouth daily.  multivitamin (ONE A DAY) tablet Take 1 Tab by mouth daily.  Cholecalciferol, Vitamin D3, 1,000 unit cap Take 1,000 Units by mouth daily.  zinc 50 mg Tab Take  by mouth daily.  doxycycline (ADOXA) 100 mg tablet Take 1 Tablet by mouth daily.       albuterol (PROVENTIL VENTOLIN) 2.5 mg /3 mL (0.083 %) nebu 3 mL by Nebulization route every six (6) hours as needed for Wheezing. 120 Nebule 3    gabapentin (NEURONTIN) 300 mg capsule TAKE 1 CAPSULE BY MOUTH AT BEDTIME      ibuprofen (MOTRIN) 800 mg tablet every eight (8) hours as needed.  diclofenac EC (VOLTAREN) 75 mg EC tablet Take 1 Tab by mouth two (2) times daily as needed for Pain. 60 Tab 2      Allergies: Allergies   Allergen Reactions    Penicillins Other (comments)     Takes skin of body   Other reaction(s): other/intolerance  Skin peeling off hands and feet        Review of Systems:   Gen:    Denied fevers, chills, malaise, fatigue, weight changes   Resp: Denied shortness of breath, cough, wheezing   CVS: Denied chest pain, palpitations   : Denied urinary urgency, frequency, incontinence   GI: Denied nausea, vomiting, constipation, diarrhea   Skin: Denied rashes, wounds   Psych: Denied anxiety, depression   Vasc: Denied claudication, ulcers   Hem: Denied easy bruising/bleeding   MSK: See HPI   Neuro: See HPI         Physical Exam     Vital Signs:   Visit Vitals  /74 (BP 1 Location: Right upper arm, BP Patient Position: Sitting, BP Cuff Size: Large adult)   Pulse 81   Temp 98.1 °F (36.7 °C) (Oral)   Ht 5' 6\" (1.676 m)   Wt 204 lb (92.5 kg)   SpO2 99%   BMI 32.93 kg/m²      General: ??????? Well nourished and well developed female without any acute distress   Psychiatric: ?  Alert and oriented x 3 with normal mood    HEENT: ???????? Atraumatic   Respiratory:   Breathing non-labored and non dyspneic   CV: ???????????????? Peripheral pulses intact, no peripheral edema   Skin: ????????????? No rashes       Neurologic: ??       Sensation: normal and grossly intact thebilateral, lower extremity(s)   Strength: 5/5 in the bilateral, lower extremity(s)   Reflexes: reveals 2+ symmetric DTRs throughout LE  Gait: normal    Musculoskeletal: Hip Exam      Alignment: Normal   Atrophy: None   Single leg stance: Normal    Tenderness to Palpation:   Anterior hip: Negative  Adductors: Negative  Greater trochanter: Positive b/l mildly  IT Band: Negative  Gluteal:Negative  SI Joint:  Negative  Lumbar paraspinals or spinous processes: Positive    ROM:   Hip Instability: None   Hip ROM: Abnormal pain with left hip IR  Lumbar ROM: No reproduction of pain with movement     Special Tests    Stinchfield: Positive left  Log Roll: Positive left  MARC: Positive left  FADIR: Positive left      Slump test, femoral stretch, SLR: Negative      Medical Decision Making:    Images: The imaging results as well as the actual images of the studies below were reviewed, visualized and interpreted by me. Labs: The results below were reviewed. 2/2022 X-ray b/l hip hip OA moderatel>R- subchondral sclerosis  2/2022 X-ray lumbar spine transitional anatomy L5 sacralized        Assessment:   -b/l L>R hip OA  - lumbar spondylosis    Plan:      -Physical therapy -  Starting PT this week   -Medications - Discussed use of NSAIDS she has several prescribed. Discussed taking only one NSAID, and she feels ibuprofen 800mg helps the best- rx renewed- but discussed only taking as needed 2-3 xper week  Counseled regarding side effects and appropriate administration of medications.    -Diagnostics/Imaging - Reviewed x-ray b/l hips and lumbar spine  -Injections -Discussed left IA hip injection but will wait until after she tries therapy or if pain is more severe  -Lifestyle - Discussed weight loss, regular exercise   -Education - The patient's diagnosis, prognosis and treatment options were discussed today. All questions were answered.     F/U -After PT and she will call sooner if she would like to try injection left hip IA        [facility]

## 2022-05-11 ENCOUNTER — APPOINTMENT (OUTPATIENT)
Dept: PHYSICAL THERAPY | Age: 62
End: 2022-05-11

## 2022-05-17 ENCOUNTER — VIRTUAL VISIT (OUTPATIENT)
Dept: ORTHOPEDIC SURGERY | Age: 62
End: 2022-05-17
Payer: COMMERCIAL

## 2022-05-17 DIAGNOSIS — G89.29 CHRONIC PRIMARY MUSCULOSKELETAL PAIN: ICD-10-CM

## 2022-05-17 DIAGNOSIS — M25.552 BILATERAL HIP PAIN: ICD-10-CM

## 2022-05-17 DIAGNOSIS — G57.12 MERALGIA PARAESTHETICA, LEFT: ICD-10-CM

## 2022-05-17 DIAGNOSIS — M54.59 MECHANICAL LOW BACK PAIN: Primary | ICD-10-CM

## 2022-05-17 DIAGNOSIS — M79.18 MYOFASCIAL PAIN: ICD-10-CM

## 2022-05-17 DIAGNOSIS — M79.18 CHRONIC PRIMARY MUSCULOSKELETAL PAIN: ICD-10-CM

## 2022-05-17 DIAGNOSIS — M25.551 BILATERAL HIP PAIN: ICD-10-CM

## 2022-05-17 PROCEDURE — 99213 OFFICE O/P EST LOW 20 MIN: CPT | Performed by: PHYSICAL MEDICINE & REHABILITATION

## 2022-05-17 RX ORDER — CLOBETASOL PROPIONATE 0.5 MG/G
OINTMENT TOPICAL
COMMUNITY
Start: 2022-05-10
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
## 2022-06-03 ENCOUNTER — HOSPITAL ENCOUNTER (OUTPATIENT)
Dept: PHYSICAL THERAPY | Age: 62
Discharge: HOME | End: 2022-06-03
Payer: MEDICAID

## 2022-06-03 PROCEDURE — 97162 PT EVAL MOD COMPLEX 30 MIN: CPT

## 2022-06-03 NOTE — PROGRESS NOTES
[facility]  [facility]  [address] [place] [address]  [phone] fax    Plan of Care/ Statement of Necessity for Physical Therapy Services    Patient name: Marlo Acuna Start of Care: 6/3/2022   Referral source: Lakesha De Santiago MD : 1960    Medical Diagnosis: Other low back pain [M54.59]  Bilateral hip pain [M25.551, M25.552]  Payor: BLUE CROSS MEDICAID / Plan: [facility] [employer] / Product Type: Managed Care Medicaid /  Onset Date: chronic, exacerbated Dec. 2021    Treatment Diagnosis: Low back pain, B hip pain   Prior Hospitalization: see medical history Provider#: [id number]   Medications: Verified on Patient summary List    Comorbidities: arthritis, weight change of > 10 pounds recently, asthma, visual impairment   Prior Level of Function: lives in 1 story home with 3 steps to enter with son; patient enjoys gardening      The Plan of Care and following information is based on the information from the initial evaluation. Assessment/ key information: Patient is a 53-year-old right-handed female who presents to therapy who presents to therapy with chief complaint of low back and B hip pain which extends to lateral thigh on right and knee on left. Patient reports numbness/tingling to left LE. She feels her left hip may \"slip\" when navigating stairs so she holds onto a handrail. She has difficulty with household chores requiring her to take rest breaks and increased pain with donning/doffing shoes. Her feet will be colder/more painful with colder weather. Symptoms aggravated with sitting, standing > 15 min, and bending; symptoms reduced with hot shower/use of heat. Patient reports PT helped with symptoms previously. Recent imaging on lumbar spine revealed \"Transitional L5 vertebral body, anatomical variant. Minimal L3-L4 spondylolisthesis, stable. No significant degenerative disc changes. \" Recent imaging of B hips revealed \"Moderate to severe bilateral hip osteoarthritis. \" Exam reveals patient with decreased glute and hip abd/add strength B. Symptoms worsened with repeated lumbar flex and with lumbar ext. Patient with TTP to B lumbar parspinals, glute med/min, and piriformis with left side worse. Sensation diminished to light touch to left lateral thigh. Slump test positive on left. MARC test positive B; FADIR test positive on left. She presents with pelvic obliquity of left AI corrected with MET. Patient able to maintain tandem stance for 30 sec with right foot posterior and 23 sec with left foot posterior. Patient would benefit from skilled outpatient PT to address above mentioned deficits to return to prior level of function, increase independence with ADLs, and improve overall quality of life. Evaluation Complexity History HIGH Complexity :3+ comorbidities / personal factors will impact the outcome/ POC ; Examination MEDIUM Complexity : 3 Standardized tests and measures addressing body structure, function, activity limitation and / or participation in recreation  ;Presentation MEDIUM Complexity : Evolving with changing characteristics  ; Clinical Decision Making MEDIUM Complexity : FOTO score of 26-74  Overall Complexity Rating: MEDIUM  Problem List: pain affecting function, decrease ROM, decrease strength, impaired gait/ balance, decrease ADL/ functional abilitiies, decrease activity tolerance, decrease flexibility/ joint mobility and decrease transfer abilities   Treatment Plan may include any combination of the following: Therapeutic exercise, Therapeutic activities, Neuromuscular re-education, Physical agent/modality, Gait/balance training, Manual therapy, Patient education, Self Care training, Functional mobility training, Home safety training and Stair training  Patient / Family readiness to learn indicated by: asking questions, trying to perform skills and interest  Persons(s) to be included in education: patient (P)  Barriers to Learning/Limitations: yes;  sensory deficits-vision/hearing/speech  Patient Goal (s): to have the pain in no more than one spot (back)  Patient Self Reported Health Status: good  Rehabilitation Potential: good    Short Term Goals: To be accomplished in 1 weeks:   1. Patient will report compliance with initial HEP to optimize therapy outcomes. Long Term Goals: To be accomplished in 6 weeks:    1. Patient will improve FOTO score by at least 5 points in order to demonstrate functional improvement. 2. Patient will report no more than \"moderate difficulty\" with \"performing your usual work, housework, or school activities\" with FOTO in order to demonstrate improved tolerance to ADLs. 3. Patient will improve B hip abd, add, and ext strength to at least 4-/5 with MMT in order to perform functional tasks with increased ease. 4. Patient will be able to navigate at least 4 stairs without UE support in order to enter/exit home with increased ease. 5. Patient will report at least 50% improvement since start of care in order to demonstrate improved overall QOL. Frequency / Duration: Patient to be seen 2 times per week for 6 weeks. Patient/  Caregiver education and instruction: Diagnosis, prognosis, self care, activity modification and exercises   [x]  Plan of care has been reviewed with ISABEL Grimes, PT 6/3/2022 8:52 AM    ________________________________________________________________________    I certify that the above Therapy Services are being furnished while the patient is under my care. I agree with the treatment plan and certify that this therapy is necessary.     [de-identified] Signature:____________Date:_________TIME:________     Adri Brown MD  ** Signature, Date and Time must be completed for valid certification **    Please sign and return to [facility] - [facility] [address]  [phone] fax

## 2022-06-03 NOTE — PROGRESS NOTES
PT DAILY TREATMENT NOTE/LUMBAR EVAL     Patient Name: Gloria Sanchez  Date:6/3/2022  : 1960  [x]  Patient  Verified  Payor: BLUE CROSS MEDICAID / Plan: [facility] HEALTHKEEPERS PLUS / Product Type: Managed Care Medicaid /    In time:9:49A  Out time:10:28A  Total Treatment Time (min): 39  Visit #: 1 of 12    Medicare/BCBS Only   Total Timed Codes (min):  7 1:1 Treatment Time:  39     Treatment Area: Other low back pain [M54.59]  Bilateral hip pain [M25.551, M25.552]  SUBJECTIVE  Pain Level (0-10 scale): 6/10 (at best: 1/10 at worst 10/10)  []constant []intermittent []improving []worsening []no change since onset    Any medication changes, allergies to medications, adverse drug reactions, diagnosis change, or new procedure performed?: [x] No    [] Yes (see summary sheet for update)  Subjective functional status/changes:       Comorbidities: arthritis, weight change of > 10 pounds recently, asthma, visual impairment   Prior Level of Function: lives in 1 story home with 3 steps to enter with son; patient enjoys gardening      The Plan of Care and following information is based on the information from the initial evaluation. Assessment/ key information: Patient is a 61-year-old right-handed female who presents to therapy who presents to therapy with chief complaint of low back and B hip pain which extends to lateral thigh on right and knee on left. Patient reports numbness/tingling to left LE. She feels her left hip may \"slip\" when navigating stairs so she holds onto a handrail. She has difficulty with household chores requiring her to take rest breaks and increased pain with donning/doffing shoes. Her feet will be colder/more painful with colder weather. Symptoms aggravated with sitting, standing > 15 min, and bending; symptoms reduced with hot shower/use of heat. Patient reports PT helped with symptoms previously.  Recent imaging on lumbar spine revealed \"Transitional L5 vertebral body, anatomical variant. Minimal L3-L4 spondylolisthesis, stable. No significant degenerative disc changes. \" Recent imaging of B hips revealed \"Moderate to severe bilateral hip osteoarthritis. \" Exam reveals patient with decreased glute and hip abd/add strength B. Symptoms worsened with repeated lumbar flex and with lumbar ext. Patient with TTP to B lumbar parspinals, glute med/min, and piriformis with left side worse. Sensation diminished to light touch to left lateral thigh. Slump test positive on left. MARC test positive B; FADIR test positive on left. She presents with pelvic obliquity of left AI corrected with MET. Patient able to maintain tandem stance for 30 sec with right foot posterior and 23 sec with left foot posterior. Patient would benefit from skilled outpatient PT to address above mentioned deficits to return to prior level of function, increase independence with ADLs, and improve overall quality of life. 32 min [x]Eval                  []Re-Eval         7 min Therapeutic Activity:  []  See flow sheet : HEP review, patient education   Rationale: increase ROM, increase strength and increase proprioception  to improve the patients ability to perform ADLs with increased ease             With   [] TE   [x] TA   [] neuro   [] other: Patient Education: [x] Review HEP    [] Progressed/Changed HEP based on:   [] positioning   [] body mechanics   [] transfers   [] heat/ice application    [x] other: diagnosis, prognosis, POC, purpose and importance of therapy; anatomy and physiology as it relates to current condition;       Other Objective/Functional Measures:     Physical Therapy Evaluation - Lumbar Spine ([facility])    SUBJECTIVE  Chief Complaint:    Mechanism of injury:    Symptoms:  Aggravated by:   [] Bending [] Sitting [] Standing [] Walking   [] Moving [] Cough [] Sneeze [] Valsalva   [] AM  [] PM  Lying:  [] sup   [] pro   [] sidelying   [] Other:     Eased by:    [] Bending [] Sitting [] Standing [] Walking   [] Moving [] AM  [] PM  Lying: [] sup  [] pro  [] sidelying   [] Other:     General Health:  Red Flags Indicated? [x] Yes    [] No  [x] Yes [] No Recent weight change (If yes, due to dieting? [x] Yes  [] No)   [x] Yes [] No Weakness in legs during walking  [x] Yes [] No Unremitting pain at night   [] Yes [x] No Abdominal pain or problems  [] Yes [] No Rectal bleeding  [x] Yes [] No Feet more cold or painful in cold weather  [] Yes [] No Menstrual irregularities  [] Yes [] No Blood or pain with urination  [] Yes [x] No Dysfunction of bowel or bladder  [] Yes [] No Recent illness within past 3 weeks (i.e, cold, flu)  [] Yes [x] No Numbness/tingling in buttock/genitalia region    Past History/Treatments:     Diagnostic Tests: [] Lab work [x] X-rays    [] CT [] MRI     [] Other:  Results: Moderate to severe bilateral hip osteoarthritis. ; IMPRESSION     Transitional L5 vertebral body, anatomical variant.     Minimal L3-L4 spondylolisthesis, stable.     No significant degenerative disc changes.      Functional Status  Prior level of function:  Present functional limitations:  What position do you sleep in?:    OBJECTIVE  Posture:  Lateral Shift: [] R    [] L     [] +  [] -  Kyphosis: [] Increased [] Decreased   []  WNL  Lordosis:  [] Increased [] Decreased   [] WNL  Pelvic symmetry: [] WNL    [] Other:    Gait:  [] Normal     [] Abnormal:    Active Movements: [] N/A   [] Too acute   [] Other:  ROM % AROM % PROM Comments:pain, area   Forward flexion 40-60      Extension 20-30      SB right 20-30      SB left 20-30      Rotation right 5-10      Rotation left 5-10        Repeated Movements   Effects on present pain: produces (KY), abolishes (A), increases (incr), decreases (decr), centralizes (C), peripheral (PH), no effect (NE)   Pre-Test Sx Flexion Repeated Flexion Extension Repeated Extension Repeated SBL Repeated SBR   Sitting  NE incr incr      Standing          Lying      N/A N/A   Comments:  Side Glide:  Sustained passive positioning test:    Neuro Screen [] WNL  Myotome/Dermatome/Reflexes:  Comments: sensation diminished to light touch to left lateral thigh    Dural Mobility:  SLR Sitting: [] R    [] L    [] +    [] -  @ (degrees):           Supine: [] R    [] L    [] +    [] -  @ (degrees):   Slump Test: [] R    [x] L    [x] +    [] -  @ (degrees):   Prone Knee Bend: [] R    [] L    [] +    [] -     Palpation B lumbar paraspinals, B glute med min, B piriformis (left side worse)   [] Min  [] Mod  [] Severe    Location:  [] Min  [] Mod  [] Severe    Location:  [] Min  [] Mod  [] Severe    Location:    Strength   L(0-5) R (0-5) N/T   Hip Flexion (L1,2) 4+ 4 []   Knee Extension (L3,4) 5 4+ []   Ankle Dorsiflexion (L4) 5 4+ []   Great Toe Extension (L5)   []   Ankle Plantarflexion (S1)   []   Knee Flexion (S1,2) 4+ 4 []   Upper Abdominals   []   Lower Abdominals   []   Paraspinals   []   Back Rotators   []   Gluteus Aidan 3 3+ []   Other   []     Right hip ER 4+ IR 4 abd 2+ add 2+  LEFt hip ER 5 IR 5 abd 3 add 3+  Special Tests  Lumbar:  Lumb. Compression: [] Pos  [] Neg               Lumbar Distraction:   [] Pos  [] Neg    Quadrant:  [] Pos  [] Neg   [] Flex  [] Ext    Sacroilliac:  Gaenslen's: [] R    [] L    [] +    [] -     Compression: [] +    [] -     Gapping:  [] +    [] -     Thigh Thrust: [] R    [] L    [] +    [] -     Leg Length: [] +    [] -   Position:    Crests:    ASIS:    PSIS:    Sacral Sulcus:    Mobility: Standing flex:     Sitting flex:     Supine to sit:     Prone knee bend:         Hip: Dennice Saucer:  [] R    [] L    [] +    [] -     Scour:  [] R    [] L    [] +    [] -     Piriformis: [] R    [] L    [] +    [] -          Deficits: Zi's: [] R    [] L    [] +    [] -     Guanako: [] R    [] L    [] +    [] -     Hamstrings 90/90:    Gastrocsoleus (to neutral): Right: Left:       Global Muscular Weakness:  Abdominals:  Quadratus Lumborum:  Paraspinals:   Other:    Other tests/comments:    Tandem: left post 23 sec, right post 30 sec  MARC pos B, FADIR pos on left  Left AI corrected with MET         Pain Level (0-10 scale) post treatment: 6/10    ASSESSMENT/Changes in Function: See POC    Patient will continue to benefit from skilled PT services to modify and progress therapeutic interventions, address functional mobility deficits, address ROM deficits, address strength deficits, analyze and address soft tissue restrictions, analyze and cue movement patterns, analyze and modify body mechanics/ergonomics, assess and modify postural abnormalities, address imbalance/dizziness and instruct in home and community integration to attain remaining goals.      [x]  See Plan of Care  []  See progress note/recertification  []  See Discharge Summary         Progress towards goals / Updated goals:  See POC    PLAN  []  Upgrade activities as tolerated     [x]  Continue plan of care  []  Update interventions per flow sheet       []  Discharge due to:_  []  Other:_      Wade Reese, PT 6/3/2022  8:51 AM

## 2022-07-06 ENCOUNTER — TRANSCRIBE ORDER (OUTPATIENT)
Dept: SCHEDULING | Age: 62
End: 2022-07-06

## 2022-07-06 DIAGNOSIS — Z12.31 VISIT FOR SCREENING MAMMOGRAM: Primary | ICD-10-CM

## 2022-07-21 NOTE — PROGRESS NOTES
[facility] - Gena Fox  [address] [facility]  [phone] fax    Physical Therapy Discharge Summary    Patient name: Manish Mchugh Start of Care: 6/3/2022   Referral source: Timbo Amaro MD : 1960   Medical/Treatment Diagnosis: Other low back pain [M54.59]  Payor: BLUE CROSS MEDICAID / Plan: [address] [place] [address] / Product Type: Managed Care Medicaid /  Onset Date:chronic, exacerbated Dec. 2021     Prior Hospitalization: see medical history Provider#: [id number]   Medications: Verified on Patient Summary List    Comorbidities: arthritis, weight change of > 10 pounds recently, asthma, visual impairment  Prior Level of Function:lives in 1 story home with 3 steps to enter with son; patient enjoys gardening       Visits from Start of Care: 1    Missed Visits: 0    Reporting Period : 6/3/2022 to 6/3/2022    Summary of Care:  Goal: Patient will report compliance with initial HEP to optimize therapy outcomes. Status at last note/certification: New goal-established at evaluation  Status at discharge:Unable to formally assess    Goal: Patient will improve FOTO score by at least 5 points in order to demonstrate functional improvement. Status at last note/certification: New [id number]  Status at discharge: Unable to formally assess    Goal: Patient will report no more than \"moderate difficulty\" with \"performing your usual work, housework, or school activities\" with FOTO in order to demonstrate improved tolerance to ADLs. Status at last note/certification: New goal-\"quite a bit of difficulty\"  Status at discharge: Unable to formally assess    Goal: Patient will improve B hip abd, add, and ext strength to at least 4-/5 with MMT in order to perform functional tasks with increased ease.   Status at last note/certification: New goal-Right hip abd 2+, add 2+, ext 3+; Left hip abd 3, add 3+, ext 3  Status at discharge: Unable to formally assess    Goal: Patient will be able to navigate at least 4 stairs without UE support in order to enter/exit home with increased ease. Status at last note/certification: New goal  Status at discharge: Unable to formally assess    Goal:Patient will report at least 50% improvement since start of care in order to demonstrate improved overall QOL. Status at last note/certification: New goal  Status at discharge: Unable to formally assess      ASSESSMENT/RECOMMENDATIONS: Patient did not return to skilled outpatient PT following initial evaluation. Unable to formally assess progress towards goals. Patient issued HEP at initial evaluation. At this time, patient is appropriate for discharge from skilled outpatient PT.    [x]Discontinue therapy: []Patient has reached or is progressing toward set goals      []Patient is non-compliant or has abdicated      []Due to lack of appreciable progress towards set goals      [x]Due to lack of insurance authorization    Marichuy Smallwood, PT 7/21/2022 3:07 PM    NOTE TO PHYSICIAN:  Please complete the following and fax to: [facility] at [facility] at [phone]  Retain this original for your records. If you are unable to process this request in   24 hours, please contact our office.      [] I have read the above report and request that my patient continue therapy with the following changes/special instructions:  [] I have read the above report and request that my patient be discharged from therapy    Physician's Signature:____________Date:_________TIME:________     Umang Jc MD  ** Signature, Date and Time must be completed for valid certification **

## 2022-07-27 ENCOUNTER — HOSPITAL ENCOUNTER (OUTPATIENT)
Dept: MAMMOGRAPHY | Age: 62
Discharge: HOME | End: 2022-07-27
Attending: FAMILY MEDICINE
Payer: MEDICAID

## 2022-07-27 DIAGNOSIS — Z12.31 VISIT FOR SCREENING MAMMOGRAM: ICD-10-CM

## 2022-07-27 PROCEDURE — 77063 BREAST TOMOSYNTHESIS BI: CPT

## 2022-08-05 ENCOUNTER — HOSPITAL ENCOUNTER (OUTPATIENT)
Dept: GENERAL RADIOLOGY | Age: 62
Discharge: HOME | End: 2022-08-05
Payer: MEDICAID

## 2022-08-05 DIAGNOSIS — M25.552 LEFT HIP PAIN: ICD-10-CM

## 2022-08-05 PROCEDURE — 73502 X-RAY EXAM HIP UNI 2-3 VIEWS: CPT

## 2022-08-26 ENCOUNTER — TRANSCRIBE ORDER (OUTPATIENT)
Dept: SCHEDULING | Age: 62
End: 2022-08-26

## 2022-08-26 DIAGNOSIS — E04.2 NONTOXIC MULTINODULAR GOITER: Primary | ICD-10-CM

## 2022-09-07 ENCOUNTER — HOSPITAL ENCOUNTER (OUTPATIENT)
Dept: NUCLEAR MEDICINE | Age: 62
Discharge: HOME | End: 2022-09-07
Attending: INTERNAL MEDICINE
Payer: MEDICAID

## 2022-09-07 DIAGNOSIS — E04.2 NONTOXIC MULTINODULAR GOITER: ICD-10-CM

## 2022-09-07 PROCEDURE — 78014 THYROID IMAGING W/BLOOD FLOW: CPT

## 2022-09-07 RX ORDER — SODIUM IODIDE I 123 200 UCI/1
200 CAPSULE, GELATIN COATED ORAL ONCE
Status: COMPLETED | OUTPATIENT
Start: 2022-09-07 | End: 2022-09-07

## 2022-09-07 RX ADMIN — SODIUM IODIDE I 123 284 MICRO CURIE: 200 CAPSULE, GELATIN COATED ORAL at 12:00

## 2022-09-08 ENCOUNTER — HOSPITAL ENCOUNTER (OUTPATIENT)
Dept: NUCLEAR MEDICINE | Age: 62
Discharge: HOME | End: 2022-09-08
Attending: INTERNAL MEDICINE
Payer: MEDICAID

## 2022-10-10 ENCOUNTER — OFFICE VISIT (OUTPATIENT)
Dept: ORTHOPEDIC SURGERY | Age: 62
End: 2022-10-10
Payer: COMMERCIAL

## 2022-10-10 VITALS — WEIGHT: 198 LBS | RESPIRATION RATE: 16 BRPM | HEIGHT: 66 IN | BODY MASS INDEX: 31.82 KG/M2

## 2022-10-10 DIAGNOSIS — M15.1 HEBERDEN NODES: Primary | ICD-10-CM

## 2022-10-10 DIAGNOSIS — R20.0 NUMBNESS AND TINGLING IN BOTH HANDS: ICD-10-CM

## 2022-10-10 DIAGNOSIS — R20.2 NUMBNESS AND TINGLING IN BOTH HANDS: ICD-10-CM

## 2022-10-10 PROCEDURE — 99203 OFFICE O/P NEW LOW 30 MIN: CPT | Performed by: SPECIALIST

## 2022-10-10 NOTE — PROGRESS NOTES
Patient: Perlita Caal                MRN: [id number]       SSN: [id number]  YOB: 1960        AGE: 63 y.o. SEX: female      PCP: Aj Gonzalez MD  10/10/22    Chief Complaint   Patient presents with    Hand Pain     bilateral       HISTORY:  Perlita Caal is a 63 y.o. female who is seen for bilateral hand pain, numbness, and tingling. She has been experiencing these sensations for the past several months. She states that her pains and numbness keep her up at night. She was previously seen for right thumb pain. She was previously treated by Dr. Sophie Miranda for a MRSA infection of the left ring finger. 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 She states her last patient passed away and she is taking a breaking till the new year. She lives alone in [place]. Her 24 yo son is working at the post office. Last 3 Recorded Weights in this Encounter    10/10/22 0936   Weight: 198 lb (89.8 kg)     Body mass index is 31.96 kg/m². REVIEW OF SYSTEMS: All Below are Negative except: See HPI     Constitutional: negative for fever, chills, and weight loss. Cardiovascular: negative for chest pain, claudication, leg swelling, SOB, WRIGHT   Gastrointestinal: Negative for pain, N/V/C/D, Blood in stool or urine, dysuria,  hematuria, incontinence, pelvic pain. Musculoskeletal: See HPI   Neurological: Negative for dizziness and weakness. Negative for headaches, Visual changes, confusion, seizures   Phychiatric/Behavioral: Negative for depression, memory loss, substance  abuse. Extremities: Negative for hair changes, rash, or skin lesion changes. Hematologic: Negative for bleeding problems, bruising, pallor or swollen lymph  nodes   Peripheral Vascular: No calf pain, no circulation deficits.     Social History     Socioeconomic History    Marital status: LEGALLY      Spouse name: Not on file    Number of children: Not on file    Years of education: Not on file    Highest education level: Not on file   Occupational History    Not on file   Tobacco Use    Smoking status: Former     Packs/day: 0.20     Years: 5.00     Pack years: 1.00     Types: Cigarettes     Quit date: 1995     Years since quittin.6    Smokeless tobacco: Never   Substance and Sexual Activity    Alcohol use: Yes     Alcohol/week: 0.0 standard drinks     Comment: socially    Drug use: No    Sexual activity: Yes     Partners: Male     Birth control/protection: None   Other Topics Concern    Not on file   Social History Narrative    Not on file     Social Determinants of Health     Financial Resource Strain: Not on file   Food Insecurity: Not on file   Transportation Needs: Not on file   Physical Activity: Not on file   Stress: Not on file   Social Connections: Not on file   Intimate Partner Violence: Not on file   Housing Stability: Not on file        Allergies   Allergen Reactions    Penicillins Other (comments)     Takes skin of body   Other reaction(s): other/intolerance  Skin peeling off hands and feet        Current Outpatient Medications   Medication Sig    clobetasoL (TEMOVATE) 0.05 % ointment     acetaminophen (TYLENOL) 500 mg tablet Take 2 Tablets by mouth as needed. pantoprazole (PROTONIX) 40 mg tablet Take 40 mg by mouth daily. meloxicam (Mobic) 15 mg tablet Take 1 Tablet by mouth daily. fluticasone propionate (FLOVENT HFA) 110 mcg/actuation inhaler Take 2 Puffs by inhalation every twelve (12) hours. albuterol (PROVENTIL HFA, VENTOLIN HFA, PROAIR HFA) 90 mcg/actuation inhaler Take 2 Puffs by inhalation every six (6) hours as needed for Wheezing or Shortness of Breath.    gabapentin (NEURONTIN) 300 mg capsule TAKE 1 CAPSULE BY MOUTH AT BEDTIME    calcium-cholecalciferol, d3, (CALCIUM 600 + D) 600-125 mg-unit tab Take  by mouth.    montelukast (SINGULAIR) 10 mg tablet Take 10 mg by mouth daily.     BIOTIN PO Take 1 Tablet by mouth daily. ascorbic acid (JESSICA-C PO) Take  by mouth Daily (before breakfast). aspirin delayed-release 81 mg tablet Take  by mouth daily. nitroglycerin (NITROSTAT) 0.4 mg SL tablet 1 Tab by SubLINGual route every five (5) minutes as needed for Chest Pain.    loratadine (CLARITIN) 10 mg tablet Take 10 mg by mouth.    cyanocobalamin (VITAMIN B-12) 1,000 mcg tablet Take 1,000 mcg by mouth daily. multivitamin (ONE A DAY) tablet Take 1 Tab by mouth daily. Cholecalciferol, Vitamin D3, 1,000 unit cap Take 1,000 Units by mouth daily. zinc 50 mg Tab Take  by mouth daily. No current facility-administered medications for this visit. PHYSICAL EXAMINATION:  Visit Vitals  Resp 16   Ht 5' 6\" (1.676 m)   Wt 198 lb (89.8 kg)   BMI 31.96 kg/m²        ORTHO EXAMINATION:    IMPRESSION:      ICD-10-CM ICD-9-CM    1. [place] nodes  M15.1 715.04       2. Numbness and tingling in both hands  R20.0 782. 0 EMG ONE EXTREMITY UPPER LT    R20.2  NCV/LAT MOTOR PER NERVE UP/LT      EMG ONE EXTREMITY UPPER RT      NCV/LAT MOTOR PER NERVE UP/RT        PLAN: She will be scheduled for an EMG and nerve conduction study, bilaterally, for further evaluation of her hand pains, numbness, and tingling. She will follow up in about three weeks.      Scribed by Huong Mayorga ([address]) as dictated by Nannette Wilson MD

## 2022-10-11 DIAGNOSIS — R20.0 NUMBNESS AND TINGLING IN BOTH HANDS: ICD-10-CM

## 2022-10-11 DIAGNOSIS — R20.2 NUMBNESS AND TINGLING IN BOTH HANDS: ICD-10-CM

## 2022-11-30 ENCOUNTER — OFFICE VISIT (OUTPATIENT)
Dept: ORTHOPEDIC SURGERY | Age: 62
End: 2022-11-30
Payer: MEDICAID

## 2022-11-30 VITALS
WEIGHT: 210 LBS | SYSTOLIC BLOOD PRESSURE: 123 MMHG | TEMPERATURE: 96.3 F | OXYGEN SATURATION: 98 % | BODY MASS INDEX: 33.75 KG/M2 | HEIGHT: 66 IN | DIASTOLIC BLOOD PRESSURE: 81 MMHG | HEART RATE: 75 BPM | RESPIRATION RATE: 16 BRPM

## 2022-11-30 DIAGNOSIS — R20.2 PARESTHESIA OF BOTH HANDS: Primary | ICD-10-CM

## 2022-11-30 DIAGNOSIS — R20.2 PARESTHESIA OF BOTH HANDS: ICD-10-CM

## 2022-11-30 NOTE — PROGRESS NOTES
Chief Complaint   Patient presents with    Confirmation for Procedure(s)       Pt preferred language for health care discussion is english. Is someone accompanying this pt? no    Is the patient using any DME equipment during OV? no    Depression Screening:  3 most recent PHQ Screens 5/2/2019 1/30/2018 11/29/2017 3/23/2017 12/18/2015   Little interest or pleasure in doing things Not at all Not at all Not at all Not at all Not at all   Feeling down, depressed, irritable, or hopeless Not at all Not at all Not at all Not at all Not at all   Total Score PHQ 2 0 0 0 0 0       Learning Assessment:  Learning Assessment 5/2/2019 2/27/2017 11/24/2014   PRIMARY LEARNER Patient Patient Patient   HIGHEST LEVEL OF EDUCATION - PRIMARY LEARNER  - - GRADUATED HIGH SCHOOL OR GED   BARRIERS PRIMARY LEARNER - NONE NONE   PRIMARY LANGUAGE ENGLISH ENGLISH ENGLISH   LEARNER PREFERENCE PRIMARY DEMONSTRATION LISTENING READING     VIDEOS - LISTENING   ANSWERED BY Jeb Hickey patient patient   Arie Bacon [address] Maintenance reviewed and discussed per provider. Yes        Advance Directive:  1. Do you have an advance directive in place? Patient Reply:no    2. If not, would you like material regarding how to put one in place? Patient Reply: no    Coordination of Care:  1. Have you been to the ER, urgent care clinic since your last visit? Hospitalized since your last visit? no    2. Have you seen or consulted any other health care providers outside of the [address] [place] [address] since your last visit? Include any pap smears or colon screening.  no

## 2022-11-30 NOTE — PROGRESS NOTES
[address]  [address], [address]  [facility], [address]  Phone: [phone]  Fax: [phone]        Anna Douglas  : 1960  PCP: Lori Jc MD  2022    ELECTROMYOGRAPHY AND NERVE CONDUCTION STUDIES    Darin Salmeron was referred by Dr. Cecelia Del Rosario for electrodiagnostic evaluation of paraesthesia of bilateral upper extremities. NCV & EMG Findings:  Evaluation of the left median sensory nerve showed prolonged distal peak latency (4.1 ms). The right median sensory nerve showed prolonged distal onset latency (3.4 ms) and prolonged distal peak latency (4.1 ms). All remaining nerves (as indicated in the following tables) were within normal limits. INTERPRETATION  This is an abnormal electrodiagnostic examination. 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 CLINICAL INTERPRETATION  Her electrodiagnostic findings of bilateral median mononeuropathy at the wrists are consistent with her bilateral arm symptoms. HISTORY OF PRESENT ILLNESS  Darin Salmeron is a 63 y.o. female. Pt presents today with BUE EMG evaluation for paraesthesia of both hands. Notes that sxs are worse in right hand compared to left hand.     PAST MEDICAL HISTORY   Past Medical History:   Diagnosis Date    Asthma     Carpal tunnel syndrome, bilateral     Chest pain, unspecified     Possible Angina, GERD, chest wall pains, recurrent pains, possible atypical angina, happens at rest, abnormal nuc scan in past, discussed risk benefit options of cardiac cath/pci, she wants to think it over, add sl ntg    Coronary atherosclerosis of native coronary artery     Abnormal NUC scan, patient's symptoms are better, will start meds and monitor    Heart murmur     Hypercholesterolemia     Ill-defined condition     Patent Foramen Ovale- asymptomatic    Obesity, unspecified     Discussed diet    Other and unspecified hyperlipidemia     no longer on meds    Pain of right thumb     Trigger thumb of right hand        Past Surgical History:   Procedure Laterality Date    COLONOSCOPY N/A 6/29/2018    COLONOSCOPY with clips, gold probe performed by Anabel Teran MD at [address]      HX COLONOSCOPY  2012    HX OTHER SURGICAL      Subcutaneous fistulotomy posterior vaginal wall. MO ANOSCOPY DX W/COLLJ SPEC BR/WA SPX WHEN PRFRMD N/A 05/05/2017    Dr. Irlanda Bautista N/A 05/05/2017    Dr. Lorraine Perez   . MEDICATIONS      Current Outpatient Medications   Medication Sig Dispense Refill    clobetasoL (TEMOVATE) 0.05 % ointment       acetaminophen (TYLENOL) 500 mg tablet Take 2 Tablets by mouth as needed. pantoprazole (PROTONIX) 40 mg tablet Take 40 mg by mouth daily. meloxicam (Mobic) 15 mg tablet Take 1 Tablet by mouth daily. 30 Tablet 2    fluticasone propionate (FLOVENT HFA) 110 mcg/actuation inhaler Take 2 Puffs by inhalation every twelve (12) hours. 1 Inhaler 3    albuterol (PROVENTIL HFA, VENTOLIN HFA, PROAIR HFA) 90 mcg/actuation inhaler Take 2 Puffs by inhalation every six (6) hours as needed for Wheezing or Shortness of Breath. 1 Inhaler 3    gabapentin (NEURONTIN) 300 mg capsule TAKE 1 CAPSULE BY MOUTH AT BEDTIME      calcium-cholecalciferol, d3, (CALCIUM 600 + D) 600-125 mg-unit tab Take  by mouth.      montelukast (SINGULAIR) 10 mg tablet Take 10 mg by mouth daily. BIOTIN PO Take 1 Tablet by mouth daily. ascorbic acid (JESSICA-C PO) Take  by mouth Daily (before breakfast). aspirin delayed-release 81 mg tablet Take  by mouth daily. nitroglycerin (NITROSTAT) 0.4 mg SL tablet 1 Tab by SubLINGual route every five (5) minutes as needed for Chest Pain.  25 Tab 1 loratadine (CLARITIN) 10 mg tablet Take 10 mg by mouth.      cyanocobalamin (VITAMIN B-12) 1,000 mcg tablet Take 1,000 mcg by mouth daily. multivitamin (ONE A DAY) tablet Take 1 Tab by mouth daily. Cholecalciferol, Vitamin D3, 1,000 unit cap Take 1,000 Units by mouth daily. zinc 50 mg Tab Take  by mouth daily. ALLERGIES    Allergies   Allergen Reactions    Penicillins Other (comments)     Takes skin of body   Other reaction(s): other/intolerance  Skin peeling off hands and feet          SOCIAL HISTORY    Social History     Socioeconomic History    Marital status: LEGALLY    Tobacco Use    Smoking status: Former     Packs/day: 0.20     Years: 5.00     Pack years: 1.00     Types: Cigarettes     Quit date: 1995     Years since quittin.7    Smokeless tobacco: Never   Substance and Sexual Activity    Alcohol use:  Yes     Alcohol/week: 0.0 standard drinks     Comment: socially    Drug use: No    Sexual activity: Yes     Partners: Male     Birth control/protection: None       FAMILY HISTORY    Family History   Problem Relation Age of Onset    Hypertension Mother     Diabetes Father     Colon Cancer Father     Cancer Father         prosate    Hypertension Maternal Grandmother     Heart Disease Neg Hx         no family history of heart disease         PHYSICAL EXAMINATION  Visit Vitals  /81 (BP 1 Location: Left upper arm, BP Patient Position: Sitting, BP Cuff Size: Adult long)   Pulse 75   Temp (!) 96.3 °F (35.7 °C) (Temporal)   Resp 16   Ht 5' 6\" (1.676 m)   Wt 210 lb (95.3 kg)   SpO2 98%   BMI 33.89 kg/m²       Pain Assessment  2022   Location of Pain Back   Location Modifiers -   Severity of Pain 10   Quality of Pain Sharp   Quality of Pain Comment -   Duration of Pain Persistent   Frequency of Pain Constant   Aggravating Factors -   Aggravating Factors Comment -   Limiting Behavior -   Relieving Factors -   Relieving Factors Comment -   Result of Injury - Constitutional:  Well developed, well nourished, in no acute distress. Psychiatric: Affect and mood are appropriate. Integumentary: No rashes or abrasions noted on exposed areas. SPINE/MUSCULOSKELETAL EXAM    On brief examination: None.       NCV & EMG Findings:  NCS+  Motor Nerve Results      Latency Amplitude F-Lat Segment Distance CV Comment   Site (ms) Norm (mV) Norm (ms)  (cm) (m/s) Norm    Left Median (APB) Motor   Wrist 3.8  < 4.4 7.4  > 3.8  Wrist-APB 8      Elbow 8.4 - 7.4 -  Elbow-Wrist 23.5 51  > 51    Right Median (APB) Motor   Wrist 4.3  < 4.4 9.4  > 3.8  Wrist-APB 8      Elbow 8.6 - 8.5 -  Elbow-Wrist 22 51  > 51    Left Ulnar (ADM) Motor   Wrist 2.7  < 3.7 9.3  > 7.9  Wrist-ADM 8      Bel Elbow 6.3 - 8.6 -  Bel Elbow-Wrist 19 53  > 52    Abv Elbow 8.2 - 8.5 -  Abv Elbow-Bel Elbow 10 53  > 43    Right Ulnar (ADM) Motor   Wrist 2.7  < 3.7 11.1  > 7.9  Wrist-ADM 8      Bel Elbow 6.2 - 9.6 -  Bel Elbow-Wrist - -  > 52    Abv Elbow 8.2 - 9.1 -  Abv Elbow-Bel Elbow - -  > 43      Sensory Sites       Latency (Onset) Latency (Peak)  Amplitude (O-P) Segment Distance CV (Onset) Comment   Site ms Norm (ms) Norm µV Norm  mm m/s Norm    Left Median Sensory   Wrist-Dig II 3.3  < 3.3 4.1  < 4.0 16  > 7 Wrist-Dig II 14 42 -    Right Median Sensory   Wrist-Dig II 3.4  < 3.3 4.1  < 4.0 12  > 7 Wrist-Dig II 14 41 -    Left Radial Sensory   Forearm-Wrist 1.50  < 2.2 2.2  < 2.8 35  > 7 Forearm-Wrist 10 67 -    Right Radial Sensory   Forearm-Wrist 1.48  < 2.2 2.1  < 2.8 32  > 7 Forearm-Wrist 10 68 -    Left Ulnar Sensory   Wrist-Dig V 2.4  < 3.1 3.1  < 4.0 25  > 7 Wrist-Dig V 14 58 -    Right Ulnar Sensory   Wrist-Dig V 2.4  < 3.1 3.2  < 4.0 18  > 7 Wrist-Dig V 14 58 -      EMG+     Side Muscle Nerve Root Ins Act Fibs Psw Fascics Other Amp Dur Poly Recrt Activation Comment Misc   Right Biceps Musculocut C5-C6 Nml Nml Nml Nml 0 Nml Nml 0 Nml Nml     Right Triceps Radial C6-C8 Nml Nml Nml Nml 0 Nml Nml 0 Nml Nml     Right Pronator Teres Median C6-C7 Nml Nml Nml Nml 0 Nml Nml 0 Nml Nml     Right FDI Median,  Ulnar C8-T1 Nml Nml Nml Nml 0 Nml Nml 0 Nml Nml     Right APB Median C8-T1 Nml Nml Nml Nml 0 Nml Nml 0 Nml Nml     Left Biceps Musculocut C5-C6 Nml Nml Nml Nml 0 Nml Nml 0 Nml Nml     Left Triceps Radial C6-C8 Nml Nml Nml Nml 0 Nml Nml 0 Nml Nml     Left Pronator Teres Median C6-C7 Nml Nml Nml Nml 0 Nml Nml 0 Nml Nml     Left FDI Median,  Ulnar C8-T1 Nml Nml Nml Nml 0 Nml Nml 0 Nml Nml     Left APB Median C8-T1 Nml Nml Nml Nml 0 Nml Nml 0 Nml Nml             Waveforms:    Motor                Sensory                            [facility]  OFFICE PROCEDURE PROGRESS NOTE        Chart reviewed for the following:   IJaime, have reviewed the History, Physical and updated the Allergic reactions for Lorelee Slight     TIME OUT performed immediately prior to start of procedure:   Jaime NÚÑEZ, have performed the following reviews on Lorelee Slight prior to the start of the procedure:            * Patient was identified by name and date of birth   * Agreement on procedure being performed was verified  * Risks and Benefits explained to the patient  * Procedure site verified and marked as necessary  * Patient was positioned for comfort  * Consent was signed and verified     Time: 12:06 PM     Date of procedure: 11/30/2022    Procedure performed by:  Antonio Duenas MD    Provider accompanied by: Bre.     Patient accompanied by another individual: No    How tolerated by patient: tolerated the procedure well with no complications    Post Procedural Pain Scale: 0 - No Hurt    Comments: none    Written by Steve Lizama as dictated by Jaime Tuttle MD

## 2022-12-05 ENCOUNTER — OFFICE VISIT (OUTPATIENT)
Dept: ORTHOPEDIC SURGERY | Age: 62
End: 2022-12-05
Payer: MEDICAID

## 2022-12-05 VITALS — WEIGHT: 210 LBS | HEIGHT: 66 IN | TEMPERATURE: 97.5 F | BODY MASS INDEX: 33.75 KG/M2

## 2022-12-05 DIAGNOSIS — G56.01 CARPAL TUNNEL SYNDROME ON RIGHT: Primary | ICD-10-CM

## 2022-12-05 DIAGNOSIS — G56.02 CARPAL TUNNEL SYNDROME ON LEFT: ICD-10-CM

## 2022-12-05 PROCEDURE — 99214 OFFICE O/P EST MOD 30 MIN: CPT | Performed by: SPECIALIST

## 2022-12-05 NOTE — PROGRESS NOTES
Patient: Mukesh Grady                MRN: [id number]       SSN: [id number]  YOB: 1960        AGE: 63 y.o. SEX: female    PCP: Viola Aaron MD  12/05/22    Chief Complaint   Patient presents with    Hand Pain     Bilat      HISTORY:  Mukesh Grady is a 63 y.o. female who is seen for continued bilateral hand pain, numbness, and tingling, R>L. She has been experiencing these sensations for the past several months. She states that her pains and numbness keep her up at night. She reports right hand tingling since her EMG. She is right-handed. She was previously seen for right thumb pain. She was previously treated by Dr. Timmy Gann for a MRSA infection of the left ring finger. Pain Assessment  11/30/2022   Location of Pain Back   Location Modifiers -   Severity of Pain 10   Quality of Pain Sharp   Quality of Pain Comment -   Duration of Pain Persistent   Frequency of Pain Constant   Aggravating Factors -   Aggravating Factors Comment -   Limiting Behavior -   Relieving Factors -   Relieving Factors Comment -   Result of Injury -     Occupation, etc:  Ms. Su Dawkins retired five years ago. 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 She states her last patient passed away and she is taking a break until the new year. She lives alone in [place]. Her 24 yo son is working at the post office. Ms. Su Dawkins weighs 210 lbs and is 5'6\" tall.        No results found for: HBA1C, BRA3OHRO, ZCX6VIKP, OQT8MLAM  Weight Metrics 12/5/2022 11/30/2022 10/10/2022 4/5/2022 3/8/2022 11/10/2020 3/24/2020   Weight 210 lb 210 lb 198 lb 204 lb 203 lb 6.4 oz 207 lb 218 lb 12.8 oz   BMI 33.89 kg/m2 33.89 kg/m2 31.96 kg/m2 32.93 kg/m2 32.83 kg/m2 33.41 kg/m2 35.32 kg/m2       Patient Active Problem List   Diagnosis Code    Coronary atherosclerosis of native coronary artery I25.10    Chest pain R07.9    Hyperlipidemia E78.5    Obesity E66.9    Asthma J45.909    Hypercholesterolemia E78.00    PVC (premature ventricular contraction) I49.3    PFO (patent foramen ovale) Q21.12    Non-rheumatic mitral regurgitation I34.0    Syncope R55    Lower GI bleed K92.2    Rectovaginal fistula 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.3    Symptomatic anemia D64.9    Severe obesity (HCC) E66.01     REVIEW OF SYSTEMS:    Constitutional Symptoms: Negative   Eyes: Negative   Ears, Nose, Throat and Mouth: Negative   Cardiovascular: Negative   Respiratory: Negative   Genitourinary: Per HPI   Gastrointestinal: Per HPI   Integumentary (Skin and/or Breast): Negative   Musculoskeletal: Per HPI   Endocrine/Rheumatologic: Negative   Neurological: Per HPI   Hematology/Lymphatic: Negative    Allergic/Immunologic: Negative   Phychiatric: Negative    Social History     Socioeconomic History    Marital status: LEGALLY      Spouse name: Not on file    Number of children: Not on file    Years of education: Not on file    Highest education level: Not on file   Occupational History    Not on file   Tobacco Use    Smoking status: Former     Packs/day: 0.20     Years: 5.00     Pack years: 1.00     Types: Cigarettes     Quit date: 1995     Years since quittin.7    Smokeless tobacco: Never   Substance and Sexual Activity    Alcohol use:  Yes     Alcohol/week: 0.0 standard drinks     Comment: socially    Drug use: No    Sexual activity: Yes     Partners: Male     Birth control/protection: None   Other Topics Concern    Not on file   Social History Narrative    Not on file     Social Determinants of Health     Financial Resource Strain: Not on file   Food Insecurity: Not on file   Transportation Needs: Not on file   Physical Activity: Not on file   Stress: Not on file   Social Connections: Not on file   Intimate Partner Violence: Not on file   Housing Stability: Not on file      Allergies   Allergen Reactions    Penicillins Other (comments)     Takes skin of body   Other reaction(s): other/intolerance  Skin peeling off hands and feet      Current Outpatient Medications   Medication Sig    clobetasoL (TEMOVATE) 0.05 % ointment     acetaminophen (TYLENOL) 500 mg tablet Take 2 Tablets by mouth as needed. pantoprazole (PROTONIX) 40 mg tablet Take 40 mg by mouth daily. meloxicam (Mobic) 15 mg tablet Take 1 Tablet by mouth daily. fluticasone propionate (FLOVENT HFA) 110 mcg/actuation inhaler Take 2 Puffs by inhalation every twelve (12) hours. albuterol (PROVENTIL HFA, VENTOLIN HFA, PROAIR HFA) 90 mcg/actuation inhaler Take 2 Puffs by inhalation every six (6) hours as needed for Wheezing or Shortness of Breath.    gabapentin (NEURONTIN) 300 mg capsule TAKE 1 CAPSULE BY MOUTH AT BEDTIME    calcium-cholecalciferol, d3, 600-125 mg-unit tab Take  by mouth.    montelukast (SINGULAIR) 10 mg tablet Take 10 mg by mouth daily. BIOTIN PO Take 1 Tablet by mouth daily. ascorbic acid (JESSICA-C PO) Take  by mouth Daily (before breakfast). aspirin delayed-release 81 mg tablet Take  by mouth daily. nitroglycerin (NITROSTAT) 0.4 mg SL tablet 1 Tab by SubLINGual route every five (5) minutes as needed for Chest Pain.    loratadine (CLARITIN) 10 mg tablet Take 10 mg by mouth.    cyanocobalamin 1,000 mcg tablet Take 1,000 mcg by mouth daily. multivitamin (ONE A DAY) tablet Take 1 Tab by mouth daily. Cholecalciferol, Vitamin D3, 1,000 unit cap Take 1,000 Units by mouth daily. zinc 50 mg Tab Take  by mouth daily. No current facility-administered medications for this visit. PHYSICAL EXAMINATION:  Visit Vitals  Temp 97.5 °F (36.4 °C) (Temporal)   Ht 5' 6\" (1.676 m)   Wt 210 lb (95.3 kg)   BMI 33.89 kg/m²   Appearance: Alert, well appearing and pleasant patient who is in no distress, oriented to person, place/time, and who follows commands. HEENT: Luis Adame hears well, does not require hearing aids. Her sclera of the eyes are non-icteric. She is breathing normally and no respiratory accessory muscle use is noted.  No JVD present and Neck ROM within normal limits. Psychiatric: Affect and mood are appropriate. Oriented x3  Cardiovascular/Peripheral Vascular: Normal pulses to each foot. Integumentary: No rashes. Warm and normal color. No drainage. Gait: nl  Sensory Exam: decreased sensation to light touch in the median nerve distribution bilateral hands  Lymphatic: No evidence of Lymphedema  Vascular:       Pulses: palpable  Varicosities none  Wounds/Abrasion: None Present  Neuro: Negative, no tremors  ORTHO EXAMINATION:  Examination Right Wrist Left Wrist   Skin Intact Intact   Tenderness - -   Flexion 40 40   Extension 30 30   Deformity - -   Effusion - -   Finger flexion Full Full   Finger extension Full Full   Tinel's sign - -   Phalen's test - -   Finklestein maneuver - -   Pain with thumb abduction - -   Capillary refill - -       BUE EMG 11/30/2022 Ramirez  INTERPRETATION  This is an abnormal electrodiagnostic examination. These findings may be consistent with:   Mild median mononeuropathy at both wrists (R>L) (carpal tunnel syndrome)  There are no electrodiagnostic findings consistent with cervical radiculopathy, brachial plexopathy, myopathy, polyneuropathy or any other mononeuropathy. RADIOGRAPHS:  XR HIP LT 2 V 08/05/2022  IMPRESSION  1. No acute fracture or dislocation. 2.  Bilateral degenerative hip arthropathy, moderate to advanced on the left and likely moderate on the right. IMPRESSION:      ICD-10-CM ICD-9-CM    1. Carpal tunnel syndrome on right  G56.01 354.0 AMB SUPPLY ORDER      2. Carpal tunnel syndrome on left  G56.02 354.0 AMB SUPPLY ORDER        PLAN:  She will be scheduled for right carpal tunnel release. Patient was fitted comfortably with a right carpal tunnel brace. She will follow up at appropriate time for preoperative H&P.       Scribed by Jerome Dee ([id number]) as dictated by Yonathan Torres MD

## 2022-12-16 DIAGNOSIS — G56.01 CARPAL TUNNEL SYNDROME ON RIGHT: Primary | ICD-10-CM

## 2022-12-16 DIAGNOSIS — Z01.818 PREOPERATIVE TESTING: ICD-10-CM

## 2022-12-16 DIAGNOSIS — Z01.810 PREOP CARDIOVASCULAR EXAM: ICD-10-CM

## 2022-12-19 DIAGNOSIS — Z01.810 PREOP CARDIOVASCULAR EXAM: ICD-10-CM

## 2022-12-19 DIAGNOSIS — Z01.818 PREOPERATIVE TESTING: ICD-10-CM

## 2022-12-19 DIAGNOSIS — G56.01 CARPAL TUNNEL SYNDROME ON RIGHT: ICD-10-CM

## 2023-02-16 ENCOUNTER — TELEPHONE (OUTPATIENT)
Age: 63
End: 2023-02-16

## 2023-02-17 ENCOUNTER — TELEPHONE (OUTPATIENT)
Age: 63
End: 2023-02-17

## 2023-02-17 NOTE — TELEPHONE ENCOUNTER
Attempted to call 3 times to r/s appt due to provider not being in the office, and the patients phone straight to [place]

## 2023-03-28 ENCOUNTER — HOSPITAL ENCOUNTER (OUTPATIENT)
Facility: HOSPITAL | Age: 63
Discharge: HOME | End: 2023-03-31

## 2023-03-28 ENCOUNTER — OFFICE VISIT (OUTPATIENT)
Age: 63
End: 2023-03-28
Payer: COMMERCIAL

## 2023-03-28 VITALS
RESPIRATION RATE: 18 BRPM | HEIGHT: 66 IN | HEART RATE: 87 BPM | TEMPERATURE: 97.5 F | OXYGEN SATURATION: 97 % | WEIGHT: 210.4 LBS | DIASTOLIC BLOOD PRESSURE: 84 MMHG | BODY MASS INDEX: 33.82 KG/M2 | SYSTOLIC BLOOD PRESSURE: 126 MMHG

## 2023-03-28 DIAGNOSIS — G47.33 OSA (OBSTRUCTIVE SLEEP APNEA): ICD-10-CM

## 2023-03-28 DIAGNOSIS — Q21.12 PFO (PATENT FORAMEN OVALE): ICD-10-CM

## 2023-03-28 DIAGNOSIS — J45.40 MODERATE PERSISTENT ASTHMA WITHOUT COMPLICATION: Primary | ICD-10-CM

## 2023-03-28 DIAGNOSIS — J45.40 MODERATE PERSISTENT ASTHMA WITHOUT COMPLICATION: ICD-10-CM

## 2023-03-28 DIAGNOSIS — I34.0 NON-RHEUMATIC MITRAL REGURGITATION: ICD-10-CM

## 2023-03-28 LAB — LABCORP SPECIMEN COLLECTION: NORMAL

## 2023-03-28 PROCEDURE — 99204 OFFICE O/P NEW MOD 45 MIN: CPT | Performed by: INTERNAL MEDICINE

## 2023-03-28 PROCEDURE — 99001 SPECIMEN HANDLING PT-LAB: CPT

## 2023-03-28 RX ORDER — FLUTICASONE FUROATE AND VILANTEROL 100; 25 UG/1; UG/1
1 POWDER RESPIRATORY (INHALATION) DAILY
Qty: 2 EACH | Refills: 0 | COMMUNITY
Start: 2023-03-28 | End: 2023-03-28

## 2023-03-28 RX ORDER — FLUTICASONE FUROATE AND VILANTEROL 100; 25 UG/1; UG/1
1 POWDER RESPIRATORY (INHALATION) DAILY
Qty: 2 EACH | Refills: 0 | COMMUNITY
Start: 2023-03-28

## 2023-03-28 ASSESSMENT — PATIENT HEALTH QUESTIONNAIRE - PHQ9
SUM OF ALL RESPONSES TO PHQ9 QUESTIONS 1 & 2: 0
SUM OF ALL RESPONSES TO PHQ QUESTIONS 1-9: 0
1. LITTLE INTEREST OR PLEASURE IN DOING THINGS: 0
SUM OF ALL RESPONSES TO PHQ QUESTIONS 1-9: 0
SUM OF ALL RESPONSES TO PHQ QUESTIONS 1-9: 0
2. FEELING DOWN, DEPRESSED OR HOPELESS: 0
SUM OF ALL RESPONSES TO PHQ QUESTIONS 1-9: 0

## 2023-03-28 NOTE — PROGRESS NOTES
Naveen Bowens presents today for   Chief Complaint   Patient presents with    Shortness of Breath     With exertion     Cough     With sputum        Is someone accompanying this pt? No    Is the patient using any DME equipment during OV? No    -DME Company NA    Depression Screening:    No flowsheet data found. Learning Needs Questionnaire:     No question data found. Fall Risk:     No flowsheet data found. Abuse Screening:     No flowsheet data found. Coordination of Care:    1. Have you been to the ER, urgent care clinic since your last visit? Hospitalized since your last visit? No    2. Have you seen or consulted any other health care providers outside of the [address] [place] [address] since your last visit? Include any pap smears or colon screening. No    Medication list has been update per patient.

## 2023-03-28 NOTE — PROGRESS NOTES
[facility]   Pulmonary, Critical Care, and Sleep Medicine           Pulmonary Office visit        Subjective:     Patient has been referred for evaluation of: Asthma     03/28/23   Patient was last seen in pulmonary clinic 3 years back. She now returns for appointment with complaints of having progressive and persistent symptoms of shortness of breath on exertion, cough and some chest discomfort. Patient states that she stays tired all the time and feels that she is more short of breath with activities of daily living. Denies orthopnea or paroxysmal nocturnal dyspnea. Cough is described as dry cough with some productive mucus usually in the mornings. Cough gets worse with exposure to change in temperature especially heat, fragrances and pollen. Symptoms are worse in spring and fall  Cooking fumes also bother her. She admits to having some nasal congestion and postnasal dripping-use saline nasal spray and Claritin  She states that over the years she has ran out of all the inhaler she was previously using and now only has as needed albuterol. Denies any fever or chills  States that she tends to stay sleepy despite sleeping for 8 to 10 hours  Denies any ER/acute visits  She does not have any pets at home but recently her daughter has been staying with her with her dog which is outdoors. Patient has received 2 doses of COVID-vaccine      HPI:   Patient is a 64 y.o.  female who hs been diagnosed with asthma and seasonal allergies for several years and treated with different medications but still has breakthrough episodes of exacerbation needing ER visits and interventions. She has been on Singulair, Asmanex, ventolin- MDI and also has a nebulizer. She needs a new machine and supplies. Usually has symptoms of wheezing, cough, increased SOB. Denies orthopnea, PND. Has been noted by her children to be a snorer.    She has seasonal increase in nasal stuffiness, postnasal drainage and

## 2023-03-29 LAB
BASOPHILS # BLD AUTO: 0 X10E3/UL (ref 0–0.2)
BASOPHILS NFR BLD AUTO: 1 %
EOSINOPHIL # BLD AUTO: 0.2 X10E3/UL (ref 0–0.4)
EOSINOPHIL NFR BLD AUTO: 4 %
ERYTHROCYTE [DISTWIDTH] IN BLOOD BY AUTOMATED COUNT: 12 % (ref 11.7–15.4)
HCT VFR BLD AUTO: 43.2 % (ref 34–46.6)
HGB BLD-MCNC: 13.8 G/DL (ref 11.1–15.9)
IMM GRANULOCYTES # BLD AUTO: 0 X10E3/UL (ref 0–0.1)
IMM GRANULOCYTES NFR BLD AUTO: 0 %
LYMPHOCYTES # BLD AUTO: 2.2 X10E3/UL (ref 0.7–3.1)
LYMPHOCYTES NFR BLD AUTO: 47 %
MCH RBC QN AUTO: 28.8 PG (ref 26.6–33)
MCHC RBC AUTO-ENTMCNC: 31.9 G/DL (ref 31.5–35.7)
MCV RBC AUTO: 90 FL (ref 79–97)
MONOCYTES # BLD AUTO: 0.3 X10E3/UL (ref 0.1–0.9)
MONOCYTES NFR BLD AUTO: 6 %
NEUTROPHILS # BLD AUTO: 1.8 X10E3/UL (ref 1.4–7)
NEUTROPHILS NFR BLD AUTO: 42 %
PLATELET # BLD AUTO: 304 X10E3/UL (ref 150–450)
RBC # BLD AUTO: 4.8 X10E6/UL (ref 3.77–5.28)
WBC # BLD AUTO: 4.4 X10E3/UL (ref 3.4–10.8)

## 2023-03-30 LAB — IGE SERPL-ACNC: 142 IU/ML (ref 6–495)

## 2023-04-06 ENCOUNTER — OFFICE VISIT (OUTPATIENT)
Age: 63
End: 2023-04-06
Payer: COMMERCIAL

## 2023-04-06 VITALS
HEIGHT: 66 IN | SYSTOLIC BLOOD PRESSURE: 146 MMHG | RESPIRATION RATE: 16 BRPM | WEIGHT: 213 LBS | TEMPERATURE: 97.6 F | OXYGEN SATURATION: 95 % | HEART RATE: 93 BPM | BODY MASS INDEX: 34.23 KG/M2 | DIASTOLIC BLOOD PRESSURE: 75 MMHG

## 2023-04-06 DIAGNOSIS — G89.29 CHRONIC PRIMARY MUSCULOSKELETAL PAIN: ICD-10-CM

## 2023-04-06 DIAGNOSIS — M79.18 CHRONIC PRIMARY MUSCULOSKELETAL PAIN: ICD-10-CM

## 2023-04-06 DIAGNOSIS — M54.59 MECHANICAL LOW BACK PAIN: Primary | ICD-10-CM

## 2023-04-06 DIAGNOSIS — M79.18 MYOFASCIAL PAIN: ICD-10-CM

## 2023-04-06 DIAGNOSIS — G57.12 MERALGIA PARESTHETICA, LEFT LOWER LIMB: ICD-10-CM

## 2023-04-06 PROCEDURE — 99214 OFFICE O/P EST MOD 30 MIN: CPT | Performed by: PHYSICAL MEDICINE & REHABILITATION

## 2023-04-06 RX ORDER — CLOBETASOL PROPIONATE 0.5 MG/G
OINTMENT TOPICAL
COMMUNITY
Start: 2023-03-31

## 2023-04-06 RX ORDER — NEOMYCIN SULFATE, POLYMYXIN B SULFATE AND DEXAMETHASONE 3.5; 10000; 1 MG/ML; [USP'U]/ML; MG/ML
SUSPENSION/ DROPS OPHTHALMIC
COMMUNITY
Start: 2023-02-08

## 2023-04-06 RX ORDER — GABAPENTIN 300 MG/1
300 CAPSULE ORAL 3 TIMES DAILY
Qty: 90 CAPSULE | Refills: 1 | Status: SHIPPED | OUTPATIENT
Start: 2023-04-06 | End: 2023-05-06

## 2023-04-06 RX ORDER — METHIMAZOLE 5 MG/1
5 TABLET ORAL EVERY OTHER DAY
COMMUNITY
Start: 2022-11-21

## 2023-04-06 RX ORDER — IBUPROFEN 800 MG/1
800 TABLET ORAL 2 TIMES DAILY PRN
Qty: 180 TABLET | Refills: 3 | Status: SHIPPED | OUTPATIENT
Start: 2023-04-06 | End: 2023-07-05

## 2023-04-06 RX ORDER — FLUTICASONE PROPIONATE 50 MCG
SPRAY, SUSPENSION (ML) NASAL
COMMUNITY
Start: 2023-01-18

## 2023-04-06 RX ORDER — ALBUTEROL SULFATE 2.5 MG/3ML
SOLUTION RESPIRATORY (INHALATION)
COMMUNITY
Start: 2023-04-04

## 2023-04-06 RX ORDER — IBUPROFEN 600 MG/1
600 TABLET ORAL EVERY 6 HOURS PRN
COMMUNITY
Start: 2023-01-18

## 2023-04-06 ASSESSMENT — PATIENT HEALTH QUESTIONNAIRE - PHQ9
2. FEELING DOWN, DEPRESSED OR HOPELESS: 0
1. LITTLE INTEREST OR PLEASURE IN DOING THINGS: 0
SUM OF ALL RESPONSES TO PHQ QUESTIONS 1-9: 0
SUM OF ALL RESPONSES TO PHQ9 QUESTIONS 1 & 2: 0
SUM OF ALL RESPONSES TO PHQ QUESTIONS 1-9: 0

## 2023-04-06 ASSESSMENT — ENCOUNTER SYMPTOMS
TROUBLE SWALLOWING: 0
NAUSEA: 0
WHEEZING: 0
BACK PAIN: 1
VOMITING: 0
SHORTNESS OF BREATH: 0

## 2023-04-06 NOTE — PROGRESS NOTES
Chief Complaint   Patient presents with    Follow-up       Pt preferred language for health care discussion is english. Is someone accompanying this pt? no    Is the patient using any DME equipment during OV? no    Depression Screening:  No flowsheet data found. Learning Assessment:  No flowsheet data found. Abuse Screening:  No flowsheet data found. Fall Risk  No flowsheet data found. Advance Directive:  1. Do you have an advance directive in place? Patient Reply:no    2. If not, would you like material regarding how to put one in place? Patient Reply: no    2. Per patient no changes to their ACP contact no. Coordination of Care:  1. Have you been to the ER, urgent care clinic since your last visit? Hospitalized since your last visit? no    2. Have you seen or consulted any other health care providers outside of the [address] [place] [address] since your last visit? Include any pap smears or colon screening.  no
negative  Straight leg raise left: negative    Other   Toe walk: normal  Heel walk: normal  Sensation: normal           MOTOR:      Elbow Flex  Elbow Ext Arm Abd Wrist Ext Wrist Flex Hand Intrin   Right 5/5 5/5 5/5 5/5 5/5 5/5   Left 5/5 5/5 5/5 5/5 5/5 5/5             Hip flex  Knee Ext EHL Ankle DF Ankle PF      Right 5/5 5/5 5/5 5/5 5/5    Left 5/5 5/5 5/5 5/5 5/5      Ambulation with single point cane. FWB. ASSESSMENT  Suma Camarena is a 63 y.o. female with lower back pain, along with left anterior thigh pain and tingling. Her symptoms may be due lumbar myofascial pain. Her bilateral hip osteoarthritis does not seem to contribute to her current symptoms, based on no pain upon internal hip rotation. Her left anterior thigh symptoms may continue to be due to meralgia paresthetica. PLAN  Referral to PT - eval and treat of lower back   Gabapentin 300 mg TID  Ibuprofen 800 mg BID PRN (3 months supply with 3 refills)  Diclofenac 1% gel (2g) PRN    Follow-up and Dispositions    Return in about 8 weeks (around 6/1/2023). Dc Kasper was seen today for follow-up.     Diagnoses and all orders for this visit:    Mechanical low back pain    Myofascial pain    Chronic primary musculoskeletal pain    Meralgia paresthetica, left lower limb               PAST MEDICAL HISTORY   Past Medical History:   Diagnosis Date    Arthritis     Asthma     Carpal tunnel syndrome, bilateral     Chest pain, unspecified     Possible Angina, GERD, chest wall pains, recurrent pains, possible atypical angina, happens at rest, abnormal nuc scan in past, discussed risk benefit options of cardiac cath/pci, she wants to think it over, add sl ntg    Coronary atherosclerosis of native coronary artery     Abnormal NUC scan, patient's symptoms are better, will start meds and monitor    GERD (gastroesophageal reflux disease)     Heart murmur     Hypercholesterolemia     Hyperthyroidism     Ill-defined condition     Patent Foramen Ovale-

## 2023-04-07 ENCOUNTER — OFFICE VISIT (OUTPATIENT)
Age: 63
End: 2023-04-07

## 2023-04-07 VITALS — WEIGHT: 213 LBS | BODY MASS INDEX: 34.23 KG/M2 | HEIGHT: 66 IN | TEMPERATURE: 97.7 F

## 2023-04-07 DIAGNOSIS — M16.12 PRIMARY OSTEOARTHRITIS OF LEFT HIP: ICD-10-CM

## 2023-04-07 DIAGNOSIS — M16.11 PRIMARY OSTEOARTHRITIS OF RIGHT HIP: ICD-10-CM

## 2023-04-07 DIAGNOSIS — M79.18 MYALGIA, OTHER SITE: ICD-10-CM

## 2023-04-07 DIAGNOSIS — G56.01 CARPAL TUNNEL SYNDROME, RIGHT UPPER LIMB: Primary | ICD-10-CM

## 2023-04-07 DIAGNOSIS — G56.02 CARPAL TUNNEL SYNDROME, LEFT UPPER LIMB: ICD-10-CM

## 2023-04-07 DIAGNOSIS — M16.0 BILATERAL PRIMARY OSTEOARTHRITIS OF HIP: ICD-10-CM

## 2023-04-07 NOTE — PROGRESS NOTES
Patient: Cameron Simon                MRN:  [id number]       SSN: [id number]   YOB: 1960        AGE:  63 y.o. SEX: female      PCP: Guero Urias MD  4/7/23             Chief Complaint       Patient presents with          Hand Pain             Bilat         HISTORY:  Cameron Simon is a 63 y.o. female who is seen for follow up bilateral hand pain, numbness, and tingling, R>L. She was experiencing   these sensations for several months symptoms have recently improved. He was scheduled for right carpal tunnel release but since she is doing better she postponed the surgery. Her pains and numbness were keeping her up at night she is sleeping much better now. She is no longer using her carpal tunnel splints. Bilateral upper extremity EMG nerve conduction study November 30, 2022 revealed evidence of mild carpal tunnel syndrome. She is right-handed. She has been experiencing some neck pain recently for which she is seeing Dr. Maryjo Ramos. She was previously seen for right thumb pain and hip arthritis. She was previously treated by Dr. Amilcar Buckner for a MRSA infection of the left ring finger. Pain Assessment   11/30/2022        Location of Pain  Back     Location Modifiers  -     Severity of Pain  10     Quality of Pain  Sharp     Quality of Pain Comment  -     Duration of Pain  Persistent     Frequency of Pain  Constant     Aggravating Factors  -     Aggravating Factors Comment  -     Limiting Behavior  -     Relieving Factors  -     Relieving Factors Comment  -        Result of Injury  -        Occupation, etc:  Ms. Wanda Gallardo retired five years ago. 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 She states her last patient passed away and she is taking a break until the new year. She lives alone in [place]. Her 26 yo son is working  at the post office. Ms. Wanda Gallardo weighs 210 lbs and is 5'6\" tall.          No results found for: HBA1C, WFT2QEFW, MFO8RCXA,

## 2023-04-24 ENCOUNTER — TELEPHONE (OUTPATIENT)
Dept: SLEEP MEDICINE | Facility: HOSPITAL | Age: 63
End: 2023-04-24

## 2023-04-25 ENCOUNTER — HOSPITAL ENCOUNTER (OUTPATIENT)
Facility: HOSPITAL | Age: 63
Setting detail: RECURRING SERIES
Discharge: HOME | End: 2023-04-28
Payer: COMMERCIAL

## 2023-04-25 PROCEDURE — 97161 PT EVAL LOW COMPLEX 20 MIN: CPT

## 2023-04-25 NOTE — PROGRESS NOTES
PT DAILY TREATMENT NOTE/LUMBAR EVAL     Patient Name: Mary Her    Date: 2023    : 1960  Insurance: Payor: ASHLEY / Plan: Amilcar Riley / Product Type: *No Product type* /      Patient  verified yes     Visit #   Current / Total 1 10-12   Time   In / Out 1040 1120   Pain   In / Out 5 5   Subjective Functional Status/Changes: Onset due to Arthritis mostly. Some left leg pain and tingling. Today is a good day. Wants to be able to do gardening and walking activities. Changes to:  Meds, Allergies, Med Hx, Sx Hx? If yes, update Summary List no         Treatment Area: Mechanical low back pain [M54.59]  Myofascial pain [M79.18]  Chronic primary musculoskeletal pain [M79.18, G89.29]  SUBJECTIVE  Pain Level (0-10 scale): 5/10  []constant []intermittent []improving []worsening []no change since onset    Any medication changes, allergies to medications, adverse drug reactions, diagnosis change, or new procedure performed?: [x] No    [] Yes (see summary sheet for update)  Subjective functional status/changes:     PLOF: pain and tingling into ankles. Described as achy to LS and burining to left Lateral thigh and inside thigh  Limitations to PLOF: likes to walk, some day is better. Mechanism of Injury: none  Current symptoms/Complaints: pain   Previous Treatment/Compliance:   PMHx/Surgical Hx: na  Work Hx: no  Living Situation: walking and gardening  Pt Goals: yes dec pain  Barriers: []pain []financial []time []transportation []other  Motivation: yes  Substance use: []Alcohol []Tobacco []other:   FABQ Score: []low []elevate  Cognition: A & O x 4    Other:    OBJECTIVE/EXAMINATION  Domestic Life: na  Activity/Recreational Limitations: all  Mobility: indep. Uses a SPC when LE is hurting  Self Care: na    Modalities Rationale:      to improve patient's ability to progress to PLOF and address remaining functional goals.      min [] Estim Unattended, type/location:

## 2023-04-25 NOTE — THERAPY EVALUATION
[facility] PHYSICAL THERAPY  [address] [facility] IX:[phone] Fx: [phone]  Plan of Care / Statement of Necessity for Physical Therapy Services     Patient Name: Oseas Schneider : 1960   Medical   Diagnosis: Mechanical low back pain [M54.59]  Myofascial pain [M79.18]  Chronic primary musculoskeletal pain [M79.18, G89.29] Treatment Diagnosis: M54.59  OTHER LOWER BACK PAIN      Onset Date: >5 months Payor :  Payor: Isabel Matta / Plan: Jessica Gregorio / Product Type: *No Product type* /    Referral Source: Junita Curling, MD [facility]): 2023   Prior Hospitalization: See medical history Provider #: [id number]   Prior Level of Function: [address] gardening. Uses a SPC some days. She likes to walk. Comorbidities: Asthma, Arthritis Thyroid. Medications: Verified on Patient Summary List     Assessment / key information:  Pt is a 63 yr old female who reports onset of LS pain and LLE pain and tingling that has been ongoing for months. Pt reports today is a good day and pain levels at 5/10 (avg). She states most of her pain is across the LS and into the left lateral and medial thigh. It does  go past her knee, but intermittently. She has a SPC she uses on days of flare ups for Ambulation purposes. Pt was able to touch her toes today and perform full LS extension. She has limited left Hip IR and decreased left hip/glute strength. SLR test and Slump Test is positive. Pt has difficulty with being able to tolerate hobbies such as Gardening and Walking activities. She reports Heating Pads and a warm bath eases her pain and discomfort. Pain increases with bending/sitting/standing and walking.   Xrays revealed a stable L3-L4 Spondylolisthesis. Pt will benefit from skilled PT to improve ROM, decrease pain and improve LS function to ease with ADL's.      Evaluation Complexity:  History:  MEDIUM  Complexity : 1-2 comorbidities /

## 2023-05-05 NOTE — TELEPHONE ENCOUNTER
Pt stated that she was suppose to have a slp study done but ins denied. Pt would like to know what she should do next. Pt also would like a refill on her albuterol to be sent to [facility] on [address].  Pt is requesting a call back at [phone]

## 2023-05-07 RX ORDER — ALBUTEROL SULFATE 90 UG/1
2 AEROSOL, METERED RESPIRATORY (INHALATION) EVERY 6 HOURS PRN
Qty: 18 G | Refills: 3 | Status: SHIPPED | OUTPATIENT
Start: 2023-05-07

## 2023-05-09 ENCOUNTER — TELEPHONE (OUTPATIENT)
Age: 63
End: 2023-05-09

## 2023-05-16 ENCOUNTER — HOSPITAL ENCOUNTER (OUTPATIENT)
Facility: HOSPITAL | Age: 63
Setting detail: RECURRING SERIES
End: 2023-05-16
Payer: COMMERCIAL

## 2023-05-17 ENCOUNTER — HOSPITAL ENCOUNTER (OUTPATIENT)
Facility: HOSPITAL | Age: 63
Setting detail: RECURRING SERIES
Discharge: HOME | End: 2023-05-20
Payer: COMMERCIAL

## 2023-05-17 PROCEDURE — 97110 THERAPEUTIC EXERCISES: CPT

## 2023-05-17 PROCEDURE — 97140 MANUAL THERAPY 1/> REGIONS: CPT

## 2023-05-17 NOTE — PROGRESS NOTES
PHYSICAL / OCCUPATIONAL THERAPY - DAILY TREATMENT NOTE (updated )    Patient Name: Vinicius Briones    Date: 2023    : 1960  Insurance: Payor: Garrick Bocanegra / Plan: Ashley Valdes / Product Type: *No Product type* /      Patient  verified Yes     Visit #   Current / Total 2 10-12   Time   In / Out 1205 1258   Pain   In / Out 7 6   Subjective Functional Status/Changes: Reports she is doing the HEP. Mornings are worst with increased pain to LS 10/10. Changes to:  Meds, Allergies, Med Hx, Sx Hx? If yes, update Summary List yes       TREATMENT AREA =  Other low back pain [M54.59]    OBJECTIVE    Modalities Rationale:     decrease pain and increase tissue extensibility to improve patient's ability to progress to PLOF and address remaining functional goals. min [] Estim Unattended, type/location:                                      []  w/ice    []  w/heat    min [] Estim Attended, type/location:                                     []  w/US     []  w/ice    []  w/heat    []  TENS insruct      min []  Mechanical Traction: type/lbs                   []  pro   []  sup   []  int   []  cont    []  before manual    []  after manual    min []  Ultrasound, settings/location:      min []  Iontophoresis w/ dexamethasone, location:                                               []  take home patch       []  in clinic   10 min  unbill []  Ice     [x]  Heat    location/position: SL/left hip    min []  Paraffin,  details:     min []  Vasopneumatic Device, press/temp:     min []  Gilbert Dine / Belvia Doyne: If using vaso (only need to measure limb vaso being performed on)      pre-treatment girth :       post-treatment girth :       measured at (landmark location) :      min []  Other:    Skin assessment post-treatment (if applicable):    []  intact    []  redness- no adverse reaction                 []redness - adverse reaction:         Therapeutic Procedures:     Tx Min Billable or 1:1 Min (if diff from [employer])

## 2023-05-22 ASSESSMENT — ENCOUNTER SYMPTOMS
COLOR CHANGE: 0
APNEA: 0
WHEEZING: 0
BLOOD IN STOOL: 0
SHORTNESS OF BREATH: 0
ABDOMINAL PAIN: 0
CONSTIPATION: 0
CHEST TIGHTNESS: 0
NAUSEA: 0
COUGH: 0
DIARRHEA: 0

## 2023-05-23 ENCOUNTER — TELEPHONE (OUTPATIENT)
Facility: HOSPITAL | Age: 63
End: 2023-05-23

## 2023-05-26 ENCOUNTER — OFFICE VISIT (OUTPATIENT)
Age: 63
End: 2023-05-26
Payer: COMMERCIAL

## 2023-05-26 VITALS
SYSTOLIC BLOOD PRESSURE: 106 MMHG | WEIGHT: 213 LBS | BODY MASS INDEX: 34.23 KG/M2 | OXYGEN SATURATION: 98 % | HEART RATE: 83 BPM | DIASTOLIC BLOOD PRESSURE: 72 MMHG | HEIGHT: 66 IN

## 2023-05-26 DIAGNOSIS — R00.2 PALPITATIONS: ICD-10-CM

## 2023-05-26 DIAGNOSIS — R07.89 OTHER CHEST PAIN: Primary | ICD-10-CM

## 2023-05-26 DIAGNOSIS — E78.5 HYPERLIPIDEMIA, UNSPECIFIED HYPERLIPIDEMIA TYPE: ICD-10-CM

## 2023-05-26 PROCEDURE — 99214 OFFICE O/P EST MOD 30 MIN: CPT | Performed by: INTERNAL MEDICINE

## 2023-05-26 RX ORDER — NITROGLYCERIN 0.4 MG/1
0.4 TABLET SUBLINGUAL EVERY 5 MIN PRN
Status: SHIPPED | OUTPATIENT
Start: 2023-05-26

## 2023-05-26 NOTE — PATIENT INSTRUCTIONS
[employer] order filled out and faxed to provider for signature.    Learning About the [facility] Diet  What is the Mediterranean diet? The Mediterranean diet is a style of eating rather than a diet plan. It features foods eaten in [place], [place], [place] and [place], and other countries along the [facility]. It emphasizes eating foods like fish, fruits, vegetables, beans, high-fiber breads and whole grains, nuts, and olive oil. This style of eating includes limited red meat, cheese, and sweets. Why choose the Mediterranean diet? A Mediterranean-style diet may improve heart health. It contains more fat than other heart-healthy diets. But the fats are mainly from nuts, unsaturated oils (such as fish oils and olive oil), and certain nut or seed oils (such as canola, soybean, or flaxseed oil). These fats may help protect the heart and blood vessels. How can you get started on the Mediterranean diet? Here are some things you can do to switch to a more Mediterranean way of eating. What to eat  Eat a variety of fruits and vegetables each day, such as grapes, blueberries, tomatoes, broccoli, peppers, figs, olives, spinach, eggplant, beans, lentils, and chickpeas. Eat a variety of whole-grain foods each day, such as oats, brown rice, and whole wheat bread, pasta, and couscous. Eat fish at least 2 times a week. Try tuna, salmon, mackerel, lake trout, herring, or sardines. Eat moderate amounts of low-fat dairy products, such as milk, cheese, or yogurt. Eat moderate amounts of poultry and eggs. Choose healthy (unsaturated) fats, such as nuts, olive oil, and certain nut or seed oils like canola, soybean, and flaxseed. Limit unhealthy (saturated) fats, such as butter, palm oil, and coconut oil. And limit fats found in animal products, such as meat and dairy products made with whole milk. Try to eat red meat only a few times a month in very small amounts. Limit sweets and desserts to only a few times a week. This includes sugar-sweetened drinks like soda.   The

## 2023-05-26 NOTE — PROGRESS NOTES
Have you had Fatigue? No   2. Have you had have you had Chest Pain? No     3. Have you had Dyspnea (SOB) ? No   4. Have you had Orthopnea? No   5. Have you had PND? No   6. Have you had leg swelling? No i  7. Have you had any weight gain? No   8. Have you had any palpitations? No    9. Have you had any syncope? No   10. Do you have any wounds on legs?  no
erythema or rash. Neurological:      Mental Status: She is alert. Mental status is at baseline. Motor: No weakness. Gait: Gait normal.   Psychiatric:         Mood and Affect: Mood normal.         Behavior: Behavior normal.         Thought Content: Thought content normal.       ASSESSMENT and PLAN  Ms. Sonia Mcghee has a reminder for a \"due or due soon\" health maintenance. I have asked that she contact her primary care provider for follow-up on this health maintenance. Atypical Chest pain -  Stress test reviewed was low risk 5/2023, without severe structural abnormalities, has Nitro PRN, taking aspirin 81mg po daily female over 60. Patient advised that if increasing frequency duration and intensity of discomfort or unrelieved discomfort to call EMS to visit emergency department. Palpitations - Heart monitor without severe sustained arrythmia, though has rare PVCs of about 0.5%, not terribly symptomatic and with blood pressure. HLD - Reports that was snacking during that time of cholesterol check per patient, checked ASCVD score around 3.4% not indicated for statin at this time.

## 2023-06-06 ENCOUNTER — HOSPITAL ENCOUNTER (OUTPATIENT)
Facility: HOSPITAL | Age: 63
Setting detail: RECURRING SERIES
Discharge: HOME | End: 2023-06-09
Payer: COMMERCIAL

## 2023-06-06 PROCEDURE — 97110 THERAPEUTIC EXERCISES: CPT

## 2023-06-06 PROCEDURE — 97530 THERAPEUTIC ACTIVITIES: CPT

## 2023-06-06 NOTE — PROGRESS NOTES
PHYSICAL / OCCUPATIONAL THERAPY - DAILY TREATMENT NOTE (updated )    Patient Name: Apoorva Becerril    Date: 2023    : 1960  Insurance: Payor: Breanna Story / Plan: Denisa Abdullahi / Product Type: *No Product type* /      Patient  verified Yes     Visit #   Current / Total 3 10-12   Time   In / Out 10:12 10:42   Pain   In / Out 6/10 5-6/10   Subjective Functional Status/Changes: Pt reports feeling 75% better, with less pain in her leg but her back pain has not changed. Therapist did not give her pictures she has just been doing what she remembers. The one where she drops her leg off the bed really helps. Changes to:  Meds, Allergies, Med Hx, Sx Hx? If yes, update Summary List yes       TREATMENT AREA =  Other low back pain [M54.59]    OBJECTIVE    Therapeutic Procedures: Tx Min Billable or 1:1 Min (if diff from Tx Min) Procedure, Rationale, Specifics   18  06910 Therapeutic Exercise (timed):  increase ROM, strength, coordination, balance, and proprioception to improve patient's ability to progress to PLOF and address remaining functional goals. (see flow sheet as applicable)     Details if applicable:       12  49996 Therapeutic Activity (timed):  use of dynamic activities replicating functional movements to increase ROM, strength, coordination, balance, and proprioception in order to improve patient's ability to progress to PLOF and address remaining functional goals.   (see flow sheet as applicable)     Details if applicable:  FOTO, re-assess goals, HEP     30  [facility] Totals Reminder: bill using total billable min of TIMED therapeutic procedures (example: do not include dry needle or estim unattended, both untimed codes, in totals to left)  8-22 min = 1 unit; 23-37 min = 2 units; 38-52 min = 3 units; 53-67 min = 4 units; 68-82 min = 5 units   Total Total     [x]  Patient Education billed concurrently with other procedures   [x] Review HEP    [x] Progressed/Changed HEP, detail:    [] Other

## 2023-06-06 NOTE — THERAPY RECERTIFICATION
[address] [facility] PHYSICAL THERAPY  [address] [facility] - Ph: [phone]   Fx: [phone]  PHYSICAL THERAPY PROGRESS NOTE  Patient Name: Amairani Gilliland : 1960   Treatment/Medical Diagnosis: Other low back pain [M54.59]   Referral Source: Leonora Truong MD     Date of Initial Visit: 2023 Attended Visits: 3 Missed Visits: 1     SUMMARY OF TREATMENT  Patient has been seen for 3 visits since initial evaluation on 2023 for lower back pain due to waiting on insurance authorization. She is progressing well towards her therapy goals. Her subjective report of progress is 75% with current pain range: 4-8/10. She has decreased pain in left LE but no change in back and hip pain. She would like to continue therapy to work on continuing to decrease her pain and improve tolerance to ADLs like doing laundry. She reports difficulty with reaching down into her top load washer and bending to get into front load dryer. She also has difficulty with yard work like landscape and gardening. CURRENT STATUS  Goal: Pt will be compliant with a HEP to improve function. Status at evaluation/last progress note: Issued HEP. Current: compliant (23)     Long Term Goals: To be accomplished in 4 weeks     Goal: Pt will increase FOTO score by  6 pts to improve function. Status at evaluation/last progress note:FOTO= 47  Current: MET: 54/100     2. Goal: Pt will reports sx improvement >50% to ease with ADL's. Status at evaluation/last progress note:0% at Eval  Current: MET: 75%     3. Goal: Pt will have no difficulty with bending or stooping with demonstration of proper mechanics to be able to perform gardening activities. Status at evaluation/last progress note: Pt reports a little difficulty. Current: NOT MET: a little difficulty     4.    Goal:Pt will reports pain <4/10 to ease with ADL's  Status at evaluation/last progress note:Pt reports pain at

## 2023-06-15 ENCOUNTER — HOSPITAL ENCOUNTER (OUTPATIENT)
Facility: HOSPITAL | Age: 63
Setting detail: RECURRING SERIES
Discharge: HOME | End: 2023-06-18
Payer: COMMERCIAL

## 2023-06-15 PROCEDURE — 97110 THERAPEUTIC EXERCISES: CPT

## 2023-06-20 ENCOUNTER — APPOINTMENT (OUTPATIENT)
Facility: HOSPITAL | Age: 63
End: 2023-06-20
Payer: COMMERCIAL

## 2023-06-22 ENCOUNTER — HOSPITAL ENCOUNTER (OUTPATIENT)
Facility: HOSPITAL | Age: 63
Setting detail: RECURRING SERIES
Discharge: HOME | End: 2023-06-25
Payer: COMMERCIAL

## 2023-06-22 PROCEDURE — 97110 THERAPEUTIC EXERCISES: CPT

## 2023-06-22 PROCEDURE — 97112 NEUROMUSCULAR REEDUCATION: CPT

## 2023-06-27 ENCOUNTER — HOSPITAL ENCOUNTER (OUTPATIENT)
Facility: HOSPITAL | Age: 63
Setting detail: RECURRING SERIES
Discharge: HOME | End: 2023-06-30
Payer: COMMERCIAL

## 2023-06-27 PROCEDURE — 97112 NEUROMUSCULAR REEDUCATION: CPT

## 2023-06-27 PROCEDURE — 97110 THERAPEUTIC EXERCISES: CPT

## 2023-06-29 ENCOUNTER — TELEPHONE (OUTPATIENT)
Facility: HOSPITAL | Age: 63
End: 2023-06-29

## 2023-06-29 ENCOUNTER — HOSPITAL ENCOUNTER (OUTPATIENT)
Facility: HOSPITAL | Age: 63
Setting detail: RECURRING SERIES
End: 2023-06-29
Payer: COMMERCIAL

## 2023-07-10 ENCOUNTER — HOSPITAL ENCOUNTER (OUTPATIENT)
Facility: HOSPITAL | Age: 63
Setting detail: RECURRING SERIES
Discharge: HOME | End: 2023-07-13
Payer: COMMERCIAL

## 2023-07-10 PROCEDURE — 97530 THERAPEUTIC ACTIVITIES: CPT

## 2023-07-10 PROCEDURE — 97110 THERAPEUTIC EXERCISES: CPT

## 2023-07-10 NOTE — THERAPY RECERTIFICATION
Goal: Patient will report pain at 4/10 or less during laundry in order to improve quality of life. Status at evaluation/last progress note: 8/10    3. Goal: Patient will improve right hip extension MMT to 4+/5 in order to increase ease of ambulation. Status at evaluation/last progress note: 4/5    4. Goal: Patient will perform 25# box lift with proper form and no increase in pain in order to increase ease of household chores. Status at evaluation/last progress note: 15 # with excessive trunk flexion    Frequency / Duration:   Patient to be seen   2   times per week for   4    weeks:    RECOMMENDATIONS  We would like to continue therapy for progress to goals stated above. Continue per initial Plan of Care. If you have any questions/comments please contact us directly. Thank you for allowing us to assist in the care of your patient.     Nba Gaming, PT       7/10/2023       2:26 PM

## 2023-07-10 NOTE — PROGRESS NOTES
PHYSICAL / OCCUPATIONAL THERAPY - DAILY TREATMENT NOTE (updated )    Patient Name: Latoya Late    Date: 7/10/2023    : 1960  Insurance: Payor: [facility] / Plan: Edward Flores / Product Type: *No Product type* /      Patient  verified Yes     Visit #   Current / Total 6 12   Time   In / Out 9:33 10:01   Pain   In / Out 5/10 6/10   Subjective Functional Status/Changes: Pt. Reports she is feeling better overall today. TREATMENT AREA =  Other low back pain [M54.59]    OBJECTIVE    Therapeutic Procedures: Tx Min Billable or 1:1 Min (if diff from Tx Min) Procedure, Rationale, Specifics   18  63222 Therapeutic Exercise (timed):  increase ROM, strength, coordination, balance, and proprioception to improve patient's ability to progress to PLOF and address remaining functional goals. (see flow sheet as applicable)     Details if applicable:  see flow sheet     10  85861 Therapeutic Activity (timed):  use of dynamic activities replicating functional movements to increase ROM, strength, coordination, balance, and proprioception in order to improve patient's ability to progress to PLOF and address remaining functional goals.   (see flow sheet as applicable)     Details if applicable:  goal re-assessment           Details if applicable:            Details if applicable:            Details if applicable:     29  [facility] Totals Reminder: bill using total billable min of TIMED therapeutic procedures (example: do not include dry needle or estim unattended, both untimed codes, in totals to left)  8-22 min = 1 unit; 23-37 min = 2 units; 38-52 min = 3 units; 53-67 min = 4 units; 68-82 min = 5 units   Total Total     [x]  Patient Education billed concurrently with other procedures   [x] Review HEP    [] Progressed/Changed HEP, detail:    [] Other detail:       Objective Information/Functional Measures/Assessment    %improvement in symptoms: 40%  Pain during ADLs: 8/10  Hip MMT ext: right: 4/5 left: 4+/5

## 2023-07-18 ENCOUNTER — HOSPITAL ENCOUNTER (OUTPATIENT)
Facility: HOSPITAL | Age: 63
Setting detail: RECURRING SERIES
End: 2023-07-18
Payer: MEDICAID

## 2023-07-21 ENCOUNTER — TELEPHONE (OUTPATIENT)
Facility: HOSPITAL | Age: 63
End: 2023-07-21

## 2023-07-21 ENCOUNTER — HOSPITAL ENCOUNTER (OUTPATIENT)
Facility: HOSPITAL | Age: 63
Setting detail: RECURRING SERIES
End: 2023-07-21
Payer: MEDICAID

## 2023-07-25 ENCOUNTER — APPOINTMENT (OUTPATIENT)
Facility: HOSPITAL | Age: 63
End: 2023-07-25
Payer: MEDICAID

## 2023-07-28 ENCOUNTER — HOSPITAL ENCOUNTER (OUTPATIENT)
Facility: HOSPITAL | Age: 63
Setting detail: RECURRING SERIES
Discharge: HOME | End: 2023-07-31
Payer: MEDICAID

## 2023-07-28 PROCEDURE — 97110 THERAPEUTIC EXERCISES: CPT

## 2023-07-28 PROCEDURE — 97112 NEUROMUSCULAR REEDUCATION: CPT

## 2023-07-28 NOTE — PROGRESS NOTES
PHYSICAL / OCCUPATIONAL THERAPY - DAILY TREATMENT NOTE (updated )    Patient Name: Abigail Castellon    Date: 2023    : 1960  Insurance: Payor: Kevon Mireles / Plan: [employer] / Product Type: *No Product type* /      Patient  verified Yes     Visit #   Current / Total 1 8   Time   In / Out 1001 1049   Pain   In / Out 7 6   Subjective Functional Status/Changes: Reports she continues to have difficulty with bending/squatting and laundry activities. TREATMENT AREA =  Other low back pain [M54.59]    OBJECTIVE    Modalities Rationale:     decrease pain to improve patient's ability to progress to PLOF and address remaining functional goals. min [] Estim Unattended, type/location:                                      []  w/ice    []  w/heat    min [] Estim Attended, type/location:                                     []  w/US     []  w/ice    []  w/heat    []  TENS insruct      min []  Mechanical Traction: type/lbs                   []  pro   []  sup   []  int   []  cont    []  before manual    []  after manual    min []  Ultrasound, settings/location:      min []  Iontophoresis w/ dexamethasone, location:                                               []  take home patch       []  in clinic   10 min  unbill []  Ice     [x]  Heat    location/position: Left hip/seated    min []  Paraffin,  details:     min []  Vasopneumatic Device, press/temp:     min []  Pamella Belt / Chandler Lefort: If using vaso (only need to measure limb vaso being performed on)      pre-treatment girth :       post-treatment girth :       measured at (landmark location) :      min []  Other:    Skin assessment post-treatment (if applicable):    []  intact    []  redness- no adverse reaction                 []redness - adverse reaction:         Therapeutic Procedures:     Tx Min Billable or 1:1 Min (if diff from Tx Min) Procedure, Rationale, Specifics   28  15051 Therapeutic Exercise (timed):  increase ROM,

## 2023-08-11 NOTE — PROGRESS NOTES
the patient during today's visit extensively discussing symptoms and treatment plan. All questions were answered. More than half of this visit today was spent on counseling. Written by Jordon Collins, as dictated by Dr. Savanah Baez.

## 2023-08-16 ENCOUNTER — OFFICE VISIT (OUTPATIENT)
Age: 63
End: 2023-08-16
Payer: MEDICAID

## 2023-08-16 VITALS
SYSTOLIC BLOOD PRESSURE: 95 MMHG | HEART RATE: 92 BPM | TEMPERATURE: 97.4 F | WEIGHT: 216 LBS | HEIGHT: 66 IN | BODY MASS INDEX: 34.72 KG/M2 | RESPIRATION RATE: 18 BRPM | OXYGEN SATURATION: 98 % | DIASTOLIC BLOOD PRESSURE: 66 MMHG

## 2023-08-16 DIAGNOSIS — M54.50 LUMBAR PAIN: ICD-10-CM

## 2023-08-16 DIAGNOSIS — M54.59 MECHANICAL LOW BACK PAIN: ICD-10-CM

## 2023-08-16 DIAGNOSIS — M79.18 CHRONIC PRIMARY MUSCULOSKELETAL PAIN: Primary | ICD-10-CM

## 2023-08-16 DIAGNOSIS — M79.18 MYOFASCIAL PAIN: ICD-10-CM

## 2023-08-16 DIAGNOSIS — G89.29 CHRONIC PRIMARY MUSCULOSKELETAL PAIN: Primary | ICD-10-CM

## 2023-08-16 PROCEDURE — 99213 OFFICE O/P EST LOW 20 MIN: CPT | Performed by: PHYSICAL MEDICINE & REHABILITATION

## 2023-08-16 RX ORDER — ATORVASTATIN CALCIUM 40 MG/1
40 TABLET, FILM COATED ORAL
COMMUNITY
Start: 2023-08-14

## 2023-08-16 RX ORDER — BUTALBITAL, ACETAMINOPHEN AND CAFFEINE 50; 325; 40 MG/1; MG/1; MG/1
2 TABLET ORAL EVERY 6 HOURS PRN
COMMUNITY
Start: 2023-08-14

## 2023-08-16 RX ORDER — GABAPENTIN 300 MG/1
300 CAPSULE ORAL 3 TIMES DAILY
Qty: 90 CAPSULE | Refills: 2 | Status: SHIPPED | OUTPATIENT
Start: 2023-08-16 | End: 2023-09-15

## 2023-08-16 RX ORDER — MELOXICAM 15 MG/1
15 TABLET ORAL DAILY
Qty: 30 TABLET | Refills: 2 | Status: SHIPPED | OUTPATIENT
Start: 2023-08-16 | End: 2023-09-15

## 2023-08-16 ASSESSMENT — ENCOUNTER SYMPTOMS
VOMITING: 0
NAUSEA: 0
SHORTNESS OF BREATH: 0
BACK PAIN: 1
WHEEZING: 0
TROUBLE SWALLOWING: 0

## 2023-08-30 ENCOUNTER — HOSPITAL ENCOUNTER (OUTPATIENT)
Facility: HOSPITAL | Age: 63
Discharge: HOME | End: 2023-09-02
Attending: PHYSICAL MEDICINE & REHABILITATION
Payer: MEDICAID

## 2023-08-30 DIAGNOSIS — M79.18 MYOFASCIAL PAIN: ICD-10-CM

## 2023-08-30 DIAGNOSIS — M54.50 LUMBAR PAIN: ICD-10-CM

## 2023-08-30 DIAGNOSIS — M54.59 MECHANICAL LOW BACK PAIN: ICD-10-CM

## 2023-08-30 DIAGNOSIS — G89.29 CHRONIC PRIMARY MUSCULOSKELETAL PAIN: ICD-10-CM

## 2023-08-30 DIAGNOSIS — M79.18 CHRONIC PRIMARY MUSCULOSKELETAL PAIN: ICD-10-CM

## 2023-08-30 PROCEDURE — 72148 MRI LUMBAR SPINE W/O DYE: CPT

## 2023-09-05 ENCOUNTER — HOSPITAL ENCOUNTER (OUTPATIENT)
Facility: HOSPITAL | Age: 63
Setting detail: RECURRING SERIES
Discharge: HOME | End: 2023-09-08
Payer: MEDICAID

## 2023-09-05 PROCEDURE — 97535 SELF CARE MNGMENT TRAINING: CPT

## 2023-09-05 PROCEDURE — 97110 THERAPEUTIC EXERCISES: CPT

## 2023-09-05 PROCEDURE — 97530 THERAPEUTIC ACTIVITIES: CPT

## 2023-09-05 NOTE — PROGRESS NOTES
PHYSICAL / OCCUPATIONAL THERAPY - DAILY TREATMENT NOTE (updated )    Patient Name: Queen Patience    Date: 2023    : 1960  Insurance: Payor: Noa Chicas / Plan: THU [address] / Product Type: *No Product type* /      Patient  verified yes   Visit #   Current / Total 2 8   Time   In / Out 3:23P 4:03   Pain   In / Out 6/10 5/10   Subjective Functional Status/Changes: Patient reports her pain is less today. She has the most difficulty bending into get her laundry out the machine and playing with her grandson. She has not had any changes in symptoms since the TIA. TREATMENT AREA =  Other low back pain [M54.59]    OBJECTIVE        Therapeutic Procedures: Tx Min Billable or 1:1 Min (if diff from Tx Min) Procedure, Rationale, Specifics   13  44730 Therapeutic Activity (timed):  use of dynamic activities replicating functional movements to increase ROM, strength, coordination, balance, and proprioception in order to improve patient's ability to progress to PLOF and address remaining functional goals. (see flow sheet as applicable)     Details if applicable:  goal assessment     13  92823 Self Care/Home Management (timed):  improve patient knowledge and understanding of pain reducing techniques, positioning, posture/ergonomics, home safety, activity modification, diagnosis/prognosis, and physical therapy expectations, procedures and progression  to improve patient's ability to progress to PLOF and address remaining functional goals.   (see flow sheet as applicable)     Details if applicable:  review of POC including potential for insurance to be out of network as of 10/1/23; review of standing posture including maintaining \"soft knees\"; review of use of wedge pillow to keep B LE elevated   14  56058 Therapeutic Exercise (timed):  increase ROM, strength, coordination, balance, and proprioception to improve patient's ability to progress to PLOF and address remaining

## 2023-09-05 NOTE — THERAPY RECERTIFICATION
[facility] [place]  [address] [facility] [address]  [phone] fax    Physician Update  [x] Progress Note  [] Discharge Summary  Patient name: Umu Hernandez Start of Care: 23   Referral source: Elba Lindsay MD : 1960   Medical/Treatment Diagnosis: Other low back pain [M54.59]  Payor: Nelson Guzman / Plan: Radha Mauro / Product Type: *No Product type* /  Onset Date:>5 months     Prior Hospitalization: see medical history Provider#: [id number]   Medications: Verified on Patient Summary List    Comorbidities: Asthma, Arthritis Thyroid. Prior Level of Function:   [address]. Uses a SPC some days. She likes to walk. Visits from Start of Care: 10    Missed Visits: 2    Status at Evaluation/Last Progress Note: Pt. Is progressing slowly towards goals. She reports a 40% improvement in symptoms since [facility]. She is having less pain overall but continues to report high pain levels at 8/10. She has most pain with lifting and bending activities like doing laundry. She demonstrates excessive drunk flexion during box lifts. She has mild decrease in hip extension MMT right: 4/5 left: 4+/5. Skilled PT is medically necessary in order to improve core stability and hip strength for increased ease of ADLs and improved quality of life. Progress towards Goals:   Goal: Patient will report an 80% improvement in symptoms since [facility] in order to improve quality of life. Status at evaluation/last progress note: 40%  Progressing-40%     2. Goal: Patient will report pain at 4/10 or less during laundry in order to improve quality of life. Status at evaluation/last progress note: 8/10  Progressing-10/10     3. Goal: Patient will improve right hip extension MMT to 4+/5 in order to increase ease of ambulation. Status at evaluation/last progress note: 4/5  MET-right 4+/5     4.    Goal: Patient will perform 25# box lift with proper

## 2023-09-08 ENCOUNTER — HOSPITAL ENCOUNTER (OUTPATIENT)
Facility: HOSPITAL | Age: 63
Setting detail: RECURRING SERIES
End: 2023-09-08
Payer: MEDICAID

## 2023-09-09 ASSESSMENT — ENCOUNTER SYMPTOMS
APNEA: 0
NAUSEA: 0
DIARRHEA: 0
WHEEZING: 0
BLOOD IN STOOL: 0
SHORTNESS OF BREATH: 0
COUGH: 0
ABDOMINAL PAIN: 0
COLOR CHANGE: 0
CONSTIPATION: 0

## 2023-09-11 ENCOUNTER — HOSPITAL ENCOUNTER (OUTPATIENT)
Facility: HOSPITAL | Age: 63
Setting detail: RECURRING SERIES
End: 2023-09-11
Payer: MEDICAID

## 2023-09-11 ENCOUNTER — OFFICE VISIT (OUTPATIENT)
Age: 63
End: 2023-09-11
Payer: MEDICAID

## 2023-09-11 VITALS
BODY MASS INDEX: 35.03 KG/M2 | DIASTOLIC BLOOD PRESSURE: 70 MMHG | HEART RATE: 82 BPM | SYSTOLIC BLOOD PRESSURE: 116 MMHG | HEIGHT: 66 IN | WEIGHT: 218 LBS | OXYGEN SATURATION: 97 %

## 2023-09-11 DIAGNOSIS — E78.5 HYPERLIPIDEMIA, UNSPECIFIED HYPERLIPIDEMIA TYPE: ICD-10-CM

## 2023-09-11 DIAGNOSIS — R00.2 PALPITATIONS: Primary | ICD-10-CM

## 2023-09-11 DIAGNOSIS — Z86.73 HISTORY OF TIA (TRANSIENT ISCHEMIC ATTACK): ICD-10-CM

## 2023-09-11 DIAGNOSIS — R07.89 OTHER CHEST PAIN: ICD-10-CM

## 2023-09-11 PROCEDURE — 99214 OFFICE O/P EST MOD 30 MIN: CPT | Performed by: INTERNAL MEDICINE

## 2023-09-11 PROCEDURE — 93000 ELECTROCARDIOGRAM COMPLETE: CPT | Performed by: INTERNAL MEDICINE

## 2023-09-11 RX ORDER — PANTOPRAZOLE SODIUM 40 MG/1
40 TABLET, DELAYED RELEASE ORAL DAILY
COMMUNITY
Start: 2023-08-20

## 2023-09-11 RX ORDER — ACETAMINOPHEN 160 MG
TABLET,DISINTEGRATING ORAL
COMMUNITY
Start: 2023-08-16

## 2023-09-11 ASSESSMENT — ENCOUNTER SYMPTOMS: CHEST TIGHTNESS: 1

## 2023-09-11 NOTE — PATIENT INSTRUCTIONS
Learning About the [address] [place] [address] Diet  What is the Mediterranean diet? The Mediterranean diet is a style of eating rather than a diet plan. It features foods eaten in [facility], [place], [place], and other countries along the [facility]. It emphasizes eating foods like fish, fruits, vegetables, beans, high-fiber breads and whole grains, nuts, and olive oil. This style of eating includes limited red meat, cheese, and sweets. Why choose the Mediterranean diet? A Mediterranean-style diet may improve heart health. It contains more fat than other heart-healthy diets. But the fats are mainly from nuts, unsaturated oils (such as fish oils and olive oil), and certain nut or seed oils (such as canola, soybean, or flaxseed oil). These fats may help protect the heart and blood vessels. How can you get started on the Mediterranean diet? Here are some things you can do to switch to a more Mediterranean way of eating. What to eat  Eat a variety of fruits and vegetables each day, such as grapes, blueberries, tomatoes, broccoli, peppers, figs, olives, spinach, eggplant, beans, lentils, and chickpeas. Eat a variety of whole-grain foods each day, such as oats, brown rice, and whole wheat bread, pasta, and couscous. Eat fish at least 2 times a week. Try tuna, salmon, mackerel, lake trout, herring, or sardines. Eat moderate amounts of low-fat dairy products, such as milk, cheese, or yogurt. Eat moderate amounts of poultry and eggs. Choose healthy (unsaturated) fats, such as nuts, olive oil, and certain nut or seed oils like canola, soybean, and flaxseed. Limit unhealthy (saturated) fats, such as butter, palm oil, and coconut oil. And limit fats found in animal products, such as meat and dairy products made with whole milk. Try to eat red meat only a few times a month in very small amounts. Limit sweets and desserts to only a few times a week. This includes sugar-sweetened drinks like soda.   The

## 2023-09-11 NOTE — PROGRESS NOTES
Have you had PND? No     2. Have you had Fatigue? No    3. Have you had Dyspnea? No    4. Have you had Orthopnea? No     5. Have you had Palpitations? No    6. Have you had Syncope? No     7. Have you had Chest Pain? Yes if so how long? Since August how frequent? intermittent . Is     it  substernal? yes Pressure like? Pressure how bad? 8/10    8. Have you had any weight gain? No      9. Have you had any swelling?  No
needed 8/14/23   Historical Provider, MD   gabapentin (NEURONTIN) 300 MG capsule Take 1 capsule by mouth 3 times daily for 30 days. Intended supply: 30 days Max Daily Amount: 900 mg 8/16/23 9/15/23  Rian Hughes MD   meloxicam JOEL HERNANDEZ [facility]) 15 MG tablet Take 1 tablet by mouth daily 8/16/23 9/15/23  Rian Hughes MD   diclofenac sodium (VOLTAREN) 1 % GEL Apply 4 g topically 4 times daily as needed for Pain 8/16/23 9/15/23  Rian Hughes MD   albuterol sulfate HFA (PROVENTIL;VENTOLIN;PROAIR) 108 (90 Base) MCG/ACT inhaler Inhale 2 puffs into the lungs every 6 hours as needed for Wheezing 5/7/23   Anneliese Mccoy MD   clobetasol (TEMOVATE) 0.05 % ointment  3/31/23   Historical Provider, MD   albuterol (PROVENTIL) (2.5 MG/3ML) 0.083% nebulizer solution  4/4/23   Historical Provider, MD   methIMAzole (TAPAZOLE) 5 MG tablet Take 1 tablet by mouth every other day 11/21/22   Historical Provider, MD   ibuprofen (ADVIL;MOTRIN) 600 MG tablet Take 1 tablet by mouth every 6 hours as needed 1/18/23   Historical Provider, MD   fluticasone (FLONASE) 50 MCG/ACT nasal spray SPRAY 1 SPRAY INTO EACH NOSTRIL EVERY DAY 1/18/23   Historical Provider, MD   gabapentin (NEURONTIN) 300 MG capsule Take 1 capsule by mouth 3 times daily for 30 days.  Intended supply: 30 days Max Daily Amount: 900 mg 4/6/23 5/6/23  Rian Hughes MD   ibuprofen (ADVIL;MOTRIN) 800 MG tablet Take 1 tablet by mouth 2 times daily as needed for Pain 4/6/23 7/5/23  Rian Hughes MD   BIOTIN PO Take 1 tablet by mouth daily    Ar Automatic Reconciliation   aspirin 81 MG EC tablet Take by mouth daily    Ar Automatic Reconciliation   vitamin D 25 MCG (1000 UT) CAPS Take 1 capsule by mouth daily    Ar Automatic Reconciliation   cyanocobalamin 1000 MCG tablet Take 1 tablet by mouth daily    Ar Automatic Reconciliation   loratadine (CLARITIN) 10 MG tablet Take 1 tablet by mouth    Ar Automatic Reconciliation       No results found for: \"LIPIDPAN\", \"BMP\", \"CMP\"

## 2023-09-14 ENCOUNTER — HOSPITAL ENCOUNTER (OUTPATIENT)
Facility: HOSPITAL | Age: 63
Discharge: HOME | End: 2023-09-14
Attending: STUDENT IN AN ORGANIZED HEALTH CARE EDUCATION/TRAINING PROGRAM
Payer: MEDICAID

## 2023-09-14 ENCOUNTER — TELEPHONE (OUTPATIENT)
Facility: HOSPITAL | Age: 63
End: 2023-09-14

## 2023-09-14 VITALS — HEIGHT: 66 IN | BODY MASS INDEX: 35.03 KG/M2 | WEIGHT: 218 LBS

## 2023-09-14 DIAGNOSIS — Z12.31 VISIT FOR SCREENING MAMMOGRAM: ICD-10-CM

## 2023-09-14 PROCEDURE — 77063 BREAST TOMOSYNTHESIS BI: CPT

## 2023-09-14 NOTE — TELEPHONE ENCOUNTER
called about missed appointment. left messag to call back to re-schedule and reminded of next appointment time. educated on D/C if misses again.

## 2023-09-19 ENCOUNTER — HOSPITAL ENCOUNTER (OUTPATIENT)
Facility: HOSPITAL | Age: 63
Setting detail: RECURRING SERIES
End: 2023-09-19
Payer: MEDICAID

## 2023-09-19 PROBLEM — G45.9 TIA (TRANSIENT ISCHEMIC ATTACK): Status: ACTIVE | Noted: 2023-08-12

## 2023-09-19 PROBLEM — R09.89 SUSPECTED STROKE PATIENT LAST KNOWN TO BE WELL 2 TO 3 HOURS AGO: Status: ACTIVE | Noted: 2023-08-12

## 2023-09-19 PROCEDURE — 97112 NEUROMUSCULAR REEDUCATION: CPT

## 2023-09-19 PROCEDURE — 97110 THERAPEUTIC EXERCISES: CPT

## 2023-09-19 PROCEDURE — 97530 THERAPEUTIC ACTIVITIES: CPT

## 2023-09-19 NOTE — PROGRESS NOTES
PHYSICAL / OCCUPATIONAL THERAPY - DAILY TREATMENT NOTE (updated )    Patient Name: Queen Patience    Date: 2023    : 1960  Insurance: Payor: Glenroy Sessions / Plan: Lucila Greerter / Product Type: *No Product type* /      Patient  verified Yes     Visit #   Current / Total 1 12   Time   In / Out 3:24 4:16   Pain   In / Out 6/10 back, 9/10 Left LE 7/10 Left LE   Subjective Functional Status/Changes: Pt reports having bad day today. She has some problem thus not being able to come to PT during the last 2 weeks. She will follow up with MD tomorrow. TREATMENT AREA =  Other low back pain [M54.59]    OBJECTIVE  Modalities Rationale:     decrease pain and increase tissue extensibility to improve patient's ability to progress to PLOF and address remaining functional goals. min [] Estim Unattended, type/location:                                      []  w/ice    []  w/heat    min [] Estim Attended, type/location:                                     []  w/US     []  w/ice    []  w/heat    []  TENS insruct      min []  Mechanical Traction: type/lbs                   []  pro   []  sup   []  int   []  cont    []  before manual    []  after manual    min []  Ultrasound, settings/location:     10 min  unbill []  Ice     [x]  Heat    location/position: Back & Left hip/thigh/seated    min []  Paraffin,  details:     min []  Vasopneumatic Device, press/temp:     min []  [place] Reason / [place] : If using vaso (only need to measure limb vaso being performed on)      pre-treatment girth :       post-treatment girth :       measured at (landmark location) :      min []  Other:    Skin assessment post-treatment:   Intact      Therapeutic Procedures:      Tx Min Billable or 1:1 Min (if diff from Tx Min) Procedure, Rationale, Specifics   10  02894 Therapeutic Activity (timed):  use of dynamic activities replicating functional movements to increase ROM, strength, coordination, balance, and proprioception in order to

## 2023-09-21 ENCOUNTER — HOSPITAL ENCOUNTER (OUTPATIENT)
Facility: HOSPITAL | Age: 63
Setting detail: RECURRING SERIES
End: 2023-09-21
Payer: MEDICAID

## 2023-10-17 ENCOUNTER — TELEPHONE (OUTPATIENT)
Age: 63
End: 2023-10-17

## 2023-10-17 NOTE — TELEPHONE ENCOUNTER
Patient had to reschedule tomorrow's appointment as she is with her daughter at the hospital who's in labor. She's asking for a refill of pain medication called in to [facility] (new pharmacy).

## 2023-10-18 NOTE — TELEPHONE ENCOUNTER
Called patient identified by two verifiers. Explained  Mobic and ibuprofen are the same class of medication. Dr. Cleo Coleman note says she take mobic and she was given a rx in Aug with 2 Rfs so she should have a RF for Oct.  She should not be taking ibuprfen AND mobic - just the mobic. Verbalized understanding and thanked me for the call. Stated she will be calling the pharmacy.

## 2023-10-18 NOTE — TELEPHONE ENCOUNTER
Patient is requesting the medications Meloxicam and Ibuprofen 600 mg.       [facility] [address] [facility] [address], [address] [place] [address]

## 2023-10-18 NOTE — TELEPHONE ENCOUNTER
Mobic and ibuprofen are the same class of medication.   Dr. Pro Colbert note says she take mobic and she was given a rx in Aug with 2 Rfs so she should have a RF for Oct.  She should not be taking ibuprfen AND mobic - just the mobic

## 2023-11-25 ASSESSMENT — ENCOUNTER SYMPTOMS
CONSTIPATION: 0
BLOOD IN STOOL: 0
COUGH: 0
NAUSEA: 0
APNEA: 0
ABDOMINAL PAIN: 0
SHORTNESS OF BREATH: 0
COLOR CHANGE: 0
DIARRHEA: 0
CHEST TIGHTNESS: 1
WHEEZING: 0

## 2023-11-25 NOTE — PROGRESS NOTES
HISTORY OF PRESENT ILLNESS  Rohan Park is a 66 y.o. female. ----  PMH GUSTAVO, HLD, history of tia  ----  CARDIAC STUDIES  ----  Coronary CTA 11/16/2023  No coronary artery calcification  Normal coronary anatomy without evidence of epicardial CAD  No major abnormalities with myocardial structure, valves, or visualized portions of the great vessels  ----  2d tte 8/2023   * Normal left ventricular cavity size and wall thickness. * No regional wall motion abnormalities. * Normal left ventricular systolic function. * Visually estimated ejection fraction 60 to 65%. * Diastolic function not assessed. * Grossly normal right ventricular size and function. * Normal left atrial size. * Normal right atrial size. * No hemodynamically significant valve pathology. * RVSP 20 to 25 mmHg. * Positive bubble study 1/14/2015.   ----  CTA head 8/12/2023  1. No definite large vessel occlusion. 2.  No hemodynamically significant carotid stenosis, based on NASCET criteria. 3. No high-grade vertebral artery stenosis. 4. No high-grade intracranial stenosis. Overall mild intracranial irregularity but suspect much of which represents pseudobeading artifact.   ----  Cardiac Telemetry 4/14/2023  Patient monitored for 14d 16h starting on 04/14/2023 01:34 pm.  Primary rhythm was Sinus Rhythm.  Average heart rate was 87 bpm, Minimum heart rate was 57 bpm on Day 12  / 08:09:32 am, Max heart rate was 141 bpm on Day 13 / 06:38:27 pm  Atrial Fibrillation or Flutter: [place] was 0 %, longest event 0 ms on --, fastest rate -- bpm on --.  SVE(s): [place] was < 0.01 %, max count per 24 hours 9  SVT (AT, RT): 1 events, longest event 3 beats on Day 13 / 01:02:38 pm, fastest event 136 bpm on Day 13 / 01:02:38 pm  Pause: 0 events, longest pause 0 ms on --  AV Block: 0 %, most severe block demonstrated --  PVC(s): [place] was 0.52 %, max count per 24 hours 649, 2 disparate morphologies  Ventricular Tachycardia: 0

## 2023-11-28 ENCOUNTER — OFFICE VISIT (OUTPATIENT)
Age: 63
End: 2023-11-28
Payer: MEDICAID

## 2023-11-28 VITALS
HEART RATE: 83 BPM | DIASTOLIC BLOOD PRESSURE: 75 MMHG | WEIGHT: 217 LBS | HEIGHT: 66 IN | BODY MASS INDEX: 34.87 KG/M2 | SYSTOLIC BLOOD PRESSURE: 130 MMHG | OXYGEN SATURATION: 96 %

## 2023-11-28 DIAGNOSIS — Z87.898 HISTORY OF CHEST PAIN: ICD-10-CM

## 2023-11-28 DIAGNOSIS — R00.2 PALPITATIONS: ICD-10-CM

## 2023-11-28 DIAGNOSIS — E78.5 HYPERLIPIDEMIA, UNSPECIFIED HYPERLIPIDEMIA TYPE: ICD-10-CM

## 2023-11-28 DIAGNOSIS — Z86.73 HISTORY OF TIA (TRANSIENT ISCHEMIC ATTACK): Primary | ICD-10-CM

## 2023-11-28 DIAGNOSIS — Z86.73 HISTORY OF TIA (TRANSIENT ISCHEMIC ATTACK): ICD-10-CM

## 2023-11-28 PROCEDURE — 99214 OFFICE O/P EST MOD 30 MIN: CPT | Performed by: INTERNAL MEDICINE

## 2023-11-28 RX ORDER — PANTOPRAZOLE SODIUM 40 MG/1
40 TABLET, DELAYED RELEASE ORAL DAILY
Qty: 30 TABLET | Refills: 5 | Status: SHIPPED | OUTPATIENT
Start: 2023-11-28

## 2023-11-28 RX ORDER — ATORVASTATIN CALCIUM 40 MG/1
40 TABLET, FILM COATED ORAL DAILY
Qty: 30 TABLET | Refills: 5 | Status: SHIPPED | OUTPATIENT
Start: 2023-11-28

## 2023-11-28 RX ORDER — NITROGLYCERIN 0.4 MG/1
0.4 TABLET SUBLINGUAL EVERY 5 MIN PRN
COMMUNITY

## 2023-11-28 RX ORDER — NITROGLYCERIN 0.4 MG/1
0.4 TABLET SUBLINGUAL EVERY 5 MIN PRN
Qty: 25 TABLET | Refills: 0 | Status: CANCELLED | OUTPATIENT
Start: 2023-11-28

## 2023-11-28 NOTE — PROGRESS NOTES
Have you had Fatigue? No   2. Have you had have you had Chest Pain? No   3. Have you had Dyspnea (SOB) ? No     4. Have you had Orthopnea? No  5. Have you had PND? No   6. Have you had leg swelling? No   7. Have you had any weight gain? No   8. Have you had any palpitations? No    9. Have you had any syncope? No   10. Do you have any wounds on legs?  no

## 2024-01-02 ENCOUNTER — HOSPITAL ENCOUNTER (OUTPATIENT)
Dept: SLEEP MEDICINE | Facility: HOSPITAL | Age: 64
Discharge: HOME | End: 2024-01-05
Attending: INTERNAL MEDICINE
Payer: MEDICAID

## 2024-01-02 DIAGNOSIS — G47.33 OSA (OBSTRUCTIVE SLEEP APNEA): ICD-10-CM

## 2024-01-02 PROCEDURE — 95800 SLP STDY UNATTENDED: CPT

## 2024-01-09 PROBLEM — R06.83 SNORING: Status: ACTIVE | Noted: 2024-01-09

## 2024-04-15 ENCOUNTER — HOSPITAL ENCOUNTER (INPATIENT)
Facility: HOSPITAL | Age: 64
LOS: 2 days | Discharge: HOME | DRG: 313 | End: 2024-04-17
Attending: EMERGENCY MEDICINE | Admitting: INTERNAL MEDICINE
Payer: MEDICAID

## 2024-04-15 ENCOUNTER — APPOINTMENT (OUTPATIENT)
Facility: HOSPITAL | Age: 64
DRG: 313 | End: 2024-04-15
Payer: MEDICAID

## 2024-04-15 DIAGNOSIS — S82.892A CLOSED FRACTURE OF LEFT ANKLE, INITIAL ENCOUNTER: Primary | ICD-10-CM

## 2024-04-15 DIAGNOSIS — S82.842A BIMALLEOLAR ANKLE FRACTURE, LEFT, CLOSED, INITIAL ENCOUNTER: ICD-10-CM

## 2024-04-15 PROBLEM — S82.842S ANKLE FRACTURE, BIMALLEOLAR, CLOSED, LEFT, SEQUELA: Status: ACTIVE | Noted: 2024-04-15

## 2024-04-15 LAB
ALBUMIN SERPL-MCNC: 3.3 G/DL (ref 3.4–5)
ALBUMIN/GLOB SERPL: 1 (ref 0.8–1.7)
ALP SERPL-CCNC: 53 U/L (ref 45–117)
ALT SERPL-CCNC: 17 U/L (ref 13–56)
ANION GAP SERPL CALC-SCNC: ABNORMAL MMOL/L (ref 3–18)
AST SERPL-CCNC: 17 U/L (ref 10–38)
BILIRUB SERPL-MCNC: 0.9 MG/DL (ref 0.2–1)
BUN SERPL-MCNC: 7 MG/DL (ref 7–18)
BUN/CREAT SERPL: 9 (ref 12–20)
CALCIUM SERPL-MCNC: 8.7 MG/DL (ref 8.5–10.1)
CHLORIDE SERPL-SCNC: 113 MMOL/L (ref 100–111)
CO2 SERPL-SCNC: 29 MMOL/L (ref 21–32)
CREAT SERPL-MCNC: 0.81 MG/DL (ref 0.6–1.3)
GLOBULIN SER CALC-MCNC: 3.4 G/DL (ref 2–4)
GLUCOSE SERPL-MCNC: 113 MG/DL (ref 74–99)
POTASSIUM SERPL-SCNC: 4 MMOL/L (ref 3.5–5.5)
PROT SERPL-MCNC: 6.7 G/DL (ref 6.4–8.2)
SODIUM SERPL-SCNC: 140 MMOL/L (ref 136–145)

## 2024-04-15 PROCEDURE — 80053 COMPREHEN METABOLIC PANEL: CPT

## 2024-04-15 PROCEDURE — 2580000003 HC RX 258: Performed by: EMERGENCY MEDICINE

## 2024-04-15 PROCEDURE — G0378 HOSPITAL OBSERVATION PER HR: HCPCS

## 2024-04-15 PROCEDURE — 6370000000 HC RX 637 (ALT 250 FOR IP): Performed by: INTERNAL MEDICINE

## 2024-04-15 PROCEDURE — 94761 N-INVAS EAR/PLS OXIMETRY MLT: CPT

## 2024-04-15 PROCEDURE — 1100000000 HC RM PRIVATE

## 2024-04-15 PROCEDURE — 73620 X-RAY EXAM OF FOOT: CPT

## 2024-04-15 PROCEDURE — 27810 TREATMENT OF ANKLE FRACTURE: CPT

## 2024-04-15 PROCEDURE — 99222 1ST HOSP IP/OBS MODERATE 55: CPT | Performed by: INTERNAL MEDICINE

## 2024-04-15 PROCEDURE — 73600 X-RAY EXAM OF ANKLE: CPT

## 2024-04-15 PROCEDURE — 6360000002 HC RX W HCPCS: Performed by: INTERNAL MEDICINE

## 2024-04-15 PROCEDURE — 96376 TX/PRO/DX INJ SAME DRUG ADON: CPT

## 2024-04-15 PROCEDURE — 96375 TX/PRO/DX INJ NEW DRUG ADDON: CPT

## 2024-04-15 PROCEDURE — 2580000003 HC RX 258: Performed by: INTERNAL MEDICINE

## 2024-04-15 PROCEDURE — 73610 X-RAY EXAM OF ANKLE: CPT

## 2024-04-15 PROCEDURE — 6360000002 HC RX W HCPCS: Performed by: EMERGENCY MEDICINE

## 2024-04-15 PROCEDURE — 99285 EMERGENCY DEPT VISIT HI MDM: CPT

## 2024-04-15 PROCEDURE — 96374 THER/PROPH/DIAG INJ IV PUSH: CPT

## 2024-04-15 PROCEDURE — 96372 THER/PROPH/DIAG INJ SC/IM: CPT

## 2024-04-15 RX ORDER — SODIUM CHLORIDE 0.9 % (FLUSH) 0.9 %
5-40 SYRINGE (ML) INJECTION PRN
Status: DISCONTINUED | OUTPATIENT
Start: 2024-04-15 | End: 2024-04-17 | Stop reason: HOSPADM

## 2024-04-15 RX ORDER — ONDANSETRON 2 MG/ML
4 INJECTION INTRAMUSCULAR; INTRAVENOUS
Status: COMPLETED | OUTPATIENT
Start: 2024-04-15 | End: 2024-04-15

## 2024-04-15 RX ORDER — POTASSIUM CHLORIDE 7.45 MG/ML
10 INJECTION INTRAVENOUS PRN
Status: DISCONTINUED | OUTPATIENT
Start: 2024-04-15 | End: 2024-04-17 | Stop reason: HOSPADM

## 2024-04-15 RX ORDER — PANTOPRAZOLE SODIUM 40 MG/1
40 TABLET, DELAYED RELEASE ORAL DAILY
Status: DISCONTINUED | OUTPATIENT
Start: 2024-04-15 | End: 2024-04-17 | Stop reason: HOSPADM

## 2024-04-15 RX ORDER — FLUTICASONE PROPIONATE 50 MCG
1 SPRAY, SUSPENSION (ML) NASAL DAILY
Status: DISCONTINUED | OUTPATIENT
Start: 2024-04-16 | End: 2024-04-17 | Stop reason: HOSPADM

## 2024-04-15 RX ORDER — ONDANSETRON 4 MG/1
4 TABLET, ORALLY DISINTEGRATING ORAL EVERY 8 HOURS PRN
Status: DISCONTINUED | OUTPATIENT
Start: 2024-04-15 | End: 2024-04-17 | Stop reason: HOSPADM

## 2024-04-15 RX ORDER — OXYCODONE HYDROCHLORIDE 5 MG/1
5 TABLET ORAL EVERY 4 HOURS PRN
Status: DISCONTINUED | OUTPATIENT
Start: 2024-04-15 | End: 2024-04-17 | Stop reason: HOSPADM

## 2024-04-15 RX ORDER — ASPIRIN 81 MG/1
81 TABLET ORAL DAILY
Status: DISCONTINUED | OUTPATIENT
Start: 2024-04-15 | End: 2024-04-16 | Stop reason: SDUPTHER

## 2024-04-15 RX ORDER — LANOLIN ALCOHOL/MO/W.PET/CERES
1000 CREAM (GRAM) TOPICAL DAILY
Status: DISCONTINUED | OUTPATIENT
Start: 2024-04-15 | End: 2024-04-17 | Stop reason: HOSPADM

## 2024-04-15 RX ORDER — SODIUM CHLORIDE 9 MG/ML
INJECTION, SOLUTION INTRAVENOUS CONTINUOUS
Status: DISCONTINUED | OUTPATIENT
Start: 2024-04-15 | End: 2024-04-17 | Stop reason: HOSPADM

## 2024-04-15 RX ORDER — ACETAMINOPHEN 325 MG/1
650 TABLET ORAL EVERY 6 HOURS PRN
Status: DISCONTINUED | OUTPATIENT
Start: 2024-04-15 | End: 2024-04-17 | Stop reason: HOSPADM

## 2024-04-15 RX ORDER — ATORVASTATIN CALCIUM 40 MG/1
40 TABLET, FILM COATED ORAL DAILY
Status: DISCONTINUED | OUTPATIENT
Start: 2024-04-15 | End: 2024-04-17 | Stop reason: HOSPADM

## 2024-04-15 RX ORDER — MAGNESIUM SULFATE IN WATER 40 MG/ML
2000 INJECTION, SOLUTION INTRAVENOUS PRN
Status: DISCONTINUED | OUTPATIENT
Start: 2024-04-15 | End: 2024-04-17 | Stop reason: HOSPADM

## 2024-04-15 RX ORDER — HEPARIN SODIUM 5000 [USP'U]/ML
5000 INJECTION, SOLUTION INTRAVENOUS; SUBCUTANEOUS EVERY 8 HOURS SCHEDULED
Status: COMPLETED | OUTPATIENT
Start: 2024-04-15 | End: 2024-04-15

## 2024-04-15 RX ORDER — IPRATROPIUM BROMIDE AND ALBUTEROL SULFATE 2.5; .5 MG/3ML; MG/3ML
1 SOLUTION RESPIRATORY (INHALATION) EVERY 4 HOURS PRN
Status: DISCONTINUED | OUTPATIENT
Start: 2024-04-15 | End: 2024-04-17 | Stop reason: HOSPADM

## 2024-04-15 RX ORDER — SODIUM CHLORIDE 0.9 % (FLUSH) 0.9 %
5-40 SYRINGE (ML) INJECTION EVERY 12 HOURS SCHEDULED
Status: DISCONTINUED | OUTPATIENT
Start: 2024-04-15 | End: 2024-04-17 | Stop reason: HOSPADM

## 2024-04-15 RX ORDER — MORPHINE SULFATE 4 MG/ML
4 INJECTION, SOLUTION INTRAMUSCULAR; INTRAVENOUS
Status: COMPLETED | OUTPATIENT
Start: 2024-04-15 | End: 2024-04-15

## 2024-04-15 RX ORDER — ACETAMINOPHEN 650 MG/1
650 SUPPOSITORY RECTAL EVERY 6 HOURS PRN
Status: DISCONTINUED | OUTPATIENT
Start: 2024-04-15 | End: 2024-04-17 | Stop reason: HOSPADM

## 2024-04-15 RX ORDER — MORPHINE SULFATE 2 MG/ML
2 INJECTION, SOLUTION INTRAMUSCULAR; INTRAVENOUS EVERY 6 HOURS PRN
Status: DISCONTINUED | OUTPATIENT
Start: 2024-04-15 | End: 2024-04-17 | Stop reason: HOSPADM

## 2024-04-15 RX ORDER — SODIUM CHLORIDE 9 MG/ML
INJECTION, SOLUTION INTRAVENOUS PRN
Status: DISCONTINUED | OUTPATIENT
Start: 2024-04-15 | End: 2024-04-17 | Stop reason: HOSPADM

## 2024-04-15 RX ORDER — POLYETHYLENE GLYCOL 3350 17 G/17G
17 POWDER, FOR SOLUTION ORAL DAILY PRN
Status: DISCONTINUED | OUTPATIENT
Start: 2024-04-15 | End: 2024-04-17 | Stop reason: HOSPADM

## 2024-04-15 RX ORDER — ONDANSETRON 2 MG/ML
4 INJECTION INTRAMUSCULAR; INTRAVENOUS EVERY 6 HOURS PRN
Status: DISCONTINUED | OUTPATIENT
Start: 2024-04-15 | End: 2024-04-17 | Stop reason: HOSPADM

## 2024-04-15 RX ADMIN — ONDANSETRON 4 MG: 2 INJECTION INTRAMUSCULAR; INTRAVENOUS at 15:40

## 2024-04-15 RX ADMIN — ONDANSETRON 4 MG: 2 INJECTION INTRAMUSCULAR; INTRAVENOUS at 20:30

## 2024-04-15 RX ADMIN — PANTOPRAZOLE SODIUM 40 MG: 40 TABLET, DELAYED RELEASE ORAL at 17:50

## 2024-04-15 RX ADMIN — MORPHINE SULFATE 4 MG: 4 INJECTION, SOLUTION INTRAMUSCULAR; INTRAVENOUS at 16:47

## 2024-04-15 RX ADMIN — SODIUM CHLORIDE: 9 INJECTION, SOLUTION INTRAVENOUS at 15:40

## 2024-04-15 RX ADMIN — ASPIRIN 81 MG: 81 TABLET, COATED ORAL at 17:50

## 2024-04-15 RX ADMIN — MORPHINE SULFATE 2 MG: 2 INJECTION, SOLUTION INTRAMUSCULAR; INTRAVENOUS at 20:30

## 2024-04-15 RX ADMIN — MORPHINE SULFATE 4 MG: 4 INJECTION, SOLUTION INTRAMUSCULAR; INTRAVENOUS at 15:39

## 2024-04-15 RX ADMIN — SODIUM CHLORIDE, PRESERVATIVE FREE 10 ML: 5 INJECTION INTRAVENOUS at 20:30

## 2024-04-15 RX ADMIN — HEPARIN SODIUM 5000 UNITS: 5000 INJECTION INTRAVENOUS; SUBCUTANEOUS at 20:29

## 2024-04-15 RX ADMIN — ATORVASTATIN CALCIUM 40 MG: 40 TABLET, FILM COATED ORAL at 20:29

## 2024-04-15 RX ADMIN — Medication 1000 MCG: at 17:50

## 2024-04-15 ASSESSMENT — PAIN SCALES - GENERAL
PAINLEVEL_OUTOF10: 7
PAINLEVEL_OUTOF10: 10
PAINLEVEL_OUTOF10: 10
PAINLEVEL_OUTOF10: 0
PAINLEVEL_OUTOF10: 10

## 2024-04-15 ASSESSMENT — PAIN DESCRIPTION - ONSET: ONSET: ON-GOING

## 2024-04-15 ASSESSMENT — PAIN DESCRIPTION - ORIENTATION
ORIENTATION: LEFT

## 2024-04-15 ASSESSMENT — PAIN DESCRIPTION - DESCRIPTORS
DESCRIPTORS: ACHING
DESCRIPTORS: STABBING
DESCRIPTORS: ACHING
DESCRIPTORS: HEAVINESS;PENETRATING

## 2024-04-15 ASSESSMENT — PAIN DESCRIPTION - LOCATION
LOCATION: ANKLE

## 2024-04-15 ASSESSMENT — PAIN DESCRIPTION - FREQUENCY: FREQUENCY: CONTINUOUS

## 2024-04-15 ASSESSMENT — PAIN - FUNCTIONAL ASSESSMENT
PAIN_FUNCTIONAL_ASSESSMENT: 0-10
PAIN_FUNCTIONAL_ASSESSMENT: ACTIVITIES ARE NOT PREVENTED

## 2024-04-15 NOTE — ED NOTES
TRANSFER - OUT REPORT:    Verbal report given to ELLIOTT Bledsoe on Aspen Gallegos  being transferred to [facility] for routine progression of patient care       Report consisted of patient's Situation, Background, Assessment and   Recommendations(SBAR).     Information from the following report(s) Nurse Handoff Report was reviewed with the receiving nurse.    [place] Fall Assessment:    Presents to emergency department  because of falls (Syncope, seizure, or loss of consciousness): No  Age > 70: No  Altered Mental Status, Intoxication with alcohol or substance confusion (Disorientation, impaired judgment, poor safety awaremess, or inability to follow instructions): No  Impaired Mobility: Ambulates or transfers with assistive devices or assistance; Unable to ambulate or transer.: No             Lines:   Peripheral IV 04/15/24 Left Antecubital (Active)        Opportunity for questions and clarification was provided.

## 2024-04-15 NOTE — H&P
History & Physical    Patient: Aspen Gallegos MRN: [id number]  [facility]: [id number]    YOB: 1960  Age: 63 y.o.  Sex: female      DOA: 4/15/2024    Chief Complaint:   Chief Complaint   Patient presents with    Dislocation          HPI:     Aspen Gallegos is a 63 y.o.  female with a past medical history of asthma, carpal tunnel syndrome, GERD, hyperthyroidism, PVCs, PFO, sleep apnea who presents after a fall.  Patient notes she was at home and was spending time with her daughter and her grandchild.  Her daughter was outside in the garden as well is the grandchild and the patient started to leave the house and just that she crossed the threshold of the door she rolled her ankle and fell.  She felt pain in her left ankle and was unable to bear weight on it.  She decided to call EMS after the pain started to worsen.    In the ED it was noted the patient was afebrile, with a respiratory rate of 15-16, with a heart rate of 78-83, and a blood pressure of 143/77 the patient was noted to be 98% on room air.  Labs were not drawn at the time of admission but ordered.  Left ankle x-ray showed a bimalleolar fracture.  A provisional reduction was to be done in the ER and the patient was scheduled for an ORIF with orthopedic surgery in the morning.  The patient was given morphine and Zofran and admitted for further workup.    Past Medical History:   Diagnosis Date    Arthritis     Asthma     Carpal tunnel syndrome, bilateral     Chest pain, unspecified     Possible Angina, GERD, chest wall pains, recurrent pains, possible atypical angina, happens at rest, abnormal nuc scan in past, discussed risk benefit options of cardiac cath/pci, she wants to think it over, add sl ntg    Coronary atherosclerosis of native coronary artery     Abnormal NUC scan, patient's symptoms are better, will start meds and monitor    GERD (gastroesophageal reflux disease)     Heart murmur     Hypercholesterolemia     Hyperthyroidism   50 MCG (2000 UT) CAPS 1 CAP BY MOUTH DAILY 8/16/23  Yes Jamey Booker MD   butalbital-acetaminophen-caffeine (FIORICET, ESGIC) -40 MG per tablet Take 2 tablets by mouth every 6 hours as needed 8/14/23  Yes Jamey Booker MD   clobetasol (TEMOVATE) 0.05 % ointment  3/31/23  Yes Jamey Booker MD   ibuprofen (ADVIL;MOTRIN) 600 MG tablet Take 1 tablet by mouth every 6 hours as needed 1/18/23  Yes Jamey Booker MD   fluticasone (FLONASE) 50 MCG/ACT nasal spray SPRAY 1 SPRAY INTO EACH NOSTRIL EVERY DAY 1/18/23  Yes Jamey Booker MD   BIOTIN PO Take 1 tablet by mouth daily   Yes Automatic Reconciliation, Ar   aspirin 81 MG EC tablet Take by mouth daily   Yes Automatic Reconciliation, Ar   vitamin D 25 MCG (1000 UT) CAPS Take 1 capsule by mouth daily   Yes Automatic Reconciliation, Ar   cyanocobalamin 1000 MCG tablet Take 1 tablet by mouth daily   Yes Automatic Reconciliation, Ar   loratadine (CLARITIN) 10 MG tablet Take 1 tablet by mouth   Yes Automatic Reconciliation, Ar   nitroGLYCERIN (NITROSTAT) 0.4 MG SL tablet Place 1 tablet under the tongue every 5 minutes as needed for Chest pain up to max of 3 total doses. If no relief after 1 dose, call 911.    Jamey Booker MD   albuterol sulfate HFA (PROVENTIL;VENTOLIN;PROAIR) 108 (90 Base) MCG/ACT inhaler Inhale 2 puffs into the lungs every 6 hours as needed for Wheezing 5/7/23   Sera Bailey MD   albuterol (PROVENTIL) (2.5 MG/3ML) 0.083% nebulizer solution  4/4/23   Jamey Booker MD       Allergies   Allergen Reactions    Penicillins Other (See Comments)     Takes skin of body   Other reaction(s): other/intolerance  Skin peeling off hands and feet  Other reaction(s): other/intolerance  Skin peeling off hands and feet         Review of Systems  GENERAL: Patient alert, awake and oriented times 3, able to communicate full sentences and not in distress. no weight loss, no falls.  HEENT: No change in vision, no

## 2024-04-15 NOTE — ED PROVIDER NOTES
EMERGENCY DEPARTMENT HISTORY AND PHYSICAL EXAM    2:19 PM EDT seen at this time in room 6        Date: 4/15/2024  Patient Name: Aspen Gallegos    History of Presenting Illness     Chief Complaint   Patient presents with    Dislocation         History Provided By: patient    Additional History (Context): Aspen Gallegos is a 63 y.o. female presents with history of obesity cholesterol and asthma, was walking with her grandchild and rolled her left ankle, laterally, severe pain.  Came in by EMS.  Had very little pain until she moved over to the stretcher and now it is 7 out of 10.  Allergy to penicillin last oral intake at 9:30 AM oatmeal..    PCP: Annette Nation MD    Chief Complaint:   Duration:    Timing:    Location:   Quality:   Severity:   Modifying Factors:   Associated Symptoms:       Current Facility-Administered Medications   Medication Dose Route Frequency Provider Last Rate Last Admin    0.9 % sodium chloride infusion   IntraVENous Continuous Jayy Hook  mL/hr at 04/15/24 1540 New Bag at 04/15/24 1540    aspirin EC tablet 81 mg  81 mg Oral Daily Jayy Hook DO        atorvastatin (LIPITOR) tablet 40 mg  40 mg Oral Daily Jayy Hook DO        vitamin B-12 (CYANOCOBALAMIN) tablet 1,000 mcg  1,000 mcg Oral Daily Jayy Hook DO        [START ON 4/16/2024] fluticasone (FLONASE) 50 MCG/ACT nasal spray 1 spray  1 spray Each Nostril Daily Jayy Hook DO        pantoprazole (PROTONIX) tablet 40 mg  40 mg Oral Daily Jayy Hook DO        sodium chloride flush 0.9 % injection 5-40 mL  5-40 mL IntraVENous 2 times per day Jayy Hook DO        sodium chloride flush 0.9 % injection 5-40 mL  5-40 mL IntraVENous PRN Jayy Hook DO        0.9 % sodium chloride infusion   IntraVENous PRN Jayy Hook DO        potassium chloride 10 mEq/100 mL IVPB (Peripheral Line)  10 mEq IntraVENous PRN Jayy Hook DO        magnesium sulfate 2000 mg in 50 mL IVPB premix   the tongue every 5 minutes as needed for Chest pain up to max of 3 total doses. If no relief after 1 dose, call 911.      albuterol sulfate HFA (PROVENTIL;VENTOLIN;PROAIR) 108 (90 Base) MCG/ACT inhaler Inhale 2 puffs into the lungs every 6 hours as needed for Wheezing 18 g 3    albuterol (PROVENTIL) (2.5 MG/3ML) 0.083% nebulizer solution          Past History     Past Medical History:  Past Medical History:   Diagnosis Date    Arthritis     Asthma     Carpal tunnel syndrome, bilateral     Chest pain, unspecified     Possible Angina, GERD, chest wall pains, recurrent pains, possible atypical angina, happens at rest, abnormal nuc scan in past, discussed risk benefit options of cardiac cath/pci, she wants to think it over, add sl ntg    Coronary atherosclerosis of native coronary artery     Abnormal NUC scan, patient's symptoms are better, will start meds and monitor    GERD (gastroesophageal reflux disease)     Heart murmur     Hypercholesterolemia     Hyperthyroidism     Ill-defined condition     Patent Foramen [place]vale- asymptomatic    Obesity, unspecified     Discussed diet    Other and unspecified hyperlipidemia     no longer on meds    Pain of right thumb     Palpitations     PVC's (premature ventricular contractions)     Sleep apnea     no cpap    Thyroid nodule     bilateral    Trigger thumb of right hand        Past Surgical History:  Past Surgical History:   Procedure Laterality Date    ANOSCOPY DX W/COLLJ SPEC BR/WA SPX WHEN PRFRMD N/A 2017    Dr. Lopes     SECTION      CHOLECYSTECTOMY      COLONOSCOPY N/A 2018    COLONOSCOPY with clips, gold probe performed by KERRI Barney MD at [facility] ENDOSCOPY    COLONOSCOPY      OTHER SURGICAL HISTORY      Subcutaneous fistulotomy posterior vaginal wall.       Family History:  Family History   Problem Relation Age of Onset    Hypertension Mother     Diabetes Father     Colon Cancer Father     Cancer Father         prosate    Prostate Cancer Father

## 2024-04-15 NOTE — CONSULTS
63-year-old obese female  Misstepped while holding her grandchild  Displaced bimalleolar fracture dislocation left ankle  Provisional reduction in ER  Hospitalist admit  Scheduled for ORIF in a.m.4/16/24  Elevation and posterior splint  N.p.o. after midnight   full note to follow

## 2024-04-15 NOTE — PROGRESS NOTES
Arrival note:    Patient was received from the ED due to left ankle fracture.       Patient oriented to room 511, use of call bell, safety measures. She is alert and oriented times 4.     Care on-going.

## 2024-04-15 NOTE — ED TRIAGE NOTES
Patient arrived via EMS after a fall. Patient has obvious deformity to left ankle. Patient states she was holding her grandchild and stepped off the step wrong. Denies hitting head or other injuries. VSS.

## 2024-04-16 ENCOUNTER — APPOINTMENT (OUTPATIENT)
Facility: HOSPITAL | Age: 64
DRG: 313 | End: 2024-04-16
Payer: MEDICAID

## 2024-04-16 ENCOUNTER — ANESTHESIA EVENT (OUTPATIENT)
Facility: HOSPITAL | Age: 64
DRG: 313 | End: 2024-04-16
Payer: MEDICAID

## 2024-04-16 ENCOUNTER — ANESTHESIA (OUTPATIENT)
Facility: HOSPITAL | Age: 64
DRG: 313 | End: 2024-04-16
Payer: MEDICAID

## 2024-04-16 PROBLEM — S82.892A CLOSED FRACTURE OF LEFT ANKLE: Status: ACTIVE | Noted: 2024-04-16

## 2024-04-16 LAB
ALBUMIN SERPL-MCNC: 3.1 G/DL (ref 3.4–5)
ALBUMIN/GLOB SERPL: 0.9 (ref 0.8–1.7)
ALP SERPL-CCNC: 54 U/L (ref 45–117)
ALT SERPL-CCNC: 17 U/L (ref 13–56)
ANION GAP SERPL CALC-SCNC: 1 MMOL/L (ref 3–18)
AST SERPL-CCNC: 18 U/L (ref 10–38)
BASOPHILS # BLD: 0 K/UL (ref 0–0.1)
BASOPHILS NFR BLD: 0 % (ref 0–2)
BILIRUB SERPL-MCNC: 1.2 MG/DL (ref 0.2–1)
BUN SERPL-MCNC: 6 MG/DL (ref 7–18)
BUN/CREAT SERPL: 7 (ref 12–20)
CALCIUM SERPL-MCNC: 8.3 MG/DL (ref 8.5–10.1)
CHLORIDE SERPL-SCNC: 111 MMOL/L (ref 100–111)
CO2 SERPL-SCNC: 29 MMOL/L (ref 21–32)
CREAT SERPL-MCNC: 0.82 MG/DL (ref 0.6–1.3)
DIFFERENTIAL METHOD BLD: ABNORMAL
EOSINOPHIL # BLD: 0.2 K/UL (ref 0–0.4)
EOSINOPHIL NFR BLD: 4 % (ref 0–5)
ERYTHROCYTE [DISTWIDTH] IN BLOOD BY AUTOMATED COUNT: 12.6 % (ref 11.6–14.5)
GLOBULIN SER CALC-MCNC: 3.4 G/DL (ref 2–4)
GLUCOSE SERPL-MCNC: 116 MG/DL (ref 74–99)
HCT VFR BLD AUTO: 35.7 % (ref 35–45)
HGB BLD-MCNC: 11.2 G/DL (ref 12–16)
IMM GRANULOCYTES # BLD AUTO: 0 K/UL (ref 0–0.04)
IMM GRANULOCYTES NFR BLD AUTO: 0 % (ref 0–0.5)
LYMPHOCYTES # BLD: 2.1 K/UL (ref 0.9–3.6)
LYMPHOCYTES NFR BLD: 35 % (ref 21–52)
MCH RBC QN AUTO: 30.2 PG (ref 24–34)
MCHC RBC AUTO-ENTMCNC: 31.4 G/DL (ref 31–37)
MCV RBC AUTO: 96.2 FL (ref 78–100)
MONOCYTES # BLD: 0.5 K/UL (ref 0.05–1.2)
MONOCYTES NFR BLD: 9 % (ref 3–10)
NEUTS SEG # BLD: 3.2 K/UL (ref 1.8–8)
NEUTS SEG NFR BLD: 52 % (ref 40–73)
NRBC # BLD: 0 K/UL (ref 0–0.01)
NRBC BLD-RTO: 0 PER 100 WBC
PLATELET # BLD AUTO: 251 K/UL (ref 135–420)
PMV BLD AUTO: 9.9 FL (ref 9.2–11.8)
POTASSIUM SERPL-SCNC: 3.9 MMOL/L (ref 3.5–5.5)
PROT SERPL-MCNC: 6.5 G/DL (ref 6.4–8.2)
RBC # BLD AUTO: 3.71 M/UL (ref 4.2–5.3)
SODIUM SERPL-SCNC: 141 MMOL/L (ref 136–145)
WBC # BLD AUTO: 6 K/UL (ref 4.6–13.2)

## 2024-04-16 PROCEDURE — 6360000002 HC RX W HCPCS: Performed by: ANESTHESIOLOGY

## 2024-04-16 PROCEDURE — 2580000003 HC RX 258: Performed by: SPECIALIST

## 2024-04-16 PROCEDURE — 94761 N-INVAS EAR/PLS OXIMETRY MLT: CPT

## 2024-04-16 PROCEDURE — G0378 HOSPITAL OBSERVATION PER HR: HCPCS

## 2024-04-16 PROCEDURE — 36415 COLL VENOUS BLD VENIPUNCTURE: CPT

## 2024-04-16 PROCEDURE — 80053 COMPREHEN METABOLIC PANEL: CPT

## 2024-04-16 PROCEDURE — 2500000003 HC RX 250 WO HCPCS: Performed by: NURSE ANESTHETIST, CERTIFIED REGISTERED

## 2024-04-16 PROCEDURE — 73600 X-RAY EXAM OF ANKLE: CPT

## 2024-04-16 PROCEDURE — 6360000002 HC RX W HCPCS: Performed by: INTERNAL MEDICINE

## 2024-04-16 PROCEDURE — 7100000000 HC PACU RECOVERY - FIRST 15 MIN: Performed by: SPECIALIST

## 2024-04-16 PROCEDURE — 3700000000 HC ANESTHESIA ATTENDED CARE: Performed by: SPECIALIST

## 2024-04-16 PROCEDURE — 64445 NJX AA&/STRD SCIATIC NRV IMG: CPT | Performed by: ANESTHESIOLOGY

## 2024-04-16 PROCEDURE — 3600000002 HC SURGERY LEVEL 2 BASE: Performed by: SPECIALIST

## 2024-04-16 PROCEDURE — 0QSH04Z REPOSITION LEFT TIBIA WITH INTERNAL FIXATION DEVICE, OPEN APPROACH: ICD-10-PCS | Performed by: SPECIALIST

## 2024-04-16 PROCEDURE — 2709999900 HC NON-CHARGEABLE SUPPLY: Performed by: SPECIALIST

## 2024-04-16 PROCEDURE — 7100000001 HC PACU RECOVERY - ADDTL 15 MIN: Performed by: SPECIALIST

## 2024-04-16 PROCEDURE — 6360000002 HC RX W HCPCS: Performed by: SPECIALIST

## 2024-04-16 PROCEDURE — 6370000000 HC RX 637 (ALT 250 FOR IP): Performed by: NURSE ANESTHETIST, CERTIFIED REGISTERED

## 2024-04-16 PROCEDURE — 6370000000 HC RX 637 (ALT 250 FOR IP): Performed by: SPECIALIST

## 2024-04-16 PROCEDURE — 2580000003 HC RX 258: Performed by: INTERNAL MEDICINE

## 2024-04-16 PROCEDURE — 27814 TREATMENT OF ANKLE FRACTURE: CPT | Performed by: SPECIALIST

## 2024-04-16 PROCEDURE — 2500000003 HC RX 250 WO HCPCS: Performed by: ANESTHESIOLOGY

## 2024-04-16 PROCEDURE — 99231 SBSQ HOSP IP/OBS SF/LOW 25: CPT | Performed by: FAMILY MEDICINE

## 2024-04-16 PROCEDURE — C1769 GUIDE WIRE: HCPCS | Performed by: SPECIALIST

## 2024-04-16 PROCEDURE — 2720000010 HC SURG SUPPLY STERILE: Performed by: SPECIALIST

## 2024-04-16 PROCEDURE — C1713 ANCHOR/SCREW BN/BN,TIS/BN: HCPCS | Performed by: SPECIALIST

## 2024-04-16 PROCEDURE — 6360000002 HC RX W HCPCS: Performed by: NURSE ANESTHETIST, CERTIFIED REGISTERED

## 2024-04-16 PROCEDURE — 0QSK04Z REPOSITION LEFT FIBULA WITH INTERNAL FIXATION DEVICE, OPEN APPROACH: ICD-10-PCS | Performed by: SPECIALIST

## 2024-04-16 PROCEDURE — 6370000000 HC RX 637 (ALT 250 FOR IP): Performed by: INTERNAL MEDICINE

## 2024-04-16 PROCEDURE — 85025 COMPLETE CBC W/AUTO DIFF WBC: CPT

## 2024-04-16 PROCEDURE — 1100000000 HC RM PRIVATE

## 2024-04-16 PROCEDURE — 99223 1ST HOSP IP/OBS HIGH 75: CPT | Performed by: SPECIALIST

## 2024-04-16 PROCEDURE — 2580000003 HC RX 258: Performed by: NURSE ANESTHETIST, CERTIFIED REGISTERED

## 2024-04-16 PROCEDURE — 3700000001 HC ADD 15 MINUTES (ANESTHESIA): Performed by: SPECIALIST

## 2024-04-16 PROCEDURE — 3600000012 HC SURGERY LEVEL 2 ADDTL 15MIN: Performed by: SPECIALIST

## 2024-04-16 DEVICE — SCREW BNE L20MM DIA2.7MM ANK S STL ST VAR ANG LOK FULL THRD: Type: IMPLANTABLE DEVICE | Site: ANKLE | Status: FUNCTIONAL

## 2024-04-16 DEVICE — PLATE BNE L105MM 5 H NONSTERILE L DST LAT FIBULAR S STL VAR: Type: IMPLANTABLE DEVICE | Site: ANKLE | Status: FUNCTIONAL

## 2024-04-16 DEVICE — SCREW BNE L16MM DIA2.7MM ANK S STL ST VAR ANG LOK FULL THRD: Type: IMPLANTABLE DEVICE | Site: ANKLE | Status: FUNCTIONAL

## 2024-04-16 DEVICE — SCREW BNE L18MM DIA2.7MM ANK S STL ST VAR ANG LOK FULL THRD: Type: IMPLANTABLE DEVICE | Site: ANKLE | Status: FUNCTIONAL

## 2024-04-16 DEVICE — IMPLANTABLE DEVICE: Type: IMPLANTABLE DEVICE | Site: ANKLE | Status: FUNCTIONAL

## 2024-04-16 DEVICE — SCREW BNE L14MM DIA2.7MM ANK S STL ST VAR ANG LOK FULL THRD: Type: IMPLANTABLE DEVICE | Site: ANKLE | Status: FUNCTIONAL

## 2024-04-16 DEVICE — SCREW BNE L12MM DIA2.7MM ANK S STL ST VAR ANG LOK FULL THRD: Type: IMPLANTABLE DEVICE | Site: ANKLE | Status: FUNCTIONAL

## 2024-04-16 RX ORDER — LIDOCAINE HYDROCHLORIDE 10 MG/ML
1 INJECTION, SOLUTION EPIDURAL; INFILTRATION; INTRACAUDAL; PERINEURAL
Status: COMPLETED | OUTPATIENT
Start: 2024-04-16 | End: 2024-04-16

## 2024-04-16 RX ORDER — SODIUM CHLORIDE 9 MG/ML
INJECTION, SOLUTION INTRAVENOUS PRN
Status: DISCONTINUED | OUTPATIENT
Start: 2024-04-16 | End: 2024-04-17 | Stop reason: HOSPADM

## 2024-04-16 RX ORDER — SODIUM CHLORIDE 0.9 % (FLUSH) 0.9 %
5-40 SYRINGE (ML) INJECTION PRN
Status: DISCONTINUED | OUTPATIENT
Start: 2024-04-16 | End: 2024-04-17 | Stop reason: HOSPADM

## 2024-04-16 RX ORDER — FENTANYL CITRATE 50 UG/ML
INJECTION, SOLUTION INTRAMUSCULAR; INTRAVENOUS
Status: COMPLETED | OUTPATIENT
Start: 2024-04-16 | End: 2024-04-16

## 2024-04-16 RX ORDER — TRAMADOL HYDROCHLORIDE 50 MG/1
50 TABLET ORAL EVERY 6 HOURS PRN
Status: DISCONTINUED | OUTPATIENT
Start: 2024-04-16 | End: 2024-04-17 | Stop reason: HOSPADM

## 2024-04-16 RX ORDER — ACETAMINOPHEN 500 MG
1000 TABLET ORAL EVERY 8 HOURS SCHEDULED
Status: DISCONTINUED | OUTPATIENT
Start: 2024-04-16 | End: 2024-04-17 | Stop reason: HOSPADM

## 2024-04-16 RX ORDER — PROPOFOL 10 MG/ML
INJECTION, EMULSION INTRAVENOUS PRN
Status: DISCONTINUED | OUTPATIENT
Start: 2024-04-16 | End: 2024-04-16 | Stop reason: SDUPTHER

## 2024-04-16 RX ORDER — KETOROLAC TROMETHAMINE 15 MG/ML
15 INJECTION, SOLUTION INTRAMUSCULAR; INTRAVENOUS EVERY 6 HOURS
Status: DISCONTINUED | OUTPATIENT
Start: 2024-04-16 | End: 2024-04-17 | Stop reason: HOSPADM

## 2024-04-16 RX ORDER — SUCCINYLCHOLINE/SOD CL,ISO/PF 100 MG/5ML
SYRINGE (ML) INTRAVENOUS PRN
Status: DISCONTINUED | OUTPATIENT
Start: 2024-04-16 | End: 2024-04-16 | Stop reason: SDUPTHER

## 2024-04-16 RX ORDER — ROPIVACAINE HYDROCHLORIDE 5 MG/ML
30 INJECTION, SOLUTION EPIDURAL; INFILTRATION; PERINEURAL ONCE
Status: DISCONTINUED | OUTPATIENT
Start: 2024-04-16 | End: 2024-04-16 | Stop reason: HOSPADM

## 2024-04-16 RX ORDER — NEOSTIGMINE METHYLSULFATE 1 MG/ML
INJECTION, SOLUTION INTRAVENOUS PRN
Status: DISCONTINUED | OUTPATIENT
Start: 2024-04-16 | End: 2024-04-16 | Stop reason: SDUPTHER

## 2024-04-16 RX ORDER — DEXAMETHASONE SODIUM PHOSPHATE 4 MG/ML
INJECTION, SOLUTION INTRA-ARTICULAR; INTRALESIONAL; INTRAMUSCULAR; INTRAVENOUS; SOFT TISSUE PRN
Status: DISCONTINUED | OUTPATIENT
Start: 2024-04-16 | End: 2024-04-16 | Stop reason: SDUPTHER

## 2024-04-16 RX ORDER — ONDANSETRON 2 MG/ML
4 INJECTION INTRAMUSCULAR; INTRAVENOUS EVERY 4 HOURS PRN
Status: DISCONTINUED | OUTPATIENT
Start: 2024-04-16 | End: 2024-04-17 | Stop reason: HOSPADM

## 2024-04-16 RX ORDER — ONDANSETRON 2 MG/ML
INJECTION INTRAMUSCULAR; INTRAVENOUS PRN
Status: DISCONTINUED | OUTPATIENT
Start: 2024-04-16 | End: 2024-04-16 | Stop reason: SDUPTHER

## 2024-04-16 RX ORDER — DIPHENHYDRAMINE HYDROCHLORIDE 50 MG/ML
INJECTION INTRAMUSCULAR; INTRAVENOUS PRN
Status: DISCONTINUED | OUTPATIENT
Start: 2024-04-16 | End: 2024-04-16 | Stop reason: SDUPTHER

## 2024-04-16 RX ORDER — FENTANYL CITRATE 50 UG/ML
100 INJECTION, SOLUTION INTRAMUSCULAR; INTRAVENOUS ONCE
Status: COMPLETED | OUTPATIENT
Start: 2024-04-16 | End: 2024-04-16

## 2024-04-16 RX ORDER — ROCURONIUM BROMIDE 10 MG/ML
INJECTION, SOLUTION INTRAVENOUS PRN
Status: DISCONTINUED | OUTPATIENT
Start: 2024-04-16 | End: 2024-04-16 | Stop reason: SDUPTHER

## 2024-04-16 RX ORDER — BISACODYL 5 MG/1
5 TABLET, DELAYED RELEASE ORAL DAILY
Status: DISCONTINUED | OUTPATIENT
Start: 2024-04-16 | End: 2024-04-17 | Stop reason: HOSPADM

## 2024-04-16 RX ORDER — SODIUM CHLORIDE, SODIUM LACTATE, POTASSIUM CHLORIDE, CALCIUM CHLORIDE 600; 310; 30; 20 MG/100ML; MG/100ML; MG/100ML; MG/100ML
INJECTION, SOLUTION INTRAVENOUS CONTINUOUS
Status: DISCONTINUED | OUTPATIENT
Start: 2024-04-16 | End: 2024-04-16 | Stop reason: HOSPADM

## 2024-04-16 RX ORDER — OXYCODONE HYDROCHLORIDE 5 MG/1
5 TABLET ORAL EVERY 4 HOURS PRN
Status: DISCONTINUED | OUTPATIENT
Start: 2024-04-16 | End: 2024-04-17 | Stop reason: HOSPADM

## 2024-04-16 RX ORDER — HYDROXYZINE HYDROCHLORIDE 10 MG/1
10 TABLET, FILM COATED ORAL EVERY 8 HOURS PRN
Status: DISCONTINUED | OUTPATIENT
Start: 2024-04-16 | End: 2024-04-17 | Stop reason: HOSPADM

## 2024-04-16 RX ORDER — FENTANYL CITRATE 50 UG/ML
50 INJECTION, SOLUTION INTRAMUSCULAR; INTRAVENOUS EVERY 10 MIN PRN
Status: DISCONTINUED | OUTPATIENT
Start: 2024-04-16 | End: 2024-04-16 | Stop reason: HOSPADM

## 2024-04-16 RX ORDER — ROPIVACAINE HYDROCHLORIDE 5 MG/ML
30 INJECTION, SOLUTION EPIDURAL; INFILTRATION; PERINEURAL ONCE
Status: COMPLETED | OUTPATIENT
Start: 2024-04-16 | End: 2024-04-16

## 2024-04-16 RX ORDER — POLYETHYLENE GLYCOL 3350 17 G/17G
17 POWDER, FOR SOLUTION ORAL DAILY
Status: DISCONTINUED | OUTPATIENT
Start: 2024-04-16 | End: 2024-04-17 | Stop reason: HOSPADM

## 2024-04-16 RX ORDER — MIDAZOLAM HYDROCHLORIDE 1 MG/ML
INJECTION INTRAMUSCULAR; INTRAVENOUS
Status: COMPLETED | OUTPATIENT
Start: 2024-04-16 | End: 2024-04-16

## 2024-04-16 RX ORDER — MIDAZOLAM HYDROCHLORIDE 2 MG/2ML
2 INJECTION, SOLUTION INTRAMUSCULAR; INTRAVENOUS
Status: DISCONTINUED | OUTPATIENT
Start: 2024-04-16 | End: 2024-04-16 | Stop reason: HOSPADM

## 2024-04-16 RX ORDER — MIDAZOLAM HYDROCHLORIDE 2 MG/2ML
2 INJECTION, SOLUTION INTRAMUSCULAR; INTRAVENOUS ONCE
Status: COMPLETED | OUTPATIENT
Start: 2024-04-16 | End: 2024-04-16

## 2024-04-16 RX ORDER — SODIUM CHLORIDE 0.9 % (FLUSH) 0.9 %
5-40 SYRINGE (ML) INJECTION EVERY 12 HOURS SCHEDULED
Status: DISCONTINUED | OUTPATIENT
Start: 2024-04-16 | End: 2024-04-17 | Stop reason: HOSPADM

## 2024-04-16 RX ORDER — GLYCOPYRROLATE 0.2 MG/ML
INJECTION INTRAMUSCULAR; INTRAVENOUS PRN
Status: DISCONTINUED | OUTPATIENT
Start: 2024-04-16 | End: 2024-04-16 | Stop reason: SDUPTHER

## 2024-04-16 RX ORDER — NALOXONE HYDROCHLORIDE 0.4 MG/ML
INJECTION, SOLUTION INTRAMUSCULAR; INTRAVENOUS; SUBCUTANEOUS PRN
Status: DISCONTINUED | OUTPATIENT
Start: 2024-04-16 | End: 2024-04-16 | Stop reason: HOSPADM

## 2024-04-16 RX ORDER — DEXTROSE, SODIUM CHLORIDE, SODIUM LACTATE, POTASSIUM CHLORIDE, AND CALCIUM CHLORIDE 5; .6; .31; .03; .02 G/100ML; G/100ML; G/100ML; G/100ML; G/100ML
INJECTION, SOLUTION INTRAVENOUS CONTINUOUS
Status: DISCONTINUED | OUTPATIENT
Start: 2024-04-16 | End: 2024-04-17 | Stop reason: HOSPADM

## 2024-04-16 RX ORDER — FAMOTIDINE 20 MG/1
20 TABLET, FILM COATED ORAL ONCE
Status: COMPLETED | OUTPATIENT
Start: 2024-04-16 | End: 2024-04-16

## 2024-04-16 RX ORDER — ASPIRIN 81 MG/1
81 TABLET ORAL 2 TIMES DAILY
Status: DISCONTINUED | OUTPATIENT
Start: 2024-04-16 | End: 2024-04-17 | Stop reason: HOSPADM

## 2024-04-16 RX ORDER — FENTANYL CITRATE 50 UG/ML
100 INJECTION, SOLUTION INTRAMUSCULAR; INTRAVENOUS
Status: DISCONTINUED | OUTPATIENT
Start: 2024-04-16 | End: 2024-04-16 | Stop reason: HOSPADM

## 2024-04-16 RX ADMIN — Medication 3 MG: at 11:15

## 2024-04-16 RX ADMIN — KETOROLAC TROMETHAMINE 15 MG: 15 INJECTION, SOLUTION INTRAMUSCULAR; INTRAVENOUS at 23:31

## 2024-04-16 RX ADMIN — SODIUM CHLORIDE, PRESERVATIVE FREE 10 ML: 5 INJECTION INTRAVENOUS at 20:43

## 2024-04-16 RX ADMIN — HYDROXYZINE HYDROCHLORIDE 10 MG: 10 TABLET, FILM COATED ORAL at 20:42

## 2024-04-16 RX ADMIN — FENTANYL CITRATE 50 MCG: 50 INJECTION INTRAMUSCULAR; INTRAVENOUS at 11:01

## 2024-04-16 RX ADMIN — ASPIRIN 81 MG: 81 TABLET, COATED ORAL at 20:42

## 2024-04-16 RX ADMIN — MIDAZOLAM 2 MG: 1 INJECTION, SOLUTION INTRAMUSCULAR; INTRAVENOUS at 08:44

## 2024-04-16 RX ADMIN — DEXAMETHASONE SODIUM PHOSPHATE 4 MG: 4 INJECTION, SOLUTION INTRAMUSCULAR; INTRAVENOUS at 09:23

## 2024-04-16 RX ADMIN — SODIUM CHLORIDE: 9 INJECTION, SOLUTION INTRAVENOUS at 00:00

## 2024-04-16 RX ADMIN — GLYCOPYRROLATE 0.2 MG: 0.2 INJECTION INTRAMUSCULAR; INTRAVENOUS at 11:14

## 2024-04-16 RX ADMIN — PROPOFOL 125 MG: 10 INJECTION, EMULSION INTRAVENOUS at 09:11

## 2024-04-16 RX ADMIN — FENTANYL CITRATE 100 MCG: 50 INJECTION INTRAMUSCULAR; INTRAVENOUS at 08:42

## 2024-04-16 RX ADMIN — SODIUM CHLORIDE, SODIUM LACTATE, POTASSIUM CHLORIDE, AND CALCIUM CHLORIDE: 600; 310; 30; 20 INJECTION, SOLUTION INTRAVENOUS at 09:00

## 2024-04-16 RX ADMIN — ONDANSETRON 4 MG: 2 INJECTION INTRAMUSCULAR; INTRAVENOUS at 11:18

## 2024-04-16 RX ADMIN — FAMOTIDINE 20 MG: 20 TABLET ORAL at 08:30

## 2024-04-16 RX ADMIN — ROCURONIUM BROMIDE 10 MG: 10 INJECTION, SOLUTION INTRAVENOUS at 10:21

## 2024-04-16 RX ADMIN — SODIUM CHLORIDE, SODIUM LACTATE, POTASSIUM CHLORIDE, CALCIUM CHLORIDE AND DEXTROSE MONOHYDRATE: 5; 600; 310; 30; 20 INJECTION, SOLUTION INTRAVENOUS at 23:28

## 2024-04-16 RX ADMIN — LIDOCAINE HYDROCHLORIDE 1 ML: 10 INJECTION, SOLUTION EPIDURAL; INFILTRATION; INTRACAUDAL; PERINEURAL at 08:42

## 2024-04-16 RX ADMIN — ACETAMINOPHEN 1000 MG: 500 TABLET ORAL at 14:38

## 2024-04-16 RX ADMIN — FENTANYL CITRATE 50 MCG: 50 INJECTION INTRAMUSCULAR; INTRAVENOUS at 10:16

## 2024-04-16 RX ADMIN — OXYCODONE HYDROCHLORIDE 5 MG: 5 TABLET ORAL at 02:13

## 2024-04-16 RX ADMIN — MORPHINE SULFATE 2 MG: 2 INJECTION, SOLUTION INTRAMUSCULAR; INTRAVENOUS at 05:57

## 2024-04-16 RX ADMIN — BISACODYL 5 MG: 5 TABLET, COATED ORAL at 18:15

## 2024-04-16 RX ADMIN — FENTANYL CITRATE 100 MCG: 50 INJECTION INTRAMUSCULAR; INTRAVENOUS at 08:37

## 2024-04-16 RX ADMIN — ROPIVACAINE HYDROCHLORIDE 30 ML: 5 INJECTION EPIDURAL; INFILTRATION; PERINEURAL at 08:46

## 2024-04-16 RX ADMIN — Medication 2 MG: at 11:32

## 2024-04-16 RX ADMIN — ATORVASTATIN CALCIUM 40 MG: 40 TABLET, FILM COATED ORAL at 20:42

## 2024-04-16 RX ADMIN — KETOROLAC TROMETHAMINE 15 MG: 15 INJECTION, SOLUTION INTRAMUSCULAR; INTRAVENOUS at 18:13

## 2024-04-16 RX ADMIN — Medication 100 MG: at 09:26

## 2024-04-16 RX ADMIN — VANCOMYCIN HYDROCHLORIDE 1000 MG: 1 INJECTION, POWDER, LYOPHILIZED, FOR SOLUTION INTRAVENOUS at 09:10

## 2024-04-16 RX ADMIN — GLYCOPYRROLATE 0.2 MG: 0.2 INJECTION INTRAMUSCULAR; INTRAVENOUS at 11:31

## 2024-04-16 RX ADMIN — ROCURONIUM BROMIDE 20 MG: 10 INJECTION, SOLUTION INTRAVENOUS at 09:39

## 2024-04-16 RX ADMIN — ACETAMINOPHEN 1000 MG: 500 TABLET ORAL at 22:00

## 2024-04-16 RX ADMIN — DIPHENHYDRAMINE HYDROCHLORIDE 50 MG: 50 INJECTION, SOLUTION INTRAMUSCULAR; INTRAVENOUS at 09:21

## 2024-04-16 RX ADMIN — MIDAZOLAM 2 MG: 1 INJECTION, SOLUTION INTRAMUSCULAR; INTRAVENOUS at 08:37

## 2024-04-16 RX ADMIN — VANCOMYCIN HYDROCHLORIDE 1000 MG: 1 INJECTION, POWDER, LYOPHILIZED, FOR SOLUTION INTRAVENOUS at 08:46

## 2024-04-16 ASSESSMENT — PAIN DESCRIPTION - ORIENTATION
ORIENTATION: LEFT

## 2024-04-16 ASSESSMENT — PAIN SCALES - GENERAL
PAINLEVEL_OUTOF10: 4
PAINLEVEL_OUTOF10: 0
PAINLEVEL_OUTOF10: 0
PAINLEVEL_OUTOF10: 6
PAINLEVEL_OUTOF10: 0
PAINLEVEL_OUTOF10: 8
PAINLEVEL_OUTOF10: 0
PAINLEVEL_OUTOF10: 0
PAINLEVEL_OUTOF10: 8

## 2024-04-16 ASSESSMENT — PAIN - FUNCTIONAL ASSESSMENT
PAIN_FUNCTIONAL_ASSESSMENT: ACTIVITIES ARE NOT PREVENTED

## 2024-04-16 ASSESSMENT — PAIN DESCRIPTION - DESCRIPTORS
DESCRIPTORS: STABBING
DESCRIPTORS: ACHING
DESCRIPTORS: STABBING
DESCRIPTORS: ACHING;DISCOMFORT

## 2024-04-16 ASSESSMENT — PAIN DESCRIPTION - LOCATION
LOCATION: ANKLE

## 2024-04-16 NOTE — PROGRESS NOTES
OT orders received and chart reviewed. Pt pending OR this afternoon. Will follow up post procedure. Please update weightbearing and activity status as needed.        Thank you for this referral,  Brittany Giron MS, OTR/L

## 2024-04-16 NOTE — ANESTHESIA POSTPROCEDURE EVALUATION
Department of Anesthesiology  Postprocedure Note    Patient: Aspen Gallegos  MRN: [id number]  YOB: 1960  Date of evaluation: 4/16/2024    Procedure Summary       Date: 04/16/24 Room / Location: [facility] MAIN 06 / [facility] MAIN OR    Anesthesia Start: 0910 Anesthesia Stop: 1155    Procedure: LEFT ANKLE OPEN REDUCTION INTERNAL FIXATION/ C-ARM (Left: Ankle) Diagnosis:       Closed fracture of left ankle, initial encounter      (Closed fracture of left ankle, initial encounter [S82.892A])    Surgeons: Williams Mclain MD Responsible Provider: Mario Olivera MD    Anesthesia Type: General, Regional ASA Status: 3            Anesthesia Type: General, Regional    Herminia Phase I: Herminia Score: 9    Herminia Phase II:      Anesthesia Post Evaluation    Patient location during evaluation: bedside  Patient participation: complete - patient participated  Level of consciousness: responsive to verbal stimuli  Airway patency: patent  Nausea & Vomiting: no nausea  Respiratory status: acceptable  Hydration status: euvolemic    No notable events documented.

## 2024-04-16 NOTE — INTERVAL H&P NOTE
Update History & Physical    The patient's History and Physical of April 16, 2024 was reviewed with the patient and I examined the patient. There was no change. The surgical site was confirmed by the patient and me.     Plan: The risks, benefits, expected outcome, and alternative to the recommended procedure have been discussed with the patient. Patient understands and wants to proceed with the procedure.     Electronically signed by Williams Mclain MD on 4/16/2024 at 8:21 AM

## 2024-04-16 NOTE — PERIOP NOTE
TRANSFER - OUT REPORT:    Verbal report given to Carli GALLAGHER on Aspen Gallegos  being transferred to [address] [place] [address] for routine post-op       Report consisted of patient's Situation, Background, Assessment and   Recommendations(SBAR).     Information from the following report(s) Nurse Handoff Report, Adult Overview, and Surgery Report was reviewed with the receiving nurse.           Lines:   Peripheral IV 04/15/24 Left Antecubital (Active)   Site Assessment Clean, dry & intact 04/16/24 1219   Line Status Infusing 04/16/24 1219   Line Care Connections checked and tightened 04/16/24 1219   Phlebitis Assessment No symptoms 04/16/24 1219   Infiltration Assessment 0 04/16/24 1219   Alcohol Cap Used No 04/16/24 1152   Dressing Status Clean, dry & intact 04/16/24 1219   Dressing Type Transparent 04/16/24 1219        Opportunity for questions and clarification was provided.      Patient transported with:  O2 @ 2lpm and Tech

## 2024-04-16 NOTE — ANESTHESIA PROCEDURE NOTES
Peripheral Block    Patient location during procedure: pre-op  Reason for block: post-op pain management and at surgeon's request  Start time: 4/16/2024 8:37 AM  End time: 4/16/2024 8:46 AM  Staffing  Performed: anesthesiologist   Anesthesiologist: Mario Olivera MD  Performed by: Mario Olivera MD  Authorized by: Mario Olivera MD    Preanesthetic Checklist  Completed: patient identified, IV checked, site marked, risks and benefits discussed, surgical/procedural consents, equipment checked, pre-op evaluation, timeout performed, anesthesia consent given, oxygen available, monitors applied/VS acknowledged, fire risk safety assessment completed and verbalized and blood product R/B/A discussed and consented  Peripheral Block   Patient position: right lateral decubitus  Prep: ChloraPrep  Provider prep: mask and sterile gloves  Patient monitoring: continuous pulse ox, cardiac monitor, continuous capnometry, frequent blood pressure checks, IV access, oxygen and responsive to questions  Block type: Sciatic  Popliteal  Laterality: left  Injection technique: single-shot  Guidance: ultrasound guided  Local infiltration: ropivacaine  Infiltration strength: 0.5 %  Local infiltration: ropivacaine  Dose: 30 mL    Needle   Needle gauge: 21 G  Needle localization: ultrasound guidance  Needle length: 10 cm  Assessment   Injection assessment: negative aspiration for heme, low pressure verified by pressure monitor, no paresthesia on injection, local visualized surrounding nerve on ultrasound and no intravascular symptoms  Paresthesia pain: none  Slow fractionated injection: yes  Hemodynamics: stable  Outcomes: uncomplicated    Medications Administered  fentaNYL (SUBLIMAZE) injection - IntraVENous   100 mcg - 4/16/2024 8:37:00 AM  midazolam (VERSED) injection 2 mg/2mL - IntraVENous   2 mg - 4/16/2024 8:37:00 AM

## 2024-04-16 NOTE — OP NOTE
Operative Note      Patient: Aspen Gallegos     Date of Surgery: 4/16/2024    YOB: 1960      Age:  63 y.o.        LOS:  LOS: 1 day       Preoperative Diagnosis:  Bimalleolar fracture dislocation left ankle     Postoperative Diagnosis: * Same     Surgeon:  Williams Mclain MD     Assistant:   Rosalind Rogers     Anesthesia:  General anesthesia    Procedure:  Procedure(s):  LEFT ANKLE OPEN REDUCTION INTERNAL FIXATION/ C-ARM    Time out performed: YES    Estimated Blood Loss:  min           Implants:    Implant Name Type Inv. Item Serial No.  Lot No. LRB No. Used Action   PLATE BNE L105MM 5 H NONSTERILE L DST LAT FIBULAR S STL ALBARO - [id number]  PLATE BNE L105MM 5 H NONSTERILE L DST LAT FIBULAR S STL ALBARO  DEPUY SYNTHES [place]-WD N/A Left 1 Implanted   SCREW BNE L14MM DIA2.7MM ANK S STL ST ALBARO ANG MARILEE FULL THRD - [id number]  SCREW BNE L14MM DIA2.7MM ANK S STL ST ALBARO ANG MARILEE FULL THRD  DEPUY SYNTHES [place]-WD N/A Left 3 Implanted   SCREW BNE L12MM DIA2.7MM ANK S STL ST ALBARO ANG MARILEE FULL THRD - [id number]  SCREW BNE L12MM DIA2.7MM ANK S STL ST ALBARO ANG MARILEE FULL THRD  DEPUY SYNTHES [place]-WD N/A Left 1 Implanted   SCREW BNE L16MM DIA2.7MM ANK S STL ST ALBARO ANG MARILEE FULL THRD - [id number]  SCREW BNE L16MM DIA2.7MM ANK S STL ST ALBARO ANG MARILEE FULL THRD  DEPUY SYNTHES [place]-WD N/A Left 1 Implanted   SCREW BNE L20MM DIA2.7MM ANK S STL ST ALBARO ANG MARILEE FULL THRD - [id number]  SCREW BNE L20MM DIA2.7MM ANK S STL ST ALBARO ANG MARILEE FULL THRD  DEPUY SYNTHES [place]-WD N/A Left 1 Implanted   SCREW BNE L18MM DIA2.7MM ANK S STL ST ALBARO ANG MARILEE FULL THRD - [id number]  SCREW BNE L18MM DIA2.7MM ANK S STL ST ALBARO ANG MARILEE FULL THRD  DEPUY SYNTHES [place]-WD N/A Left 1 Implanted   SCREW BNE HDLSS LNG THRD 4X40 MM 16 MM PAPI ST SD TI GRN NS - [id number]  SCREW BNE HDLSS LNG THRD 4X40 MM 16 MM PAPI ST SD TI GRN NS  [employer] [place]-WD N/A Left 1 Implanted       Specimens: * No specimens in log *            Complications:  None    DESCRIPTION  OF PROCEDURE: After satisfactory general anesthesia with the patient in the supine position, the patient's left foot, ankle and lower extremity were prepped with ChloraPrep solution and draped in the usual fashion for foot and ankle surgery. The patient's lower extremity was exsanguinated with elevation and the tourniquet was inflated to 350 mmHg. A lateral skin incision was made overlying the distal fibula and lateral malleolus. The lateral malleolar fracture site was identified and debrided of adherent clot and soft tissue debris. The fracture was reduced. A 2.7 variable angle locking distal fibular plate was applied to the reduced fracture fragments and fixed with a combination of locking and non locking screws.  Adequacy of reduction, fixation, and screw length were verified using the C-arm image intensifier.     Next, attention was directed to the medial side. An anteromedial skin incision was made and was carried down through the subcutaneous tissue. Care was taken to preserve and protect the saphenous vein and nerve. The medial malleolar fracture site was carefully debrided of soft tissue debris and clot.  The fracture was reduced anatomically and fixed with one 4.0 mm Synthes headless cannulated compression screw. The wound was irrigated thoroughly. Closure was obtained using 2-0 Vicryl for the subcutaneous tissue, and staples for the skin. Ms. Gallegos was transported back to the recovery room in good condition after application of a soft tissue dressing and well-padded short-leg cast. Sponge and needle counts were correct.

## 2024-04-16 NOTE — PLAN OF CARE
Problem: Discharge Planning  Goal: Discharge to home or other facility with appropriate resources  Outcome: Progressing  Flowsheets (Taken 4/15/2024 2030 by Yaa Andrews, RN)  Discharge to home or other facility with appropriate resources: Identify barriers to discharge with patient and caregiver     Problem: Pain  Goal: Verbalizes/displays adequate comfort level or baseline comfort level  Outcome: Progressing  Flowsheets (Taken 4/16/2024 0328 by Yaa Andrews, RN)  Verbalizes/displays adequate comfort level or baseline comfort level: Encourage patient to monitor pain and request assistance     Problem: Skin/Tissue Integrity  Goal: Absence of new skin breakdown  Description: 1.  Monitor for areas of redness and/or skin breakdown  2.  Assess vascular access sites hourly  3.  Every 4-6 hours minimum:  Change oxygen saturation probe site  4.  Every 4-6 hours:  If on nasal continuous positive airway pressure, respiratory therapy assess nares and determine need for appliance change or resting period.  Outcome: Progressing     Problem: Safety - Adult  Goal: Free from fall injury  Outcome: Progressing  Flowsheets (Taken 4/16/2024 1525)  Free From Fall Injury: Instruct family/caregiver on patient safety     Problem: ABCDS Injury Assessment  Goal: Absence of physical injury  Outcome: Progressing  Flowsheets (Taken 4/16/2024 1525)  Absence of Physical Injury: Implement safety measures based on patient assessment

## 2024-04-16 NOTE — PERIOP NOTE
TRANSFER - IN REPORT:    Verbal report received from ELLIOTT Boyce  on Aspen Gallegos  being received from Room 511 for ordered procedure      Report consisted of patient's Situation, Background, Assessment and   Recommendations(SBAR).     Information from the following report(s) Nurse Handoff Report was reviewed with the receiving nurse.    Opportunity for questions and clarification was provided.      Assessment completed upon patient's arrival to unit and care assumed.

## 2024-04-16 NOTE — PROGRESS NOTES
[facility]  Progress Note    Patient: Aspen Gallegos Age: 63 y.o. : 1960 MR#: [id number] SSN: [id number]      Subjective/24-hour events:     Nothing acute.    Assessment:   L bimalleolar ankle fracture  Asthma without acute exacerbation  Hyperlipidemia  GERD  GUSTAVO, not on home CPAP  Class II obesity    Plan:   Routine postoperative surgical management per orthopedics.  PT/OT pending Ortho clearance.  Disposition to be determined.    Case discussed with:  [x]Patient  []Family  [x] Nursing  [x]Case Management  DVT Prophylaxis:  []Lovenox  []Hep SQ  []SCDs  []Coumadin   []On Heparin gtt []PO anticoagulant    Objective:   VS: /73   Pulse 83   Temp 98.2 °F (36.8 °C) (Oral)   Resp 12   Ht 1.676 m (5' 6\")   Wt 102.7 kg (226 lb 6.4 oz)   SpO2 93%   BMI 36.54 kg/m²      Tmax/24hrs: Temp (24hrs), Av.1 °F (36.7 °C), Min:98 °F (36.7 °C), Max:98.2 °F (36.8 °C)    Intake/Output Summary (Last 24 hours) at 2024 0854  Last data filed at 2024 0700  Gross per 24 hour   Intake 1857.92 ml   Output --   Net 1857.92 ml   .    Current Facility-Administered Medications   Medication Dose Route Frequency    lactated ringers IV soln infusion   IntraVENous Continuous    fentaNYL (SUBLIMAZE) injection 100 mcg  100 mcg IntraVENous Once PRN    midazolam PF (VERSED) injection 2 mg  2 mg IntraVENous Once PRN    vancomycin 1000 mg IVPB in 250 mL NS addavial  1,000 mg IntraVENous On Call to OR    ROPivacaine (NAROPIN) 0.5% injection 30 mL  30 mL Other Once    0.9 % sodium chloride infusion   IntraVENous Continuous    aspirin EC tablet 81 mg  81 mg Oral Daily    atorvastatin (LIPITOR) tablet 40 mg  40 mg Oral Daily    vitamin B-12 (CYANOCOBALAMIN) tablet 1,000 mcg  1,000 mcg Oral Daily    fluticasone (FLONASE) 50 MCG/ACT nasal spray 1 spray  1 spray Each Nostril Daily    pantoprazole (PROTONIX) tablet 40 mg  40 mg Oral Daily    sodium chloride flush 0.9 % injection 5-40 mL   5-40 mL IntraVENous 2 times per day    sodium chloride flush 0.9 % injection 5-40 mL  5-40 mL IntraVENous PRN    0.9 % sodium chloride infusion   IntraVENous PRN    potassium chloride 10 mEq/100 mL IVPB (Peripheral Line)  10 mEq IntraVENous PRN    magnesium sulfate 2000 mg in 50 mL IVPB premix  2,000 mg IntraVENous PRN    ondansetron (ZOFRAN-ODT) disintegrating tablet 4 mg  4 mg Oral Q8H PRN    Or    ondansetron (ZOFRAN) injection 4 mg  4 mg IntraVENous Q6H PRN    polyethylene glycol (GLYCOLAX) packet 17 g  17 g Oral Daily PRN    acetaminophen (TYLENOL) tablet 650 mg  650 mg Oral Q6H PRN    Or    acetaminophen (TYLENOL) suppository 650 mg  650 mg Rectal Q6H PRN    morphine (PF) injection 2 mg  2 mg IntraVENous Q6H PRN    oxyCODONE (ROXICODONE) immediate release tablet 5 mg  5 mg Oral Q4H PRN    ipratropium 0.5 mg-albuterol 2.5 mg (DUONEB) nebulizer solution 1 Dose  1 Dose Inhalation Q4H PRN        Labs:    Recent Results (from the past 24 hour(s))   Comprehensive Metabolic Panel    Collection Time: 04/15/24  5:57 PM   Result Value Ref Range    Sodium 140 136 - 145 mmol/L    Potassium 4.0 3.5 - 5.5 mmol/L    Chloride 113 (H) 100 - 111 mmol/L    CO2 29 21 - 32 mmol/L    Anion Gap NEG 2 3.0 - 18 mmol/L    Glucose 113 (H) 74 - 99 mg/dL    BUN 7 7.0 - 18 MG/DL    Creatinine 0.81 0.6 - 1.3 MG/DL    Bun/Cre Ratio 9 (L) 12 - 20      Est, Glom Filt Rate 83 >60 ml/min/1.73m2    Calcium 8.7 8.5 - 10.1 MG/DL    Total Bilirubin 0.9 0.2 - 1.0 MG/DL    ALT 17 13 - 56 U/L    AST 17 10 - 38 U/L    Alk Phosphatase 53 45 - 117 U/L    Total Protein 6.7 6.4 - 8.2 g/dL    Albumin 3.3 (L) 3.4 - 5.0 g/dL    Globulin 3.4 2.0 - 4.0 g/dL    Albumin/Globulin Ratio 1.0 0.8 - 1.7     CBC with Auto Differential    Collection Time: 04/16/24  3:52 AM   Result Value Ref Range    WBC 6.0 4.6 - 13.2 K/uL    RBC 3.71 (L) 4.20 - 5.30 M/uL    Hemoglobin 11.2 (L) 12.0 - 16.0 g/dL    Hematocrit 35.7 35.0 - 45.0 %    MCV 96.2 78.0 - 100.0 FL    MCH 30.2

## 2024-04-16 NOTE — PLAN OF CARE
Problem: Pain  Goal: Verbalizes/displays adequate comfort level or baseline comfort level  Outcome: Progressing  Flowsheets (Taken 4/15/2024 2030)  Verbalizes/displays adequate comfort level or baseline comfort level: Encourage patient to monitor pain and request assistance     Problem: Skin/Tissue Integrity  Goal: Absence of new skin breakdown  Description: 1.  Monitor for areas of redness and/or skin breakdown  2.  Assess vascular access sites hourly  3.  Every 4-6 hours minimum:  Change oxygen saturation probe site  4.  Every 4-6 hours:  If on nasal continuous positive airway pressure, respiratory therapy assess nares and determine need for appliance change or resting period.  Outcome: Progressing     Problem: Safety - Adult  Goal: Free from fall injury  Outcome: Progressing     Problem: ABCDS Injury Assessment  Goal: Absence of physical injury  Outcome: Progressing     Problem: Discharge Planning  Goal: Discharge to home or other facility with appropriate resources  Outcome: Progressing  Flowsheets (Taken 4/15/2024 2030)  Discharge to home or other facility with appropriate resources: Identify barriers to discharge with patient and caregiver

## 2024-04-16 NOTE — PROGRESS NOTES
conducted an initial consultation and Spiritual Assessment for Aspen Gallegos, who is a 63 y.o.,female. Patient's Primary Language is: English.   According to the patient's EMR Taoism Affiliation is: Oriental orthodox.     The reason the Patient came to the hospital is:   Patient Active Problem List    Diagnosis Date Noted    Closed fracture of left ankle 04/16/2024    Closed fracture dislocation of left ankle 04/15/2024    Bimalleolar ankle fracture, left, closed, initial encounter 04/15/2024    Ankle fracture, bimalleolar, closed, left, sequela 04/15/2024    Malleolar fracture, left, closed, initial encounter 04/15/2024    Snoring 01/09/2024    TIA (transient ischemic attack) 08/12/2023    Suspected stroke patient last known to be well 2 to 3 hours ago 08/12/2023    Severe obesity (HCC) 03/24/2020    Rectovaginal fistula 06/27/2018    Lower GI bleed 06/27/2018    Symptomatic anemia 06/27/2018    Syncope 11/27/2017    Non-rheumatic mitral regurgitation 11/27/2017    Coronary atherosclerosis of native coronary artery     PVC (premature ventricular contraction) 01/28/2016    PFO (patent foramen ovale) 01/28/2016    Obesity     Hyperlipidemia     Chest pain 01/26/2015    Asthma     Hypercholesterolemia         The  provided the following Interventions:  Initiated a relationship of care and support.   Explored issues of alka, belief, spirituality and Episcopalian/ritual needs while hospitalized.  Listened empathically.  Provided information about Spiritual Care Services.  Chart reviewed.    The following outcomes where achieved:   confirmed Patient's Taoism Affiliation.  Patient expressed gratitude for 's visit.    Assessment:  Patient does not have any Episcopalian/cultural needs that will affect patient's preferences in health care.  There are no spiritual or Episcopalian issues which require intervention at this time.     Plan:  Chaplains will continue to follow and will provide pastoral care on  an as needed/requested basis.   recommends bedside caregivers page  on duty if patient shows signs of acute spiritual or emotional distress.     Linh Pineda  Spiritual Care   [phone]

## 2024-04-16 NOTE — CONSULTS
Consult    Patient: Aspen Gallegos MRN: [id number]  SSN: [id number]    YOB: 1960  Age: 63 y.o.  Sex: female      Subjective:      Aspen Gallegos is a 63 y.o. female referred by the ED provider for left ankle pain status post fall.  Patient slipped and fell on her porch threshold yesterday while holding her grandson.  She twisted her ankle and felt immediate pain.  She was unable to bear weight.  She was transported to [facility] where x-rays revealed a displaced fracture dislocation of the left ankle.  She has a history ofasthma, carpal tunnel syndrome, GERD, hyperthyroidism, PVCs, PFO & sleep apnea.     Past Medical History:   Diagnosis Date    Arthritis     Asthma     Carpal tunnel syndrome, bilateral     Chest pain, unspecified     Possible Angina, GERD, chest wall pains, recurrent pains, possible atypical angina, happens at rest, abnormal nuc scan in past, discussed risk benefit options of cardiac cath/pci, she wants to think it over, add sl ntg    Coronary atherosclerosis of native coronary artery     Abnormal NUC scan, patient's symptoms are better, will start meds and monitor    GERD (gastroesophageal reflux disease)     Heart murmur     Hypercholesterolemia     Hyperthyroidism     Ill-defined condition     Patent Foramen Ovale- asymptomatic    Obesity, unspecified     Discussed diet    Other and unspecified hyperlipidemia     no longer on meds    Pain of right thumb     Palpitations     PVC's (premature ventricular contractions)     Sleep apnea     no cpap    Thyroid nodule     bilateral    Trigger thumb of right hand      Past Surgical History:   Procedure Laterality Date    ANOSCOPY DX W/COLLJ SPEC BR/WA SPX WHEN PRFRMD N/A 2017    Dr. Lopes     SECTION      CHOLECYSTECTOMY      COLONOSCOPY N/A 2018    COLONOSCOPY with clips, gold probe performed by KERRI Barney MD at [facility] ENDOSCOPY    COLONOSCOPY      OTHER SURGICAL HISTORY      Subcutaneous

## 2024-04-16 NOTE — PROGRESS NOTES
PT orders received and chart reviewed. Pending OR this afternoon. Will follow up post procedure. Please update weightbearing and activity status as needed.

## 2024-04-16 NOTE — ANESTHESIA PRE PROCEDURE
D64.9   • Syncope R55   • Severe obesity (HCC) E66.01   • Non-rheumatic mitral regurgitation I34.0   • Chest pain R07.9   • Hypercholesterolemia E78.00   • TIA (transient ischemic attack) G45.9   • Suspected stroke patient last known to be well 2 to 3 hours ago R09.89   • Snoring R06.83   • Closed fracture dislocation of left ankle S82.892A   • Bimalleolar ankle fracture, left, closed, initial encounter S82.842A   • Ankle fracture, bimalleolar, closed, left, sequela S82.842S   • Malleolar fracture, left, closed, initial encounter S82.892A   • Closed fracture of left ankle S82.892A       Past Medical History:        Diagnosis Date   • Arthritis    • Asthma    • Carpal tunnel syndrome, bilateral    • Chest pain, unspecified     Possible Angina, GERD, chest wall pains, recurrent pains, possible atypical angina, happens at rest, abnormal nuc scan in past, discussed risk benefit options of cardiac cath/pci, she wants to think it over, add sl ntg   • Coronary atherosclerosis of native coronary artery     Abnormal NUC scan, patient's symptoms are better, will start meds and monitor   • GERD (gastroesophageal reflux disease)    • Heart murmur    • Hypercholesterolemia    • Hyperthyroidism    • Ill-defined condition     Patent Foramen Ovale- asymptomatic   • Obesity, unspecified     Discussed diet   • Other and unspecified hyperlipidemia     no longer on meds   • Pain of right thumb    • Palpitations    • PVC's (premature ventricular contractions)    • Sleep apnea     no cpap   • Thyroid nodule     bilateral   • Trigger thumb of right hand        Past Surgical History:        Procedure Laterality Date   • ANOSCOPY DX W/COLLJ SPEC BR/WA SPX WHEN PRFRMD N/A 2017    Dr. Lopes   •  SECTION     • CHOLECYSTECTOMY     • COLONOSCOPY N/A 2018    COLONOSCOPY with clips, gold probe performed by KERRI Barney MD at [facility] ENDOSCOPY   • COLONOSCOPY     • OTHER SURGICAL HISTORY      Subcutaneous fistulotomy

## 2024-04-16 NOTE — BRIEF OP NOTE
Brief Postoperative Note      Patient: Aspen Gallegos  YOB: 1960  MRN: [id number]    Date of Procedure: 4/16/2024    Pre-Op Diagnosis Codes:     *Bimalleolar fracture dislocation left ankle    Post-Op Diagnosis: Same       Procedure(s):  LEFT ANKLE OPEN REDUCTION INTERNAL FIXATION/ C-ARM    Surgeon(s):  Williams Mclain MD    Assistant:  Surgical Assistant: Rosalind Rogers    Anesthesia: General    Estimated Blood Loss (mL): Minimal    Complications: None    Specimens:   * No specimens in log *    Implants:  Implant Name Type Inv. Item Serial No.  Lot No. LRB No. Used Action   PLATE BNE L105MM 5 H NONSTERILE L DST LAT FIBULAR S STL ALBARO - [id number]  PLATE BNE L105MM 5 H NONSTERILE L DST LAT FIBULAR S STL ALBARO  DEPUY SYNTHES [place] N/A Left 1 Implanted   SCREW BNE L14MM DIA2.7MM ANK S STL ST ALBARO ANG MARILEE FULL THRD - [id number]  SCREW BNE L14MM DIA2.7MM ANK S STL ST ALBARO ANG MARILEE FULL THRD  DEPUY SYNTHES [place]-WD N/A Left 3 Implanted   SCREW BNE L12MM DIA2.7MM ANK S STL ST ALBARO ANG MARILEE FULL THRD - [id number]  SCREW BNE L12MM DIA2.7MM ANK S STL ST ALBARO ANG MARILEE FULL THRD  DEPUY SYNTHES [place]-WD N/A Left 1 Implanted   SCREW BNE L16MM DIA2.7MM ANK S STL ST ALBARO ANG MARILEE FULL THRD - [id number]  SCREW BNE L16MM DIA2.7MM ANK S STL ST ALBARO ANG MARILEE FULL THRD  DEPUY SYNTHES USA-WD N/A Left 1 Implanted   SCREW BNE L20MM DIA2.7MM ANK S STL ST ALBARO ANG MARILEE FULL THRD - [id number]  SCREW BNE L20MM DIA2.7MM ANK S STL ST ALBARO ANG MARILEE FULL THRD  DEPUY SYNTHES [place]-WD N/A Left 1 Implanted   SCREW BNE L18MM DIA2.7MM ANK S STL ST ALBARO ANG MARILEE FULL THRD - [id number]  SCREW BNE L18MM DIA2.7MM ANK S STL ST ALBARO ANG MARILEE FULL THRD  DEPUY SYNTHES [place]-WD N/A Left 1 Implanted   SCREW BNE HDLSS LNG THRD 4X40 MM 16 MM PAPI ST SD TI GRN NS - [id number]  SCREW BNE HDLSS LNG THRD 4X40 MM 16 MM PAPI ST SD TI GRN NS  DEPUY SYNTHES [place]-WD N/A Left 1 Implanted         Drains: * No LDAs found *    Findings:  Infection Present At Time Of Surgery

## 2024-04-17 VITALS
WEIGHT: 252.8 LBS | HEIGHT: 66 IN | BODY MASS INDEX: 40.63 KG/M2 | HEART RATE: 82 BPM | RESPIRATION RATE: 19 BRPM | SYSTOLIC BLOOD PRESSURE: 113 MMHG | TEMPERATURE: 98 F | OXYGEN SATURATION: 96 % | DIASTOLIC BLOOD PRESSURE: 76 MMHG

## 2024-04-17 PROCEDURE — G0378 HOSPITAL OBSERVATION PER HR: HCPCS

## 2024-04-17 PROCEDURE — 6370000000 HC RX 637 (ALT 250 FOR IP): Performed by: INTERNAL MEDICINE

## 2024-04-17 PROCEDURE — 94761 N-INVAS EAR/PLS OXIMETRY MLT: CPT

## 2024-04-17 PROCEDURE — 97530 THERAPEUTIC ACTIVITIES: CPT

## 2024-04-17 PROCEDURE — 97166 OT EVAL MOD COMPLEX 45 MIN: CPT

## 2024-04-17 PROCEDURE — 2580000003 HC RX 258: Performed by: SPECIALIST

## 2024-04-17 PROCEDURE — 97535 SELF CARE MNGMENT TRAINING: CPT

## 2024-04-17 PROCEDURE — 6370000000 HC RX 637 (ALT 250 FOR IP): Performed by: SPECIALIST

## 2024-04-17 PROCEDURE — 6360000002 HC RX W HCPCS: Performed by: SPECIALIST

## 2024-04-17 PROCEDURE — 51798 US URINE CAPACITY MEASURE: CPT

## 2024-04-17 PROCEDURE — 97162 PT EVAL MOD COMPLEX 30 MIN: CPT

## 2024-04-17 PROCEDURE — 99238 HOSP IP/OBS DSCHRG MGMT 30/<: CPT | Performed by: FAMILY MEDICINE

## 2024-04-17 PROCEDURE — 2700000000 HC OXYGEN THERAPY PER DAY

## 2024-04-17 PROCEDURE — 96375 TX/PRO/DX INJ NEW DRUG ADDON: CPT

## 2024-04-17 RX ORDER — OXYCODONE HYDROCHLORIDE 5 MG/1
5 TABLET ORAL EVERY 6 HOURS PRN
Qty: 20 TABLET | Refills: 0 | Status: SHIPPED | OUTPATIENT
Start: 2024-04-17 | End: 2024-04-19 | Stop reason: SINTOL

## 2024-04-17 RX ORDER — POLYETHYLENE GLYCOL 3350 17 G/17G
17 POWDER, FOR SOLUTION ORAL DAILY
Qty: 30 PACKET | Refills: 0 | Status: SHIPPED | OUTPATIENT
Start: 2024-04-18 | End: 2024-05-18

## 2024-04-17 RX ADMIN — BISACODYL 5 MG: 5 TABLET, COATED ORAL at 08:32

## 2024-04-17 RX ADMIN — KETOROLAC TROMETHAMINE 15 MG: 15 INJECTION, SOLUTION INTRAMUSCULAR; INTRAVENOUS at 05:17

## 2024-04-17 RX ADMIN — Medication 1000 MCG: at 08:31

## 2024-04-17 RX ADMIN — ACETAMINOPHEN 1000 MG: 500 TABLET ORAL at 05:17

## 2024-04-17 RX ADMIN — ASPIRIN 81 MG: 81 TABLET, COATED ORAL at 08:31

## 2024-04-17 RX ADMIN — SODIUM CHLORIDE, PRESERVATIVE FREE 10 ML: 5 INJECTION INTRAVENOUS at 08:34

## 2024-04-17 RX ADMIN — FLUTICASONE PROPIONATE 1 SPRAY: 50 SPRAY, METERED NASAL at 08:39

## 2024-04-17 RX ADMIN — POLYETHYLENE GLYCOL 3350 17 G: 17 POWDER, FOR SOLUTION ORAL at 08:38

## 2024-04-17 RX ADMIN — PANTOPRAZOLE SODIUM 40 MG: 40 TABLET, DELAYED RELEASE ORAL at 08:32

## 2024-04-17 ASSESSMENT — PAIN SCALES - GENERAL
PAINLEVEL_OUTOF10: 3
PAINLEVEL_OUTOF10: 0
PAINLEVEL_OUTOF10: 0

## 2024-04-17 ASSESSMENT — PAIN - FUNCTIONAL ASSESSMENT: PAIN_FUNCTIONAL_ASSESSMENT: ACTIVITIES ARE NOT PREVENTED

## 2024-04-17 NOTE — DISCHARGE SUMMARY
Discharge Summary    Patient: Aspen Gallegos MRN: [id number]  CSN: [id number]    YOB: 1960  Age: 63 y.o.  Sex: female    DOA: 4/15/2024 LOS:  LOS: 2 days   Discharge Date: 4/17/2024     Admission Diagnoses: Closed fracture of left ankle, initial encounter [S82.892A]  Ankle fracture, bimalleolar, closed, left, sequela [S82.842S]  Malleolar fracture, left, closed, initial encounter [S82.892A]    Discharge Diagnoses:    Bimalleolar left ankle fracture status post ORIF  Asthma without acute exacerbation  Hyperlipidemia  GERD  GUSTAVO, not on home CPAP  Class II obesity    Discharge Condition: Stable    PHYSICAL EXAM  Visit Vitals  /65   Pulse 75   Temp 98 °F (36.7 °C) (Oral)   Resp 17   Ht 1.676 m (5' 6\")   Wt 114.7 kg (252 lb 12.8 oz)   SpO2 98%   BMI 40.80 kg/m²       General: In NAD.  Nontoxic-appearing.  HEENT: NCAT.  Sclerae anicteric, EOMI.    Lungs:  Clear, no wheezes.  No accessory muscle use.  Heart:  RRR.  Abdomen: Soft, NT/ND.  Extremities: Warm, no edema or ischemia.  LLE cast in place below knee.  Psych:   Mood normal.  Neurologic:  Awake and alert, moves extremities spontaneously.  Motor grossly nonfocal.    Hospital Course:   See admission H&P for full details of HPI.  Patient was admitted to the hospital after presenting to the ED following a fall.  Patient reports tripping over the threshold of the doorway as she was attempting to leave her home.  There was no head trauma or loss of consciousness.  Evaluation done in the ED showed unremarkable laboratory studies but imaging was consistent with a bimalleolar fracture of the left ankle.  Orthopedic surgery was consulted.  Patient has undergone open reduction/internal fixation of left ankle fracture and is recovering well.  Physical and Occupational Therapy have seen the patient postoperatively.  Recommendations are for rolling walker in shower chair at discharge.  Patient is declining home health care services.  She is medically stable for

## 2024-04-17 NOTE — PROGRESS NOTES
Physical Therapy    PHYSICAL THERAPY EVALUATION/DISCHARGE    Patient: Aspen Gallegos (63 y.o. female)  Date: 4/17/2024  Primary Diagnosis: Closed fracture of left ankle, initial encounter [S82.892A]  Ankle fracture, bimalleolar, closed, left, sequela [S82.842S]  Malleolar fracture, left, closed, initial encounter [S82.892A]  Procedure(s) (LRB):  LEFT ANKLE OPEN REDUCTION INTERNAL FIXATION/ C-ARM (Left) 1 Day Post-Op   Precautions: Weight Bearing, Fall Risk,  , Left Lower Extremity Weight Bearing: Non Weight Bearing   PLOF: lives with children in a 1  with 4 LINDSAY, ind, no DME      ASSESSMENT AND RECOMMENDATIONS:  Pt received in recliner and agreeable, aware of NWB to LLE. Denies pain. RLE WFL with ROM and strength. Supervision to stand to RW. Tolerates 30 ft x 2 of hop to pattern on the R with safe management of AD, adequate safe foot clearance noted. Pt able to hop up/down 4 steps with CGA to mock home entry. Educated pt and daughter on safety for stair training. Returns to room with all needs met. Will sign off. Safe for discharge home, pt nurse notified.     Patient does not require further skilled physical therapy intervention at this level of care.    Further Equipment Recommendations for Discharge: RW and shower chair    AMPA: AM-PAC Inpatient Mobility Raw Score : 19      Current research shows that an AM-PAC score of 18 (14 without stairs) or greater is associated with a discharge to the patient's home setting. Based on an AM-PAC score and their current functional mobility deficits, it is recommended that the patient have 2-3 sessions per week of Physical Therapy at d/c to increase the patient's independence.    This AMPAC score should be considered in conjunction with interdisciplinary team recommendations to determine the most appropriate discharge setting. Patient's social support, diagnosis, medical stability, and prior level of function should also be taken into consideration.     SUBJECTIVE:   Patient

## 2024-04-17 NOTE — PROGRESS NOTES
Physical Therapy    Pt is safe for discharge home from PT standpoint. Recommend RW. Full note to follow. Alayna Lazo, PT, DPT

## 2024-04-17 NOTE — PLAN OF CARE
Problem: Pain  Goal: Verbalizes/displays adequate comfort level or baseline comfort level  4/16/2024 2055 by Yaa Andrews RN  Outcome: Progressing  Flowsheets (Taken 4/16/2024 1950)  Verbalizes/displays adequate comfort level or baseline comfort level: Encourage patient to monitor pain and request assistance  4/16/2024 1525 by Dora Castellanos RN  Outcome: Progressing  Flowsheets (Taken 4/16/2024 0328 by Yaa Andrews RN)  Verbalizes/displays adequate comfort level or baseline comfort level: Encourage patient to monitor pain and request assistance     Problem: Skin/Tissue Integrity  Goal: Absence of new skin breakdown  Description: 1.  Monitor for areas of redness and/or skin breakdown  2.  Assess vascular access sites hourly  3.  Every 4-6 hours minimum:  Change oxygen saturation probe site  4.  Every 4-6 hours:  If on nasal continuous positive airway pressure, respiratory therapy assess nares and determine need for appliance change or resting period.  4/16/2024 2055 by Yaa Andrews RN  Outcome: Progressing  4/16/2024 1525 by Dora Castellanos RN  Outcome: Progressing     Problem: Safety - Adult  Goal: Free from fall injury  4/16/2024 2055 by Yaa Andrews RN  Outcome: Progressing  4/16/2024 1525 by Dora Castellanos RN  Outcome: Progressing  Flowsheets (Taken 4/16/2024 1525)  Free From Fall Injury: Instruct family/caregiver on patient safety     Problem: ABCDS Injury Assessment  Goal: Absence of physical injury  4/16/2024 2055 by Yaa Andrews RN  Outcome: Progressing  4/16/2024 1525 by Dora Castellanos RN  Outcome: Progressing  Flowsheets (Taken 4/16/2024 1525)  Absence of Physical Injury: Implement safety measures based on patient assessment     Problem: Discharge Planning  Goal: Discharge to home or other facility with appropriate resources  4/16/2024 2055 by Yaa Andrews RN  Outcome: Progressing  4/16/2024 1525 by Dora Castellanos RN  Outcome: Progressing  Flowsheets (Taken 4/15/2024

## 2024-04-17 NOTE — CARE COORDINATION
04/17/24 1047   Discharge Planning   Living Arrangements Children   Support Systems Children   Potential Assistance Needed Durable Medical Equipment   Potential Assistance Purchasing Medications No   Meds-to-Beds: Does the patient want to have any new prescriptions delivered to bedside prior to discharge? Yes   Potential DME Needed Walker  (Shower chair ordered- will be delviered to the patient home.)   Patient expects to be discharged to: House   Expected Discharge Date 04/17/24     EVELIN Stallworth     [phone]

## 2024-04-17 NOTE — PLAN OF CARE
Problem: Discharge Planning  Goal: Discharge to home or other facility with appropriate resources  4/17/2024 0949 by Dora Castellanos RN  Outcome: Progressing  Flowsheets (Taken 4/16/2024 1950 by Yaa Andrews RN)  Discharge to home or other facility with appropriate resources: Identify barriers to discharge with patient and caregiver  4/16/2024 2055 by Yaa Andrews RN  Outcome: Progressing  Flowsheets (Taken 4/16/2024 1950)  Discharge to home or other facility with appropriate resources: Identify barriers to discharge with patient and caregiver     Problem: Pain  Goal: Verbalizes/displays adequate comfort level or baseline comfort level  4/17/2024 0949 by Dora Castellanos RN  Outcome: Progressing  4/16/2024 2055 by Yaa Andrews RN  Outcome: Progressing  Flowsheets (Taken 4/16/2024 1950)  Verbalizes/displays adequate comfort level or baseline comfort level: Encourage patient to monitor pain and request assistance     Problem: Skin/Tissue Integrity  Goal: Absence of new skin breakdown  Description: 1.  Monitor for areas of redness and/or skin breakdown  2.  Assess vascular access sites hourly  3.  Every 4-6 hours minimum:  Change oxygen saturation probe site  4.  Every 4-6 hours:  If on nasal continuous positive airway pressure, respiratory therapy assess nares and determine need for appliance change or resting period.  4/17/2024 0949 by Dora Castellanos RN  Outcome: Progressing  4/16/2024 2055 by Yaa Andrews RN  Outcome: Progressing     Problem: Safety - Adult  Goal: Free from fall injury  4/17/2024 0949 by Dora Castellanos RN  Outcome: Progressing  Flowsheets (Taken 4/16/2024 1525)  Free From Fall Injury: Instruct family/caregiver on patient safety  4/16/2024 2055 by Yaa Andrews RN  Outcome: Progressing     Problem: ABCDS Injury Assessment  Goal: Absence of physical injury  4/17/2024 0949 by Dora Castellanos RN  Outcome: Progressing  Flowsheets (Taken 4/16/2024 1525)  Absence of Physical  Injury: Implement safety measures based on patient assessment  4/16/2024 2055 by Yaa Andrews, RN  Outcome: Progressing

## 2024-04-17 NOTE — CARE COORDINATION
SW spoke with the patient about HH, she declined. The SW ordered the RW/ shower chair via [employer].       SW and the patient signed the proof of delivery form, SW delivered the RW to patient room, Shower chair will be delivered to the patient home.       Vashti Le, EVELIN     [phone]

## 2024-04-17 NOTE — PROGRESS NOTES
OT order received and chart reviewed. Patient currently has an active \"strict bedrest\" order.  Please discontinue \"strict bedrest\" order for full participation in skilled OT evaluation/treatment.          Thank you for this referral,    Brittany Giron MS, OTR/L

## 2024-04-17 NOTE — PROGRESS NOTES
Per Ortho:    Patient appropriate for discharge today when cleared from medicine service.    Appointment 1 PM Friday, 4/19/2024 [facility] location    Office notified appointment scheduled.    Strict nonweightbearing left lower extremity.

## 2024-04-17 NOTE — PROGRESS NOTES
OCCUPATIONAL THERAPY EVALUATION/DISCHARGE    Patient: Aspen Gallegos (63 y.o. female)  Date: 4/17/2024  Primary Diagnosis: Closed fracture of left ankle, initial encounter [S82.892A]  Ankle fracture, bimalleolar, closed, left, sequela [S82.842S]  Malleolar fracture, left, closed, initial encounter [S82.892A]  Procedure(s) (LRB):  LEFT ANKLE OPEN REDUCTION INTERNAL FIXATION/ C-ARM (Left) 1 Day Post-Op   Precautions: Weight Bearing, Fall Risk,  , Left Lower Extremity Weight Bearing: Toe Touch Weight Bearing  PLOF: Patient was independent with self-care and functional mobility PTA.      ASSESSMENT AND RECOMMENDATIONS:  Pt cleared to participate in OT evaluation by RN. Pt supine in bed, alert, and agreeable to participate with daughter present. Pt educated on weight-bearing status, importance of ice/elevation and safety during this admission/around the house. Patient preferred and maintained NWB this session using rolling walker with stand by assistance and able to don underwear seated/standing, SBA with education on compensatory techniques for task. Spoke with patient regarding use of SC and leg sleeve or wrap for bathing with pt/daughter verbalizing understanding. Patient independent for upper body dressing of gown onto backside and left seated in recliner with all needs met and call bell in reach.     Maximum therapeutic gains met at current level of care and patient will be discharged from occupational therapy at this time.    Further Equipment Recommendations for Discharge: shower chair and rolling walker to maintain precautions and safety    [facility]: AM-PAC Inpatient Daily Activity Raw Score: 22    Current research shows that an AM-PAC score of 18 or greater is associated with a discharge to the patient's home setting.  Based on an AM-PAC score and their current ADL deficits; it is recommended that the patient have 2-3 sessions per week of Occupational Therapy at d/c to increase the patient's independence.      This  AMPAC score should be considered in conjunction with interdisciplinary team recommendations to determine the most appropriate discharge setting. Patient's social support, diagnosis, medical stability, and prior level of function should also be taken into consideration.     SUBJECTIVE:   Patient stated “Im ready to go home”    OBJECTIVE DATA SUMMARY:     Past Medical History:   Diagnosis Date    Arthritis     Asthma     Carpal tunnel syndrome, bilateral     Chest pain, unspecified     Possible Angina, GERD, chest wall pains, recurrent pains, possible atypical angina, happens at rest, abnormal nuc scan in past, discussed risk benefit options of cardiac cath/pci, she wants to think it over, add sl ntg    Coronary atherosclerosis of native coronary artery     Abnormal NUC scan, patient's symptoms are better, will start meds and monitor    GERD (gastroesophageal reflux disease)     Heart murmur     Hypercholesterolemia     Hyperthyroidism     Ill-defined condition     Patent Foramen Ovale- asymptomatic    Obesity, unspecified     Discussed diet    Other and unspecified hyperlipidemia     no longer on meds    Pain of right thumb     Palpitations     PVC's (premature ventricular contractions)     Sleep apnea     no cpap    Thyroid nodule     bilateral    Trigger thumb of right hand      Past Surgical History:   Procedure Laterality Date    ANKLE FRACTURE SURGERY Left 2024    LEFT ANKLE OPEN REDUCTION INTERNAL FIXATION/ C-ARM performed by Williams Mclain MD at [facility] MAIN OR    ANOSCOPY DX W/COLLJ SPEC BR/WA SPX WHEN PRFRMD N/A 2017    Dr. Lopes     SECTION      CHOLECYSTECTOMY      COLONOSCOPY N/A 2018    COLONOSCOPY with clips, gold probe performed by KERRI Barney MD at [facility] ENDOSCOPY    COLONOSCOPY      OTHER SURGICAL HISTORY      Subcutaneous fistulotomy posterior vaginal wall.       Home Situation:   Social/Functional History  Lives With: Daughter, Son  Type of Home: House  Home Layout:

## 2024-04-19 ENCOUNTER — OFFICE VISIT (OUTPATIENT)
Age: 64
End: 2024-04-19

## 2024-04-19 VITALS — TEMPERATURE: 98.4 F | HEIGHT: 66 IN | BODY MASS INDEX: 34.39 KG/M2 | WEIGHT: 214 LBS

## 2024-04-19 DIAGNOSIS — G89.18 ACUTE POST-OPERATIVE PAIN: ICD-10-CM

## 2024-04-19 DIAGNOSIS — M25.572 ACUTE LEFT ANKLE PAIN: Primary | ICD-10-CM

## 2024-04-19 RX ORDER — HYDROCODONE BITARTRATE AND ACETAMINOPHEN 5; 325 MG/1; MG/1
1 TABLET ORAL EVERY 6 HOURS PRN
Qty: 28 TABLET | Refills: 0 | Status: SHIPPED | OUTPATIENT
Start: 2024-04-19 | End: 2024-04-26

## 2024-04-19 RX ORDER — ACETAMINOPHEN 500 MG
500 TABLET ORAL EVERY 8 HOURS PRN
Status: SHIPPED | OUTPATIENT
Start: 2024-04-19

## 2024-04-19 NOTE — PROGRESS NOTES
Aspen Gallegos returns to the office 3 days status post severe left ankle fracture dislocation resulting in a bimalleolar fracture with repair using plate and screw fixation.  She is in a poorly fitting postoperative cast.  She has pain over the surgical site.    Cast removed today to reveal over the medial aspect of the left ankle a 8 cm [id number] measured surgical incision with staples noted.  Significant swelling is present surrounding it.  No fracture blisters or evidence of skin breakdown thin bloody drainage noted.  Over the lateral aspect of the ankle craniocaudal there is a 14 cm surgical incision intact with the lower third noting thin bloody discharge from the central portion of the surgical site staples are noted with wound borders well-approximated no wound dehiscence.  The lower 2 cm to the distal tip reveals extending anterior posterior 1.5 cm of macerated tissue.  No fracture blisters noted.    X-rays: [facility] 4/19/2024 space 3 view of the left ankle reveals distal fibular plate and screw fixation with reduced fracture component of the distal spiral fibula.  The medial fracture component spanned with cancellous cannulated screws.  Ankle mortise is balanced.'s skin staples are noted in all imaging.      Plan: Xeroform placed with a bulky dressing over both surgical sites.  Patient is going to stop her aspirin noting the thin bloody discharge.  She was placed in a tall fracture walker which she will remove daily to wound check and change her top dressings.  She will avoid any weightbearing left lower extremity.  Follow-up with our office early next week for wound check.  She should sleep in her fracture walker.

## 2024-04-23 ENCOUNTER — OFFICE VISIT (OUTPATIENT)
Age: 64
End: 2024-04-23

## 2024-04-23 VITALS — BODY MASS INDEX: 34.39 KG/M2 | TEMPERATURE: 97.8 F | WEIGHT: 214 LBS | HEIGHT: 66 IN

## 2024-04-23 DIAGNOSIS — M25.572 ACUTE LEFT ANKLE PAIN: Primary | ICD-10-CM

## 2024-04-23 PROCEDURE — 99024 POSTOP FOLLOW-UP VISIT: CPT | Performed by: PHYSICIAN ASSISTANT

## 2024-04-23 NOTE — PROGRESS NOTES
Aspen Horner returns for wound check associated with her left  ankle ORIF foot.  Her bleeding has improved to almost completely resolved.  She has developed blistering over the inner medial incision line.  Her pain is well-managed.  She is not weightbearing left lower extremity.    At this time she will continue daily sterile dressing changes.  She will continue her fracture walker nonweightbearing left lower extremity.  Will see her back in a week for wound checkup.  As the blisters enlarge she is to avoid popping them.    She is can avoid getting the surgical sites wet.  T-handled knee scooter provided today.

## 2024-04-30 ENCOUNTER — OFFICE VISIT (OUTPATIENT)
Age: 64
End: 2024-04-30

## 2024-04-30 VITALS — HEIGHT: 66 IN | WEIGHT: 214 LBS | TEMPERATURE: 97.8 F | BODY MASS INDEX: 34.39 KG/M2

## 2024-04-30 DIAGNOSIS — S82.842A BIMALLEOLAR ANKLE FRACTURE, LEFT, CLOSED, INITIAL ENCOUNTER: Primary | ICD-10-CM

## 2024-04-30 PROCEDURE — 99024 POSTOP FOLLOW-UP VISIT: CPT | Performed by: PHYSICIAN ASSISTANT

## 2024-04-30 NOTE — PROGRESS NOTES
Aspen Horner returns the office for follow-up regarding her severe ankle fracture with ORIF.  The surgical sites over the medial lateral ankle have matured nicely.  There is no drainage evident at either site.  Wound borders have remained intact with the scattered blistering resolving particularly over the medial surgical site.      procedural: Surgical staples removal completed at both the medial lateral surgical sites.  Minimal bloody drainage noted controlled with a sterile top-cover.    Patient to follow back in 2 weeks with reimaging.  Strict nonweightbearing surgical extremity.

## 2024-05-02 ENCOUNTER — TELEPHONE (OUTPATIENT)
Age: 64
End: 2024-05-02

## 2024-05-02 DIAGNOSIS — S82.842A BIMALLEOLAR ANKLE FRACTURE, LEFT, CLOSED, INITIAL ENCOUNTER: Primary | ICD-10-CM

## 2024-05-02 NOTE — TELEPHONE ENCOUNTER
5/2/24 Patient was called. DME order was put in for a bedside commode. She will call [facility] to get it.

## 2024-05-07 ENCOUNTER — TELEPHONE (OUTPATIENT)
Age: 64
End: 2024-05-07

## 2024-05-07 DIAGNOSIS — M25.572 ACUTE LEFT ANKLE PAIN: Primary | ICD-10-CM

## 2024-05-07 RX ORDER — ACETAMINOPHEN 325 MG/1
650 TABLET ORAL EVERY 6 HOURS PRN
Qty: 120 TABLET | Refills: 3 | Status: SHIPPED | OUTPATIENT
Start: 2024-05-07

## 2024-05-07 RX ORDER — ACETAMINOPHEN 500 MG
500 TABLET ORAL ONCE
Status: DISCONTINUED | OUTPATIENT
Start: 2024-05-07 | End: 2024-05-07

## 2024-05-07 NOTE — TELEPHONE ENCOUNTER
Patient is requesting a refill of pain medication called in to [facility] on [facility] [address].  She cannot recall the name of the medication, however, she says it was to be a reduced strength from what she received at the hospital after surgery.    She's asking if we can let her know once it's called in as the pharmacy doesn't always notify her, [phone].

## 2024-05-09 NOTE — TELEPHONE ENCOUNTER
Patient requests order for [facility] chair be sent to [facility] as they participate with her insurance.  She did not have their fax number, however, their phone number is [phone].

## 2024-05-15 ENCOUNTER — OFFICE VISIT (OUTPATIENT)
Age: 64
End: 2024-05-15
Payer: MEDICAID

## 2024-05-15 VITALS — WEIGHT: 214 LBS | TEMPERATURE: 98.7 F | BODY MASS INDEX: 34.39 KG/M2 | HEIGHT: 66 IN

## 2024-05-15 DIAGNOSIS — G89.18 ACUTE POST-OPERATIVE PAIN: ICD-10-CM

## 2024-05-15 DIAGNOSIS — S82.842A BIMALLEOLAR ANKLE FRACTURE, LEFT, CLOSED, INITIAL ENCOUNTER: Primary | ICD-10-CM

## 2024-05-15 PROCEDURE — 99024 POSTOP FOLLOW-UP VISIT: CPT | Performed by: PHYSICIAN ASSISTANT

## 2024-05-15 PROCEDURE — 73610 X-RAY EXAM OF ANKLE: CPT | Performed by: PHYSICIAN ASSISTANT

## 2024-05-15 NOTE — PROGRESS NOTES
Aspen Horner returns the office for follow-up regarding her severe ankle fracture with ORIF.  The surgical sites over the medial lateral ankle have matured nicely.  There is no drainage evident at either site.  Wound borders have remained intact with the scattered blistering resolved particularly over the medial surgical site.    She remains nonweightbearing of the left lower extremity.    X-ray: [facility] 5/15/2024 space 3 view of the left ankle reveals medial cancellous cannulated screws spanning the nondisplaced reduced medial mallear fracture alignment acceptable and distal lateral fibular plate and screw complex intact with near complete healing noted of the fibular fracture.  Ankle mortise is balanced.  No lytic or blastic lesions.  No soft tissue ossifications.      Patient to follow back in 3 weeks with reimaging.  Patient to begin 25% weightbearing with walker left lower extremity she may use an emollient on the surgical sites.  She will follow back with physical therapy beginning as soon as possible for gait training ambulation and gradually increase her weightbearing from 25% to 100 over 3 to 4 weeks.

## 2024-06-01 NOTE — PROGRESS NOTES
HISTORY OF PRESENT ILLNESS  Aspen Gallegos is a 63 y.o. female.  ----  PMH GUSTAVO, HLD, history of tia  ----  CARDIAC STUDIES  ----  Coronary CTA 11/16/2023  No coronary artery calcification  Normal coronary anatomy without evidence of epicardial CAD  No major abnormalities with myocardial structure, valves, or visualized portions of the great vessels  ----  2d tte 8/2023   * Normal left ventricular cavity size and wall thickness.     * No regional wall motion abnormalities.     * Normal left ventricular systolic function.     * Visually estimated ejection fraction 60 to 65%.     * Diastolic function not assessed.     * Grossly normal right ventricular size and function.     * Normal left atrial size.     * Normal right atrial size.     * No hemodynamically significant valve pathology.     * RVSP 20 to 25 mmHg.    * Positive bubble study 1/14/2015.   ----  CTA head 8/12/2023  1.  No definite large vessel occlusion.   2.  No hemodynamically significant carotid stenosis, based on NASCET criteria.   3. No high-grade vertebral artery stenosis.   4. No high-grade intracranial stenosis. Overall mild intracranial irregularity but suspect much of which represents pseudobeading artifact.   ----  Cardiac Telemetry 4/14/2023  Patient monitored for 14d 16h starting on 04/14/2023 01:34 pm.  Primary rhythm was Sinus Rhythm. Average heart rate was 87 bpm, Minimum heart rate was 57 bpm on Day 12  / 08:09:32 am, Max heart rate was 141 bpm on Day 13 / 06:38:27 pm  Atrial Fibrillation or Flutter: [place] was 0 %, longest event 0 ms on --, fastest rate -- bpm on --.  SVE(s): [place] was < 0.01 %, max count per 24 hours 9  SVT (AT, RT): 1 events, longest event 3 beats on Day 13 / 01:02:38 pm, fastest event 136 bpm on Day 13 / 01:02:38 pm  Pause: 0 events, longest pause 0 ms on --  AV Block: 0 %, most severe block demonstrated --  PVC(s): [place] was 0.52 %, max count per 24 hours 649, 2 disparate morphologies  Ventricular Tachycardia: 0

## 2024-06-04 ENCOUNTER — HOSPITAL ENCOUNTER (OUTPATIENT)
Facility: HOSPITAL | Age: 64
Discharge: HOME | End: 2024-06-07

## 2024-06-04 PROCEDURE — 99001 SPECIMEN HANDLING PT-LAB: CPT

## 2024-06-05 ENCOUNTER — OFFICE VISIT (OUTPATIENT)
Age: 64
End: 2024-06-05
Payer: MEDICAID

## 2024-06-05 VITALS
WEIGHT: 213 LBS | HEART RATE: 87 BPM | HEIGHT: 66 IN | BODY MASS INDEX: 34.23 KG/M2 | OXYGEN SATURATION: 97 % | SYSTOLIC BLOOD PRESSURE: 128 MMHG | DIASTOLIC BLOOD PRESSURE: 82 MMHG

## 2024-06-05 DIAGNOSIS — Z87.898 HISTORY OF CHEST PAIN: Primary | ICD-10-CM

## 2024-06-05 DIAGNOSIS — R00.2 PALPITATIONS: ICD-10-CM

## 2024-06-05 DIAGNOSIS — E78.5 HYPERLIPIDEMIA, UNSPECIFIED HYPERLIPIDEMIA TYPE: ICD-10-CM

## 2024-06-05 DIAGNOSIS — Z86.73 HISTORY OF TIA (TRANSIENT ISCHEMIC ATTACK): ICD-10-CM

## 2024-06-05 LAB
D DIMER PPP FEU-MCNC: 0.95 MG/L FEU (ref 0–0.49)
SPECIMEN STATUS REPORT: NORMAL

## 2024-06-05 PROCEDURE — 99214 OFFICE O/P EST MOD 30 MIN: CPT | Performed by: INTERNAL MEDICINE

## 2024-06-05 RX ORDER — DILTIAZEM HYDROCHLORIDE 120 MG/1
120 CAPSULE, COATED, EXTENDED RELEASE ORAL DAILY
Qty: 30 CAPSULE | Refills: 5 | Status: SHIPPED | OUTPATIENT
Start: 2024-06-05

## 2024-06-05 RX ORDER — DILTIAZEM HYDROCHLORIDE 120 MG/1
120 CAPSULE, COATED, EXTENDED RELEASE ORAL DAILY
Qty: 30 CAPSULE | Refills: 5 | Status: SHIPPED | OUTPATIENT
Start: 2024-06-05 | End: 2024-06-05

## 2024-06-05 NOTE — PROGRESS NOTES
Have you had Fatigue?  No     2.   Have you had have you had Chest Pain? No     3.   Have you had Dyspnea (SOB) ? No   4.   Have you had Orthopnea? No     5.   Have you had PND? No   6.   Have you had leg swelling? No   7.    Have you had any weight gain? No     8. Have you had any palpitations? No      9. Have you had any syncope? No   10. Do you have any wounds on legs?no

## 2024-06-05 NOTE — PATIENT INSTRUCTIONS
Learning About the Mediterranean Diet  What is the Mediterranean diet?     The Mediterranean diet is a style of eating rather than a diet plan. It features foods eaten in [place], [place], southern [place], and other countries along the [place] Sea. It emphasizes eating foods like fish, fruits, vegetables, beans, high-fiber breads and whole grains, nuts, and olive oil. This style of eating includes limited red meat, cheese, and sweets.  Why choose the Mediterranean diet?  A Mediterranean-style diet may improve heart health. It contains more fat than other heart-healthy diets. But the fats are mainly from nuts, unsaturated oils (such as fish oils and olive oil), and certain nut or seed oils (such as canola, soybean, or flaxseed oil). These fats may help protect the heart and blood vessels.  How can you get started on the Mediterranean diet?  Here are some things you can do to switch to a more Mediterranean way of eating.  What to eat  Eat a variety of fruits and vegetables each day, such as grapes, blueberries, tomatoes, broccoli, peppers, figs, olives, spinach, eggplant, beans, lentils, and chickpeas.  Eat a variety of whole-grain foods each day, such as oats, brown rice, and whole wheat bread, pasta, and couscous.  Eat fish at least 2 times a week. Try tuna, salmon, mackerel, lake trout, herring, or sardines.  Eat moderate amounts of low-fat dairy products, such as milk, cheese, or yogurt.  Eat moderate amounts of poultry and eggs.  Choose healthy (unsaturated) fats, such as nuts, olive oil, and certain nut or seed oils like canola, soybean, and flaxseed.  Limit unhealthy (saturated) fats, such as butter, palm oil, and coconut oil. And limit fats found in animal products, such as meat and dairy products made with whole milk. Try to eat red meat only a few times a month in very small amounts.  Limit sweets and desserts to only a few times a week. This includes sugar-sweetened drinks like soda.  The

## 2024-06-11 ENCOUNTER — OFFICE VISIT (OUTPATIENT)
Age: 64
End: 2024-06-11
Payer: MEDICAID

## 2024-06-11 VITALS — HEIGHT: 66 IN | WEIGHT: 214 LBS | TEMPERATURE: 98.7 F | BODY MASS INDEX: 34.39 KG/M2

## 2024-06-11 DIAGNOSIS — S82.842A BIMALLEOLAR ANKLE FRACTURE, LEFT, CLOSED, INITIAL ENCOUNTER: Primary | ICD-10-CM

## 2024-06-11 PROCEDURE — 99024 POSTOP FOLLOW-UP VISIT: CPT | Performed by: PHYSICIAN ASSISTANT

## 2024-06-11 PROCEDURE — 73610 X-RAY EXAM OF ANKLE: CPT | Performed by: PHYSICIAN ASSISTANT

## 2024-06-11 NOTE — PROGRESS NOTES
Aspen Horner returns the office for follow-up regarding her severe ankle fracture with ORIF.  The surgical sites over the medial lateral ankle have matured nicely.  There is no drainage evident at either site.      She has had some sensitivity issues with the medial side of the ankle in the area of the midpoint of the incision site.  She is also noted developing keloid over the incision line of the medial side more so than lateral.    She has continued 50% weightbearing with Aircast left lower extremity.    X-ray: [facility] 6/11/2024 space 3 view of the left ankle reveals medial cancellous cannulated screws spanning the nondisplaced reduced medial mallear fracture alignment acceptable and distal lateral fibular plate and screw complex intact with near complete healing noted of the fibular fracture.  Ankle mortise is balanced.  No lytic or blastic lesions.  No soft tissue ossifications.      Plan: Patient's had some difficulty scheduling outpatient physical therapy.  Therefore I spoke to in [facility] and a therapy alternative was established for Thursday, 13 June 2024.  Patient may discontinue her Aircast.  She may also discontinue use of her T-handle knee scooter.  Will plan on seeing her back in about a month for reimaging.

## 2024-06-13 ENCOUNTER — HOSPITAL ENCOUNTER (OUTPATIENT)
Facility: HOSPITAL | Age: 64
Setting detail: RECURRING SERIES
Discharge: HOME | End: 2024-06-16
Payer: MEDICAID

## 2024-06-13 LAB — LABCORP SPECIMEN COLLECTION: NORMAL

## 2024-06-13 PROCEDURE — 97161 PT EVAL LOW COMPLEX 20 MIN: CPT

## 2024-06-13 NOTE — PROGRESS NOTES
[facility] [address]  [address]  [place] [address]  [phone] fax    Plan of Care / Statement of Necessity for Physical Therapy Services     Patient Name: Aspen Gallegos : 1960   Medical   Diagnosis: Pain in left ankle [M25.572]  Status post ORIF of fracture of ankle [Z98.890, Z87.81] Treatment Diagnosis: M25.572  LEFT ANKLE PAIN       Onset Date: 24 (DOS) Payor :  Payor: [facility] MEDICAID / Plan: [facility] HEALTHKEEPERS PLUS / Product Type: *No Product type* /    Referral Source: Jose Portillo PA-C Start of Care (SOC): 2024   Prior Hospitalization: See medical history Provider #: [id number]   Prior Level of Function: Independent with all functional mobility, ADLs/IADLs; some running after her grandson; no AD    Comorbidities: Asthma, irregular heart beat, hole in heart, arthritis in back, hyperthyroidism and high cholesterol -- see EMR      Assessment / key information:  Patient is a pleasant 63 year old female with c/o L ankle pain and swelling 2/2 L ankle ORIF.  DOS was 24. Recently d/c from knee scooter and aircast; no bracing donned at evaluation. Patient ambulates in clinic space with SPC; ambulates with slow, antalgic gait pattern, uneven dereck, decreased stance time with WB on LLE, and decreased heel strike LLE. Patient presents with L medial ankle pain, L ankle weakness, limited AROM ankle grossly, decreased flexibility of B gastroc/soleus, increased swelling in L ankle, and impaired functional mobility, which affects her QOL. Patient provided with HEP printout and performs initial exercises with moderate vc/tc and no adverse effect. Patient education on anatomy of present condition, symptom modulation, activity modification, HEP and importance of compliance, role of PT, and POC. Patient will benefit from skilled PT to address the above deficits and improve functional mobility so that she can safely return to ADLs, 
HEP printout and performs initial exercises with moderate vc/tc and no adverse effect. Patient education on anatomy of present condition, symptom modulation, activity modification, HEP and importance of compliance, role of PT, and POC. Patient will benefit from skilled PT to address the above deficits and improve functional mobility so that she can safely return to ADLs, ambulation, and recreational activities, such as playing with her grandchildren and gardening, with minimal pain or limitation.       PLAN  yes Continue plan of care  [x]  Upgrade activities as tolerated  []  Discharge due to :  []  Other:    Kyleigh Castano, PT    6/13/2024    2:55 PM    Future Appointments   Date Time Provider Department Center   7/15/2024  9:45 AM Jose Portillo PA-C [facility] BS AMB   12/6/2024  9:15 AM Shahid Vaz MD [facility] BS AMB

## 2024-06-18 ENCOUNTER — HOSPITAL ENCOUNTER (OUTPATIENT)
Facility: HOSPITAL | Age: 64
Setting detail: RECURRING SERIES
Discharge: HOME | End: 2024-06-21
Payer: MEDICAID

## 2024-06-18 PROCEDURE — 97112 NEUROMUSCULAR REEDUCATION: CPT

## 2024-06-18 PROCEDURE — 97110 THERAPEUTIC EXERCISES: CPT

## 2024-06-18 PROCEDURE — 97530 THERAPEUTIC ACTIVITIES: CPT

## 2024-06-18 NOTE — PROGRESS NOTES
PHYSICAL / OCCUPATIONAL THERAPY - DAILY TREATMENT NOTE    Patient Name: sApen Gallegos    Date: 2024    : 1960  Insurance: Payor: [facility] MEDICAID / Plan: [facility] HEALTHKEEPERS PLUS / Product Type: *No Product type* /      Patient  verified Yes     Visit #   Current / Total 2 10   Time   In / Out 1050 1140   Pain   In / Out 9 5   Subjective Functional Status/Changes: \"This ankle is hurting this morning. I have to keep it propped up.\"     TREATMENT AREA =  Pain in left ankle [M25.572]  Status post ORIF of fracture of ankle [Z98.890, Z87.81]     OBJECTIVE         Therapeutic Procedures:    Tx Min Billable or 1:1 Min (if diff from Tx Min) Procedure, Rationale, Specifics   20 20 19670 Therapeutic Exercise (timed):  increase ROM, strength, coordination, balance, and proprioception to improve patient's ability to progress to PLOF and address remaining functional goals. (see flow sheet as applicable)     Details if applicable:       15 15 76486 Neuromuscular Re-Education (timed):  improve balance, coordination, kinesthetic sense, posture, core stability and proprioception to improve patient's ability to develop conscious control of individual muscles and awareness of position of extremities in order to progress to PLOF and address remaining functional goals. (see flow sheet as applicable)     Details if applicable:     10 10 23676 Therapeutic Activity (timed):  use of dynamic activities replicating functional movements to increase ROM, strength, coordination, balance, and proprioception in order to improve patient's ability to progress to PLOF and address remaining functional goals.  (see flow sheet as applicable)     Details if applicable:     45 45 [facility] Totals Reminder: bill using total billable min of TIMED therapeutic procedures (example: do not include dry needle or estim unattended, both untimed codes, in totals to left)  8-22 min = 1 unit; 23-37 min = 2 units; 38-52 min = 3 units; 53-67 min =

## 2024-06-20 ENCOUNTER — HOSPITAL ENCOUNTER (OUTPATIENT)
Facility: HOSPITAL | Age: 64
Setting detail: RECURRING SERIES
Discharge: HOME | End: 2024-06-23
Payer: MEDICAID

## 2024-06-20 PROCEDURE — 97112 NEUROMUSCULAR REEDUCATION: CPT

## 2024-06-20 PROCEDURE — 97530 THERAPEUTIC ACTIVITIES: CPT

## 2024-06-20 PROCEDURE — 97110 THERAPEUTIC EXERCISES: CPT

## 2024-06-20 NOTE — PROGRESS NOTES
applicable)     Details if applicable:       12 12 97112 Neuromuscular Re-Education (timed):  improve balance, coordination, kinesthetic sense, posture, core stability and proprioception to improve patient's ability to develop conscious control of individual muscles and awareness of position of extremities in order to progress to PLOF and address remaining functional goals. (see flow sheet as applicable)     Details if applicable:     8 8 97530 Therapeutic Activity (timed):  use of dynamic activities replicating functional movements to increase ROM, strength, coordination, balance, and proprioception in order to improve patient's ability to progress to PLOF and address remaining functional goals.  (see flow sheet as applicable)     Details if applicable:  Practiced ambulation without AD in //. Required vc for increase heel strike and to increase stance time on (L) and increased step length with (R).   38 38 [facility] Totals Reminder: bill using total billable min of TIMED therapeutic procedures (example: do not include dry needle or estim unattended, both untimed codes, in totals to left)  8-22 min = 1 unit; 23-37 min = 2 units; 38-52 min = 3 units; 53-67 min = 4 units; 68-82 min = 5 units   Total Total     [x]  Patient Education billed concurrently with other procedures   [x] Review HEP    [] Progressed/Changed HEP, detail:    [] Other detail:       Objective Information/Functional Measures/Assessment    Patient participated in today's session with no adverse effects. Was able tolerate exercise progressions and the addition of incline stretch well. Performed gait training in parallel bars without AD. Required vc for increase heel strike and to increase stance time on (L) and increase kell length with (R) - good carryover. Continue to progress patient with patient tolerance - possibly begin adding balance exercises NV.    Patient will continue to benefit from skilled PT / OT services to modify and progress therapeutic

## 2024-06-21 ENCOUNTER — APPOINTMENT (OUTPATIENT)
Facility: HOSPITAL | Age: 64
End: 2024-06-21
Payer: MEDICAID

## 2024-06-25 ENCOUNTER — HOSPITAL ENCOUNTER (OUTPATIENT)
Facility: HOSPITAL | Age: 64
Setting detail: RECURRING SERIES
Discharge: HOME | End: 2024-06-28
Payer: MEDICAID

## 2024-06-25 PROCEDURE — 97530 THERAPEUTIC ACTIVITIES: CPT

## 2024-06-25 PROCEDURE — 97112 NEUROMUSCULAR REEDUCATION: CPT

## 2024-06-25 PROCEDURE — 97110 THERAPEUTIC EXERCISES: CPT

## 2024-06-25 NOTE — PROGRESS NOTES
PHYSICAL / OCCUPATIONAL THERAPY - DAILY TREATMENT NOTE    Patient Name: Aspen Gallegos    Date: 2024    : 1960  Insurance: Payor: [facility] MEDICAID / Plan: [facility]U [facility] HEALTHKEEPERS PLUS / Product Type: *No Product type* /      Patient  verified Yes     Visit #   Current / Total 4 10   Time   In / Out 1134 1227   Pain   In / Out 3 \"tingling\" 2   Subjective Functional Status/Changes: Patient reports doing pretty good today with a tingling 3/10 pain.     TREATMENT AREA =  Pain in left ankle [M25.572]  Status post ORIF of fracture of ankle [Z98.890, Z87.81]     OBJECTIVE    Modalities Rationale:     decrease edema, decrease inflammation, and decrease pain to improve patient's ability to progress to PLOF and address remaining functional goals.     min [] Estim Unattended, type/location:                                      []  w/ice    []  w/heat    min [] Estim Attended, type/location:                                     []  w/US     []  w/ice    []  w/heat    []  TENS insruct      min []  Mechanical Traction: type/lbs                   []  pro   []  sup   []  int   []  cont    []  before manual    []  after manual    min []  Ultrasound, settings/location:     10 min  unbill [x]  Ice     []  Heat    location/position: Seated with to (L) ankle with (L) foot propped on stool    min []  Paraffin,  details:     min []  Vasopneumatic Device, press/temp:     min []  Whirlpool / Fluido:    If using vaso (only need to measure limb vaso being performed on)      pre-treatment girth :       post-treatment girth :       measured at (landmark location) :      min []  Other:    Skin assessment post-treatment:   Intact      Therapeutic Procedures:    Tx Min Billable or 1:1 Min (if diff from Tx Min) Procedure, Rationale, Specifics   21  86186 Therapeutic Exercise (timed):  increase ROM, strength, coordination, balance, and proprioception to improve patient's ability to progress to PLOF and address remaining

## 2024-06-27 ENCOUNTER — HOSPITAL ENCOUNTER (OUTPATIENT)
Facility: HOSPITAL | Age: 64
Setting detail: RECURRING SERIES
Discharge: HOME | End: 2024-06-30
Payer: MEDICAID

## 2024-06-27 PROCEDURE — 97530 THERAPEUTIC ACTIVITIES: CPT

## 2024-06-27 PROCEDURE — 97110 THERAPEUTIC EXERCISES: CPT

## 2024-06-27 PROCEDURE — 97112 NEUROMUSCULAR REEDUCATION: CPT

## 2024-06-27 NOTE — PROGRESS NOTES
balance, coordination, kinesthetic sense, posture, core stability and proprioception to improve patient's ability to develop conscious control of individual muscles and awareness of position of extremities in order to progress to PLOF and address remaining functional goals. (see flow sheet as applicable)     Details if applicable:     10 10 29881 Therapeutic Activity (timed):  use of dynamic activities replicating functional movements to increase ROM, strength, coordination, balance, and proprioception in order to improve patient's ability to progress to PLOF and address remaining functional goals.  (see flow sheet as applicable)     Details if applicable:     39 38 [facility] Totals Reminder: bill using total billable min of TIMED therapeutic procedures (example: do not include dry needle or estim unattended, both untimed codes, in totals to left)  8-22 min = 1 unit; 23-37 min = 2 units; 38-52 min = 3 units; 53-67 min = 4 units; 68-82 min = 5 units   Total Total     [x]  Patient Education billed concurrently with other procedures   [x] Review HEP    [] Progressed/Changed HEP, detail:    [] Other detail:       Objective Information/Functional Measures/Assessment    Pt is making progress with her weight shifting and activity tolerance. Reports that she is able to ambulate short distances within her home without an AD. Continues to have swelling throughout left ankle. Making progress with her ROM, but still has some tightness/discomfort with DF.     Patient will continue to benefit from skilled PT / OT services to modify and progress therapeutic interventions, analyze and address functional mobility deficits, analyze and address ROM deficits, analyze and address strength deficits, analyze and address soft tissue restrictions, analyze and cue for proper movement patterns, analyze and modify for postural abnormalities, analyze and address imbalance/dizziness, and instruct in home and community integration to address

## 2024-07-02 ENCOUNTER — HOSPITAL ENCOUNTER (OUTPATIENT)
Facility: HOSPITAL | Age: 64
Setting detail: RECURRING SERIES
Discharge: HOME | End: 2024-07-05
Payer: MEDICAID

## 2024-07-02 PROCEDURE — 97530 THERAPEUTIC ACTIVITIES: CPT

## 2024-07-02 PROCEDURE — 97112 NEUROMUSCULAR REEDUCATION: CPT

## 2024-07-02 PROCEDURE — 97110 THERAPEUTIC EXERCISES: CPT

## 2024-07-02 NOTE — PROGRESS NOTES
PHYSICAL / OCCUPATIONAL THERAPY - DAILY TREATMENT NOTE    Patient Name: Aspen Gallegos    Date: 2024    : 1960  Insurance: Payor: [facility] MEDICAID / Plan: [facility]U [facility] HEALTHKEEPERS PLUS / Product Type: *No Product type* /      Patient  verified Yes     Visit #   Current / Total 6 10   Time   In / Out 935 1024   Pain   In / Out 4 4   Subjective Functional Status/Changes: Patient reports having some pain today (/10)  that is right around where the screw is in the ankle.     TREATMENT AREA =  Pain in left ankle [M25.572]  Status post ORIF of fracture of ankle [Z98.890, Z87.81]     OBJECTIVE    Modalities Rationale:     decrease edema, decrease inflammation, and decrease pain to improve patient's ability to progress to PLOF and address remaining functional goals.     min [] Estim Unattended, type/location:                                      []  w/ice    []  w/heat    min [] Estim Attended, type/location:                                     []  w/US     []  w/ice    []  w/heat    []  TENS insruct      min []  Mechanical Traction: type/lbs                   []  pro   []  sup   []  int   []  cont    []  before manual    []  after manual    min []  Ultrasound, settings/location:     10 min  unbill [x]  Ice     []  Heat    location/position: Left Ankle in Sitting and L foot propped on stool    min []  Paraffin,  details:     min []  Vasopneumatic Device, press/temp:     min []  Whirlpool / Fluido:    If using vaso (only need to measure limb vaso being performed on)      pre-treatment girth :       post-treatment girth :       measured at (landmark location) :      min []  Other:    Skin assessment post-treatment:   Intact      Therapeutic Procedures:    Tx Min Billable or 1:1 Min (if diff from Tx Min) Procedure, Rationale, Specifics   11 11 99882 Therapeutic Exercise (timed):  increase ROM, strength, coordination, balance, and proprioception to improve patient's ability to progress to PLOF and address

## 2024-07-05 ENCOUNTER — TELEPHONE (OUTPATIENT)
Facility: HOSPITAL | Age: 64
End: 2024-07-05

## 2024-07-05 NOTE — TELEPHONE ENCOUNTER
Called Regarding No Show, Pt states she didn't see any cars in the parking lot and thought the office was closed. Confirmed next appt.

## 2024-07-09 ENCOUNTER — HOSPITAL ENCOUNTER (OUTPATIENT)
Facility: HOSPITAL | Age: 64
Setting detail: RECURRING SERIES
Discharge: HOME | End: 2024-07-12
Payer: MEDICAID

## 2024-07-09 PROCEDURE — 97112 NEUROMUSCULAR REEDUCATION: CPT

## 2024-07-09 PROCEDURE — 97530 THERAPEUTIC ACTIVITIES: CPT

## 2024-07-09 PROCEDURE — 97110 THERAPEUTIC EXERCISES: CPT

## 2024-07-09 NOTE — PROGRESS NOTES
of Care  - Other : Due for PN NV    Kyleigh Castano, PT    7/9/2024    10:16 AM    Future Appointments   Date Time Provider Department Center   7/11/2024  9:30 AM [facility]   7/15/2024  9:45 AM Jose Portillo PA-C [facility] BS AMB   7/15/2024 10:50 AM [facility]   7/18/2024 11:30 AM Twin Sorensonn A, SPTA [facility]   7/23/2024  9:30 AM Twin Sorensonn A, SPTA [facility]   7/25/2024  9:30 AM YasTwinn A, SPTA [facility]   7/30/2024 11:30 AM YasTwinn A, SPTA [facility]   12/6/2024  9:15 AM Shahid Vaz MD [facility] BS AMB

## 2024-07-11 ENCOUNTER — HOSPITAL ENCOUNTER (OUTPATIENT)
Facility: HOSPITAL | Age: 64
Setting detail: RECURRING SERIES
Discharge: HOME | End: 2024-07-14
Payer: MEDICAID

## 2024-07-11 PROCEDURE — 97530 THERAPEUTIC ACTIVITIES: CPT

## 2024-07-11 NOTE — PROGRESS NOTES
[facility] [address]  [address]  [place] [address]  [phone] fax  PROGRESS NOTE  Patient Name: Aspen Gallegos : 1960   Treatment/Medical Diagnosis: Pain in left ankle [M25.572]  Status post ORIF of fracture of ankle [Z98.890, Z87.81]   Referral Source:  Payor Jose Portillo, KATHRYN  Payor: [facility] MEDICAID / Plan: [facility] HEALTHKEEPERS PLUS / Product Type: *No Product type* /      Date of Initial Visit: 24 Attended Visits: 8 Missed Visits: 0     SUMMARY OF TREATMENT  Patient is a pleasant 63 year old female with c/o L ankle pain, s/p ORIF on 24. Patient has completed x8 visits of skilled PT to address, reporting 100% improvement since SOC. Reports functional gains including improved walking ability, improved independence with functional mobility, and improved confidence with ambulation without AD. Reports functional deficits including stair negotiation and placing increased weight on her L side, such as when picking up her grandson. Objectively, patient presents with improved ankle DF AROM, improved L ankle gross strength, and improved ambulation with and without SPC. Despite improvements, patient presents with limitations in DF ROM which impacts her ability to ambulate and negotiate stairs, as well as impaired stability/neuromuscular control with SLS on LLE. Patient will continue to benefit from skilled PT to address the remaining deficits and improve functional mobility, so that she can perform ambulation, ADLs, and recreational activities, such as playing with her grandson, safely with minimal pain or limitation.     CURRENT STATUS  Functional Gains: Walking better, improved independence, improved confidence with walking without cane   Functional Deficits: Going up/down a flight of stairs, putting too much weight on L side - such as with picking up her grandson   % improvement: 100% improvement  Pain   Average: 4/10       Best: 
descend stairs at home.               PN: 5 deg with knee ext and knee flexed (07/11/24) -- consider joint mobilizations into DF     Pt will be able to ambulate 100 m in clinic space without AD, proper gait mechanics, and good stability, to improve ability to safely ambulate in the community.               PN: able to ambulate 100 ft in clinic hallway without instability, but noted decreased heel strike and loading response on LLE causing slight limp on L >R (07/11/24)      Pt will be able to perform SLS >/= 10 seconds on LLE so that she can get dressed and climb into the tub safely without risk of falling.   PN: 8 sec on L, 30 sec on R (07/11/24)      (Updated) Pt will improve LEFS score to >/= 54 to demonstrate improvement in patient's ability to perform unrestricted ADLs/IADLs at home & community.              PN: 43 - goal met and updated (07/11/24)       Next PN/ RC due 08/10/24  Auth due (visit number/ date) LUCAS    PLAN  - Continue Plan of Care  - Upgrade activities as tolerated    Kyleigh Castano, PT    7/11/2024    9:32 AM    Future Appointments   Date Time Provider Department Center   7/15/2024  9:45 AM Jose Portillo PA-C [facility] BS AMB   7/15/2024 10:50 AM Gianna Young, PTA [facility]   7/18/2024 11:30 AM Alayna Sorenson A, SPTA [facility]   7/23/2024  9:30 AM Alayna Sorenson A, SPTA [facility]   7/25/2024  9:30 AM Twin Sorensonn A, SPTA [facility]   7/30/2024 11:30 AM Alayna Sorenson A, SPTA [facility]   12/6/2024  9:15 AM Shahid Vaz MD [facility] AMB

## 2024-07-15 ENCOUNTER — APPOINTMENT (OUTPATIENT)
Facility: HOSPITAL | Age: 64
End: 2024-07-15
Payer: MEDICAID

## 2024-07-18 ENCOUNTER — HOSPITAL ENCOUNTER (OUTPATIENT)
Facility: HOSPITAL | Age: 64
Setting detail: RECURRING SERIES
Discharge: HOME | End: 2024-07-21
Payer: MEDICAID

## 2024-07-18 PROCEDURE — 97535 SELF CARE MNGMENT TRAINING: CPT

## 2024-07-18 PROCEDURE — 97110 THERAPEUTIC EXERCISES: CPT

## 2024-07-18 PROCEDURE — 97112 NEUROMUSCULAR REEDUCATION: CPT

## 2024-07-18 PROCEDURE — 97530 THERAPEUTIC ACTIVITIES: CPT

## 2024-07-18 NOTE — PROGRESS NOTES
PHYSICAL / OCCUPATIONAL THERAPY - DAILY TREATMENT NOTE    Patient Name: Aspen Gallegos    Date: 2024    : 1960  Insurance: Payor: [facility] MEDICAID / Plan: THU [facility] HEALTHKEEPERS PLUS / Product Type: *No Product type* /      Patient  verified Yes     Visit #   Current / Total 1 10   Time   In / Out 1132 1214   Pain   In / Out 2 2   Subjective Functional Status/Changes: Ortho f/u rescheduled for tomorrow, 2024       TREATMENT AREA =  Pain in left ankle [M25.572]  Status post ORIF of fracture of ankle [Z98.890, Z87.81]     OBJECTIVE    Therapeutic Procedures:    Tx Min Billable or 1:1 Min (if diff from Tx Min) Procedure, Rationale, Specifics   15  73647 Therapeutic Activity (timed):  use of dynamic activities replicating functional movements to increase ROM, strength, coordination, balance, and proprioception in order to improve patient's ability to progress to PLOF and address remaining functional goals.  (see flow sheet as applicable)     Details if applicable:      15  58612 Therapeutic Exercise (timed):  increase ROM, strength, coordination, balance, and proprioception to improve patient's ability to progress to PLOF and address remaining functional goals. (see flow sheet as applicable)      12  46160 Neuromuscular Re-Education (timed):  improve balance, coordination, kinesthetic sense, posture, core stability and proprioception to improve patient's ability to develop conscious control of individual muscles and awareness of position of extremities in order to progress to PLOF and address remaining functional goals. (see flow sheet as applicable)      -  23319 Self Care/Home Management (timed):  improve patient knowledge and understanding of pain reducing techniques, positioning, posture/ergonomics, home safety, activity modification, diagnosis/prognosis, and physical therapy expectations, procedures and progression  to improve patient's ability to progress to PLOF and address remaining

## 2024-07-22 ENCOUNTER — OFFICE VISIT (OUTPATIENT)
Age: 64
End: 2024-07-22
Payer: MEDICAID

## 2024-07-22 VITALS — HEIGHT: 66 IN | WEIGHT: 216 LBS | BODY MASS INDEX: 34.72 KG/M2 | TEMPERATURE: 97 F

## 2024-07-22 DIAGNOSIS — S82.842A BIMALLEOLAR ANKLE FRACTURE, LEFT, CLOSED, INITIAL ENCOUNTER: Primary | ICD-10-CM

## 2024-07-22 PROCEDURE — 99213 OFFICE O/P EST LOW 20 MIN: CPT | Performed by: SPECIALIST

## 2024-07-22 NOTE — PROGRESS NOTES
Patient: Aspen Gallegos                MRN: [id number]       SSN: [id number]  YOB: 1960        AGE: 63 y.o.        SEX: female      PCP: Annette Nation MD  07/22/24    Chief Complaint   Patient presents with    Ankle Pain     LEFT ANKLE PAIN      HISTORY:  Aspen Gallegos is a 63 y.o. female who is seen for follow up of left ankle ORIF 4/16/24 for bimalleolar ankle fracture dislocation. She has been responding nicely to physical therapy.  She still experiences some intermittent swelling in her left ankle but overall she is doing much better.     She was previously seen for bilateral hand pain, numbness, and tingling, R>L.  Bilateral upper extremity EMG nerve conduction study November 30, 2022 revealed evidence of mild carpal tunnel syndrome. She is right-handed.  She has been experiencing some neck pain recently for which she is seeing Dr. Sorto.      She was previously seen for right thumb pain and hip arthritis.  She was previously treated by Dr. Moreno for a MRSA infection of the left ring finger.     Occupation, etc:  Ms. Gallegos retired six years ago. 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  She states her last patient passed away and she is taking a break but is looking to get involved in work again. She lives alone in [place]. Her 23 yo son is working  at the post office.  She has 3 adult daughters and 1 son. Ms. Gallegos weighs 210 lbs and is 5'6\" tall.     Wt Readings from Last 3 Encounters:   07/22/24 98 kg (216 lb)   06/11/24 97.1 kg (214 lb)   06/05/24 96.6 kg (213 lb)      Body mass index is 34.86 kg/m².    Patient Active Problem List   Diagnosis    PVC (premature ventricular contraction)    Asthma    Rectovaginal fistula    Class 2 obesity    PFO (patent foramen ovale)    Coronary atherosclerosis of native coronary artery    Hyperlipidemia    Lower GI bleed    Symptomatic anemia    Syncope    Severe obesity (HCC)    Non-rheumatic mitral

## 2024-07-23 ENCOUNTER — HOSPITAL ENCOUNTER (OUTPATIENT)
Facility: HOSPITAL | Age: 64
Setting detail: RECURRING SERIES
Discharge: HOME | End: 2024-07-26
Payer: MEDICAID

## 2024-07-23 PROCEDURE — 97112 NEUROMUSCULAR REEDUCATION: CPT

## 2024-07-23 PROCEDURE — 97110 THERAPEUTIC EXERCISES: CPT

## 2024-07-23 NOTE — PROGRESS NOTES
goal #5. Continues to be appropriately challenged with balance exercises. Required some vc for exercise recall throughout and the proper completion for ankle 4-way and steamboats. Possibly progress to GTB for ankle 4-way and RTB for steamboats NV. Continue to progress patient as able.    Patient will continue to benefit from skilled PT / OT services to modify and progress therapeutic interventions, analyze and address functional mobility deficits, analyze and address ROM deficits, analyze and address strength deficits, analyze and address soft tissue restrictions, analyze and cue for proper movement patterns, analyze and modify for postural abnormalities, analyze and address imbalance/dizziness, and instruct in home and community integration to address functional deficits and attain remaining goals.    Progress toward goals / Updated goals:  []  See Progress Note/Recertification    Pt will be able to report a </= 7/10 pain rating at worst to improve patient's ability to tolerate prolonged functional activities at home.   PN: 8/10 at worst in the past week (07/11/24)   Current: 8/10, no change since last PN (7/18/24)     2.  Pt will be independent with UPDATED HEP to facilitate carry-over of functional gains made in PT at home & community.               PN: reports daily compliance (07/11/24) -- updated HEP, will give printout to pt NV     3.  Patient will be able to improve ROM in L ankle DF to at least 10 deg with both knee ext and knee flexed to improve patient's ability to ambulate with full heel strike and descend stairs at home.               PN: 5 deg with knee ext and knee flexed (07/11/24) -- consider joint mobilizations into DF     Pt will be able to ambulate 100 m in clinic space without AD, proper gait mechanics, and good stability, to improve ability to safely ambulate in the community.               PN: able to ambulate 100 ft in clinic hallway without instability, but noted decreased heel strike and

## 2024-07-25 ENCOUNTER — HOSPITAL ENCOUNTER (OUTPATIENT)
Facility: HOSPITAL | Age: 64
Setting detail: RECURRING SERIES
Discharge: HOME | End: 2024-07-28
Payer: MEDICAID

## 2024-07-25 PROCEDURE — 97110 THERAPEUTIC EXERCISES: CPT

## 2024-07-25 PROCEDURE — 97112 NEUROMUSCULAR REEDUCATION: CPT

## 2024-07-25 NOTE — PROGRESS NOTES
LLE causing slight limp on L >R (07/11/24)      Pt will be able to perform SLS >/= 10 seconds on LLE so that she can get dressed and climb into the tub safely without risk of falling.   PN: 8 sec on L, 30 sec on R (07/11/24)   Current: 19 seconds on L [Date Assessed: 07/23/2024]     (Updated) Pt will improve LEFS score to >/= 54 to demonstrate improvement in patient's ability to perform unrestricted ADLs/IADLs at home & community.              PN: 43 - goal met and updated (07/11/24)    Next PN/ RC due 08/10/2024  Auth due (visit number/ date) LUCAS    PLAN  - Continue Plan of Care    Alayna Sorenson PTA    7/25/2024    9:29 AM  If an interpreting service was utilized for treatment of this patient, the contents of this document represent the material reviewed with the patient via the .     Future Appointments   Date Time Provider Department Center   7/25/2024  9:30 AM Alayna Sorenson SPTA [facility]   7/30/2024 11:30 AM Alayna Sorenson SPTA [facility]   8/7/2024 10:10 AM Kyleigh Castano, PT [facility]   8/9/2024 10:10 AM Kyleigh Castano, PT [facility]   8/13/2024 11:30 AM Rusty Yu, PT [facility]   8/15/2024  9:30 AM Kyleigh Castano, PT [facility]   8/20/2024  9:30 AM Rusty Yu, PT [facility]   8/22/2024  9:30 AM Gosia Talley, PTA [facility]   8/27/2024 10:10 AM Kyleigh Castano, PT [facility]   8/29/2024  9:30 AM Rusty Yu, PT [facility]   12/6/2024  9:15 AM Shahid Vaz MD [facility]

## 2024-07-30 ENCOUNTER — HOSPITAL ENCOUNTER (OUTPATIENT)
Facility: HOSPITAL | Age: 64
Setting detail: RECURRING SERIES
Discharge: HOME | End: 2024-08-02
Payer: MEDICAID

## 2024-07-30 PROCEDURE — 97110 THERAPEUTIC EXERCISES: CPT

## 2024-07-30 PROCEDURE — 97112 NEUROMUSCULAR REEDUCATION: CPT

## 2024-07-30 NOTE — PROGRESS NOTES
PHYSICAL / OCCUPATIONAL THERAPY - DAILY TREATMENT NOTE    Patient Name: Aspen Gallegos    Date: 2024    : 1960  Insurance: Payor: [facility] MEDICAID / Plan: THU [facility] HEALTHKEEPERS PLUS / Product Type: *No Product type* /      Patient  verified Yes     Visit #   Current / Total 4 10   Time   In / Out 1133 1219   Pain   In / Out 5 6   Subjective Functional Status/Changes: Patient reports that the ankle is acting up today.     TREATMENT AREA =  Pain in left ankle [M25.572]  Status post ORIF of fracture of ankle [Z98.890, Z87.81]     OBJECTIVE    Modality rationale: decrease pain to improve the patient’s ability to improve patient's ability to progress to PLOF and address remaining functional goals.    Min Type Additional Details    [] Estim:  []Unatt       []IFC  []Premod                        []Other:  []w/ice   []w/heat  Position:  Location:    [] Estim: []Att    []TENS instruct  []NMES                    []Other:  []w/US   []w/ice   []w/heat  Position:  Location:    []  Traction: [] Cervical       []Lumbar                       [] Prone          []Supine                       []Intermittent   []Continuous Lbs:  [] before manual  [] after manual    []  Ultrasound: []Continuous   [] Pulsed                           []1MHz   []3MHz Location:  W/cm2:    []  Iontophoresis with dexamethasone         Location: [] Take home patch   [] In clinic   10 [x]  Ice     []  heat  []  Ice massage  []  Laser   []  Anodyne Position: (L) ankle; Seated with (L) ankle propped on step  Location:    []  Laser with stim  []  Other: Position:  Location:    []  Vasopneumatic Device  Pre-treatment girth:  Post-treatment girth:  Measured at (location):  Pressure:       [] lo [] med [] hi   Temperature: [] lo [] med [] hi   [x] Skin assessment post-treatment:  [x]intact []redness- no adverse reaction    []redness - adverse reaction:      Therapeutic Procedures:    Tx Min Billable or 1:1 Min (if diff from Tx Min) Procedure,

## 2024-08-07 ENCOUNTER — APPOINTMENT (OUTPATIENT)
Facility: HOSPITAL | Age: 64
End: 2024-08-07
Payer: MEDICAID

## 2024-08-09 ENCOUNTER — HOSPITAL ENCOUNTER (OUTPATIENT)
Facility: HOSPITAL | Age: 64
Setting detail: RECURRING SERIES
Discharge: HOME | End: 2024-08-12
Payer: MEDICAID

## 2024-08-09 PROCEDURE — 97112 NEUROMUSCULAR REEDUCATION: CPT

## 2024-08-09 PROCEDURE — 97110 THERAPEUTIC EXERCISES: CPT

## 2024-08-09 PROCEDURE — 97530 THERAPEUTIC ACTIVITIES: CPT

## 2024-08-09 NOTE — PROGRESS NOTES
unrestricted ADLs/IADLs at home & community.              PN: 43 - goal met and updated (07/11/24)   PN status: 40 - goal regressed overall, but some improvements in individual questions (08/09/24)     Non-Medicare, can change goals, can adjust or add frequency duration, no signature required      New Goals to be achieved in __5__ WEEKS    Pt will be able to report a </= 7/10 pain rating at worst to improve patient's ability to tolerate prolonged functional activities at home.   PN status: 9-10/10 with rainy weather this past week (08/09/24)      2.  Pt will be independent with UPDATED HEP to facilitate carry-over of functional gains made in PT at home & community.     PN status: reports daily compliance (08/09/24) - updated HEP to include DF stretches    3.  Patient will be able to improve ROM in L ankle DF to at least 10 deg with both knee ext and knee flexed to improve patient's ability to ambulate with full heel strike and descend stairs at home.    PN status: 7 deg with knee flexed, 3 deg with knee extendedd (08/09/24)      4. Pt will be able to ambulate 100 m in clinic space without AD, proper gait mechanics, and good stability, to improve ability to safely ambulate in the community.    PN status: 100 ft in clinic corley without instability; decreased heel strike initially on LLE but improved with visual/verbal cuing - goal progressing (08/09/24)      5. Pt will improve LEFS score to >/= 54 to demonstrate improvement in patient's ability to perform unrestricted ADLs/IADLs at home & community.   PN status: 40     6. (New goal) Patient will be able to descend x1 FOS with reciprocal pattern and no increase in L ankle pain, so that she can negotiate stairs at home without limitation.    PN status: descends with step-to pattern with LLE leading    Frequency / Duration:   Patient to be seen   1-2   times per week for   5    WEEKS    RECOMMENDATIONS  Patient would benefit from the continuation of skilled rehab 
[facility]   8/22/2024  9:30 AM Gosia Talley, PTA [facility]   8/27/2024 10:10 AM [facility]igh [facility]   8/29/2024  9:30 AM [facility]   12/6/2024  9:15 AM Shahid Vaz MD [facility]

## 2024-08-13 ENCOUNTER — HOSPITAL ENCOUNTER (OUTPATIENT)
Facility: HOSPITAL | Age: 64
Setting detail: RECURRING SERIES
Discharge: HOME | End: 2024-08-16
Payer: MEDICAID

## 2024-08-13 PROCEDURE — 97112 NEUROMUSCULAR REEDUCATION: CPT

## 2024-08-13 PROCEDURE — 97530 THERAPEUTIC ACTIVITIES: CPT

## 2024-08-13 PROCEDURE — 97110 THERAPEUTIC EXERCISES: CPT

## 2024-08-13 NOTE — PROGRESS NOTES
[x]  Patient Education billed concurrently with other procedures   [x] Review HEP    [] Progressed/Changed HEP, detail:    [] Other detail:       Objective Information/Functional Measures/Assessment    Pt able to progress balance activities and functional activities such as squatting. Reports ease with ascending stairs, but still has difficulty with descending stairs due to left ankle ROM. Demonstrated good squatting mechanics with initiation of squats today. Pt reports a slight increase in pain following treatment. Progress as able.     Patient will continue to benefit from skilled PT / OT services to modify and progress therapeutic interventions, analyze and address functional mobility deficits, analyze and address ROM deficits, analyze and address strength deficits, analyze and address soft tissue restrictions, analyze and cue for proper movement patterns, analyze and modify for postural abnormalities, analyze and address imbalance/dizziness, and instruct in home and community integration to address functional deficits and attain remaining goals.    Progress toward goals / Updated goals:  []  See Progress Note/Recertification    Pt will be able to report a </= 7/10 pain rating at worst to improve patient's ability to tolerate prolonged functional activities at home.   PN status: 9-10/10 with rainy weather this past week (08/09/24)      2.  Pt will be independent with UPDATED HEP to facilitate carry-over of functional gains made in PT at home & community.                PN status: reports daily compliance (08/09/24) - updated HEP to include DF stretches     3.  Patient will be able to improve ROM in L ankle DF to at least 10 deg with both knee ext and knee flexed to improve patient's ability to ambulate with full heel strike and descend stairs at home.               PN status: 7 deg with knee flexed, 3 deg with knee extendedd (08/09/24)      4. Pt will be able to ambulate 100 m in clinic space without AD,

## 2024-08-20 ENCOUNTER — HOSPITAL ENCOUNTER (OUTPATIENT)
Facility: HOSPITAL | Age: 64
Setting detail: RECURRING SERIES
Discharge: HOME | End: 2024-08-23
Payer: MEDICAID

## 2024-08-20 PROCEDURE — 97530 THERAPEUTIC ACTIVITIES: CPT

## 2024-08-20 PROCEDURE — 97112 NEUROMUSCULAR REEDUCATION: CPT

## 2024-08-20 PROCEDURE — 97110 THERAPEUTIC EXERCISES: CPT

## 2024-08-20 NOTE — PROGRESS NOTES
PHYSICAL / OCCUPATIONAL THERAPY - DAILY TREATMENT NOTE    Patient Name: Aspen Gallegos    Date: 2024    : 1960  Insurance: Payor: [facility] MEDICAID / Plan: THU [facility] HEALTHKEEPERS PLUS / Product Type: *No Product type* /      Patient  verified Yes     Visit #   Current / Total 2 10   Time   In / Out 933 1011   Pain   In / Out 0 1   Subjective Functional Status/Changes: \"I feel good today.\"     TREATMENT AREA =  Pain in left ankle [M25.572]  Status post ORIF of fracture of ankle [Z98.890, Z87.81]     OBJECTIVE         Therapeutic Procedures:    Tx Min Billable or 1:1 Min (if diff from Tx Min) Procedure, Rationale, Specifics   10 10 21077 Therapeutic Exercise (timed):  increase ROM, strength, coordination, balance, and proprioception to improve patient's ability to progress to PLOF and address remaining functional goals. (see flow sheet as applicable)     Details if applicable:        23362 Neuromuscular Re-Education (timed):  improve balance, coordination, kinesthetic sense, posture, core stability and proprioception to improve patient's ability to develop conscious control of individual muscles and awareness of position of extremities in order to progress to PLOF and address remaining functional goals. (see flow sheet as applicable)     Details if applicable:     10 10 03241 Therapeutic Activity (timed):  use of dynamic activities replicating functional movements to increase ROM, strength, coordination, balance, and proprioception in order to improve patient's ability to progress to PLOF and address remaining functional goals.  (see flow sheet as applicable)     Details if applicable:     38 38 [facility] Totals Reminder: bill using total billable min of TIMED therapeutic procedures (example: do not include dry needle or estim unattended, both untimed codes, in totals to left)  8-22 min = 1 unit; 23-37 min = 2 units; 38-52 min = 3 units; 53-67 min = 4 units; 68-82 min = 5 units   Total Total

## 2024-08-22 ENCOUNTER — HOSPITAL ENCOUNTER (OUTPATIENT)
Facility: HOSPITAL | Age: 64
Setting detail: RECURRING SERIES
End: 2024-08-22
Payer: MEDICAID

## 2024-08-27 ENCOUNTER — APPOINTMENT (OUTPATIENT)
Facility: HOSPITAL | Age: 64
End: 2024-08-27
Payer: MEDICAID

## 2024-08-29 ENCOUNTER — HOSPITAL ENCOUNTER (OUTPATIENT)
Facility: HOSPITAL | Age: 64
Setting detail: RECURRING SERIES
End: 2024-08-29
Payer: MEDICAID

## 2024-08-29 PROCEDURE — 97112 NEUROMUSCULAR REEDUCATION: CPT

## 2024-08-29 PROCEDURE — 97110 THERAPEUTIC EXERCISES: CPT

## 2024-08-29 PROCEDURE — 97530 THERAPEUTIC ACTIVITIES: CPT

## 2024-08-29 NOTE — PROGRESS NOTES
PHYSICAL / OCCUPATIONAL THERAPY - DAILY TREATMENT NOTE    Patient Name: Aspen Gallegos    Date: 2024    : 1960  Insurance: Payor: [facility] MEDICAID / Plan: THU [facility] HEALTHKEEPERS PLUS / Product Type: *No Product type* /      Patient  verified Yes     Visit #   Current / Total 3 10   Time   In / Out 1053 1131   Pain   In / Out 1.5 1.5   Subjective Functional Status/Changes: Patient reports she has a slight tingly pain in her ankle.     TREATMENT AREA =  Pain in left ankle [M25.572]  Status post ORIF of fracture of ankle [Z98.890, Z87.81]     OBJECTIVE      Therapeutic Procedures:    Tx Min Billable or 1:1 Min (if diff from Tx Min) Procedure, Rationale, Specifics   8  06365 Therapeutic Exercise (timed):  increase ROM, strength, coordination, balance, and proprioception to improve patient's ability to progress to PLOF and address remaining functional goals. (see flow sheet as applicable)     Details if applicable:       15  32830 Neuromuscular Re-Education (timed):  improve balance, coordination, kinesthetic sense, posture, core stability and proprioception to improve patient's ability to develop conscious control of individual muscles and awareness of position of extremities in order to progress to PLOF and address remaining functional goals. (see flow sheet as applicable)     Details if applicable:  balance, ankle re-ed   15  86217 Therapeutic Activity (timed):  use of dynamic activities replicating functional movements to increase ROM, strength, coordination, balance, and proprioception in order to improve patient's ability to progress to PLOF and address remaining functional goals.  (see flow sheet as applicable)     Details if applicable:  standing functional hip strength   38  [facility] Totals Reminder: bill using total billable min of TIMED therapeutic procedures (example: do not include dry needle or estim unattended, both untimed codes, in totals to left)  8-22 min = 1 unit; 23-37 min = 2  units; 38-52 min = 3 units; 53-67 min = 4 units; 68-82 min = 5 units   Total Total     [x]  Patient Education billed concurrently with other procedures   [x] Review HEP    [] Progressed/Changed HEP, detail:    [] Other detail:       Objective Information/Functional Measures/Assessment    Patient reports slight \"tingly\" pain in left ankle at start of session. She is progressing well with all strengthening and balance training activities. Demonstrates ability to touch right heel to floor from 4\" step down without significant difficulty or discomfort. Will benefit from more challenging balance training NV.    Patient will continue to benefit from skilled PT / OT services to modify and progress therapeutic interventions, analyze and address functional mobility deficits, analyze and address ROM deficits, analyze and address strength deficits, analyze and address soft tissue restrictions, analyze and cue for proper movement patterns, analyze and modify for postural abnormalities, analyze and address imbalance/dizziness, and instruct in home and community integration to address functional deficits and attain remaining goals.    Progress toward goals / Updated goals:  []  See Progress Note/Recertification      Pt will be able to report a </= 7/10 pain rating at worst to improve patient's ability to tolerate prolonged functional activities at home.   PN status: 9-10/10 with rainy weather this past week (08/09/24)                Pain appears to be declining [Date assessed: 08/20/2024]     2.  Pt will be independent with UPDATED HEP to facilitate carry-over of functional gains made in PT at home & community.                PN status: reports daily compliance (08/09/24) - updated HEP to include DF stretches               Reports daily compliance [Date assessed: 08/20/2024]     3.  Patient will be able to improve ROM in L ankle DF to at least 10 deg with both knee ext and knee flexed to improve patient's ability to ambulate with

## 2024-09-04 ENCOUNTER — APPOINTMENT (OUTPATIENT)
Facility: HOSPITAL | Age: 64
End: 2024-09-04
Payer: MEDICAID

## 2024-09-06 ENCOUNTER — HOSPITAL ENCOUNTER (OUTPATIENT)
Facility: HOSPITAL | Age: 64
Setting detail: RECURRING SERIES
Discharge: HOME | End: 2024-09-09
Payer: MEDICAID

## 2024-09-06 PROCEDURE — 97530 THERAPEUTIC ACTIVITIES: CPT

## 2024-09-06 PROCEDURE — 97535 SELF CARE MNGMENT TRAINING: CPT

## 2024-09-06 PROCEDURE — 97110 THERAPEUTIC EXERCISES: CPT

## 2024-09-18 ENCOUNTER — HOSPITAL ENCOUNTER (OUTPATIENT)
Facility: HOSPITAL | Age: 64
Setting detail: RECURRING SERIES
End: 2024-09-18
Payer: MEDICAID

## 2024-10-02 ENCOUNTER — HOSPITAL ENCOUNTER (OUTPATIENT)
Facility: HOSPITAL | Age: 64
Setting detail: RECURRING SERIES
Discharge: HOME | End: 2024-10-05
Payer: MEDICAID

## 2024-10-02 PROCEDURE — 97112 NEUROMUSCULAR REEDUCATION: CPT

## 2024-10-02 PROCEDURE — 97110 THERAPEUTIC EXERCISES: CPT

## 2024-10-02 PROCEDURE — 97530 THERAPEUTIC ACTIVITIES: CPT

## 2024-10-02 NOTE — PROGRESS NOTES
add frequency duration, no signature required      New Goals to be achieved in __5__ WEEKS    Pt will be able to report a </= 7/10 pain rating at worst to improve patient's ability to tolerate prolonged functional activities at home.     PN: 8/10 at worst (10/02/24)    2.  Pt will be independent with UPDATED HEP to facilitate carry-over of functional gains made in PT at home & community.     PN: reports daily compliance with some exercises (10/02/24) - goal ongoing, will update at d/c     3.  Patient will be able to improve ROM in L ankle DF to at least 10 deg with both knee ext and knee flexed to improve patient's ability to ambulate with full heel strike and descend stairs at home.    PN: 9 deg knee flexed, 5 deg knee ext (10/02/24)    4. Pt will improve LEFS score to >/= 54 to demonstrate improvement in patient's ability to perform unrestricted ADLs/IADLs at home & community.     PN: 46 (10/02/24)     5. Patient will be able to descend x1 FOS with reciprocal pattern and no increase in L ankle pain, so that she can negotiate stairs at home without limitation.     PN: able to perform reciprocally, but with increased pinching at front of ankle; reports step to gait with descending steps at home as they are higher than 6\" (10/02/24)    6. Pt will be able to perform 10 body weight squats with minimal left heel rise and no anterior ankle symptoms to improve her ability to squat for home-management tasks.    PN: 10 squats with increased discomfort at anterior ankle, moderate depth,  and slight L heel raise (10/02/24)    Frequency / Duration:   Patient to be seen   1-2   times per week for   5    WEEKS    RECOMMENDATIONS  Patient would benefit from the continuation of skilled rehab interventions for functional progress to achieving above stated clinically significant goals.  Continue per initial Plan of Care.    If you have any questions/comments please contact us directly.  Thank you for allowing us to assist in the 
[facility]   10/15/2024 10:10 AM Rusty Yu, PT [facility]   10/17/2024 10:10 AM [facility]   10/22/2024 10:10 AM Rusty Yu, PT [facility]   10/24/2024 10:10 AM Rusty Yu, PT [facility]   10/29/2024 10:10 AM Rusty Yu, PT [facility]   10/31/2024 10:10 AM Kyleigh Castano, PT [facility]   12/6/2024  9:15 AM Shahid Vaz MD [facility]

## 2024-10-08 ENCOUNTER — HOSPITAL ENCOUNTER (OUTPATIENT)
Facility: HOSPITAL | Age: 64
Setting detail: RECURRING SERIES
Discharge: HOME | End: 2024-10-11
Payer: MEDICAID

## 2024-10-08 PROCEDURE — 97110 THERAPEUTIC EXERCISES: CPT

## 2024-10-08 PROCEDURE — 97112 NEUROMUSCULAR REEDUCATION: CPT

## 2024-10-08 PROCEDURE — 97530 THERAPEUTIC ACTIVITIES: CPT

## 2024-10-08 NOTE — PROGRESS NOTES
in warm water and massage. Other than that a 3/10 - progressing pain goal.  Able to wear small heel the other day without issue.   Progressed hip exercises to 12 reps.   Completed all exercises with good effort.     Patient will continue to benefit from skilled PT / OT services to modify and progress therapeutic interventions, analyze and address functional mobility deficits, analyze and address ROM deficits, analyze and address strength deficits, analyze and address soft tissue restrictions, analyze and cue for proper movement patterns, analyze and modify for postural abnormalities, analyze and address imbalance/dizziness, and instruct in home and community integration to address functional deficits and attain remaining goals.    Progress toward goals / Updated goals:  []  See Progress Note/Recertification    Pt will be able to report a </= 7/10 pain rating at worst to improve patient's ability to tolerate prolonged functional activities at home.                PN: 8/10 at worst (10/02/24)     Current: 10/8/2024 7/10 on Saturday that eased up after soaking in warm water and massage. Other than that a 3/10 - progressing.     2.  Pt will be independent with UPDATED HEP to facilitate carry-over of functional gains made in PT at home & community.                PN: reports daily compliance with some exercises (10/02/24) - goal ongoing, will update at d/c      3.  Patient will be able to improve ROM in L ankle DF to at least 10 deg with both knee ext and knee flexed to improve patient's ability to ambulate with full heel strike and descend stairs at home.    PN: 9 deg knee flexed, 5 deg knee ext (10/02/24)     4. Pt will improve LEFS score to >/= 54 to demonstrate improvement in patient's ability to perform unrestricted ADLs/IADLs at home & community.                PN: 46 (10/02/24)      5. Patient will be able to descend x1 FOS with reciprocal pattern and no increase in L ankle pain, so that she can negotiate stairs

## 2024-10-10 ENCOUNTER — HOSPITAL ENCOUNTER (OUTPATIENT)
Facility: HOSPITAL | Age: 64
Setting detail: RECURRING SERIES
Discharge: HOME | End: 2024-10-13
Payer: MEDICAID

## 2024-10-10 PROCEDURE — 97530 THERAPEUTIC ACTIVITIES: CPT

## 2024-10-10 PROCEDURE — 97112 NEUROMUSCULAR REEDUCATION: CPT

## 2024-10-10 NOTE — PROGRESS NOTES
PHYSICAL / OCCUPATIONAL THERAPY - DAILY TREATMENT NOTE    Patient Name: Aspen Gallegos    Date: 10/10/2024    : 1960  Insurance: Payor: [facility] MEDICAID / Plan: THU [facility] HEALTHKEEPERS PLUS / Product Type: *No Product type* /      Patient  verified Yes     Visit #   Current / Total 2 10   Time   In / Out 0940 1012   Pain   In / Out 0/10 0/10   Subjective Functional Status/Changes: \"Just tickling in my L ankle\"     TREATMENT AREA =  Pain in left ankle [M25.572]  Status post ORIF of fracture of ankle [Z98.890, Z87.81]     OBJECTIVE    Therapeutic Procedures:    Tx Min Billable or 1:1 Min (if diff from Tx Min) Procedure, Rationale, Specifics   18 18 [id number] Neuromuscular Re-Education (timed):  improve balance, coordination, kinesthetic sense, posture, core stability and proprioception to improve patient's ability to develop conscious control of individual muscles and awareness of position of extremities in order to progress to PLOF and address remaining functional goals. (see flow sheet as applicable)     Details if applicable:  balance, LE neuromuscular control/stability    12 12 18842 Therapeutic Activity (timed):  use of dynamic activities replicating functional movements to increase ROM, strength, coordination, balance, and proprioception in order to improve patient's ability to progress to PLOF and address remaining functional goals.  (see flow sheet as applicable)     Details if applicable:  including step ups/downs, functional squatting   30 30 [facility] Totals Reminder: bill using total billable min of TIMED therapeutic procedures (example: do not include dry needle or estim unattended, both untimed codes, in totals to left)  8-22 min = 1 unit; 23-37 min = 2 units; 38-52 min = 3 units; 53-67 min = 4 units; 68-82 min = 5 units   Total Total     [x]  Patient Education billed concurrently with other procedures   [x] Review HEP    [] Progressed/Changed HEP, detail:    [] Other detail:       Objective

## 2024-10-14 ENCOUNTER — HOSPITAL ENCOUNTER (OUTPATIENT)
Facility: HOSPITAL | Age: 64
Discharge: HOME | End: 2024-10-17
Attending: FAMILY MEDICINE
Payer: MEDICAID

## 2024-10-14 VITALS — BODY MASS INDEX: 34.72 KG/M2 | WEIGHT: 216 LBS | HEIGHT: 66 IN

## 2024-10-14 DIAGNOSIS — Z12.31 VISIT FOR SCREENING MAMMOGRAM: ICD-10-CM

## 2024-10-14 PROCEDURE — 77063 BREAST TOMOSYNTHESIS BI: CPT

## 2024-10-15 ENCOUNTER — HOSPITAL ENCOUNTER (OUTPATIENT)
Facility: HOSPITAL | Age: 64
Setting detail: RECURRING SERIES
Discharge: HOME | End: 2024-10-18
Payer: MEDICAID

## 2024-10-15 PROCEDURE — 97112 NEUROMUSCULAR REEDUCATION: CPT

## 2024-10-15 PROCEDURE — 97530 THERAPEUTIC ACTIVITIES: CPT

## 2024-10-15 NOTE — PROGRESS NOTES
to perform unrestricted ADLs/IADLs at home & community.                PN: 46 (10/02/24)     Assess at 30 day flor [Date assessed: 10/15/2024]    5. Patient will be able to descend x1 FOS with reciprocal pattern and no increase in L ankle pain, so that she can negotiate stairs at home without limitation.                PN: able to perform reciprocally, but with increased pinching at front of ankle; reports step to gait with descending steps at home as they are higher than 6\" (10/02/24)     6. Pt will be able to perform 10 body weight squats with minimal left heel rise and no anterior ankle symptoms to improve her ability to squat for home-management tasks.               PN: 10 squats with increased discomfort at anterior ankle, moderate depth,  and slight L heel raise (10/02/24)              Initiated TRX squatting with good mechanics, moderate depth (10/10/24)     Next PN/ RC due 10/31/2024  Auth due (visit number/ date) 7 remaining through 11/29/2024    PLAN  - Continue Plan of Care    Rusty Yu PTA, [facility]    10/15/2024    10:35 AM  If an interpreting service was utilized for treatment of this patient, the contents of this document represent the material reviewed with the patient via the .     Future Appointments   Date Time Provider Department Center   10/17/2024 10:10 AM Kyleigh Castano PT [facility]   10/22/2024 10:10 AM [facility]   10/24/2024 10:10 AM Rusty Yu PT [facility]   10/29/2024 10:10 AM Rusty Yu, PT [facility]   10/31/2024 10:10 AM [facility]   12/6/2024  9:15 AM Shahid Vaz MD [facility]

## 2024-10-17 ENCOUNTER — HOSPITAL ENCOUNTER (OUTPATIENT)
Facility: HOSPITAL | Age: 64
Setting detail: RECURRING SERIES
Discharge: HOME | End: 2024-10-20
Payer: MEDICAID

## 2024-10-17 PROCEDURE — 97530 THERAPEUTIC ACTIVITIES: CPT

## 2024-10-17 PROCEDURE — 97112 NEUROMUSCULAR REEDUCATION: CPT

## 2024-10-17 PROCEDURE — 97110 THERAPEUTIC EXERCISES: CPT

## 2024-10-17 NOTE — PROGRESS NOTES
PHYSICAL / OCCUPATIONAL THERAPY - DAILY TREATMENT NOTE    Patient Name: Aspen Gallegos    Date: 10/17/2024    : 1960  Insurance: Payor: [facility] MEDICAID / Plan: THU [facility] HEALTHKEEPERS PLUS / Product Type: *No Product type* /      Patient  verified Yes     Visit #   Current / Total 4 10   Time   In / Out 1014 1050   Pain   In / Out 2 0   Subjective Functional Status/Changes: Some mild pain in the left inner ankle.     TREATMENT AREA =  Pain in left ankle [M25.572]  Status post ORIF of fracture of ankle [Z98.890, Z87.81]     OBJECTIVE         Therapeutic Procedures:    Tx Min Billable or 1:1 Min (if diff from Tx Min) Procedure, Rationale, Specifics    15 68704 Therapeutic Exercise (timed):  increase ROM, strength, coordination, balance, and proprioception to improve patient's ability to progress to PLOF and address remaining functional goals. (see flow sheet as applicable)     Details if applicable:        10 13225 Neuromuscular Re-Education (timed):  improve balance, coordination, kinesthetic sense, posture, core stability and proprioception to improve patient's ability to develop conscious control of individual muscles and awareness of position of extremities in order to progress to PLOF and address remaining functional goals. (see flow sheet as applicable)     Details if applicable:      11 53672 Therapeutic Activity (timed):  use of dynamic activities replicating functional movements to increase ROM, strength, coordination, balance, and proprioception in order to improve patient's ability to progress to PLOF and address remaining functional goals.  (see flow sheet as applicable)     Details if applicable:      36 [facility] Totals Reminder: bill using total billable min of TIMED therapeutic procedures (example: do not include dry needle or estim unattended, both untimed codes, in totals to left)  8-22 min = 1 unit; 23-37 min = 2 units; 38-52 min = 3 units; 53-67 min = 4 units; 68-82 min = 5 units

## 2024-10-22 ENCOUNTER — HOSPITAL ENCOUNTER (OUTPATIENT)
Facility: HOSPITAL | Age: 64
Setting detail: RECURRING SERIES
Discharge: HOME | End: 2024-10-25
Payer: MEDICAID

## 2024-10-22 PROCEDURE — 97112 NEUROMUSCULAR REEDUCATION: CPT

## 2024-10-22 PROCEDURE — 97110 THERAPEUTIC EXERCISES: CPT

## 2024-10-22 PROCEDURE — 97530 THERAPEUTIC ACTIVITIES: CPT

## 2024-10-22 NOTE — PROGRESS NOTES
PHYSICAL / OCCUPATIONAL THERAPY - DAILY TREATMENT NOTE    Patient Name: Aspen Gallegos    Date: 10/22/2024    : 1960  Insurance: Payor: [facility] MEDICAID / Plan: THU [facility] HEALTHKEEPERS PLUS / Product Type: *No Product type* /      Patient  verified Yes     Visit #   Current / Total 5 10   Time   In / Out 1012 1050   Pain   In / Out 0 0   Subjective Functional Status/Changes: \"No pain today.\"     TREATMENT AREA =  Pain in left ankle [M25.572]  Status post ORIF of fracture of ankle [Z98.890, Z87.81]     OBJECTIVE         Therapeutic Procedures:    Tx Min Billable or 1:1 Min (if diff from Tx Min) Procedure, Rationale, Specifics   10 10 01917 Therapeutic Exercise (timed):  increase ROM, strength, coordination, balance, and proprioception to improve patient's ability to progress to PLOF and address remaining functional goals. (see flow sheet as applicable)     Details if applicable:       15 15 69630 Neuromuscular Re-Education (timed):  improve balance, coordination, kinesthetic sense, posture, core stability and proprioception to improve patient's ability to develop conscious control of individual muscles and awareness of position of extremities in order to progress to PLOF and address remaining functional goals. (see flow sheet as applicable)     Details if applicable:      61312 Therapeutic Activity (timed):  use of dynamic activities replicating functional movements to increase ROM, strength, coordination, balance, and proprioception in order to improve patient's ability to progress to PLOF and address remaining functional goals.  (see flow sheet as applicable)     Details if applicable:     38 38 [facility] Totals Reminder: bill using total billable min of TIMED therapeutic procedures (example: do not include dry needle or estim unattended, both untimed codes, in totals to left)  8-22 min = 1 unit; 23-37 min = 2 units; 38-52 min = 3 units; 53-67 min = 4 units; 68-82 min = 5 units   Total Total     [x]

## 2024-10-24 ENCOUNTER — HOSPITAL ENCOUNTER (OUTPATIENT)
Facility: HOSPITAL | Age: 64
Setting detail: RECURRING SERIES
Discharge: HOME | End: 2024-10-27
Payer: MEDICAID

## 2024-10-24 PROCEDURE — 97112 NEUROMUSCULAR REEDUCATION: CPT

## 2024-10-24 PROCEDURE — 97110 THERAPEUTIC EXERCISES: CPT

## 2024-10-24 PROCEDURE — 97530 THERAPEUTIC ACTIVITIES: CPT

## 2024-10-24 NOTE — PROGRESS NOTES
PHYSICAL / OCCUPATIONAL THERAPY - DAILY TREATMENT NOTE    Patient Name: Aspen Gallegos    Date: 10/24/2024    : 1960  Insurance: Payor: [facility] MEDICAID / Plan: THU [facility] HEALTHKEEPERS PLUS / Product Type: *No Product type* /      Patient  verified Yes     Visit #   Current / Total 6 10   Time   In / Out 1013 1051   Pain   In / Out 3-4 3   Subjective Functional Status/Changes: \"I don't know what happened, but I woke up this morning with some pain.\"     TREATMENT AREA =  Pain in left ankle [M25.572]  Status post ORIF of fracture of ankle [Z98.890, Z87.81]     OBJECTIVE         Therapeutic Procedures:    Tx Min Billable or 1:1 Min (if diff from Tx Min) Procedure, Rationale, Specifics   10 10 86892 Therapeutic Exercise (timed):  increase ROM, strength, coordination, balance, and proprioception to improve patient's ability to progress to PLOF and address remaining functional goals. (see flow sheet as applicable)     Details if applicable:       15 15 72631 Neuromuscular Re-Education (timed):  improve balance, coordination, kinesthetic sense, posture, core stability and proprioception to improve patient's ability to develop conscious control of individual muscles and awareness of position of extremities in order to progress to PLOF and address remaining functional goals. (see flow sheet as applicable)     Details if applicable:     13  54201 Therapeutic Activity (timed):  use of dynamic activities replicating functional movements to increase ROM, strength, coordination, balance, and proprioception in order to improve patient's ability to progress to PLOF and address remaining functional goals.  (see flow sheet as applicable)     Details if applicable:     38 38 [facility] Totals Reminder: bill using total billable min of TIMED therapeutic procedures (example: do not include dry needle or estim unattended, both untimed codes, in totals to left)  8-22 min = 1 unit; 23-37 min = 2 units; 38-52 min = 3 units;

## 2024-10-29 ENCOUNTER — HOSPITAL ENCOUNTER (OUTPATIENT)
Facility: HOSPITAL | Age: 64
Setting detail: RECURRING SERIES
Discharge: HOME | End: 2024-11-01
Payer: MEDICAID

## 2024-10-29 PROCEDURE — 97110 THERAPEUTIC EXERCISES: CPT

## 2024-10-29 PROCEDURE — 97112 NEUROMUSCULAR REEDUCATION: CPT

## 2024-10-29 PROCEDURE — 97530 THERAPEUTIC ACTIVITIES: CPT

## 2024-10-29 PROCEDURE — 97535 SELF CARE MNGMENT TRAINING: CPT

## 2024-10-29 NOTE — PROGRESS NOTES
PHYSICAL / OCCUPATIONAL THERAPY - DAILY TREATMENT NOTE    Patient Name: Aspen Gallegos    Date: 10/29/2024    : 1960  Insurance: Payor: [facility] MEDICAID / Plan: THU [facility] HEALTHKEEPERS PLUS / Product Type: *No Product type* /      Patient  verified Yes     Visit #   Current / Total 7 10   Time   In / Out 1130 1215   Pain   In / Out 0/10 0/10   Subjective Functional Status/Changes: \"I  am doing ok, except when I try to go down the stairs using my L leg \"     TREATMENT AREA =  Pain in left ankle [M25.572]  Status post ORIF of fracture of ankle [Z98.890, Z87.81]     OBJECTIVE         Therapeutic Procedures:    Tx Min Billable or 1:1 Min (if diff from Tx Min) Procedure, Rationale, Specifics    15 16881 Therapeutic Exercise (timed):  increase ROM, strength, coordination, balance, and proprioception to improve patient's ability to progress to PLOF and address remaining functional goals. (see flow sheet as applicable)     Details if applicable:        10 01116 Neuromuscular Re-Education (timed):  improve balance, coordination, kinesthetic sense, posture, core stability and proprioception to improve patient's ability to develop conscious control of individual muscles and awareness of position of extremities in order to progress to PLOF and address remaining functional goals. (see flow sheet as applicable)     Details if applicable:      12 06593 Therapeutic Activity (timed):  use of dynamic activities replicating functional movements to increase ROM, strength, coordination, balance, and proprioception in order to improve patient's ability to progress to PLOF and address remaining functional goals.  (see flow sheet as applicable)     Details if applicable:        8 90713 Self Care/Home Management (timed):  improve patient knowledge and understanding of home injury/symptom/pain management and transfer techniques  to improve patient's ability to progress to PLOF and address remaining functional goals.  (see flow

## 2024-10-31 ENCOUNTER — HOSPITAL ENCOUNTER (OUTPATIENT)
Facility: HOSPITAL | Age: 64
Setting detail: RECURRING SERIES
Discharge: HOME | End: 2024-11-03
Payer: MEDICAID

## 2024-10-31 PROCEDURE — 97530 THERAPEUTIC ACTIVITIES: CPT

## 2024-10-31 NOTE — PROGRESS NOTES
[facility] [address]  [address]  [place] [address]  [phone] fax  PROGRESS NOTE  Patient Name: Aspen Gallegos : 1960   Treatment/Medical Diagnosis: Pain in left ankle [M25.572]  Status post ORIF of fracture of ankle [Z98.890, Z87.81]   Referral Source:  Payor Jose Portillo PA-C  Payor: [facility] MEDICAID / [facility] / Product Type: *No Product type* /      Date of Initial Visit: 24 Attended Visits: 26 Missed Visits: 1     SUMMARY OF TREATMENT  Patient is a pleasant 64 year old female with c/o L ankle pain s/p L ORIF. Patient has completed x26 visits of skilled PT to address, reporting 80 improvement since SOC. Reports functional gains including cutting the front yard with push mower, household chores, and walking around stores. Reports functional deficits including walking and mowing on uneven ground as well as going down the stairs. Objectively, patient presents with improved stair ascension and functional squatting. Despite improvements, patient's pain fluctuates but remains elevated and ankle DF mobility remains limited, which impacts her ability to descend stairs. Added goal for SLS on foam to progress towards pt goal of walking on uneven surfaces in her yard. Pt apprehensive towards d/c, therefore will continue for 1 more course of PT prior to d/c to establish HEP and promote pt independence. Patient will continue to benefit from skilled PT to address the remaining deficits and improve functional mobility, so that she can perform ambulation on even/uneven surfaces, ADLs, and yard work safely with minimal pain or limitation.     CURRENT STATUS  Functional Gains: Cutting the front yard with push mower, household chores, walking around stores  Functional Deficits: Walking and mowing on uneven ground, going down the stairs   % improvement: 80%  Pain   Average: 4/10       Best: 0/10     Worst: 8/10  Patient Goal:     PN: x10 squats with decreasing L heel rise, weight shifting onto RLE, moderate depth; cuing to sit hips back (10/31/24)     7.  Pt will be able to perform SLS on foam pad for >/= 30 sec B, so that she can confidently perform yard work on uneven surfaces at home without bouts of instability. (New)    PN: 10 sec on L, 16 sec on R     Frequency / Duration:   Patient to be seen   1-2   times per week for   5    WEEKS    RECOMMENDATIONS  Patient would benefit from the continuation of skilled rehab interventions for functional progress to achieving above stated clinically significant goals.  Continue per initial Plan of Care.    If you have any questions/comments please contact us directly.  Thank you for allowing us to assist in the care of your patient.    Kyleigh Castano, PT       10/31/2024       10:21 AM

## 2024-10-31 NOTE — PROGRESS NOTES
PHYSICAL / OCCUPATIONAL THERAPY - DAILY TREATMENT NOTE    Patient Name: Aspen Gallegos    Date: 10/31/2024    : 1960  Insurance: Payor: [facility] MEDICAID / Plan: THU [facility] HEALTHKEEPERS PLUS / Product Type: *No Product type* /      Patient  verified Yes     Visit #   Current / Total 8 10   Time   In / Out 1019 1044   Pain   In / Out 3/10 3/10   Subjective Functional Status/Changes: Patient reports that she is not ready to be done with PT.     TREATMENT AREA =  Pain in left ankle [M25.572]  Status post ORIF of fracture of ankle [Z98.890, Z87.81]     OBJECTIVE    Therapeutic Procedures:    Tx Min Billable or 1:1 Min (if diff from Tx Min) Procedure, Rationale, Specifics   25 25 37770 Therapeutic Activity (timed):  use of dynamic activities replicating functional movements to increase ROM, strength, coordination, balance, and proprioception in order to improve patient's ability to progress to PLOF and address remaining functional goals.  (see flow sheet as applicable)     Details if applicable:  progress update     25 25 [facility] Totals Reminder: bill using total billable min of TIMED therapeutic procedures (example: do not include dry needle or estim unattended, both untimed codes, in totals to left)  8-22 min = 1 unit; 23-37 min = 2 units; 38-52 min = 3 units; 53-67 min = 4 units; 68-82 min = 5 units   Total Total     [x]  Patient Education billed concurrently with other procedures   [x] Review HEP    [] Progressed/Changed HEP, detail:    [] Other detail:       Objective Information/Functional Measures/Assessment    Patient is a pleasant 64 year old female with c/o L ankle pain s/p L ORIF. Patient has completed x26 visits of skilled PT to address, reporting 80 improvement since SOC. Reports functional gains including cutting the front yard with push mower, household chores, and walking around stores. Reports functional deficits including walking and mowing on uneven ground as well as going down the stairs.

## 2024-11-22 ENCOUNTER — HOSPITAL ENCOUNTER (OUTPATIENT)
Facility: HOSPITAL | Age: 64
Setting detail: RECURRING SERIES
Discharge: HOME | End: 2024-11-25
Payer: MEDICAID

## 2024-11-22 PROCEDURE — 97110 THERAPEUTIC EXERCISES: CPT

## 2024-11-22 PROCEDURE — 97112 NEUROMUSCULAR REEDUCATION: CPT

## 2024-11-22 PROCEDURE — 97530 THERAPEUTIC ACTIVITIES: CPT

## 2024-11-22 NOTE — PROGRESS NOTES
[facility] [address]  [address]  [place] [address]  [phone] fax    DISCHARGE SUMMARY  Patient Name: Aspen Gallegos : 1960   Treatment/Medical Diagnosis: Pain in left ankle [M25.572]  Status post ORIF of fracture of ankle [Z98.890, Z87.81]   Referral Source: Jose Portillo PA-C     Date of Initial Visit: 2024 Attended Visits: 27 Missed Visits: 1     SUMMARY OF TREATMENT  Pt will be able to report a </= 7/10 pain rating at worst to improve patient's ability to tolerate prolonged functional activities at home.                 PN: 8/10 at worst (10/31/24)               D/C: Not met, 8/10 at worst 24     2.  Pt will be independent with UPDATED HEP to facilitate carry-over of functional gains made in PT at home & community.                PN: reports daily compliance with some exercises,3x per week for entire HEP; will update at d/c (10/31/24)                D/C: Met 24    3.  Patient will be able to improve ROM in L ankle DF to at least 10 deg with both knee ext and knee flexed to improve patient's ability to ambulate with full heel strike and descend stairs at home.   PN: 6 deg knee ext, 8 deg knee flex (10/31/24)     D/C: Met, 10 deg knee ext, 12 deg knee flex  24    4. Pt will improve LEFS score to >/= 54 to demonstrate improvement in patient's ability to perform unrestricted ADLs/IADLs at home & community.               PN: 43/100 (10/31/24)                D/C: Not met, progressed 46/80 24    5. Patient will be able to descend x1 FOS with reciprocal pattern and no increase in L ankle pain, so that she can negotiate stairs at home without limitation.                PN: ascends reciprocally without difficulty; descends laterally at home or reciprocally if stairs are lower (10/31/24)                D/C: Met, reciprocal step pattern, no UE assist, and ascending and descending standard height FOS 24    6. Pt will be

## 2024-11-22 NOTE — PROGRESS NOTES
PHYSICAL / OCCUPATIONAL THERAPY - DAILY TREATMENT NOTE    Patient Name: Aspen Gallegos    Date: 2024    : 1960  Insurance: Payor: [facility] MEDICAID / Plan: THU [facility] HEALTHKEEPERS PLUS / Product Type: *No Product type* /    12  Patient  verified Yes     Visit #   Current / Total 9 10   Time   In / Out 1210 1248   Pain   In / Out 5 2   Subjective Functional Status/Changes: Pain in lower right leg upon arrival     TREATMENT AREA =  Pain in left ankle [M25.572]  Status post ORIF of fracture of ankle [Z98.890, Z87.81]     OBJECTIVE         Therapeutic Procedures:    Tx Min Billable or 1:1 Min (if diff from Tx Min) Procedure, Rationale, Specifics   10  22255 Therapeutic Exercise (timed):  increase ROM, strength, coordination, balance, and proprioception to improve patient's ability to progress to PLOF and address remaining functional goals. (see flow sheet as applicable)     Details if applicable:       10  49055 Neuromuscular Re-Education (timed):  improve balance, coordination, kinesthetic sense, posture, core stability and proprioception to improve patient's ability to develop conscious control of individual muscles and awareness of position of extremities in order to progress to PLOF and address remaining functional goals. (see flow sheet as applicable)     Details if applicable:     18  89226 Therapeutic Activity (timed):  use of dynamic activities replicating functional movements to increase ROM, strength, coordination, balance, and proprioception in order to improve patient's ability to progress to PLOF and address remaining functional goals.  (see flow sheet as applicable)     Details if applicable:            Details if applicable:            Details if applicable:     38  [facility] Totals Reminder: bill using total billable min of TIMED therapeutic procedures (example: do not include dry needle or estim unattended, both untimed codes, in totals to left)  8-22 min = 1 unit; 23-37 min = 2 units;

## 2024-12-01 NOTE — PROGRESS NOTES
HISTORY OF PRESENT ILLNESS  Aspen Gallegos is a 64 y.o. female.  ----  PMH GUSTAVO, HLD, history of tia  ----  CARDIAC STUDIES  ----  Coronary CTA 11/16/2023  No coronary artery calcification  Normal coronary anatomy without evidence of epicardial CAD  No major abnormalities with myocardial structure, valves, or visualized portions of the great vessels  ----  2d tte 8/2023   * Normal left ventricular cavity size and wall thickness.     * No regional wall motion abnormalities.     * Normal left ventricular systolic function.     * Visually estimated ejection fraction 60 to 65%.     * Diastolic function not assessed.     * Grossly normal right ventricular size and function.     * Normal left atrial size.     * Normal right atrial size.     * No hemodynamically significant valve pathology.     * RVSP 20 to 25 mmHg.    * Positive bubble study 1/14/2015.   ----  CTA head 8/12/2023  1.  No definite large vessel occlusion.   2.  No hemodynamically significant carotid stenosis, based on NASCET criteria.   3. No high-grade vertebral artery stenosis.   4. No high-grade intracranial stenosis. Overall mild intracranial irregularity but suspect much of which represents pseudobeading artifact.   ----  Cardiac Telemetry 4/14/2023  Patient monitored for 14d 16h starting on 04/14/2023 01:34 pm.  Primary rhythm was Sinus Rhythm. Average heart rate was 87 bpm, Minimum heart rate was 57 bpm on Day 12  / 08:09:32 am, Max heart rate was 141 bpm on Day 13 / 06:38:27 pm  Atrial Fibrillation or Flutter: [place] was 0 %, longest event 0 ms on --, fastest rate -- bpm on --.  SVE(s): [place] was < 0.01 %, max count per 24 hours 9  SVT (AT, RT): 1 events, longest event 3 beats on Day 13 / 01:02:38 pm, fastest event 136 bpm on Day 13 / 01:02:38 pm  Pause: 0 events, longest pause 0 ms on --  AV Block: 0 %, most severe block demonstrated --  PVC(s): [place] was 0.52 %, max count per 24 hours 649, 2 disparate morphologies  Ventricular Tachycardia: 0

## 2024-12-06 ENCOUNTER — OFFICE VISIT (OUTPATIENT)
Age: 64
End: 2024-12-06

## 2024-12-06 VITALS
HEART RATE: 88 BPM | HEIGHT: 66 IN | WEIGHT: 214 LBS | DIASTOLIC BLOOD PRESSURE: 86 MMHG | BODY MASS INDEX: 34.39 KG/M2 | SYSTOLIC BLOOD PRESSURE: 128 MMHG | OXYGEN SATURATION: 99 %

## 2024-12-06 DIAGNOSIS — R00.2 PALPITATIONS: ICD-10-CM

## 2024-12-06 DIAGNOSIS — Z87.898 HISTORY OF CHEST PAIN: Primary | ICD-10-CM

## 2024-12-06 DIAGNOSIS — R03.0 ELEVATED BLOOD PRESSURE READING: ICD-10-CM

## 2024-12-06 DIAGNOSIS — E78.49 OTHER HYPERLIPIDEMIA: ICD-10-CM

## 2024-12-06 DIAGNOSIS — Z86.73 HISTORY OF TIA (TRANSIENT ISCHEMIC ATTACK): ICD-10-CM

## 2024-12-06 RX ORDER — ATORVASTATIN CALCIUM 40 MG/1
40 TABLET, FILM COATED ORAL DAILY
Qty: 30 TABLET | Refills: 5 | Status: SHIPPED | OUTPATIENT
Start: 2024-12-06

## 2024-12-06 ASSESSMENT — ENCOUNTER SYMPTOMS: CHEST TIGHTNESS: 0

## 2024-12-06 NOTE — PATIENT INSTRUCTIONS
Learning About the Mediterranean Diet  What is the Mediterranean diet?     The Mediterranean diet is a style of eating rather than a diet plan. It features foods eaten in [place], [place], [place], and other countries along the [place] Sea. It emphasizes eating foods like fish, fruits, vegetables, beans, high-fiber breads and whole grains, nuts, and olive oil. This style of eating includes limited red meat, cheese, and sweets.  Why choose the Mediterranean diet?  A Mediterranean-style diet may improve heart health. It contains more fat than other heart-healthy diets. But the fats are mainly from nuts, unsaturated oils (such as fish oils and olive oil), and certain nut or seed oils (such as canola, soybean, or flaxseed oil). These fats may help protect the heart and blood vessels.  How can you get started on the Mediterranean diet?  Here are some things you can do to switch to a more Mediterranean way of eating.  What to eat  Eat a variety of fruits and vegetables each day, such as grapes, blueberries, tomatoes, broccoli, peppers, figs, olives, spinach, eggplant, beans, lentils, and chickpeas.  Eat a variety of whole-grain foods each day, such as oats, brown rice, and whole wheat bread, pasta, and couscous.  Eat fish at least 2 times a week. Try tuna, salmon, mackerel, lake trout, herring, or sardines.  Eat moderate amounts of low-fat dairy products, such as milk, cheese, or yogurt.  Eat moderate amounts of poultry and eggs.  Choose healthy (unsaturated) fats, such as nuts, olive oil, and certain nut or seed oils like canola, soybean, and flaxseed.  Limit unhealthy (saturated) fats, such as butter, palm oil, and coconut oil. And limit fats found in animal products, such as meat and dairy products made with whole milk. Try to eat red meat only a few times a month in very small amounts.  Limit sweets and desserts to only a few times a week. This includes sugar-sweetened drinks like soda.  The

## 2025-04-01 ENCOUNTER — OFFICE VISIT (OUTPATIENT)
Age: 65
End: 2025-04-01

## 2025-04-01 DIAGNOSIS — Z53.8 APPOINTMENT CANCELED BY HOSPITAL: Primary | ICD-10-CM

## 2025-08-06 ENCOUNTER — OFFICE VISIT (OUTPATIENT)
Age: 65
End: 2025-08-06
Payer: MEDICAID

## 2025-08-06 VITALS
DIASTOLIC BLOOD PRESSURE: 85 MMHG | HEART RATE: 82 BPM | SYSTOLIC BLOOD PRESSURE: 130 MMHG | OXYGEN SATURATION: 99 % | BODY MASS INDEX: 4.5 KG/M2 | HEIGHT: 66 IN | WEIGHT: 28 LBS

## 2025-08-06 DIAGNOSIS — R03.0 ELEVATED BLOOD PRESSURE READING: ICD-10-CM

## 2025-08-06 DIAGNOSIS — Z86.73 HISTORY OF TIA (TRANSIENT ISCHEMIC ATTACK): ICD-10-CM

## 2025-08-06 DIAGNOSIS — Z86.73 HISTORY OF TIA (TRANSIENT ISCHEMIC ATTACK): Primary | ICD-10-CM

## 2025-08-06 DIAGNOSIS — E78.49 OTHER HYPERLIPIDEMIA: ICD-10-CM

## 2025-08-06 DIAGNOSIS — R00.2 PALPITATIONS: ICD-10-CM

## 2025-08-06 PROCEDURE — 99214 OFFICE O/P EST MOD 30 MIN: CPT | Performed by: INTERNAL MEDICINE

## 2025-08-06 RX ORDER — NITROGLYCERIN 0.4 MG/1
0.4 TABLET SUBLINGUAL EVERY 5 MIN PRN
Qty: 25 TABLET | Refills: 1 | Status: SHIPPED | OUTPATIENT
Start: 2025-08-06

## 2025-08-06 RX ORDER — BLOOD PRESSURE TEST KIT
1 KIT MISCELLANEOUS 2 TIMES DAILY
Qty: 1 KIT | Refills: 0 | Status: SHIPPED | OUTPATIENT
Start: 2025-08-06

## 2025-08-06 RX ORDER — ATORVASTATIN CALCIUM 40 MG/1
40 TABLET, FILM COATED ORAL DAILY
Qty: 90 TABLET | Refills: 1 | Status: SHIPPED | OUTPATIENT
Start: 2025-08-06

## (undated) DEVICE — PAD,ABDOMINAL,8"X10",ST,LF: Brand: MEDLINE

## (undated) DEVICE — CUFF TRNQT AD W4IN 18IN CIRC SURG GEL SGL PRT BLDR PNEUMAT

## (undated) DEVICE — GAUZE SPONGES,16 PLY: Brand: CURITY

## (undated) DEVICE — SYR 10ML LUER LOK 1/5ML GRAD --

## (undated) DEVICE — TAPE CAST W3INXL4YD DP BLU FBRGLS POLYUR RESIN LO TACK RIG

## (undated) DEVICE — SOLUTION IV 1000ML 0.9% SOD CHL

## (undated) DEVICE — CLIP HEMO ENDOSCP 235CM BX/10 -- RESOLUTION 360

## (undated) DEVICE — BIPOLAR ELECTROHEMOSTASIS CATHETER: Brand: GOLD PROBE

## (undated) DEVICE — SYRINGE MED 20ML STD CLR PLAS LUERLOCK TIP N CTRL DISP

## (undated) DEVICE — INTENDED FOR TISSUE SEPARATION, AND OTHER PROCEDURES THAT REQUIRE A SHARP SURGICAL BLADE TO PUNCTURE OR CUT.: Brand: BARD-PARKER SAFETY BLADES SIZE 10, STERILE

## (undated) DEVICE — KIT CLN UP BON SECOURS MARYV

## (undated) DEVICE — Device

## (undated) DEVICE — VESSEL LOOPS,MAXI, RED: Brand: DEVON

## (undated) DEVICE — REM POLYHESIVE ADULT PATIENT RETURN ELECTRODE: Brand: VALLEYLAB

## (undated) DEVICE — SHEET,DRAPE,70X100,STERILE: Brand: MEDLINE

## (undated) DEVICE — SHEET, T, LAPAROTOMY, STERILE: Brand: MEDLINE

## (undated) DEVICE — BITE BLOCK ENDOSCP UNIV AD 6 TO 9.4 MM

## (undated) DEVICE — APPLICATOR MEDICATED 26 CC SOLUTION HI LT ORNG CHLORAPREP

## (undated) DEVICE — SYR 10ML CTRL LR LCK NSAF LF --

## (undated) DEVICE — LUB SURG MEDC STRL 2OZ TUBE MC -- MEDICHOICE

## (undated) DEVICE — DRAPE C ARM UNIV W41XL74IN CLR PLAS XR VELC CLSR POLY STRP

## (undated) DEVICE — ELECTRODE PT RET AD L9FT HI MOIST COND ADH HYDRGEL CORDED

## (undated) DEVICE — SOLUTION IRRIG 1000ML H2O STRL BLT

## (undated) DEVICE — STERILE POLYISOPRENE POWDER-FREE SURGICAL GLOVES: Brand: PROTEXIS

## (undated) DEVICE — DERMACEA GAUZE ROLL: Brand: DERMACEA

## (undated) DEVICE — PACK SURG BSHR TOT KNEE LF

## (undated) DEVICE — GOWN ISOL IMPERV UNIV, DISP, OPEN BACK, BLUE --

## (undated) DEVICE — INTENDED FOR TISSUE SEPARATION, AND OTHER PROCEDURES THAT REQUIRE A SHARP SURGICAL BLADE TO PUNCTURE OR CUT.: Brand: BARD-PARKER SAFETY BLADES SIZE 15, STERILE

## (undated) DEVICE — SYR 50ML SLIP TIP NSAF LF STRL --

## (undated) DEVICE — FLEX ADVANTAGE 3000CC: Brand: FLEX ADVANTAGE

## (undated) DEVICE — (D)SYR 10ML 1/5ML GRAD NSAF -- PKGING CHANGE USE ITEM 338027

## (undated) DEVICE — 3M™ COBAN™ STERILE SELF-ADHERENT WRAP, 1584S, 4 IN X 5 YD (10 CM X 4,5 M), 18 ROLLS/CASE: Brand: 3M™ COBAN™

## (undated) DEVICE — SYRINGE MED 25GA 3ML L5/8IN SUBQ PLAS W/ DETACH NDL SFTY

## (undated) DEVICE — CANNULA ORIG TL CLR W FOAM CUSHIONS AND 14FT SUPL TB 3 CHN

## (undated) DEVICE — (D)GLOVE EXAM LG NITRL NS -- DISC BY MFR NO SUB

## (undated) DEVICE — DRESSING,GAUZE,XEROFORM,CURAD,1"X8",ST: Brand: CURAD

## (undated) DEVICE — GAUZE,SPONGE,4"X4",16PLY,STRL,LF,10/TRAY: Brand: MEDLINE

## (undated) DEVICE — MEDI-VAC NON-CONDUCTIVE SUCTION TUBING: Brand: CARDINAL HEALTH

## (undated) DEVICE — PACK PROCEDURE SURG MAJ W/ BASIN LF

## (undated) DEVICE — FLUFF AND POLYMER UNDERPAD,EXTRA HEAVY: Brand: WINGS

## (undated) DEVICE — ENDOSCOPY PUMP TUBING/ CAP SET: Brand: ERBE

## (undated) DEVICE — FORCEPS BX L240CM JAW DIA2.8MM L CAP W/ NDL MIC MESH TOOTH

## (undated) DEVICE — SUTURE VCRL SZ 3-0 L27IN ABSRB UD L26MM SH 1/2 CIR J416H

## (undated) DEVICE — BIT DRL L140MM DIA2MM QUIK CPL 3 FLUT CALIB DEPTH MRK W/O

## (undated) DEVICE — BASIN EMESIS 500CC ROSE 250/CS 60/PLT: Brand: MEDEGEN MEDICAL PRODUCTS, LLC

## (undated) DEVICE — GUIDEWIRE ORTHOPEDIC 1.4X150 MM TROCAR PT 1 END

## (undated) DEVICE — SUTURE VICRYL SZ 0 L27IN ABSRB UD L36MM CT-1 1/2 CIR J260H

## (undated) DEVICE — CLEANSER WND CLN DEB SYS IRRISEPT

## (undated) DEVICE — SUTURE VICRYL SZ 2-0 L27IN ABSRB UD L26MM SH 1/2 CIR J417H

## (undated) DEVICE — SOLUTION SCRB 4OZ 10% PVP I POVIDONE IOD TOP PAINT EXIDINE

## (undated) DEVICE — MEDI-VAC SUCTION HIGH CAPACITY: Brand: CARDINAL HEALTH

## (undated) DEVICE — KIT OR TURNOVER

## (undated) DEVICE — AIRLIFE™ NASAL OXYGEN CANNULA CURVED, NONFLARED TIP WITH 14 FOOT (4.3 M) CRUSH-RESISTANT TUBING, OVER-THE-EAR STYLE: Brand: AIRLIFE™

## (undated) DEVICE — CATHETER SUCT TR FL TIP 14FR W/ O CTRL

## (undated) DEVICE — BANDAGE COMPR EXSANGUATION SGL LAYERED NO CLSR 9FT LEN 4IN W

## (undated) DEVICE — 3M™ IOBAN™ 2 ANTIMICROBIAL INCISE DRAPE 6648EZ: Brand: IOBAN™ 2

## (undated) DEVICE — AIRLIFE™ NASAL OXYGEN CANNULA CURVED, FLARED TIP WITH 14 FOOT (4.3 M) CRUSH-RESISTANT TUBING, OVER-THE-EAR STYLE: Brand: AIRLIFE™

## (undated) DEVICE — TAPE CAST W4INXL12FT DP BLU FBRGLS POLYUR RESIN LO TACK RIG